# Patient Record
Sex: FEMALE | Race: WHITE | NOT HISPANIC OR LATINO | Employment: OTHER | URBAN - METROPOLITAN AREA
[De-identification: names, ages, dates, MRNs, and addresses within clinical notes are randomized per-mention and may not be internally consistent; named-entity substitution may affect disease eponyms.]

---

## 2017-11-10 ENCOUNTER — HOSPITAL ENCOUNTER (OUTPATIENT)
Dept: RADIOLOGY | Facility: OTHER | Age: 82
Discharge: HOME | End: 2017-11-10
Attending: UROLOGY
Payer: MEDICARE

## 2017-11-10 ENCOUNTER — HOSPITAL ENCOUNTER (OUTPATIENT)
Dept: PREADMISSION TESTING | Facility: OTHER | Age: 82
Discharge: HOME | End: 2017-11-10
Attending: UROLOGY
Payer: MEDICARE

## 2017-11-10 ENCOUNTER — OFFICE VISIT (OUTPATIENT)
Dept: UROLOGY | Facility: CLINIC | Age: 82
End: 2017-11-10
Attending: UROLOGY
Payer: MEDICARE

## 2017-11-10 ENCOUNTER — ANESTHESIA EVENT (OUTPATIENT)
Dept: SURGERY | Facility: OTHER | Age: 82
End: 2017-11-10
Payer: MEDICARE

## 2017-11-10 VITALS
WEIGHT: 150.81 LBS | BODY MASS INDEX: 26.72 KG/M2 | HEIGHT: 63 IN | DIASTOLIC BLOOD PRESSURE: 69 MMHG | HEART RATE: 81 BPM | SYSTOLIC BLOOD PRESSURE: 162 MMHG

## 2017-11-10 VITALS
TEMPERATURE: 98 F | HEART RATE: 77 BPM | WEIGHT: 150 LBS | HEIGHT: 63 IN | OXYGEN SATURATION: 99 % | BODY MASS INDEX: 26.58 KG/M2 | SYSTOLIC BLOOD PRESSURE: 151 MMHG | DIASTOLIC BLOOD PRESSURE: 67 MMHG

## 2017-11-10 DIAGNOSIS — N28.89 URETERAL MASS: ICD-10-CM

## 2017-11-10 DIAGNOSIS — I10 HTN (HYPERTENSION): ICD-10-CM

## 2017-11-10 DIAGNOSIS — N28.89 URETERAL MASS: Primary | ICD-10-CM

## 2017-11-10 DIAGNOSIS — I10 HYPERTENSION, UNSPECIFIED TYPE: ICD-10-CM

## 2017-11-10 LAB
ANION GAP SERPL CALC-SCNC: 9 MMOL/L
BASOPHILS # BLD AUTO: 0.01 K/UL
BASOPHILS NFR BLD: 0.1 %
BILIRUB SERPL-MCNC: ABNORMAL MG/DL
BLOOD URINE, POC: 250
BUN SERPL-MCNC: 22 MG/DL
CALCIUM SERPL-MCNC: 9.5 MG/DL
CHLORIDE SERPL-SCNC: 104 MMOL/L
CO2 SERPL-SCNC: 25 MMOL/L
COLOR, POC UA: ABNORMAL
CREAT SERPL-MCNC: 1.3 MG/DL
DIFFERENTIAL METHOD: ABNORMAL
EOSINOPHIL # BLD AUTO: 0.2 K/UL
EOSINOPHIL NFR BLD: 2.4 %
ERYTHROCYTE [DISTWIDTH] IN BLOOD BY AUTOMATED COUNT: 15.5 %
EST. GFR  (AFRICAN AMERICAN): 43 ML/MIN/1.73 M^2
EST. GFR  (NON AFRICAN AMERICAN): 37 ML/MIN/1.73 M^2
GLUCOSE SERPL-MCNC: 79 MG/DL
GLUCOSE UR QL STRIP: ABNORMAL
HCT VFR BLD AUTO: 35.7 %
HGB BLD-MCNC: 11.5 G/DL
KETONES UR QL STRIP: ABNORMAL
LEUKOCYTE ESTERASE URINE, POC: ABNORMAL
LYMPHOCYTES # BLD AUTO: 1.6 K/UL
LYMPHOCYTES NFR BLD: 20.7 %
MCH RBC QN AUTO: 27.3 PG
MCHC RBC AUTO-ENTMCNC: 32.2 G/DL
MCV RBC AUTO: 85 FL
MONOCYTES # BLD AUTO: 0.6 K/UL
MONOCYTES NFR BLD: 8.6 %
NEUTROPHILS # BLD AUTO: 5.1 K/UL
NEUTROPHILS NFR BLD: 67.9 %
NITRITE, POC UA: ABNORMAL
PH, POC UA: 6
PLATELET # BLD AUTO: 322 K/UL
PMV BLD AUTO: 9.6 FL
POTASSIUM SERPL-SCNC: 3.9 MMOL/L
PROTEIN, POC: ABNORMAL
RBC # BLD AUTO: 4.21 M/UL
SODIUM SERPL-SCNC: 138 MMOL/L
SPECIFIC GRAVITY, POC UA: 1.01
UROBILINOGEN, POC UA: ABNORMAL
WBC # BLD AUTO: 7.48 K/UL

## 2017-11-10 PROCEDURE — 71020 XR CHEST PA AND LATERAL PRE-OP: CPT | Mod: 26,,, | Performed by: RADIOLOGY

## 2017-11-10 PROCEDURE — 85025 COMPLETE CBC W/AUTO DIFF WBC: CPT

## 2017-11-10 PROCEDURE — 99205 OFFICE O/P NEW HI 60 MIN: CPT | Mod: 25,S$GLB,, | Performed by: UROLOGY

## 2017-11-10 PROCEDURE — 87086 URINE CULTURE/COLONY COUNT: CPT

## 2017-11-10 PROCEDURE — 93010 ELECTROCARDIOGRAM REPORT: CPT | Mod: ,,, | Performed by: INTERNAL MEDICINE

## 2017-11-10 PROCEDURE — 93005 ELECTROCARDIOGRAM TRACING: CPT

## 2017-11-10 PROCEDURE — 81002 URINALYSIS NONAUTO W/O SCOPE: CPT | Mod: S$GLB,,, | Performed by: UROLOGY

## 2017-11-10 PROCEDURE — 36415 COLL VENOUS BLD VENIPUNCTURE: CPT

## 2017-11-10 PROCEDURE — 80048 BASIC METABOLIC PNL TOTAL CA: CPT

## 2017-11-10 PROCEDURE — 88112 CYTOPATH CELL ENHANCE TECH: CPT | Performed by: PATHOLOGY

## 2017-11-10 PROCEDURE — 88112 CYTOPATH CELL ENHANCE TECH: CPT | Mod: 26,,, | Performed by: PATHOLOGY

## 2017-11-10 PROCEDURE — 71020 XR CHEST PA AND LATERAL PRE-OP: CPT | Mod: TC

## 2017-11-10 RX ORDER — CLOPIDOGREL BISULFATE 75 MG/1
75 TABLET ORAL DAILY
Status: ON HOLD | COMMUNITY
End: 2017-11-15 | Stop reason: HOSPADM

## 2017-11-10 RX ORDER — NAPROXEN SODIUM 220 MG/1
81 TABLET, FILM COATED ORAL DAILY
Status: ON HOLD | COMMUNITY
End: 2017-11-15 | Stop reason: HOSPADM

## 2017-11-10 RX ORDER — NITROFURANTOIN 25; 75 MG/1; MG/1
100 CAPSULE ORAL DAILY
Status: ON HOLD | COMMUNITY
End: 2017-11-15 | Stop reason: HOSPADM

## 2017-11-10 RX ORDER — LIDOCAINE HYDROCHLORIDE 20 MG/ML
JELLY TOPICAL ONCE
Status: CANCELLED | OUTPATIENT
Start: 2017-11-10 | End: 2017-11-10

## 2017-11-10 RX ORDER — FENOFIBRATE 145 MG/1
145 TABLET, FILM COATED ORAL DAILY
COMMUNITY
End: 2018-05-07 | Stop reason: SDUPTHER

## 2017-11-10 RX ORDER — SODIUM CHLORIDE, SODIUM LACTATE, POTASSIUM CHLORIDE, CALCIUM CHLORIDE 600; 310; 30; 20 MG/100ML; MG/100ML; MG/100ML; MG/100ML
INJECTION, SOLUTION INTRAVENOUS CONTINUOUS
Status: CANCELLED | OUTPATIENT
Start: 2017-11-10

## 2017-11-10 RX ORDER — AMLODIPINE BESYLATE 2.5 MG/1
2.5 TABLET ORAL DAILY
COMMUNITY
End: 2018-04-10 | Stop reason: SDUPTHER

## 2017-11-10 RX ORDER — FAMOTIDINE 20 MG/1
20 TABLET, FILM COATED ORAL
Status: CANCELLED | OUTPATIENT
Start: 2017-11-10 | End: 2017-11-10

## 2017-11-10 NOTE — LETTER
November 10, 2017      MARIA ELENA Larson III, MD  [address]  [address]  [facility] [address]           [facility] - Urology  [address] [place] [address], [address] [address]  Phone: [phone]          Patient: Destinee Luo   MR Number: [id number]   YOB: 1931   Date of Visit: 11/10/2017       Dear Dr. MARIA ELENA Larson III:    Thank you for referring Destinee Luo to me for evaluation. Attached you will find relevant portions of my assessment and plan of care.    If you have questions, please do not hesitate to call me. I look forward to following Destinee Luo along with you.    Sincerely,    Kiet Arellano MD    Enclosure  CC:  No Recipients    If you would like to receive this communication electronically, please contact [email or web address] or [phone] to request more information on [facility] access.    For providers and/or their staff who would like to refer a patient to [facility], please contact us through our one-stop-shop provider referral line, [facility], at [phone].    If you feel you have received this communication in error or would no longer like to receive these types of communications, please e-mail [email or web address]

## 2017-11-10 NOTE — DISCHARGE INSTRUCTIONS
PRE-ADMIT TESTING -  [phone]    [address]  [place] [facility]          Your surgery has been scheduled at [facility]. We are pleased to have the opportunity to serve you. For Further Information please call [phone].    On the day of surgery please report to the Information Desk on the 1st floor.    · CONTACT YOUR PHYSICIAN'S OFFICE THE DAY PRIOR TO YOUR SURGERY TO OBTAIN YOUR ARRIVAL TIME.     · The evening before surgery do not eat anything after 9 p.m. ( this includes hard candy, chewing gum and mints).  You may only have GATORADE, POWERADE AND WATER  from 9 p.m. until you leave your home.   DO NOT DRINK ANY LIQUIDS ON THE WAY TO THE HOSPITAL.      SPECIAL MEDICATION INSTRUCTIONS: TAKE medications checked off by the Anesthesiologist on your Medication List.    Angiogram Patients: Take medications as instructed by your physician, including aspirin.     Surgery Patients:    If you take ASPIRIN - Your PHYSICIAN/SURGEON will need to inform you IF/OR when you need to stop taking aspirin prior to your surgery.     Do Not take any medications containing IBUPROFEN.  Do Not Wear any make-up or dark nail polish   (especially eye make-up) to surgery. If you come to surgery with makeup on you will be required to remove the makeup or nail polish.    Do not shave your surgical area at least 5 days prior to your surgery. The surgical prep will be performed at the hospital according to Infection Control regulations.    Leave all valuables at home.   Do Not wear any jewelry or watches, including any metal in body piercings.  Contact Lens must be removed before surgery. Either do not wear the contact lens or bring a case and solution for storage.  Please bring a container for eyeglasses or dentures as required.  Bring any paperwork your physician has provided, such as consent forms,  history and physicals, doctor's orders, etc.   Bring comfortable clothes that are loose fitting to wear upon  discharge. Take into consideration the type of surgery being performed.  Maintain your diet as advised per your physician the day prior to surgery.      Adequate rest the night before surgery is advised.   Park in the Parking lot behind the hospital or in the [facility] across the street from the parking lot. Parking is complimentary.  If you will be discharged the same day as your procedure, please arrange for a responsible adult to drive you home or to accompany you if traveling by taxi.   YOU WILL NOT BE PERMITTED TO DRIVE OR TO LEAVE THE HOSPITAL ALONE AFTER SURGERY.   It is strongly recommended that you arrange for someone to remain with you for the first 24 hrs following your surgery.       Thank you for your cooperation.  The Staff of [facility].        Bathing Instructions                                                                 Please shower the evening before and morning of your procedure with    ANTIBACTERIAL SOAP. ( DIAL, etc )  Concentrate on the surgical area   for at least 3 minutes and rinse completely. Dry off as usual.   Do not use any deodorant, powder, body lotions, perfume, after shave or    cologne.

## 2017-11-10 NOTE — ANESTHESIA PREPROCEDURE EVALUATION
11/10/2017  Destinee Luo is a 85 y.o., female.    Anesthesia Evaluation    I have reviewed the Patient Summary Reports.    I have reviewed the Nursing Notes.   I have reviewed the Medications.     Review of Systems  Anesthesia Hx:  No problems with previous Anesthesia  Denies Family Hx of Anesthesia complications.    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    Endocrine:  Endocrine Normal        Physical Exam   Airway/Jaw/Neck:  Airway Findings: Mouth Opening: Normal Tongue: Normal  General Airway Assessment: Adult  Mallampati: I         Dental:  Dental Findings: In tact             Anesthesia Plan  Type of Anesthesia, risks & benefits discussed:  Anesthesia Type:  general  Patient's Preference:   Intra-op Monitoring Plan: standard ASA monitors  Intra-op Monitoring Plan Comments:   Post Op Pain Control Plan:   Post Op Pain Control Plan Comments:   Induction:   IV  Beta Blocker:         Informed Consent: Patient understands risks and agrees with Anesthesia plan.  Questions answered. Anesthesia consent signed with patient.  ASA Score: 2     Day of Surgery Review of History & Physical:    H&P update referred to the surgeon.         Ready For Surgery From Anesthesia Perspective.

## 2017-11-10 NOTE — PROGRESS NOTES
[facility] Urolog  Urology  History & Physical    Patient Name: Destinee Luo  MRN: [id number]  Admission Date: (Not on file)  Code Status: [unfilled]   Attending Provider: Kiet Arellano MD   Primary Care Physician: Primary Doctor No  Principal Problem:[unfilled]    Subjective:     HPI:   85-year-old female referred by Dr. Joseph Larson.  She has had bleeding into her underwear for quite a few months.  It is not clear if this is vaginal or in the urine.  She tells me she was evaluated by her gynecologist and he did not find a source.  She recently saw Dr. Dorian Larson, urology.  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  He performed a retrograde pyelogram and was not able to get a stent up the right ureter.  She is not symptomatic.  She presents for further evaluation.  No symptoms of metastatic disease.  Otherwise she feels well.    She stopped her Plavix about 2 days ago with the permission of her cardiologist.  She is on this for carotid stenosis.  She does not have any stents and is not having any chest pain or CVA type symptoms.  She had a TIA in the past    No past medical history on file.    No past surgical history on file.    Review of patient's allergies indicates:   Allergen Reactions    Codeine Anxiety       Family History     None          Social History Main Topics    Smoking status: Never Smoker    Smokeless tobacco: Never Used    Alcohol use Not on file    Drug use: Unknown    Sexual activity: Not on file       Review of Systems    Objective:     [unfilled]     Body mass index is 26.71 kg/m².    [unfilled]       Lines/Drains/Airways          No matching active lines, drains, or airways          Physical Exam    Ao*4  resp normal rate' breathing not labored; clear to auscultation bilateral  cv normal rate; regular rate and rhythm  Ab nondistended  Ext no edema  No inguinal adenopathy          Significant Imaging:    Ultrasound and  retrograde pyelogram show right hydronephrosis with right ureteral mass.  Reports reviewed images not available    Assessment and Plan:     There are no hospital problems to display for this patient.    Right ureteral mass    Chest x-ray, CBC, CMP, urine cytology, CT urogram  We will schedule right ureteroscopy and biopsy.  I explained that a nephrostomy tube may be necessary as a second procedure if we are unable to obtain access to the right kidney    We discussed all the risk and benefits and alternatives and I answered all of her questions and her family's questions.  They would like to proceed as above.  We will tentatively schedule ureteroscopy for next Wednesday    60 min face to face; more than 50% time spent counseling and coordinating care        VTE Risk Mitigation     None          Kiet Arellano MD  Urology  [facility]  Urology

## 2017-11-11 LAB — BACTERIA UR CULT: NO GROWTH

## 2017-11-13 ENCOUNTER — HOSPITAL ENCOUNTER (OUTPATIENT)
Dept: RADIOLOGY | Facility: HOSPITAL | Age: 82
Discharge: HOME | End: 2017-11-13
Attending: UROLOGY
Payer: MEDICARE

## 2017-11-13 DIAGNOSIS — N28.89 URETERAL MASS: ICD-10-CM

## 2017-11-13 PROCEDURE — 74178 CT ABD&PLV WO CNTR FLWD CNTR: CPT | Mod: 26,,, | Performed by: RADIOLOGY

## 2017-11-13 PROCEDURE — 25500020 PHARM REV CODE 255: Mod: PO | Performed by: UROLOGY

## 2017-11-13 PROCEDURE — 74178 CT ABD&PLV WO CNTR FLWD CNTR: CPT | Mod: TC,PO

## 2017-11-13 RX ADMIN — IOHEXOL 125 ML: 350 INJECTION, SOLUTION INTRAVENOUS at 10:11

## 2017-11-15 ENCOUNTER — ANESTHESIA (OUTPATIENT)
Dept: SURGERY | Facility: OTHER | Age: 82
End: 2017-11-15
Payer: MEDICARE

## 2017-11-15 ENCOUNTER — TELEPHONE (OUTPATIENT)
Dept: UROLOGY | Facility: CLINIC | Age: 82
End: 2017-11-15

## 2017-11-15 ENCOUNTER — HOSPITAL ENCOUNTER (OUTPATIENT)
Facility: OTHER | Age: 82
Discharge: HOME | End: 2017-11-15
Attending: UROLOGY | Admitting: UROLOGY
Payer: MEDICARE

## 2017-11-15 VITALS
SYSTOLIC BLOOD PRESSURE: 149 MMHG | WEIGHT: 150 LBS | HEIGHT: 63 IN | HEART RATE: 84 BPM | BODY MASS INDEX: 26.58 KG/M2 | RESPIRATION RATE: 16 BRPM | TEMPERATURE: 98 F | DIASTOLIC BLOOD PRESSURE: 69 MMHG | OXYGEN SATURATION: 93 %

## 2017-11-15 DIAGNOSIS — N28.89 URETERAL MASS: ICD-10-CM

## 2017-11-15 DIAGNOSIS — N13.6 PYONEPHROSIS: Primary | ICD-10-CM

## 2017-11-15 LAB
GRAM STN SPEC: NORMAL
GRAM STN SPEC: NORMAL

## 2017-11-15 PROCEDURE — 88112 CYTOPATH CELL ENHANCE TECH: CPT | Mod: 26,,, | Performed by: PATHOLOGY

## 2017-11-15 PROCEDURE — C2617 STENT, NON-COR, TEM W/O DEL: HCPCS | Performed by: UROLOGY

## 2017-11-15 PROCEDURE — 87116 MYCOBACTERIA CULTURE: CPT

## 2017-11-15 PROCEDURE — 88112 CYTOPATH CELL ENHANCE TECH: CPT | Performed by: PATHOLOGY

## 2017-11-15 PROCEDURE — 74420 UROGRAPHY RTRGR +-KUB: CPT | Mod: 26,,, | Performed by: UROLOGY

## 2017-11-15 PROCEDURE — 25000003 PHARM REV CODE 250: Performed by: ANESTHESIOLOGY

## 2017-11-15 PROCEDURE — 63600175 PHARM REV CODE 636 W HCPCS: Performed by: UROLOGY

## 2017-11-15 PROCEDURE — 36000707: Performed by: UROLOGY

## 2017-11-15 PROCEDURE — 25000003 PHARM REV CODE 250: Performed by: NURSE ANESTHETIST, CERTIFIED REGISTERED

## 2017-11-15 PROCEDURE — 63600175 PHARM REV CODE 636 W HCPCS: Performed by: NURSE ANESTHETIST, CERTIFIED REGISTERED

## 2017-11-15 PROCEDURE — 87205 SMEAR GRAM STAIN: CPT

## 2017-11-15 PROCEDURE — C1758 CATHETER, URETERAL: HCPCS | Performed by: UROLOGY

## 2017-11-15 PROCEDURE — 37000009 HC ANESTHESIA EA ADD 15 MINS: Performed by: UROLOGY

## 2017-11-15 PROCEDURE — 36000706: Performed by: UROLOGY

## 2017-11-15 PROCEDURE — 71000016 HC POSTOP RECOV ADDL HR: Performed by: UROLOGY

## 2017-11-15 PROCEDURE — 71000033 HC RECOVERY, INTIAL HOUR: Performed by: UROLOGY

## 2017-11-15 PROCEDURE — 87075 CULTR BACTERIA EXCEPT BLOOD: CPT

## 2017-11-15 PROCEDURE — 37000008 HC ANESTHESIA 1ST 15 MINUTES: Performed by: UROLOGY

## 2017-11-15 PROCEDURE — C1769 GUIDE WIRE: HCPCS | Performed by: UROLOGY

## 2017-11-15 PROCEDURE — 25500020 PHARM REV CODE 255: Performed by: UROLOGY

## 2017-11-15 PROCEDURE — 52332 CYSTOSCOPY AND TREATMENT: CPT | Mod: RT,,, | Performed by: UROLOGY

## 2017-11-15 PROCEDURE — 87070 CULTURE OTHR SPECIMN AEROBIC: CPT

## 2017-11-15 PROCEDURE — 71000015 HC POSTOP RECOV 1ST HR: Performed by: UROLOGY

## 2017-11-15 PROCEDURE — 87102 FUNGUS ISOLATION CULTURE: CPT

## 2017-11-15 PROCEDURE — 63600175 PHARM REV CODE 636 W HCPCS: Performed by: ANESTHESIOLOGY

## 2017-11-15 DEVICE — STENT URETERAL UNIV 6FR 24CM: Type: IMPLANTABLE DEVICE | Site: KIDNEY | Status: FUNCTIONAL

## 2017-11-15 RX ORDER — ACETAMINOPHEN 10 MG/ML
INJECTION, SOLUTION INTRAVENOUS
Status: DISCONTINUED | OUTPATIENT
Start: 2017-11-15 | End: 2017-11-15

## 2017-11-15 RX ORDER — SODIUM CHLORIDE 9 MG/ML
INJECTION, SOLUTION INTRAVENOUS CONTINUOUS
Status: DISCONTINUED | OUTPATIENT
Start: 2017-11-15 | End: 2017-11-15 | Stop reason: HOSPADM

## 2017-11-15 RX ORDER — FENTANYL CITRATE 50 UG/ML
25 INJECTION, SOLUTION INTRAMUSCULAR; INTRAVENOUS EVERY 5 MIN PRN
Status: DISCONTINUED | OUTPATIENT
Start: 2017-11-15 | End: 2017-11-15 | Stop reason: HOSPADM

## 2017-11-15 RX ORDER — DEXAMETHASONE SODIUM PHOSPHATE 4 MG/ML
INJECTION, SOLUTION INTRA-ARTICULAR; INTRALESIONAL; INTRAMUSCULAR; INTRAVENOUS; SOFT TISSUE
Status: DISCONTINUED | OUTPATIENT
Start: 2017-11-15 | End: 2017-11-15

## 2017-11-15 RX ORDER — SODIUM CHLORIDE, SODIUM LACTATE, POTASSIUM CHLORIDE, CALCIUM CHLORIDE 600; 310; 30; 20 MG/100ML; MG/100ML; MG/100ML; MG/100ML
INJECTION, SOLUTION INTRAVENOUS CONTINUOUS
Status: DISCONTINUED | OUTPATIENT
Start: 2017-11-15 | End: 2017-11-15 | Stop reason: HOSPADM

## 2017-11-15 RX ORDER — FAMOTIDINE 20 MG/1
20 TABLET, FILM COATED ORAL
Status: COMPLETED | OUTPATIENT
Start: 2017-11-15 | End: 2017-11-15

## 2017-11-15 RX ORDER — LIDOCAINE HCL/PF 100 MG/5ML
SYRINGE (ML) INTRAVENOUS
Status: DISCONTINUED | OUTPATIENT
Start: 2017-11-15 | End: 2017-11-15

## 2017-11-15 RX ORDER — SODIUM CHLORIDE 0.9 % (FLUSH) 0.9 %
3 SYRINGE (ML) INJECTION
Status: DISCONTINUED | OUTPATIENT
Start: 2017-11-15 | End: 2017-11-15 | Stop reason: HOSPADM

## 2017-11-15 RX ORDER — PROPOFOL 10 MG/ML
VIAL (ML) INTRAVENOUS
Status: DISCONTINUED | OUTPATIENT
Start: 2017-11-15 | End: 2017-11-15

## 2017-11-15 RX ORDER — HYDROMORPHONE HYDROCHLORIDE 2 MG/ML
0.4 INJECTION, SOLUTION INTRAMUSCULAR; INTRAVENOUS; SUBCUTANEOUS EVERY 5 MIN PRN
Status: DISCONTINUED | OUTPATIENT
Start: 2017-11-15 | End: 2017-11-15 | Stop reason: HOSPADM

## 2017-11-15 RX ORDER — OXYCODONE HYDROCHLORIDE 5 MG/1
5 TABLET ORAL
Status: DISCONTINUED | OUTPATIENT
Start: 2017-11-15 | End: 2017-11-15 | Stop reason: HOSPADM

## 2017-11-15 RX ORDER — MEPERIDINE HYDROCHLORIDE 50 MG/ML
12.5 INJECTION INTRAMUSCULAR; INTRAVENOUS; SUBCUTANEOUS ONCE AS NEEDED
Status: DISCONTINUED | OUTPATIENT
Start: 2017-11-15 | End: 2017-11-15 | Stop reason: HOSPADM

## 2017-11-15 RX ORDER — FLUCONAZOLE 2 MG/ML
200 INJECTION, SOLUTION INTRAVENOUS
Status: DISCONTINUED | OUTPATIENT
Start: 2017-11-15 | End: 2017-11-15 | Stop reason: HOSPADM

## 2017-11-15 RX ORDER — LIDOCAINE HYDROCHLORIDE 20 MG/ML
JELLY TOPICAL ONCE
Status: DISCONTINUED | OUTPATIENT
Start: 2017-11-15 | End: 2017-11-15 | Stop reason: HOSPADM

## 2017-11-15 RX ORDER — ONDANSETRON 2 MG/ML
4 INJECTION INTRAMUSCULAR; INTRAVENOUS DAILY PRN
Status: DISCONTINUED | OUTPATIENT
Start: 2017-11-15 | End: 2017-11-15 | Stop reason: HOSPADM

## 2017-11-15 RX ORDER — FLUCONAZOLE 100 MG/1
100 TABLET ORAL DAILY
Qty: 14 TABLET | Refills: 0 | Status: SHIPPED | OUTPATIENT
Start: 2017-11-15 | End: 2017-11-29

## 2017-11-15 RX ORDER — AMOXICILLIN AND CLAVULANATE POTASSIUM 875; 125 MG/1; MG/1
1 TABLET, FILM COATED ORAL 2 TIMES DAILY
Qty: 28 TABLET | Refills: 0 | Status: SHIPPED | OUTPATIENT
Start: 2017-11-15 | End: 2017-12-01

## 2017-11-15 RX ORDER — ONDANSETRON 2 MG/ML
INJECTION INTRAMUSCULAR; INTRAVENOUS
Status: DISCONTINUED | OUTPATIENT
Start: 2017-11-15 | End: 2017-11-15

## 2017-11-15 RX ORDER — CEFAZOLIN SODIUM 2 G/50ML
2 SOLUTION INTRAVENOUS
Status: COMPLETED | OUTPATIENT
Start: 2017-11-15 | End: 2017-11-15

## 2017-11-15 RX ORDER — FENTANYL CITRATE 50 UG/ML
INJECTION, SOLUTION INTRAMUSCULAR; INTRAVENOUS
Status: DISCONTINUED | OUTPATIENT
Start: 2017-11-15 | End: 2017-11-15

## 2017-11-15 RX ADMIN — PROPOFOL 140 MG: 10 INJECTION, EMULSION INTRAVENOUS at 10:11

## 2017-11-15 RX ADMIN — FENTANYL CITRATE 50 MCG: 50 INJECTION, SOLUTION INTRAMUSCULAR; INTRAVENOUS at 10:11

## 2017-11-15 RX ADMIN — OXYCODONE HYDROCHLORIDE 5 MG: 5 TABLET ORAL at 11:11

## 2017-11-15 RX ADMIN — ONDANSETRON 4 MG: 2 INJECTION INTRAMUSCULAR; INTRAVENOUS at 12:11

## 2017-11-15 RX ADMIN — PROMETHAZINE HYDROCHLORIDE 6.25 MG: 25 INJECTION INTRAMUSCULAR; INTRAVENOUS at 12:11

## 2017-11-15 RX ADMIN — FLUCONAZOLE 200 MG: 200 INJECTION, SOLUTION INTRAVENOUS at 10:11

## 2017-11-15 RX ADMIN — LIDOCAINE HYDROCHLORIDE 50 MG: 20 INJECTION, SOLUTION INTRAVENOUS at 10:11

## 2017-11-15 RX ADMIN — CARBOXYMETHYLCELLULOSE SODIUM 2 DROP: 2.5 SOLUTION/ DROPS OPHTHALMIC at 10:11

## 2017-11-15 RX ADMIN — CEFAZOLIN SODIUM 2 G: 2 SOLUTION INTRAVENOUS at 10:11

## 2017-11-15 RX ADMIN — FAMOTIDINE 20 MG: 20 TABLET, FILM COATED ORAL at 08:11

## 2017-11-15 RX ADMIN — DEXAMETHASONE SODIUM PHOSPHATE 8 MG: 4 INJECTION, SOLUTION INTRAMUSCULAR; INTRAVENOUS at 10:11

## 2017-11-15 RX ADMIN — ACETAMINOPHEN 1000 MG: 10 INJECTION, SOLUTION INTRAVENOUS at 10:11

## 2017-11-15 RX ADMIN — ONDANSETRON 4 MG: 2 INJECTION INTRAMUSCULAR; INTRAVENOUS at 10:11

## 2017-11-15 RX ADMIN — SODIUM CHLORIDE, SODIUM LACTATE, POTASSIUM CHLORIDE, AND CALCIUM CHLORIDE: 600; 310; 30; 20 INJECTION, SOLUTION INTRAVENOUS at 09:11

## 2017-11-15 NOTE — TELEPHONE ENCOUNTER
----- Message from Luz Plaza sent at 11/15/2017  4:09 PM CST -----  Contact: Dayana ayala 1st Request  _ 2nd Request  _ 3rd Request    Who:Ara pt granddaughter     Why: Ara is calling in regards to a ultra sound order for pt. Please call and advise.     What Number to Call Back: [phone]     When to Expect a call back: (Before the end of the day)  -- if call after 3:00 call back will be tomorrow.

## 2017-11-15 NOTE — H&P (VIEW-ONLY)
[facility] Urolog  Urology  History & Physical    Patient Name: Destinee Luo  MRN: [id number]  Admission Date: (Not on file)  Code Status: [unfilled]   Attending Provider: Kiet Arellano MD   Primary Care Physician: Primary Doctor No  Principal Problem:[unfilled]    Subjective:     HPI:   85-year-old female referred by Dr. Joseph Larson.  She has had bleeding into her underwear for quite a few months.  It is not clear if this is vaginal or in the urine.  She tells me she was evaluated by her gynecologist and he did not find a source.  She recently saw Dr. Dorian Larson, urology.  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  He performed a retrograde pyelogram and was not able to get a stent up the right ureter.  She is not symptomatic.  She presents for further evaluation.  No symptoms of metastatic disease.  Otherwise she feels well.    She stopped her Plavix about 2 days ago with the permission of her cardiologist.  She is on this for carotid stenosis.  She does not have any stents and is not having any chest pain or CVA type symptoms.  She had a TIA in the past    No past medical history on file.    No past surgical history on file.    Review of patient's allergies indicates:   Allergen Reactions    Codeine Anxiety       Family History     None          Social History Main Topics    Smoking status: Never Smoker    Smokeless tobacco: Never Used    Alcohol use Not on file    Drug use: Unknown    Sexual activity: Not on file       Review of Systems    Objective:     [unfilled]     Body mass index is 26.71 kg/m².    [unfilled]       Lines/Drains/Airways          No matching active lines, drains, or airways          Physical Exam    Ao*4  resp normal rate' breathing not labored; clear to auscultation bilateral  cv normal rate; regular rate and rhythm  Ab nondistended  Ext no edema  No inguinal adenopathy          Significant Imaging:    Ultrasound and  retrograde pyelogram show right hydronephrosis with right ureteral mass.  Reports reviewed images not available    Assessment and Plan:     There are no hospital problems to display for this patient.    Right ureteral mass    Chest x-ray, CBC, CMP, urine cytology, CT urogram  We will schedule right ureteroscopy and biopsy.  I explained that a nephrostomy tube may be necessary as a second procedure if we are unable to obtain access to the right kidney    We discussed all the risk and benefits and alternatives and I answered all of her questions and her family's questions.  They would like to proceed as above.  We will tentatively schedule ureteroscopy for next Wednesday    60 min face to face; more than 50% time spent counseling and coordinating care        VTE Risk Mitigation     None          Kiet Arellano MD  Urology  [facility]  Urology

## 2017-11-15 NOTE — PLAN OF CARE
Destinee Luo has met all discharge criteria from Phase II. Vital Signs are stable, ambulating  without difficulty. Discharge instructions given, patient verbalized understanding. Discharged from facility via wheelchair in stable condition.

## 2017-11-15 NOTE — INTERVAL H&P NOTE
The patient has been examined and the H&P has been reviewed:    cs neg; cytology shows yeast not no malignant cells; will give diflucan    Anesthesia/Surgery risks, benefits and alternative options discussed and understood by patient/family.          Active Hospital Problems    Diagnosis  POA    Ureteral mass [N28.9]  Yes      Resolved Hospital Problems    Diagnosis Date Resolved POA   No resolved problems to display.

## 2017-11-15 NOTE — TRANSFER OF CARE
"Anesthesia Transfer of Care Note    Patient: Destinee CARDONA Major    Procedure(s) Performed: Procedure(s) (LRB):  URETEROSCOPY; biopsy (Right)  CYSTOSCOPY/ RETROGRADE PYELOGRAM/ STENT PLACEMENT/STENT EXCHANGE (Right)    Patient location: PACU    Anesthesia Type: general    Transport from OR: Transported from OR on 2-3 L/min O2 by NC with adequate spontaneous ventilation    Post pain: adequate analgesia    Post assessment: no apparent anesthetic complications and tolerated procedure well    Post vital signs: stable    Level of consciousness: awake, alert and oriented    Nausea/Vomiting: no nausea/vomiting    Complications: none    Transfer of care protocol was followed      Last vitals:   Visit Vitals  BP (!) 170/77 (BP Location: Left arm, Patient Position: Sitting)   Pulse 80   Temp 36.6 °C (97.8 °F) (Oral)   Resp 18   Ht 5' 3" (1.6 m)   Wt 68 kg (150 lb)   SpO2 100%   BMI 26.57 kg/m²     "

## 2017-11-15 NOTE — OP NOTE
DATE OF PROCEDURE:  11/15/2017.    SURGEON:  Kiet Arellano M.D.    PROCEDURE PERFORMED:  Cystoscopy with right retrograde pyelogram, right ureteral   stent placement and cultures from right kidney.    PREOPERATIVE DIAGNOSES:  1.  Right ureteral mass.  2.  Ovarian lesions.    POSTOPERATIVE DIAGNOSES:  1.  Right ureteral mass.  2.  Ovarian lesions.  3.  Pyonephrosis present on admission.    ANESTHESIA:  MAC.    COMPLICATIONS:  None.    HISTORY:  Ms. Luo is an 85-year-old woman who has had some bleeding.  It is   not clear if the bleeding is urinary or vaginal.  She has seen her gynecologist   and he did not find a vaginal source for her bleeding.  Imaging reveals a mass   in the right distal ureter with hydronephrosis.  Stent placement was   unsuccessful and she was referred by Dr. Joseph Larson for further evaluation and   treatment.  Urine cytology showed yeast, but surprisingly no malignant cells.    The left upper tracts were unremarkable.  Preop urine culture is negative.  She   presents for ureteroscopy and biopsy of the right ureteral mass.  Informed   consent is obtained.    PROCEDURE IN DETAIL:  Preop Diflucan and Ancef were administered.  TEDs and SCDs   were applied.  The patient was brought to Cysto and undergoes LMA anesthesia.    Sterile prep and drape was performed.  She was placed in the dorsal lithotomy   position.  Cystoscopy was performed with 30 and 70 degree lenses.  The bladder   was unremarkable.  There were no tumors and no stones in the bladder.  Both   ureteral orifices are in the anticipated locations.  A right retrograde   pyelogram was performed with the cone tip catheter.  There was an obstructive lesion in the ureter aproximately 5 cm proximal to the ureteral orifice.  There   does appear to be intrinsic obstruction of the right ureter with a filling defect.  The proximal   ureter was severely dilated.  The open-ended catheter was advanced after   removing the cone tip catheter.  A  [employer] sensor wires advanced   beyond the obstruction.  After performing this, there was a large amount of   gross purulent material expressed.  The open-ended catheter was advanced into   the kidney.  The wire was removed and a large amount of purulent urine is   drained from the kidney.  This was sent for urine culture including AFB and   fungal cultures and cytology.  I did not think it was safe to proceed with the   ureteroscopy at this time.  Therefore, we placed the stent and will reschedule   ureteroscopy next week after she was treated with antibiotics and antifungals.    A 6-Serbian 24 cm double-J ureteral stent was placed over a wire.  The stent   coils well in the area of the kidney and in the bladder.  There is brisk efflux   of the side holes of the stent.  The bladder was drained.  The scope was   removed.  Bimanual examination was performed.  There was a pessary in place.  No   masses are palpable on examination.  The patient tolerated the procedure well.    I will order a pelvic ultrasound to follow up on the ovarian lesions that were   identified.  I did review this with one of our GYN and oncologist and they were   not particularly concerned about the ovarian lesions.  She will return next   Wednesday for ureteroscopy.  We will keep her on Augmentin and Diflucan until   that time.      CHARLES/BLAKE  dd: 11/15/2017 11:17:21 (CST)  td: 11/15/2017 11:59:35 (CST)  Doc ID   #[id number]  Job ID #[id number]    CC:

## 2017-11-15 NOTE — ANESTHESIA POSTPROCEDURE EVALUATION
"Anesthesia Post Evaluation    Patient: Destinee CARDONA Major    Procedure(s) Performed: Procedure(s) (LRB):  URETEROSCOPY; biopsy (Right)  CYSTOSCOPY/ RETROGRADE PYELOGRAM/ STENT PLACEMENT/STENT EXCHANGE (Right)    Final Anesthesia Type: general  Patient location during evaluation: PACU  Patient participation: Yes- Able to Participate  Level of consciousness: awake and alert  Post-procedure vital signs: reviewed and stable  Pain management: adequate  Airway patency: patent  PONV status at discharge: No PONV  Anesthetic complications: no      Cardiovascular status: blood pressure returned to baseline  Respiratory status: unassisted and spontaneous ventilation  Hydration status: euvolemic  Follow-up not needed.        Visit Vitals  BP (!) 150/76   Pulse 80   Temp 36.6 °C (97.9 °F) (Oral)   Resp 16   Ht 5' 3" (1.6 m)   Wt 68 kg (150 lb)   SpO2 95%   BMI 26.57 kg/m²       Pain/Anabella Score: Pain Assessment Performed: Yes (11/15/2017 12:05 PM)  Presence of Pain: denies (11/15/2017 12:05 PM)  Pain Rating Prior to Med Admin: 0 (11/15/2017 11:27 AM)  Pain Rating Post Med Admin: 0 (11/15/2017 11:27 AM)  Anabella Score: 10 (11/15/2017 11:35 AM)      "

## 2017-11-15 NOTE — DISCHARGE INSTRUCTIONS

## 2017-11-16 ENCOUNTER — TELEPHONE (OUTPATIENT)
Dept: UROLOGY | Facility: CLINIC | Age: 82
End: 2017-11-16

## 2017-11-16 DIAGNOSIS — N13.6 PYONEPHROSIS: Primary | ICD-10-CM

## 2017-11-16 NOTE — TELEPHONE ENCOUNTER
----- Message from Jv Sierra sent at 11/16/2017 10:14 AM CST -----  Contact: Pt Ara Ludwig   x_  1st Request  _  2nd Request  _  3rd Request        Who: Pt Ara Ludwig     Why: Requesting a call back in regards to speaking with gio about scheduling concerns.   Please return the call at earliest convenience to discuss further. Thanks!    What Number to Call Back: ara @ [phone]     When to Expect a call back: (Within 24 hours)

## 2017-11-17 ENCOUNTER — HOSPITAL ENCOUNTER (OUTPATIENT)
Dept: RADIOLOGY | Facility: HOSPITAL | Age: 82
Discharge: HOME | End: 2017-11-17
Attending: UROLOGY
Payer: MEDICARE

## 2017-11-17 DIAGNOSIS — N13.6 PYONEPHROSIS: ICD-10-CM

## 2017-11-17 PROCEDURE — 76856 US EXAM PELVIC COMPLETE: CPT | Mod: TC,PO

## 2017-11-17 PROCEDURE — 76830 TRANSVAGINAL US NON-OB: CPT | Mod: 26,,, | Performed by: RADIOLOGY

## 2017-11-17 PROCEDURE — 76856 US EXAM PELVIC COMPLETE: CPT | Mod: 26,,, | Performed by: RADIOLOGY

## 2017-11-18 LAB — BACTERIA SPEC AEROBE CULT: NO GROWTH

## 2017-11-22 ENCOUNTER — ANESTHESIA (OUTPATIENT)
Dept: SURGERY | Facility: OTHER | Age: 82
DRG: 744 | End: 2017-11-22
Payer: MEDICARE

## 2017-11-22 ENCOUNTER — ANESTHESIA EVENT (OUTPATIENT)
Dept: SURGERY | Facility: OTHER | Age: 82
DRG: 744 | End: 2017-11-22
Payer: MEDICARE

## 2017-11-22 ENCOUNTER — HOSPITAL ENCOUNTER (INPATIENT)
Facility: OTHER | Age: 82
LOS: 1 days | Discharge: HOME | DRG: 744 | End: 2017-11-22
Attending: UROLOGY | Admitting: UROLOGY
Payer: MEDICARE

## 2017-11-22 VITALS
TEMPERATURE: 98 F | BODY MASS INDEX: 26.58 KG/M2 | OXYGEN SATURATION: 100 % | HEART RATE: 79 BPM | RESPIRATION RATE: 16 BRPM | SYSTOLIC BLOOD PRESSURE: 182 MMHG | HEIGHT: 63 IN | DIASTOLIC BLOOD PRESSURE: 72 MMHG | WEIGHT: 150 LBS

## 2017-11-22 DIAGNOSIS — N28.89 URETERAL MASS: ICD-10-CM

## 2017-11-22 LAB — BACTERIA SPEC ANAEROBE CULT: NORMAL

## 2017-11-22 PROCEDURE — 25500020 PHARM REV CODE 255: Performed by: UROLOGY

## 2017-11-22 PROCEDURE — 37000008 HC ANESTHESIA 1ST 15 MINUTES: Performed by: UROLOGY

## 2017-11-22 PROCEDURE — 71000015 HC POSTOP RECOV 1ST HR: Performed by: UROLOGY

## 2017-11-22 PROCEDURE — 0T768DZ DILATION OF RIGHT URETER WITH INTRALUMINAL DEVICE, VIA NATURAL OR ARTIFICIAL OPENING ENDOSCOPIC: ICD-10-PCS | Performed by: UROLOGY

## 2017-11-22 PROCEDURE — 52332 CYSTOSCOPY AND TREATMENT: CPT | Mod: 51,RT,, | Performed by: UROLOGY

## 2017-11-22 PROCEDURE — BT1D1ZZ FLUOROSCOPY OF RIGHT KIDNEY, URETER AND BLADDER USING LOW OSMOLAR CONTRAST: ICD-10-PCS | Performed by: UROLOGY

## 2017-11-22 PROCEDURE — 71000016 HC POSTOP RECOV ADDL HR: Performed by: UROLOGY

## 2017-11-22 PROCEDURE — 88305 TISSUE EXAM BY PATHOLOGIST: CPT | Mod: 26,,, | Performed by: PATHOLOGY

## 2017-11-22 PROCEDURE — 0TB68ZX EXCISION OF RIGHT URETER, VIA NATURAL OR ARTIFICIAL OPENING ENDOSCOPIC, DIAGNOSTIC: ICD-10-PCS | Performed by: UROLOGY

## 2017-11-22 PROCEDURE — 36000706: Performed by: UROLOGY

## 2017-11-22 PROCEDURE — 74420 UROGRAPHY RTRGR +-KUB: CPT | Mod: 26,,, | Performed by: UROLOGY

## 2017-11-22 PROCEDURE — 63600175 PHARM REV CODE 636 W HCPCS: Performed by: NURSE ANESTHETIST, CERTIFIED REGISTERED

## 2017-11-22 PROCEDURE — 71000033 HC RECOVERY, INTIAL HOUR: Performed by: UROLOGY

## 2017-11-22 PROCEDURE — C2617 STENT, NON-COR, TEM W/O DEL: HCPCS | Performed by: UROLOGY

## 2017-11-22 PROCEDURE — 36000707: Performed by: UROLOGY

## 2017-11-22 PROCEDURE — C1769 GUIDE WIRE: HCPCS | Performed by: UROLOGY

## 2017-11-22 PROCEDURE — C1758 CATHETER, URETERAL: HCPCS | Performed by: UROLOGY

## 2017-11-22 PROCEDURE — 37000009 HC ANESTHESIA EA ADD 15 MINS: Performed by: UROLOGY

## 2017-11-22 PROCEDURE — C1773 RET DEV, INSERTABLE: HCPCS | Performed by: UROLOGY

## 2017-11-22 PROCEDURE — 0UDB7ZX EXTRACTION OF ENDOMETRIUM, VIA NATURAL OR ARTIFICIAL OPENING, DIAGNOSTIC: ICD-10-PCS | Performed by: OBSTETRICS & GYNECOLOGY

## 2017-11-22 PROCEDURE — 25000003 PHARM REV CODE 250: Performed by: NURSE ANESTHETIST, CERTIFIED REGISTERED

## 2017-11-22 PROCEDURE — 27201423 OPTIME MED/SURG SUP & DEVICES STERILE SUPPLY: Performed by: UROLOGY

## 2017-11-22 PROCEDURE — 12000002 HC ACUTE/MED SURGE SEMI-PRIVATE ROOM

## 2017-11-22 PROCEDURE — 88305 TISSUE EXAM BY PATHOLOGIST: CPT | Mod: 59 | Performed by: PATHOLOGY

## 2017-11-22 PROCEDURE — 52354 CYSTOURETERO W/BIOPSY: CPT | Mod: RT,,, | Performed by: UROLOGY

## 2017-11-22 PROCEDURE — 25000003 PHARM REV CODE 250: Performed by: ANESTHESIOLOGY

## 2017-11-22 PROCEDURE — 0TP98DZ REMOVAL OF INTRALUMINAL DEVICE FROM URETER, VIA NATURAL OR ARTIFICIAL OPENING ENDOSCOPIC: ICD-10-PCS | Performed by: UROLOGY

## 2017-11-22 DEVICE — STENT URETERAL UNIV 6FR 24CM: Type: IMPLANTABLE DEVICE | Site: URETER | Status: FUNCTIONAL

## 2017-11-22 RX ORDER — MEPERIDINE HYDROCHLORIDE 50 MG/ML
12.5 INJECTION INTRAMUSCULAR; INTRAVENOUS; SUBCUTANEOUS ONCE AS NEEDED
Status: ACTIVE | OUTPATIENT
Start: 2017-11-22 | End: 2017-11-22

## 2017-11-22 RX ORDER — SODIUM CHLORIDE 0.9 % (FLUSH) 0.9 %
3 SYRINGE (ML) INJECTION
Status: DISCONTINUED | OUTPATIENT
Start: 2017-11-22 | End: 2017-11-27 | Stop reason: HOSPADM

## 2017-11-22 RX ORDER — CIPROFLOXACIN 2 MG/ML
INJECTION, SOLUTION INTRAVENOUS
Status: DISCONTINUED | OUTPATIENT
Start: 2017-11-22 | End: 2017-11-22

## 2017-11-22 RX ORDER — DEXAMETHASONE SODIUM PHOSPHATE 4 MG/ML
INJECTION, SOLUTION INTRA-ARTICULAR; INTRALESIONAL; INTRAMUSCULAR; INTRAVENOUS; SOFT TISSUE
Status: DISCONTINUED | OUTPATIENT
Start: 2017-11-22 | End: 2017-11-22

## 2017-11-22 RX ORDER — SODIUM CHLORIDE, SODIUM LACTATE, POTASSIUM CHLORIDE, CALCIUM CHLORIDE 600; 310; 30; 20 MG/100ML; MG/100ML; MG/100ML; MG/100ML
INJECTION, SOLUTION INTRAVENOUS CONTINUOUS PRN
Status: DISCONTINUED | OUTPATIENT
Start: 2017-11-22 | End: 2017-11-22

## 2017-11-22 RX ORDER — ONDANSETRON HYDROCHLORIDE 2 MG/ML
INJECTION, SOLUTION INTRAMUSCULAR; INTRAVENOUS
Status: DISCONTINUED | OUTPATIENT
Start: 2017-11-22 | End: 2017-11-22

## 2017-11-22 RX ORDER — ACETAMINOPHEN 10 MG/ML
INJECTION, SOLUTION INTRAVENOUS
Status: DISCONTINUED | OUTPATIENT
Start: 2017-11-22 | End: 2017-11-22

## 2017-11-22 RX ORDER — ONDANSETRON 2 MG/ML
4 INJECTION INTRAMUSCULAR; INTRAVENOUS DAILY PRN
Status: DISCONTINUED | OUTPATIENT
Start: 2017-11-22 | End: 2017-11-24

## 2017-11-22 RX ORDER — FENTANYL CITRATE 50 UG/ML
25 INJECTION, SOLUTION INTRAMUSCULAR; INTRAVENOUS EVERY 5 MIN PRN
Status: DISCONTINUED | OUTPATIENT
Start: 2017-11-22 | End: 2017-11-24

## 2017-11-22 RX ORDER — GLYCOPYRROLATE 0.2 MG/ML
INJECTION INTRAMUSCULAR; INTRAVENOUS
Status: DISCONTINUED | OUTPATIENT
Start: 2017-11-22 | End: 2017-11-22

## 2017-11-22 RX ORDER — SODIUM CHLORIDE 9 MG/ML
INJECTION, SOLUTION INTRAVENOUS CONTINUOUS
Status: DISCONTINUED | OUTPATIENT
Start: 2017-11-22 | End: 2017-11-27 | Stop reason: HOSPADM

## 2017-11-22 RX ORDER — PROPOFOL 10 MG/ML
VIAL (ML) INTRAVENOUS
Status: DISCONTINUED | OUTPATIENT
Start: 2017-11-22 | End: 2017-11-22

## 2017-11-22 RX ORDER — FENTANYL CITRATE 50 UG/ML
INJECTION, SOLUTION INTRAMUSCULAR; INTRAVENOUS
Status: DISCONTINUED | OUTPATIENT
Start: 2017-11-22 | End: 2017-11-22

## 2017-11-22 RX ORDER — LIDOCAINE HCL/PF 100 MG/5ML
SYRINGE (ML) INTRAVENOUS
Status: DISCONTINUED | OUTPATIENT
Start: 2017-11-22 | End: 2017-11-22

## 2017-11-22 RX ORDER — HYDROMORPHONE HYDROCHLORIDE 2 MG/ML
0.4 INJECTION, SOLUTION INTRAMUSCULAR; INTRAVENOUS; SUBCUTANEOUS EVERY 5 MIN PRN
Status: DISCONTINUED | OUTPATIENT
Start: 2017-11-22 | End: 2017-11-24

## 2017-11-22 RX ORDER — ACETAMINOPHEN 325 MG/1
325 TABLET ORAL EVERY 6 HOURS PRN
Refills: 0 | Status: ON HOLD | COMMUNITY
Start: 2017-11-22 | End: 2021-01-01 | Stop reason: CLARIF

## 2017-11-22 RX ORDER — OXYCODONE HYDROCHLORIDE 5 MG/1
5 TABLET ORAL
Status: DISCONTINUED | OUTPATIENT
Start: 2017-11-22 | End: 2017-11-24

## 2017-11-22 RX ADMIN — FENTANYL CITRATE 50 MCG: 50 INJECTION, SOLUTION INTRAMUSCULAR; INTRAVENOUS at 11:11

## 2017-11-22 RX ADMIN — CIPROFLOXACIN 400 MG: 2 INJECTION, SOLUTION INTRAVENOUS at 11:11

## 2017-11-22 RX ADMIN — GLYCOPYRROLATE 0.2 MG: 0.2 INJECTION, SOLUTION INTRAMUSCULAR; INTRAVENOUS at 11:11

## 2017-11-22 RX ADMIN — PROPOFOL 100 MG: 10 INJECTION, EMULSION INTRAVENOUS at 11:11

## 2017-11-22 RX ADMIN — ACETAMINOPHEN 1000 MG: 10 INJECTION, SOLUTION INTRAVENOUS at 11:11

## 2017-11-22 RX ADMIN — FENTANYL CITRATE 25 MCG: 50 INJECTION, SOLUTION INTRAMUSCULAR; INTRAVENOUS at 11:11

## 2017-11-22 RX ADMIN — DEXAMETHASONE SODIUM PHOSPHATE 8 MG: 4 INJECTION, SOLUTION INTRAMUSCULAR; INTRAVENOUS at 11:11

## 2017-11-22 RX ADMIN — LIDOCAINE HYDROCHLORIDE 20 MG: 20 INJECTION, SOLUTION INTRAVENOUS at 11:11

## 2017-11-22 RX ADMIN — SODIUM CHLORIDE, SODIUM LACTATE, POTASSIUM CHLORIDE, AND CALCIUM CHLORIDE: 600; 310; 30; 20 INJECTION, SOLUTION INTRAVENOUS at 10:11

## 2017-11-22 RX ADMIN — CARBOXYMETHYLCELLULOSE SODIUM 2 DROP: 2.5 SOLUTION/ DROPS OPHTHALMIC at 11:11

## 2017-11-22 RX ADMIN — ONDANSETRON 4 MG: 2 INJECTION, SOLUTION INTRAMUSCULAR; INTRAVENOUS at 11:11

## 2017-11-22 NOTE — DISCHARGE INSTRUCTIONS
Anesthesia: After Your Surgery  Youve just had surgery. During surgery, you received medication called anesthesia to keep you comfortable and pain-free. After surgery, you may experience some pain or nausea. This is common. Here are some tips for feeling better and recovering after surgery.    Going home  Your doctor or nurse will show you how to take care of yourself when you go home. He or she will also answer your questions. Have an adult family member or friend drive you home. For the first 24 hours after your surgery:  · Do not drive or use heavy equipment.  · Do not make important decisions or sign legal documents.  · Avoid alcohol.  · Have someone stay with you, if needed. He or she can watch for problems and help keep you safe.  Be sure to keep all follow-up appointments with your doctor. And rest after your procedure for as long as your doctor tells you to.    Coping with pain  If you have pain after surgery, pain medication will help you feel better. Take it as directed, before pain becomes severe. Also, ask your doctor or pharmacist about other ways to control pain, such as with heat, ice, and relaxation. And follow any other instructions your surgeon or nurse gives you.    Tips for taking pain medication  To get the best relief possible, remember these points:  · Pain medications can upset your stomach. Taking them with a little food may help.  · Most pain relievers taken by mouth need at least 20 to 30 minutes to take effect.  · Taking medication on a schedule can help you remember to take it. Try to time your medication so that you can take it before beginning an activity, such as dressing, walking, or sitting down for dinner.  · Constipation is a common side effect of pain medications. Contact your doctor before taking any medications like laxatives or stool softeners to help relieve constipation. Also ask about any dietary restrictions, because drinking lots of fluids and eating foods like fruits  and vegetables that are high in fiber can also help. Remember, dont take laxatives unless your surgeon has prescribed them.  · Mixing alcohol and pain medication can cause dizziness and slow your breathing. It can even be fatal. Dont drink alcohol while taking pain medication.  · Pain medication can slow your reflexes. Dont drive or operate machinery while taking pain medication.  If your health care provider tells you to take acetaminophen to help relieve your pain, ask him or her how much you are supposed to take each day. (Acetaminophen is the generic name for Tylenol and other brand-name pain relievers.) Acetaminophen or other pain relievers may interact with your prescription medicines or other over-the-counter (OTC) drugs. Some prescription medications contain acetaminophen along with other active ingredients. Using both prescription and OTC acetaminophen for pain can cause you to overdose. The FDA recommends that you read the labels on your OTC medications carefully. This will help you to clearly understand the list of active ingredients, dosing instructions, and any warnings. It may also help you avoid taking too much acetaminophen. If you have questions or don't understand the information, ask your pharmacist or health care provider to explain it to you before you take the OTC medication.    Managing nausea  Some people have an upset stomach after surgery. This is often due to anesthesia, pain, pain medications, or the stress of surgery. The following tips will help you manage nausea and get good nutrition as you recover. If you were on a special diet before surgery, ask your doctor if you should follow it during recovery. These tips may help:  · Dont push yourself to eat. Your body will tell you when to eat and how much.  · Start off with clear liquids and soup. They are easier to digest.  · Progress to semi-solid foods (mashed potatoes, applesauce, and gelatin) as you feel ready.  · Slowly move to  solid foods. Dont eat fatty, rich, or spicy foods at first.  · Dont force yourself to have three large meals a day. Instead, eat smaller amounts more often.  · Take pain medications with a small amount of solid food, such as crackers or toast to avoid nausea.      Call your surgeon if  · You still have pain an hour after taking medication (it may not be strong enough).  · You feel too sleepy, dizzy, or groggy (medication may be too strong).  · You have side effects like nausea, vomiting, or skin changes (rash, itching, or hives).   © 8577-5500 [employer]. [address] [place] [address], [place] [address]. All rights reserved. This information is not intended as a substitute for professional medical care. Always follow your healthcare professional's instructions.

## 2017-11-22 NOTE — INTERVAL H&P NOTE
The patient has been examined and the H&P has been reviewed:    spoke with daughter and answered all questions    Anesthesia/Surgery risks, benefits and alternative options discussed and understood by patient/family.          There are no hospital problems to display for this patient.

## 2017-11-22 NOTE — ANESTHESIA POSTPROCEDURE EVALUATION
"Anesthesia Post Evaluation    Patient: Destinee CARDONA Major    Procedure(s) Performed: Procedure(s) (LRB):  URETEROSCOPY - WITH BX MASS (Right)  BIOPSY ENDOMETRIAL    Final Anesthesia Type: general  Patient location during evaluation: PACU  Patient participation: Yes- Able to Participate  Level of consciousness: awake and alert  Post-procedure vital signs: reviewed and stable  Pain management: adequate  Airway patency: patent  PONV status at discharge: No PONV  Anesthetic complications: no      Cardiovascular status: blood pressure returned to baseline and stable  Respiratory status: unassisted, spontaneous ventilation and room air  Hydration status: euvolemic  Follow-up not needed.        Visit Vitals  BP (!) 149/72   Pulse 85   Temp 36.3 °C (97.4 °F) (Oral)   Resp 16   Ht 5' 3" (1.6 m)   Wt 68 kg (150 lb)   SpO2 100%   BMI 26.57 kg/m²       Pain/Anabella Score: Pain Assessment Performed: Yes (11/22/2017 12:43 PM)  Presence of Pain: denies (11/22/2017 12:43 PM)  Anabella Score: 8 (11/22/2017 12:43 PM)  Modified Anabella Score: 18 (11/22/2017 12:43 PM)      "

## 2017-11-22 NOTE — DISCHARGE SUMMARY
[facility]  Discharge Note  Short Stay    Admit Date: 11/22/2017    Discharge Date and Time: No discharge date for patient encounter.     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       Discharged Condition: good    Hospital Course: Patient was admitted for an outpatient procedure and tolerated the procedure well with no complications.    Final Diagnoses: Same as principal problem.    Disposition: Home or Self Care    Follow up/Patient Instructions:    Medications:    Stop augmentin and diflucan in 2 days  Reconciled Home Medications:   Current Discharge Medication List      START taking these medications    Details   acetaminophen (TYLENOL) 325 MG tablet Take 1 tablet (325 mg total) by mouth every 6 (six) hours as needed for Pain.  Refills: 0         CONTINUE these medications which have NOT CHANGED    Details   amLODIPine (NORVASC) 2.5 MG tablet Take 2.5 mg by mouth once daily.      amoxicillin-clavulanate 875-125mg (AUGMENTIN) 875-125 mg per tablet Take 1 tablet by mouth 2 (two) times daily.  Qty: 28 tablet, Refills: 0      fenofibrate (TRICOR) 145 MG tablet Take 145 mg by mouth once daily.      fluconazole (DIFLUCAN) 100 MG tablet Take 1 tablet (100 mg total) by mouth once daily.  Qty: 14 tablet, Refills: 0             Discharge Procedure Orders  Diet general     Activity as tolerated     Call MD for:  temperature >100.4     Call MD for:  persistent nausea and vomiting     Call MD for:  severe uncontrolled pain     Call MD for:  difficulty breathing, headache or visual disturbances     Call MD for:  redness, tenderness, or signs of infection (pain, swelling, redness, odor or green/yellow discharge around incision site)     Call MD for:  hives     Call MD for:  persistent  dizziness or light-headedness     Call MD for:  extreme fatigue     Call MD for:   Order Comments: Call MD or go to the nearest ER if you are unable to urinate.       Follow-up Information     Kiet Arellano MD On 12/1/2017.    Specialty:  Urology  Contact information:  [address] [address]  [phone]                   Discharge Procedure Orders (must include Diet, Follow-up, Activity):    Discharge Procedure Orders (must include Diet, Follow-up, Activity)  Diet general     Activity as tolerated     Call MD for:  temperature >100.4     Call MD for:  persistent nausea and vomiting     Call MD for:  severe uncontrolled pain     Call MD for:  difficulty breathing, headache or visual disturbances     Call MD for:  redness, tenderness, or signs of infection (pain, swelling, redness, odor or green/yellow discharge around incision site)     Call MD for:  hives     Call MD for:  persistent dizziness or light-headedness     Call MD for:  extreme fatigue     Call MD for:   Order Comments: Call MD or go to the nearest ER if you are unable to urinate.

## 2017-11-22 NOTE — DISCHARGE SUMMARY
[facility]  Discharge Note  Short Stay    Admit Date: 11/15/2017    Discharge Date and Time: 11/15/2017  1:37 PM     Attending Physician: No att. 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       Discharged Condition: good    Hospital Course: Patient was admitted for an outpatient procedure and tolerated the procedure well with no complications.    Final Diagnoses: Same as principal problem.    Disposition: Home or Self Care    Follow up/Patient Instructions:    Medications:  Reconciled Home Medications:   Discharge Medication List as of 11/15/2017 11:28 AM      START taking these medications    Details   amoxicillin-clavulanate 875-125mg (AUGMENTIN) 875-125 mg per tablet Take 1 tablet by mouth 2 (two) times daily., Starting Wed 11/15/2017, Normal      fluconazole (DIFLUCAN) 100 MG tablet Take 1 tablet (100 mg total) by mouth once daily., Starting Wed 11/15/2017, Until Wed 11/29/2017, Normal         CONTINUE these medications which have NOT CHANGED    Details   amLODIPine (NORVASC) 2.5 MG tablet Take 2.5 mg by mouth once daily., Historical Med      fenofibrate (TRICOR) 145 MG tablet Take 145 mg by mouth once daily., Historical Med         STOP taking these medications       aspirin 81 MG Chew Comments:   Reason for Stopping:         clopidogrel (PLAVIX) 75 mg tablet Comments:   Reason for Stopping:         nitrofurantoin, macrocrystal-monohydrate, (MACROBID) 100 MG capsule Comments:   Reason for Stopping:               Discharge Procedure Orders  Diet general     Activity as tolerated     Call MD for:  temperature >100.4     Call MD for:  persistent nausea and vomiting     Call MD for:  severe uncontrolled pain     Call MD for:  difficulty breathing, headache or visual disturbances     Call MD for:  redness,  tenderness, or signs of infection (pain, swelling, redness, odor or green/yellow discharge around incision site)     Call MD for:  hives     Call MD for:  persistent dizziness or light-headedness     Call MD for:  extreme fatigue     Call MD for:   Order Comments: Call MD or go to the nearest ER if you are unable to urinate.       Follow-up Information     Follow up On 11/22/2017.    Why:  Return wednesday 11/22 at 8am for ureteroscopy                 Discharge Procedure Orders (must include Diet, Follow-up, Activity):    Discharge Procedure Orders (must include Diet, Follow-up, Activity)  Diet general     Activity as tolerated     Call MD for:  temperature >100.4     Call MD for:  persistent nausea and vomiting     Call MD for:  severe uncontrolled pain     Call MD for:  difficulty breathing, headache or visual disturbances     Call MD for:  redness, tenderness, or signs of infection (pain, swelling, redness, odor or green/yellow discharge around incision site)     Call MD for:  hives     Call MD for:  persistent dizziness or light-headedness     Call MD for:  extreme fatigue     Call MD for:   Order Comments: Call MD or go to the nearest ER if you are unable to urinate.

## 2017-11-22 NOTE — TRANSFER OF CARE
"Anesthesia Transfer of Care Note    Patient: Destinee CARDONA Major    Procedure(s) Performed: Procedure(s) (LRB):  URETEROSCOPY - WITH BX MASS (Right)  BIOPSY ENDOMETRIAL    Patient location: PACU    Anesthesia Type: general    Transport from OR: Transported from OR on 2-3 L/min O2 by NC with adequate spontaneous ventilation    Post pain: adequate analgesia    Post assessment: no apparent anesthetic complications and tolerated procedure well    Post vital signs: stable    Level of consciousness: awake, alert and oriented    Nausea/Vomiting: no nausea/vomiting    Complications: none    Transfer of care protocol was followed      Last vitals:   Visit Vitals  BP (!) 170/78   Pulse 90   Temp 36.4 °C (97.6 °F)   Resp 18   Ht 5' 3" (1.6 m)   Wt 68 kg (150 lb)   SpO2 (!) 94%   BMI 26.57 kg/m²     "

## 2017-11-22 NOTE — ANESTHESIA PREPROCEDURE EVALUATION
11/22/2017  Destniee Luo is a 85 y.o., female.    Pre-op Assessment    I have reviewed the Patient Summary Reports.     I have reviewed the Nursing Notes.   I have reviewed the Medications.     Review of Systems  Anesthesia Hx:  No problems with previous Anesthesia  Denies Family Hx of Anesthesia complications.    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    Endocrine:  Endocrine Normal        Physical Exam   Airway/Jaw/Neck:  Airway Findings: Mouth Opening: Normal Tongue: Normal  General Airway Assessment: Adult  Mallampati: I         Dental:  Dental Findings: In tact             Anesthesia Plan  Type of Anesthesia, risks & benefits discussed:  Anesthesia Type:  general  Patient's Preference:   Intra-op Monitoring Plan: standard ASA monitors  Intra-op Monitoring Plan Comments:   Post Op Pain Control Plan:   Post Op Pain Control Plan Comments:   Induction:   IV  Beta Blocker:         Informed Consent: Patient understands risks and agrees with Anesthesia plan.  Questions answered. Anesthesia consent signed with patient.  ASA Score: 2     Day of Surgery Review of History & Physical:    H&P update referred to the surgeon.     Anesthesia Plan Notes: No problems with prior procedure        Ready For Surgery From Anesthesia Perspective.

## 2017-11-22 NOTE — CONSULTS
INTRAOPERATIVE CONSULT    DATE: November 22nd, 2017   TIME: 1115 AM    PROCEDURE: Endometrial biopsy    INDICATION: pmb, thickend EMS (18mm)    IMAGING: reviewed as below   Pelvic U/S 11/17/2017  Narrative     Ultrasound transabdominal and transvaginal. The vaginal portion of this examination was reportedly limited secondary to patient comfort      The uterus appears to measure 7.3 x 3.3 x 4.2cm in size.  The endometrium appears to measure 18 mm, this has a thickened rounded appearance suggestive of a possible endometrial neoplasm. Clinical correlation further evaluation is necessary.      The right ovary and the left ovary were not able to be visualized making this a suboptimal evaluation.    A solid mass that is tubular is noted in the right hemipelvis consistent with the patient's distal mass filled ureter. This is worrisome for a ureteral neoplasm    A tubular cystic structure is noted in the right adnexal region suggestive of a hydrosalpinx. No obvious solid mass component is noted.   Impression         1.  Hyperechoic uterine masslike prominence to the endometrium worrisome for a possible endometrial neoplasm. Endometrial hyperplasia or polyps would be less likely. Further evaluation and/or clinical correlation is necessary    2. Tubular fluid signal intensity in the right hemipelvis most likely relating to a hydrosalpinx. This could be from multiple etiologies including inflammatory or post obstructive. Please clinical correlate for sources     3. Tubular masslike echogenicity suggestive of a ureteral mass/neoplasm    4. Neither ovary was able to be visualized making the examination suboptimal     PATIENT CONSENT:     PRE ENDOMETRIAL BIOPSY COUNSELING:   The patient was informed of the risk of bleeding, infection, uterine perforation and  pain and that the test will rule-out endometrial cancer with accuracy greater than 95%. She was counseled on the alternatives to endometrial biopsy.  All of her questions  were answered.  Patient gives written consent    ANESTHESIA: None    PROCEDURE NOTE:  The pessary was removed. The cervix was visualized with a speculum and swabbed with soap and with betadine.  The anterior lip of the cervix was grasped with a single toothed tenaculum, and a sterile endometrial pipelle was inserted into the uterus to a sound length of 9 cm. 3 passes were made with the pipelle and heavy amount of tissue was obtained. The specimen was placed in formalin and sent to Pathology for evaluation. The vagina was again swabbed with betadine. The pessary was rinsed with soap and water and replaced. At this point, the procedure was turned over to Urology for scheduled ureteroscopy.    COMPLICATIONS: None    DISPOSITION: The patient tolerated the above procedure well.    POST ENDOMETRIAL BIOPSY COUNSELING:  - Manage post biopsy cramping with NSAIDs or Tylenol.  - Expect spotting or light bleeding for a few days.  - Report bleeding heavier than a period, fever > 101.0 F, worsening pain or a foul smelling vaginal discharge.    We will follow up Pathology results.  Irma Gramajo MD 11/22/2017 10:04 AM

## 2017-11-22 NOTE — H&P
[facility]  Urology  History & Physical    Patient Name: Destinee Luo  MRN: [id number]  Admission Date: 11/22/2017  Code Status: No Order   Attending Provider: Kiet Arellano MD   Primary Care Physician: Edilberto Cassidy MD  Principal Problem:<principal problem not specified>    Subjective:     HPI:   85 WF  Right ureteral mass  Stent placed last week; purulent material drainged from right kidney  On abx and diflucan  All cs neg  Cytology neg    Pelvic US shows ?endometrial lesion  Gyn team plans endometrial biopsy at time of ureteroscopy and biopsy    Past Medical History:   Diagnosis Date    High cholesterol     Hypertension     Stroke 2012    TIA, no deficits       Past Surgical History:   Procedure Laterality Date    NO PAST SURGERIES         Review of patient's allergies indicates:   Allergen Reactions    Sulfa (sulfonamide antibiotics)     Codeine Anxiety       Family History     None          Social History Main Topics    Smoking status: Never Smoker    Smokeless tobacco: Never Used    Alcohol use No    Drug use: Unknown    Sexual activity: Not on file       Review of Systems    Objective:     Temp:  [97.6 °F (36.4 °C)] 97.6 °F (36.4 °C)  Pulse:  [90] 90  Resp:  [18] 18  SpO2:  [94 %] 94 %  BP: (170)/(78) 170/78     Body mass index is 26.57 kg/m².    No intake/output data recorded.       Lines/Drains/Airways     Peripheral Intravenous Line                 Peripheral IV - Single Lumen 11/22/17 1004 Left Hand less than 1 day                Physical Exam  Ao*4  resp normal rate' breathing not labored  cv normal rate  Ab nondistended  Ext no edema    Significant Labs:  BMP:  No results for input(s): NA, K, CL, CO2, BUN, CREATININE, LABGLOM, GLUCOSE, CALCIUM in the last 168 hours.      Significant Imaging:  CXR no mets; pelvic US ?endometrial mass    Assessment and Plan:     There are no hospital problems to display for this patient.  Right ureteral mass  Endometrial  lesion    Plan  Right ureteroscopy and biopsy  Endometrial biopsy by gyn team    VTE Risk Mitigation     None          Kiet Arellano MD  Urology  [facility]

## 2017-11-23 PROCEDURE — 12000002 HC ACUTE/MED SURGE SEMI-PRIVATE ROOM

## 2017-11-23 NOTE — OP NOTE
DATE OF PROCEDURE:  11/22/2017.    PROCEDURES:  1.  Cystoscopy with right retrograde pyelogram and right ureteral stent change.  2.  Right ureteroscopy with biopsy and fulguration.    PREOPERATIVE DIAGNOSIS:  Right ureteral tumor.    POSTOPERATIVE DIAGNOSIS:  Right ureteral tumor.    ANESTHESIA:  General.    COMPLICATIONS:  None.    BLOOD LOSS:  Minimal.    HISTORY:  Ms. Luo is an 85-year-old woman with hematuria and a right distal   ureteral tumor.  The left upper tract is unremarkable.  There is no evidence of   metastatic disease.  She does have a questionable lesion in her endometrium for   which she is also undergoing an endometrial biopsy.  Preop urine cytology is   negative and urine cultures from the right kidney were negative also for   bacteria and fungi.  Informed consent was obtained.  The patient was brought to   the operating room and undergoes general endotracheal anesthesia.  The   gynecology team performed the endometrial biopsy.  Please see their separate   dictation.  Sterile prep and drape were then performed.  The 22-Sinhala   cystoscope was advanced into the bladder.  The stent was seen emanating from the   right ureteral orifice.  There were no tumors noted in the bladder.  The stent   was removed and a wire was advanced through the stent.  An open ended catheter   was advanced over the wire and retrograde pyelogram was performed.  The tip of   the wire was in the proximal ureter.  The wire was navigated into the kidney,   which was hydronephrotic.  There was a strictured long mid ureter.  The   cystoscope was removed and the rigid ureteroscope was advanced alongside the   wire.  There is indeed a papillary tumor noted in the ureter.  Even after   significant manipulations, we are unable to bypass the entire tumor.  We   attempted to biopsy the tumor with a wire basket, initially this is   unsuccessful.  Following this, a biopsy was attempted with an ureteroscopic   grasper.  This was also  unsuccessful.  A second ureteroscopic grasper was tried   and this was unsuccessful.  We then backloaded the BIGopsy forceps and I was   unable to navigate the ureteroscope with the BIGopsy forceps beyond the ureteral   orifice.  Therefore, the ureteroscope was removed.  The bladder was   decompressed throughout the procedure with a red rubber catheter.  Another   attempt is made to biopsy the forceps with a wire basket.  We were able to get   two quite adequate specimens with the wire basket.  The largest specimen was   approximately 1 cm in diameter.  This appeared quite adequate for biopsy.  The   ureteroscope is again advanced into the ureter.  The tumor was bleeding after   the biopsy and this was fulgurated which provided good hemostasis.  There was no   evidence of perforation.  The ureteroscope was removed.  The wire was   back-loaded on the cystoscope.  A 6-Georgian 24 cm double-J ureteral stent was   placed.  It coils well in the renal pelvis and in the bladder.  There was good   hemostasis at the end of the case.  The bladder was drained and the scope was   removed.  The patient will be monitored and she will be discharged home today if   she continues to do well.  She was on antibiotics at home.  Her cultures again   have all been negative and therefore I have asked her family to stop her   antibiotics and stop her antifungals in two days.  She will follow up with me   next Friday to discuss the pathology results.      LIZ  dd: 11/22/2017 13:04:23 (CST)  td: 11/22/2017 18:38:58 (CST)  Doc ID   #[id number]  Job ID #[id number]    CC:

## 2017-11-24 PROCEDURE — 12000002 HC ACUTE/MED SURGE SEMI-PRIVATE ROOM

## 2017-11-25 PROCEDURE — 12000002 HC ACUTE/MED SURGE SEMI-PRIVATE ROOM

## 2017-11-26 PROCEDURE — 12000002 HC ACUTE/MED SURGE SEMI-PRIVATE ROOM

## 2017-11-27 ENCOUNTER — TELEPHONE (OUTPATIENT)
Dept: UROLOGY | Facility: CLINIC | Age: 82
End: 2017-11-27

## 2017-11-27 NOTE — TELEPHONE ENCOUNTER
----- Message from Jv Sierra sent at 11/27/2017  3:05 PM CST -----  Contact: Pt Ara Ludwig   x_  1st Request  _  2nd Request  _  3rd Request        Who: Pt Ara Ludwig     Why: Requesting a call back in regards to discussing appointmnet this week, which was advised by .     What Number to Call Back:[phone]    When to Expect a call back: (Within 24 hours)    Please return the call at earliest convenience. Thanks!

## 2017-12-01 ENCOUNTER — PATIENT MESSAGE (OUTPATIENT)
Dept: UROLOGY | Facility: CLINIC | Age: 82
End: 2017-12-01

## 2017-12-01 ENCOUNTER — OFFICE VISIT (OUTPATIENT)
Dept: UROLOGY | Facility: CLINIC | Age: 82
End: 2017-12-01
Attending: UROLOGY
Payer: MEDICARE

## 2017-12-01 VITALS
SYSTOLIC BLOOD PRESSURE: 176 MMHG | WEIGHT: 150 LBS | HEIGHT: 63 IN | BODY MASS INDEX: 26.58 KG/M2 | HEART RATE: 95 BPM | DIASTOLIC BLOOD PRESSURE: 77 MMHG

## 2017-12-01 DIAGNOSIS — C54.1 ENDOMETRIAL CANCER: ICD-10-CM

## 2017-12-01 DIAGNOSIS — C66.1 URETERAL CANCER, RIGHT: ICD-10-CM

## 2017-12-01 DIAGNOSIS — R39.15 URINARY URGENCY: Primary | ICD-10-CM

## 2017-12-01 LAB
BILIRUB SERPL-MCNC: ABNORMAL MG/DL
BLOOD URINE, POC: 250
COLOR, POC UA: ABNORMAL
GLUCOSE UR QL STRIP: NORMAL
KETONES UR QL STRIP: ABNORMAL
LEUKOCYTE ESTERASE URINE, POC: ABNORMAL
NITRITE, POC UA: ABNORMAL
PH, POC UA: 6
PROTEIN, POC: ABNORMAL
SPECIFIC GRAVITY, POC UA: 1
UROBILINOGEN, POC UA: NORMAL

## 2017-12-01 PROCEDURE — 81002 URINALYSIS NONAUTO W/O SCOPE: CPT | Mod: S$GLB,,, | Performed by: UROLOGY

## 2017-12-01 PROCEDURE — 99214 OFFICE O/P EST MOD 30 MIN: CPT | Mod: S$GLB,,, | Performed by: UROLOGY

## 2017-12-01 PROCEDURE — 87086 URINE CULTURE/COLONY COUNT: CPT

## 2017-12-01 RX ORDER — LORATADINE 10 MG/1
TABLET ORAL
COMMUNITY
End: 2018-03-21

## 2017-12-01 RX ORDER — CIPROFLOXACIN 250 MG/1
250 TABLET, FILM COATED ORAL EVERY 12 HOURS
Qty: 14 TABLET | Refills: 0 | Status: SHIPPED | OUTPATIENT
Start: 2017-12-01 | End: 2017-12-08

## 2017-12-01 NOTE — PROGRESS NOTES
"Subjective:      Destinee Luo is a 85 y.o. female who returns today regarding her ureteral cancer. She is an established patient of Dr. Arellano and is new to me today.     The patient is s/p ureteroscopy with right retrograde pyelogram, right ureteral stent change, and biopsy on 11/22. Dr. Arellano has discussed the pathology results with the patient and her family (pathology report below). They present today to discuss treatment options.    Additionally, the patient reports urinary urgency and frequency for the last 2 days. She denies fever, chills, flank pain, dysuria and hematuria. She completed Augmentin 1 week ago. Previously on macrodantin prophylaxis for recurrent UTIs.    The following portions of the patient's history were reviewed and updated as appropriate: allergies, current medications, past family history, past medical history, past social history, past surgical history and problem list.    Review of Systems  A comprehensive multipoint review of systems was negative except as otherwise stated in the HPI.     Objective:   Vitals: BP (!) 176/77 (BP Location: Right arm, Patient Position: Sitting, BP Method: Large (Automatic))   Pulse 95   Ht 5' 3" (1.6 m)   Wt 68 kg (150 lb)   BMI 26.57 kg/m²     Physical Exam   General: alert and oriented, no acute distress  Respiratory: Symmetric expansion, non-labored breathing  Cardiovascular: no peripheral edema  Abdomen: soft, non distended  Skin: normal coloration and turgor, no rashes, no suspicious skin lesions noted  Neuro: no gross deficits  Psych: normal judgment and insight, normal mood/affect and non-anxious  No CVA tenderness     Lab Review   Urinalysis demonstrates positive for leukocytes (++), red blood cells (about 250 malka/mL), protein (++)  Lab Results   Component Value Date    WBC 7.48 11/10/2017    HGB 11.5 (L) 11/10/2017    HCT 35.7 (L) 11/10/2017    MCV 85 11/10/2017     11/10/2017     Lab Results   Component Value Date    CREATININE 1.3 " "11/10/2017    BUN 22 11/10/2017       FINAL PATHOLOGIC DIAGNOSIS  1. 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  2. URETERAL BIOPSY: HIGH-GRADE UROTHELIAL CARCINOMA.  Note: The ureteral tumor appears mostly "in situ" within the small biopsy. There is a small area of at least  superficial stromal invasion. It appears histologically distinct from the endometrial tumor.    Diagnosed by: Desmond Hernandez M.D.    Imaging   (all images personally reviewed; agree with report below)    CT urogram (11/10/17)- "1. Moderate to severe right-sided hydronephrosis to the level of the distal right ureteral mass worrisome for an obstructive neoplasm in that is thought to be enhancing.2. Hypodensity off of the right ovary nonspecific on this examination possibly relating to a cyst or follicle. Female pelvis ultrasound would be suggested to exclude a cystic ovarian neoplastic mass3. Suggestion of fluid or abnormal density in the uterus centrally presumably involving the endometrium, female pelvis ultrasound evaluation would be suggested to exclude endometrial thickening as can be seen with a neoplastic process, hyperplasia, or polyps.4. Gas within the bladder, please correlate for recent instrumentation, fistula formation or air forming organism could appear similar.5. Adrenal nodules, statistically favored related to adenomas but not ideally evaluated secondary to size. Followup for size stability would be reasonable"    Assessment and Plan:   Destinee was seen today for follow-up.    Diagnoses and all orders for this visit:    Urinary urgency  -     POCT URINE DIPSTICK WITHOUT MICROSCOPE  -     Urine culture  -     ciprofloxacin HCl (CIPRO) 250 MG tablet; Take 1 tablet (250 mg total) by mouth every 12 (twelve) hours.    Ureteral cancer, right  -     Ambulatory consult to Gynecologic Oncology    Endometrial cancer  -     Ambulatory consult to Gynecologic Oncology    Plan:  --Urine culture today  --Start Cipro, may adjust " based on culture results  --Dr. Arellano discussed pathology results with the patient and her family. Due to her CKD, chemotherapy is not recommended. The patient and her family are in agreement with this.   --Referral to Dr. Mann, appointment to be scheduled for early next week  --Plan for robotic distal ureterectomy with reimplant and hysterectomy  --Follow up with Dr. Arellano next week for pre op appointment and repeat urine culture

## 2017-12-03 LAB
BACTERIA UR CULT: NORMAL
BACTERIA UR CULT: NORMAL

## 2017-12-06 ENCOUNTER — INITIAL CONSULT (OUTPATIENT)
Dept: GYNECOLOGIC ONCOLOGY | Facility: CLINIC | Age: 82
End: 2017-12-06
Payer: MEDICARE

## 2017-12-06 VITALS
HEIGHT: 63 IN | WEIGHT: 150.38 LBS | BODY MASS INDEX: 26.64 KG/M2 | HEART RATE: 87 BPM | DIASTOLIC BLOOD PRESSURE: 75 MMHG | SYSTOLIC BLOOD PRESSURE: 167 MMHG

## 2017-12-06 DIAGNOSIS — C54.1 ENDOMETRIAL CANCER: Primary | ICD-10-CM

## 2017-12-06 DIAGNOSIS — N93.9 VAGINAL BLEEDING: ICD-10-CM

## 2017-12-06 DIAGNOSIS — N28.89 URETERAL MASS: ICD-10-CM

## 2017-12-06 PROCEDURE — 99212 OFFICE O/P EST SF 10 MIN: CPT | Mod: PBBFAC | Performed by: OBSTETRICS & GYNECOLOGY

## 2017-12-06 PROCEDURE — 99999 PR PBB SHADOW E&M-EST. PATIENT-LVL II: CPT | Mod: PBBFAC,,, | Performed by: OBSTETRICS & GYNECOLOGY

## 2017-12-06 PROCEDURE — 99205 OFFICE O/P NEW HI 60 MIN: CPT | Mod: S$PBB,,, | Performed by: OBSTETRICS & GYNECOLOGY

## 2017-12-06 RX ORDER — TITANIUM DIOXIDE, OCTINOXATE, ZINC OXIDE 4.61; 1.6; .78 G/40ML; G/40ML; G/40ML
CREAM TOPICAL DAILY
Status: ON HOLD | COMMUNITY
End: 2021-01-01 | Stop reason: CLARIF

## 2017-12-06 NOTE — PROGRESS NOTES
Subjective:      Patient ID: Destinee Luo is a 85 y.o. female.    Chief Complaint: Endometrial Cancer (consult)      HPI  Here today at the request of Dr. Arellano due to known right urothelial cancer of the ureter and recent diagnosis of endometrial cancer.  During evaluation of her right ureteral mass, her uterus was noted to be heterogenous with a thickened EMS. During her cystoscopy, EMB was performed and final path was c/w grade 1-2 endometrial cancer.  He is planning segmental resection and reimplant and was wanting to perform her surgery concurrently.  She originally presented with bleeding and it was unclear whether the source was gyn or urologic in origin.  She states her symptoms have improved slightly.  Of note, she is on Plavix for carotid stenosis, but has stopped that and her ASA since her cysto.  No previous abdominal surgeries.  On CT she does have bilateral inguinal hernias.  Known prolapse for which she has an indwelling pessary  Review of Systems   Constitutional: Negative for activity change, appetite change, chills, fatigue and fever.   HENT: Negative for hearing loss, mouth sores, nosebleeds, sore throat and tinnitus.    Eyes: Negative for visual disturbance.   Respiratory: Negative for cough, chest tightness, shortness of breath and wheezing.    Cardiovascular: Negative for chest pain and leg swelling.   Gastrointestinal: Negative for abdominal distention, abdominal pain, blood in stool, constipation, diarrhea, nausea and vomiting.   Genitourinary: Negative for dysuria, flank pain, frequency, hematuria, pelvic pain, vaginal bleeding, vaginal discharge and vaginal pain.   Musculoskeletal: Negative for arthralgias and back pain.   Skin: Negative for rash.   Neurological: Negative for dizziness, seizures, syncope, weakness and numbness.   Hematological: Does not bruise/bleed easily.   Psychiatric/Behavioral: Negative for confusion and sleep disturbance. The patient is not nervous/anxious.         Past Medical History:   Diagnosis Date    High cholesterol     Hypertension     Stroke 2012    TIA, no deficits     Past Surgical History:   Procedure Laterality Date    NO PAST SURGERIES       History reviewed. No pertinent family history.  Social History     Social History    Marital status:      Spouse name: N/A    Number of children: N/A    Years of education: N/A     Occupational History    Not on file.     Social History Main Topics    Smoking status: Never Smoker    Smokeless tobacco: Never Used    Alcohol use No    Drug use: Unknown    Sexual activity: Not on file     Other Topics Concern    Not on file     Social History Narrative    No narrative on file     Current Outpatient Prescriptions   Medication Sig    acetaminophen (TYLENOL) 325 MG tablet Take 1 tablet (325 mg total) by mouth every 6 (six) hours as needed for Pain.    amLODIPine (NORVASC) 2.5 MG tablet Take 2.5 mg by mouth once daily.    ciprofloxacin HCl (CIPRO) 250 MG tablet Take 1 tablet (250 mg total) by mouth every 12 (twelve) hours.    cranberry 400 mg Cap Take by mouth.    fenofibrate (TRICOR) 145 MG tablet Take 145 mg by mouth once daily.    Lactobacillus acidophilus Cap Take by mouth.    loratadine (CLARITIN) 10 mg tablet Take by mouth.    multivitamin capsule Take 1 capsule by mouth.     No current facility-administered medications for this visit.      Review of patient's allergies indicates:   Allergen Reactions    Sulfa (sulfonamide antibiotics)     Codeine Anxiety       Objective:   Physical Exam:   Constitutional: She is oriented to person, place, and time. She appears well-developed and well-nourished. No distress.    HENT:   Head: Normocephalic and atraumatic.    Eyes: No scleral icterus.    Neck: Normal range of motion. Neck supple.    Cardiovascular: Normal rate and intact distal pulses.  Exam reveals no cyanosis and no edema.     Pulmonary/Chest: Effort normal. No respiratory distress. She  exhibits no tenderness.        Abdominal: Soft. Normal appearance. She exhibits no distension, no fluid wave, no ascites and no mass. There is no tenderness. There is no rigidity, no rebound and no guarding. No hernia.     Genitourinary: Rectum normal, vagina normal and uterus normal. Pelvic exam was performed with patient supine. There is no rash, tenderness or lesion on the right labia. There is no rash, tenderness or lesion on the left labia. Uterus is not deviated, not enlarged, not fixed, not tender, not hosting fibroids and not experiencing uterine prolapse. Cervix is normal. Right adnexum displays no mass, no tenderness and no fullness. Left adnexum displays no mass, no tenderness and no fullness. No bleeding in the vagina. No vaginal discharge found. Labial bartholins normal.Cervix exhibits no motion tenderness, no discharge and no friability.        Uterus Size: 7 cm   Musculoskeletal: Normal range of motion and moves all extremeties. She exhibits no edema.      Lymphadenopathy:     She has no cervical adenopathy.        Right: No inguinal adenopathy present.        Left: No inguinal adenopathy present.    Neurological: She is alert and oriented to person, place, and time.    Skin: Skin is warm. No rash noted. No cyanosis or erythema.    Psychiatric: She has a normal mood and affect. Thought content normal.       Assessment:     1. Endometrial cancer    2. Vaginal bleeding    3. Ureteral mass        Plan:       Spoke with the patient and her daughter.  My recommendation is simple hysterectomy based on her EMB histology, age and concurrent ureteral cancer.  We can perform this when Dr. Arellano does her ureteral resection.  I would not repair her inguinal hernia concurrently if it is not in the way of our surgery as she would likely require mesh and risk of infection is increased when performing colpotomy with hysterectomy.  I will schedule her on a day that is convenient for Dr. Arellano in the SI room.  The  risks, benefits, and indications of the procedure were discussed with the patient and her daughter.  These included bleeding, infection, damage to surrounding tissues, conversion to laparotomy, and the possibility of major complications including death.  She voiced understanding, all questions were answered and consents were signed.

## 2017-12-06 NOTE — Clinical Note
I will look at dates with my nurse when I get back to [facility].  I saw her in [place] today and did not have my [facility] surgery book.

## 2017-12-07 ENCOUNTER — TELEPHONE (OUTPATIENT)
Dept: GYNECOLOGIC ONCOLOGY | Facility: CLINIC | Age: 82
End: 2017-12-07

## 2017-12-07 DIAGNOSIS — C54.1 ENDOMETRIAL CANCER: Primary | ICD-10-CM

## 2017-12-08 ENCOUNTER — TELEPHONE (OUTPATIENT)
Dept: UROLOGY | Facility: CLINIC | Age: 82
End: 2017-12-08

## 2017-12-08 NOTE — TELEPHONE ENCOUNTER
----- Message from Wu Fine sent at 12/8/2017  9:23 AM CST -----  Contact: patient's maikel Ford  x_ 1st Request   _ 2nd Request   _ 3rd Request     Who: STEPHAN GREGG [[id number]]    Why: patient's granddaughter is requesting a call back in reference to questions she has about rescheduling the patients f/u appointment.  Patient cancelled her appointment today due to weather.    What Number to Call Back: [phone]    When to Expect a call back: (Before the end of the day)   -- if call after 3:00 call back will be tomorrow.

## 2017-12-08 NOTE — TELEPHONE ENCOUNTER
Spoke to patient grand daughter.  They are unable to come in today because of weather.  She is aware of procedure date and pre op appt set up by Dr Mann.  I rescheduled her appt with Dr Arellano on 12/22.  Per Dr Arellano patient is to finish current antibiotics.  If she does not have any UTI symptoms we do not need to repeat urine culture.  It will be done on 12/22 appt.  Granddaughter notified if patient develops UTI symptoms to call office and a urine culture can be order for home health nurse.

## 2017-12-18 LAB — FUNGUS SPEC CULT: NORMAL

## 2017-12-20 ENCOUNTER — TELEPHONE (OUTPATIENT)
Dept: UROLOGY | Facility: CLINIC | Age: 82
End: 2017-12-20

## 2017-12-20 NOTE — TELEPHONE ENCOUNTER
Pt and granddaughter think pt has UTI.  I called [facility] at [phone] and asked them to get a cath UA with C&S.  They will do this today, but results will not be back until at least Friday.  With the holiday week, I was not sure if you wanted to call in antibiotics for her.  Please advise.

## 2017-12-20 NOTE — TELEPHONE ENCOUNTER
----- Message from Allegra Adams sent at 12/20/2017  7:21 AM CST -----  _  1st Request  _  2nd Request  _  3rd Request        Who: Ara santos    Why: Requesting a call back in regards to pt may have uti      What Number to Call Back: [phone]    When to Expect a call back: (Within 24 hours)    Please return the call at earliest convenience. Thanks!

## 2017-12-28 ENCOUNTER — ANESTHESIA EVENT (OUTPATIENT)
Dept: SURGERY | Facility: OTHER | Age: 82
DRG: 654 | End: 2017-12-28
Payer: MEDICARE

## 2017-12-28 ENCOUNTER — OFFICE VISIT (OUTPATIENT)
Dept: UROLOGY | Facility: CLINIC | Age: 82
End: 2017-12-28
Attending: UROLOGY
Payer: MEDICARE

## 2017-12-28 ENCOUNTER — HOSPITAL ENCOUNTER (OUTPATIENT)
Dept: PREADMISSION TESTING | Facility: OTHER | Age: 82
Discharge: HOME | End: 2017-12-28
Attending: OBSTETRICS & GYNECOLOGY
Payer: MEDICARE

## 2017-12-28 VITALS
TEMPERATURE: 98 F | HEART RATE: 72 BPM | HEIGHT: 62 IN | SYSTOLIC BLOOD PRESSURE: 142 MMHG | OXYGEN SATURATION: 97 % | WEIGHT: 150 LBS | RESPIRATION RATE: 16 BRPM | BODY MASS INDEX: 27.6 KG/M2 | DIASTOLIC BLOOD PRESSURE: 66 MMHG

## 2017-12-28 VITALS
HEIGHT: 63 IN | WEIGHT: 150 LBS | DIASTOLIC BLOOD PRESSURE: 70 MMHG | SYSTOLIC BLOOD PRESSURE: 162 MMHG | HEART RATE: 82 BPM | BODY MASS INDEX: 26.58 KG/M2

## 2017-12-28 DIAGNOSIS — C66.1 URETERAL CANCER, RIGHT: Primary | ICD-10-CM

## 2017-12-28 DIAGNOSIS — N18.30 CKD (CHRONIC KIDNEY DISEASE) STAGE 3, GFR 30-59 ML/MIN: ICD-10-CM

## 2017-12-28 DIAGNOSIS — N39.0 URINARY TRACT INFECTION WITH HEMATURIA, SITE UNSPECIFIED: ICD-10-CM

## 2017-12-28 DIAGNOSIS — R31.9 URINARY TRACT INFECTION WITH HEMATURIA, SITE UNSPECIFIED: ICD-10-CM

## 2017-12-28 DIAGNOSIS — C54.1 ENDOMETRIAL CANCER: ICD-10-CM

## 2017-12-28 LAB
ABO + RH BLD: NORMAL
BLD GP AB SCN CELLS X3 SERPL QL: NORMAL
RH BLD: NORMAL

## 2017-12-28 PROCEDURE — 86901 BLOOD TYPING SEROLOGIC RH(D): CPT

## 2017-12-28 PROCEDURE — 36415 COLL VENOUS BLD VENIPUNCTURE: CPT

## 2017-12-28 PROCEDURE — 86900 BLOOD TYPING SEROLOGIC ABO: CPT

## 2017-12-28 PROCEDURE — 86901 BLOOD TYPING SEROLOGIC RH(D): CPT | Mod: 91

## 2017-12-28 PROCEDURE — 87086 URINE CULTURE/COLONY COUNT: CPT

## 2017-12-28 PROCEDURE — 99215 OFFICE O/P EST HI 40 MIN: CPT | Mod: S$GLB,,, | Performed by: UROLOGY

## 2017-12-28 RX ORDER — FAMOTIDINE 20 MG/1
20 TABLET, FILM COATED ORAL
Status: CANCELLED | OUTPATIENT
Start: 2017-12-28 | End: 2017-12-28

## 2017-12-28 RX ORDER — CHOLECALCIFEROL (VITAMIN D3) 25 MCG
1000 TABLET ORAL DAILY
Status: ON HOLD | COMMUNITY
End: 2021-01-01 | Stop reason: CLARIF

## 2017-12-28 RX ORDER — CIPROFLOXACIN 500 MG/1
500 TABLET ORAL 2 TIMES DAILY
Refills: 0 | Status: ON HOLD | COMMUNITY
Start: 2017-12-22 | End: 2018-01-08

## 2017-12-28 RX ORDER — SODIUM CHLORIDE, SODIUM LACTATE, POTASSIUM CHLORIDE, CALCIUM CHLORIDE 600; 310; 30; 20 MG/100ML; MG/100ML; MG/100ML; MG/100ML
INJECTION, SOLUTION INTRAVENOUS CONTINUOUS
Status: CANCELLED | OUTPATIENT
Start: 2017-12-28

## 2017-12-28 NOTE — DISCHARGE INSTRUCTIONS
PRE-ADMIT TESTING -  [phone]    [address]  [place] [facility]          Your surgery has been scheduled at [facility]. We are pleased to have the opportunity to serve you. For Further Information please call [phone].    On the day of surgery please report to the Information Desk on the 1st floor.    · CONTACT YOUR PHYSICIAN'S OFFICE THE DAY PRIOR TO YOUR SURGERY TO OBTAIN YOUR ARRIVAL TIME.     · The evening before surgery do not eat anything after 9 p.m. ( this includes hard candy, chewing gum and mints).  You may only have GATORADE, POWERADE AND WATER  from 9 p.m. until you leave your home.   DO NOT DRINK ANY LIQUIDS ON THE WAY TO THE HOSPITAL.      SPECIAL MEDICATION INSTRUCTIONS: TAKE medications checked off by the Anesthesiologist on your Medication List.    Angiogram Patients: Take medications as instructed by your physician, including aspirin.     Surgery Patients:    If you take ASPIRIN - Your PHYSICIAN/SURGEON will need to inform you IF/OR when you need to stop taking aspirin prior to your surgery.     Do Not take any medications containing IBUPROFEN.  Do Not Wear any make-up or dark nail polish   (especially eye make-up) to surgery. If you come to surgery with makeup on you will be required to remove the makeup or nail polish.    Do not shave your surgical area at least 5 days prior to your surgery. The surgical prep will be performed at the hospital according to Infection Control regulations.    Leave all valuables at home.   Do Not wear any jewelry or watches, including any metal in body piercings.  Contact Lens must be removed before surgery. Either do not wear the contact lens or bring a case and solution for storage.  Please bring a container for eyeglasses or dentures as required.  Bring any paperwork your physician has provided, such as consent forms,  history and physicals, doctor's orders, etc.   Bring comfortable clothes that are loose fitting to wear upon  discharge. Take into consideration the type of surgery being performed.  Maintain your diet as advised per your physician the day prior to surgery.      Adequate rest the night before surgery is advised.   Park in the Parking lot behind the hospital or in the [facility] across the street from the parking lot. Parking is complimentary.  If you will be discharged the same day as your procedure, please arrange for a responsible adult to drive you home or to accompany you if traveling by taxi.   YOU WILL NOT BE PERMITTED TO DRIVE OR TO LEAVE THE HOSPITAL ALONE AFTER SURGERY.   It is strongly recommended that you arrange for someone to remain with you for the first 24 hrs following your surgery.       Thank you for your cooperation.  The Staff of [facility].        Bathing Instructions                                                                 Please shower the evening before and morning of your procedure with    ANTIBACTERIAL SOAP. ( DIAL, etc )  Concentrate on the surgical area   for at least 3 minutes and rinse completely. Dry off as usual.   Do not use any deodorant, powder, body lotions, perfume, after shave or    cologne.

## 2017-12-28 NOTE — ANESTHESIA PREPROCEDURE EVALUATION
12/28/2017  Destinee Luo is a 86 y.o., female.    Anesthesia Evaluation    I have reviewed the Patient Summary Reports.    I have reviewed the Nursing Notes.   I have reviewed the Medications.     Review of Systems  Anesthesia Hx:  No problems with previous Anesthesia  Denies Family Hx of Anesthesia complications.    Social:  Non-Smoker    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    Endocrine:  Endocrine Normal        Physical Exam   Airway/Jaw/Neck:  Airway Findings: Mouth Opening: Normal Tongue: Normal  General Airway Assessment: Adult  Mallampati: I         Dental:  Dental Findings: In tact             Anesthesia Plan  Type of Anesthesia, risks & benefits discussed:  Anesthesia Type:  general  Patient's Preference:   Intra-op Monitoring Plan: standard ASA monitors  Intra-op Monitoring Plan Comments:   Post Op Pain Control Plan:   Post Op Pain Control Plan Comments:   Induction:   IV  Beta Blocker:         Informed Consent: Patient understands risks and agrees with Anesthesia plan.  Questions answered. Anesthesia consent signed with patient.  ASA Score: 2     Day of Surgery Review of History & Physical:    H&P update referred to the surgeon.     Anesthesia Plan Notes: No problems with prior procedures, some nausea with first but not with second        Ready For Surgery From Anesthesia Perspective.

## 2017-12-29 LAB — BACTERIA UR CULT: NO GROWTH

## 2018-01-03 ENCOUNTER — TELEPHONE (OUTPATIENT)
Dept: GYNECOLOGIC ONCOLOGY | Facility: CLINIC | Age: 83
End: 2018-01-03

## 2018-01-03 NOTE — TELEPHONE ENCOUNTER
01/03/18 5:30 am arrival time for surg on 1/04/18 at [facility] verified with ptRobert IRAHETA/MA

## 2018-01-04 ENCOUNTER — HOSPITAL ENCOUNTER (INPATIENT)
Facility: OTHER | Age: 83
LOS: 4 days | Discharge: HOME HEALTH | DRG: 654 | End: 2018-01-08
Attending: OBSTETRICS & GYNECOLOGY | Admitting: OBSTETRICS & GYNECOLOGY
Payer: MEDICARE

## 2018-01-04 ENCOUNTER — ANESTHESIA (OUTPATIENT)
Dept: SURGERY | Facility: OTHER | Age: 83
DRG: 654 | End: 2018-01-04
Payer: MEDICARE

## 2018-01-04 ENCOUNTER — SURGERY (OUTPATIENT)
Age: 83
End: 2018-01-04

## 2018-01-04 DIAGNOSIS — C66.1 URETERAL CANCER, RIGHT: Primary | ICD-10-CM

## 2018-01-04 DIAGNOSIS — C54.1 ENDOMETRIAL CANCER: ICD-10-CM

## 2018-01-04 PROBLEM — Z86.73 HISTORY OF TRANSIENT ISCHEMIC ATTACK (TIA): Status: ACTIVE | Noted: 2018-01-04

## 2018-01-04 PROBLEM — I10 ESSENTIAL HYPERTENSION: Status: ACTIVE | Noted: 2018-01-04

## 2018-01-04 PROBLEM — E78.5 DYSLIPIDEMIA: Status: ACTIVE | Noted: 2018-01-04

## 2018-01-04 LAB — POCT GLUCOSE: 86 MG/DL (ref 70–110)

## 2018-01-04 PROCEDURE — 25000003 PHARM REV CODE 250: Performed by: HOSPITALIST

## 2018-01-04 PROCEDURE — 71000039 HC RECOVERY, EACH ADD'L HOUR: Performed by: OBSTETRICS & GYNECOLOGY

## 2018-01-04 PROCEDURE — 58150 TOTAL HYSTERECTOMY: CPT | Mod: ,,, | Performed by: OBSTETRICS & GYNECOLOGY

## 2018-01-04 PROCEDURE — 58150 TOTAL HYSTERECTOMY: CPT | Mod: AS,,, | Performed by: NURSE PRACTITIONER

## 2018-01-04 PROCEDURE — 88342 IMHCHEM/IMCYTCHM 1ST ANTB: CPT | Performed by: PATHOLOGY

## 2018-01-04 PROCEDURE — 71000033 HC RECOVERY, INTIAL HOUR: Performed by: OBSTETRICS & GYNECOLOGY

## 2018-01-04 PROCEDURE — 88307 TISSUE EXAM BY PATHOLOGIST: CPT | Mod: 26,,, | Performed by: PATHOLOGY

## 2018-01-04 PROCEDURE — 50949 UNLISTED LAPS PX URETER: CPT | Mod: RT,,, | Performed by: UROLOGY

## 2018-01-04 PROCEDURE — 63600175 PHARM REV CODE 636 W HCPCS: Performed by: NURSE ANESTHETIST, CERTIFIED REGISTERED

## 2018-01-04 PROCEDURE — 88309 TISSUE EXAM BY PATHOLOGIST: CPT | Mod: 26,,, | Performed by: PATHOLOGY

## 2018-01-04 PROCEDURE — 25000003 PHARM REV CODE 250: Performed by: NURSE ANESTHETIST, CERTIFIED REGISTERED

## 2018-01-04 PROCEDURE — 63600175 PHARM REV CODE 636 W HCPCS: Performed by: UROLOGY

## 2018-01-04 PROCEDURE — 0TB64ZZ EXCISION OF RIGHT URETER, PERCUTANEOUS ENDOSCOPIC APPROACH: ICD-10-PCS | Performed by: UROLOGY

## 2018-01-04 PROCEDURE — 0UT24ZZ RESECTION OF BILATERAL OVARIES, PERCUTANEOUS ENDOSCOPIC APPROACH: ICD-10-PCS | Performed by: OBSTETRICS & GYNECOLOGY

## 2018-01-04 PROCEDURE — 99223 1ST HOSP IP/OBS HIGH 75: CPT | Mod: ,,, | Performed by: HOSPITALIST

## 2018-01-04 PROCEDURE — 36000712 HC OR TIME LEV V 1ST 15 MIN: Performed by: OBSTETRICS & GYNECOLOGY

## 2018-01-04 PROCEDURE — 36000713 HC OR TIME LEV V EA ADD 15 MIN: Performed by: OBSTETRICS & GYNECOLOGY

## 2018-01-04 PROCEDURE — 0TN64ZZ RELEASE RIGHT URETER, PERCUTANEOUS ENDOSCOPIC APPROACH: ICD-10-PCS | Performed by: UROLOGY

## 2018-01-04 PROCEDURE — 07BC4ZZ EXCISION OF PELVIS LYMPHATIC, PERCUTANEOUS ENDOSCOPIC APPROACH: ICD-10-PCS | Performed by: UROLOGY

## 2018-01-04 PROCEDURE — C1769 GUIDE WIRE: HCPCS | Performed by: OBSTETRICS & GYNECOLOGY

## 2018-01-04 PROCEDURE — 63600175 PHARM REV CODE 636 W HCPCS: Performed by: STUDENT IN AN ORGANIZED HEALTH CARE EDUCATION/TRAINING PROGRAM

## 2018-01-04 PROCEDURE — 86920 COMPATIBILITY TEST SPIN: CPT

## 2018-01-04 PROCEDURE — 0T164ZB BYPASS RIGHT URETER TO BLADDER, PERCUTANEOUS ENDOSCOPIC APPROACH: ICD-10-PCS | Performed by: UROLOGY

## 2018-01-04 PROCEDURE — C1729 CATH, DRAINAGE: HCPCS | Performed by: OBSTETRICS & GYNECOLOGY

## 2018-01-04 PROCEDURE — 38571 LAPAROSCOPY LYMPHADENECTOMY: CPT | Mod: 52,,, | Performed by: UROLOGY

## 2018-01-04 PROCEDURE — C2617 STENT, NON-COR, TEM W/O DEL: HCPCS | Performed by: OBSTETRICS & GYNECOLOGY

## 2018-01-04 PROCEDURE — 88307 TISSUE EXAM BY PATHOLOGIST: CPT | Performed by: PATHOLOGY

## 2018-01-04 PROCEDURE — 37000009 HC ANESTHESIA EA ADD 15 MINS: Performed by: OBSTETRICS & GYNECOLOGY

## 2018-01-04 PROCEDURE — 27201423 OPTIME MED/SURG SUP & DEVICES STERILE SUPPLY: Performed by: OBSTETRICS & GYNECOLOGY

## 2018-01-04 PROCEDURE — 25000003 PHARM REV CODE 250: Performed by: ANESTHESIOLOGY

## 2018-01-04 PROCEDURE — 0UT94ZZ RESECTION OF UTERUS, PERCUTANEOUS ENDOSCOPIC APPROACH: ICD-10-PCS | Performed by: OBSTETRICS & GYNECOLOGY

## 2018-01-04 PROCEDURE — C9290 INJ, BUPIVACAINE LIPOSOME: HCPCS | Performed by: UROLOGY

## 2018-01-04 PROCEDURE — 88342 IMHCHEM/IMCYTCHM 1ST ANTB: CPT | Mod: 26,,, | Performed by: PATHOLOGY

## 2018-01-04 PROCEDURE — 37000008 HC ANESTHESIA 1ST 15 MINUTES: Performed by: OBSTETRICS & GYNECOLOGY

## 2018-01-04 PROCEDURE — 8E0W4CZ ROBOTIC ASSISTED PROCEDURE OF TRUNK REGION, PERCUTANEOUS ENDOSCOPIC APPROACH: ICD-10-PCS | Performed by: UROLOGY

## 2018-01-04 PROCEDURE — 0T764DZ DILATION OF RIGHT URETER WITH INTRALUMINAL DEVICE, PERCUTANEOUS ENDOSCOPIC APPROACH: ICD-10-PCS | Performed by: UROLOGY

## 2018-01-04 PROCEDURE — 88341 IMHCHEM/IMCYTCHM EA ADD ANTB: CPT | Mod: 26,,, | Performed by: PATHOLOGY

## 2018-01-04 PROCEDURE — 11000001 HC ACUTE MED/SURG PRIVATE ROOM

## 2018-01-04 PROCEDURE — 25000003 PHARM REV CODE 250: Performed by: UROLOGY

## 2018-01-04 PROCEDURE — 0TBB4ZZ EXCISION OF BLADDER, PERCUTANEOUS ENDOSCOPIC APPROACH: ICD-10-PCS | Performed by: UROLOGY

## 2018-01-04 PROCEDURE — 0UT74ZZ RESECTION OF BILATERAL FALLOPIAN TUBES, PERCUTANEOUS ENDOSCOPIC APPROACH: ICD-10-PCS | Performed by: OBSTETRICS & GYNECOLOGY

## 2018-01-04 DEVICE — STENT URETERAL UNIV 8FR 22CM: Type: IMPLANTABLE DEVICE | Site: URETER | Status: FUNCTIONAL

## 2018-01-04 RX ORDER — CEFAZOLIN SODIUM 2 G/50ML
2 SOLUTION INTRAVENOUS
Status: COMPLETED | OUTPATIENT
Start: 2018-01-04 | End: 2018-01-05

## 2018-01-04 RX ORDER — SODIUM CHLORIDE 9 MG/ML
INJECTION, SOLUTION INTRAVENOUS CONTINUOUS
Status: DISCONTINUED | OUTPATIENT
Start: 2018-01-04 | End: 2018-01-05

## 2018-01-04 RX ORDER — CEFAZOLIN SODIUM 2 G/50ML
2 SOLUTION INTRAVENOUS
Status: COMPLETED | OUTPATIENT
Start: 2018-01-04 | End: 2018-01-04

## 2018-01-04 RX ORDER — LABETALOL HYDROCHLORIDE 5 MG/ML
INJECTION, SOLUTION INTRAVENOUS
Status: DISCONTINUED | OUTPATIENT
Start: 2018-01-04 | End: 2018-01-04

## 2018-01-04 RX ORDER — SODIUM CHLORIDE 0.9 % (FLUSH) 0.9 %
3 SYRINGE (ML) INJECTION
Status: DISCONTINUED | OUTPATIENT
Start: 2018-01-04 | End: 2018-01-04

## 2018-01-04 RX ORDER — FAMOTIDINE 20 MG/1
20 TABLET, FILM COATED ORAL
Status: COMPLETED | OUTPATIENT
Start: 2018-01-04 | End: 2018-01-04

## 2018-01-04 RX ORDER — DIPHENHYDRAMINE HYDROCHLORIDE 50 MG/ML
12.5 INJECTION INTRAMUSCULAR; INTRAVENOUS EVERY 4 HOURS PRN
Status: DISCONTINUED | OUTPATIENT
Start: 2018-01-04 | End: 2018-01-08 | Stop reason: HOSPADM

## 2018-01-04 RX ORDER — SODIUM CHLORIDE 450 MG/100ML
INJECTION, SOLUTION INTRAVENOUS CONTINUOUS
Status: DISCONTINUED | OUTPATIENT
Start: 2018-01-04 | End: 2018-01-04

## 2018-01-04 RX ORDER — ACETAMINOPHEN 10 MG/ML
INJECTION, SOLUTION INTRAVENOUS
Status: DISCONTINUED | OUTPATIENT
Start: 2018-01-04 | End: 2018-01-04

## 2018-01-04 RX ORDER — ACETAMINOPHEN 10 MG/ML
1000 INJECTION, SOLUTION INTRAVENOUS EVERY 8 HOURS
Status: COMPLETED | OUTPATIENT
Start: 2018-01-04 | End: 2018-01-05

## 2018-01-04 RX ORDER — OXYCODONE HYDROCHLORIDE 5 MG/1
5 TABLET ORAL
Status: DISCONTINUED | OUTPATIENT
Start: 2018-01-04 | End: 2018-01-04 | Stop reason: HOSPADM

## 2018-01-04 RX ORDER — NEOSTIGMINE METHYLSULFATE 1 MG/ML
INJECTION, SOLUTION INTRAVENOUS
Status: DISCONTINUED | OUTPATIENT
Start: 2018-01-04 | End: 2018-01-04

## 2018-01-04 RX ORDER — ONDANSETRON 2 MG/ML
INJECTION INTRAMUSCULAR; INTRAVENOUS
Status: DISCONTINUED | OUTPATIENT
Start: 2018-01-04 | End: 2018-01-04

## 2018-01-04 RX ORDER — HYDROMORPHONE HYDROCHLORIDE 2 MG/ML
0.4 INJECTION, SOLUTION INTRAMUSCULAR; INTRAVENOUS; SUBCUTANEOUS EVERY 5 MIN PRN
Status: DISCONTINUED | OUTPATIENT
Start: 2018-01-04 | End: 2018-01-04 | Stop reason: HOSPADM

## 2018-01-04 RX ORDER — LIDOCAINE HYDROCHLORIDE 10 MG/ML
1 INJECTION, SOLUTION EPIDURAL; INFILTRATION; INTRACAUDAL; PERINEURAL ONCE
Status: DISCONTINUED | OUTPATIENT
Start: 2018-01-04 | End: 2018-01-04 | Stop reason: HOSPADM

## 2018-01-04 RX ORDER — BISACODYL 10 MG
10 SUPPOSITORY, RECTAL RECTAL 2 TIMES DAILY
Status: COMPLETED | OUTPATIENT
Start: 2018-01-04 | End: 2018-01-05

## 2018-01-04 RX ORDER — PROPOFOL 10 MG/ML
VIAL (ML) INTRAVENOUS
Status: DISCONTINUED | OUTPATIENT
Start: 2018-01-04 | End: 2018-01-04

## 2018-01-04 RX ORDER — OXYCODONE HYDROCHLORIDE 5 MG/1
10 TABLET ORAL EVERY 4 HOURS PRN
Status: DISCONTINUED | OUTPATIENT
Start: 2018-01-04 | End: 2018-01-08 | Stop reason: HOSPADM

## 2018-01-04 RX ORDER — PHENYLEPHRINE HYDROCHLORIDE 10 MG/ML
INJECTION INTRAVENOUS
Status: DISCONTINUED | OUTPATIENT
Start: 2018-01-04 | End: 2018-01-04

## 2018-01-04 RX ORDER — FUROSEMIDE 10 MG/ML
INJECTION INTRAMUSCULAR; INTRAVENOUS
Status: DISCONTINUED | OUTPATIENT
Start: 2018-01-04 | End: 2018-01-04

## 2018-01-04 RX ORDER — DIPHENHYDRAMINE HYDROCHLORIDE 50 MG/ML
25 INJECTION INTRAMUSCULAR; INTRAVENOUS EVERY 6 HOURS PRN
Status: DISCONTINUED | OUTPATIENT
Start: 2018-01-04 | End: 2018-01-04 | Stop reason: HOSPADM

## 2018-01-04 RX ORDER — ONDANSETRON 2 MG/ML
4 INJECTION INTRAMUSCULAR; INTRAVENOUS EVERY 8 HOURS PRN
Status: DISCONTINUED | OUTPATIENT
Start: 2018-01-04 | End: 2018-01-08 | Stop reason: HOSPADM

## 2018-01-04 RX ORDER — ONDANSETRON 2 MG/ML
4 INJECTION INTRAMUSCULAR; INTRAVENOUS DAILY PRN
Status: DISCONTINUED | OUTPATIENT
Start: 2018-01-04 | End: 2018-01-04 | Stop reason: HOSPADM

## 2018-01-04 RX ORDER — AMLODIPINE BESYLATE 2.5 MG/1
2.5 TABLET ORAL DAILY
Status: DISCONTINUED | OUTPATIENT
Start: 2018-01-04 | End: 2018-01-05

## 2018-01-04 RX ORDER — ROCURONIUM BROMIDE 10 MG/ML
INJECTION, SOLUTION INTRAVENOUS
Status: DISCONTINUED | OUTPATIENT
Start: 2018-01-04 | End: 2018-01-04

## 2018-01-04 RX ORDER — LIDOCAINE HCL/PF 100 MG/5ML
SYRINGE (ML) INTRAVENOUS
Status: DISCONTINUED | OUTPATIENT
Start: 2018-01-04 | End: 2018-01-04

## 2018-01-04 RX ORDER — FENTANYL CITRATE 50 UG/ML
INJECTION, SOLUTION INTRAMUSCULAR; INTRAVENOUS
Status: DISCONTINUED | OUTPATIENT
Start: 2018-01-04 | End: 2018-01-04

## 2018-01-04 RX ORDER — FENTANYL CITRATE 50 UG/ML
25 INJECTION, SOLUTION INTRAMUSCULAR; INTRAVENOUS EVERY 5 MIN PRN
Status: DISCONTINUED | OUTPATIENT
Start: 2018-01-04 | End: 2018-01-04 | Stop reason: HOSPADM

## 2018-01-04 RX ORDER — MEPERIDINE HYDROCHLORIDE 50 MG/ML
12.5 INJECTION INTRAMUSCULAR; INTRAVENOUS; SUBCUTANEOUS ONCE AS NEEDED
Status: DISCONTINUED | OUTPATIENT
Start: 2018-01-04 | End: 2018-01-04 | Stop reason: HOSPADM

## 2018-01-04 RX ORDER — MUPIROCIN 20 MG/G
OINTMENT TOPICAL
Status: DISCONTINUED | OUTPATIENT
Start: 2018-01-04 | End: 2018-01-04 | Stop reason: HOSPADM

## 2018-01-04 RX ORDER — CIPROFLOXACIN 500 MG/1
500 TABLET ORAL 2 TIMES DAILY
Status: DISCONTINUED | OUTPATIENT
Start: 2018-01-04 | End: 2018-01-08 | Stop reason: HOSPADM

## 2018-01-04 RX ORDER — DOCUSATE SODIUM 100 MG/1
100 CAPSULE, LIQUID FILLED ORAL 2 TIMES DAILY
Status: DISCONTINUED | OUTPATIENT
Start: 2018-01-04 | End: 2018-01-08 | Stop reason: HOSPADM

## 2018-01-04 RX ORDER — FENOFIBRATE 145 MG/1
145 TABLET, FILM COATED ORAL DAILY
Status: DISCONTINUED | OUTPATIENT
Start: 2018-01-04 | End: 2018-01-08 | Stop reason: HOSPADM

## 2018-01-04 RX ORDER — GLYCOPYRROLATE 0.2 MG/ML
INJECTION INTRAMUSCULAR; INTRAVENOUS
Status: DISCONTINUED | OUTPATIENT
Start: 2018-01-04 | End: 2018-01-04

## 2018-01-04 RX ORDER — KETOROLAC TROMETHAMINE 30 MG/ML
15 INJECTION, SOLUTION INTRAMUSCULAR; INTRAVENOUS EVERY 6 HOURS PRN
Status: DISCONTINUED | OUTPATIENT
Start: 2018-01-04 | End: 2018-01-06

## 2018-01-04 RX ORDER — MIDAZOLAM HYDROCHLORIDE 1 MG/ML
INJECTION INTRAMUSCULAR; INTRAVENOUS
Status: DISCONTINUED | OUTPATIENT
Start: 2018-01-04 | End: 2018-01-04

## 2018-01-04 RX ORDER — CIPROFLOXACIN 2 MG/ML
400 INJECTION, SOLUTION INTRAVENOUS
Status: COMPLETED | OUTPATIENT
Start: 2018-01-04 | End: 2018-01-04

## 2018-01-04 RX ORDER — OXYCODONE HYDROCHLORIDE 5 MG/1
5 TABLET ORAL EVERY 4 HOURS PRN
Status: DISCONTINUED | OUTPATIENT
Start: 2018-01-04 | End: 2018-01-08 | Stop reason: HOSPADM

## 2018-01-04 RX ORDER — SODIUM CHLORIDE, SODIUM LACTATE, POTASSIUM CHLORIDE, CALCIUM CHLORIDE 600; 310; 30; 20 MG/100ML; MG/100ML; MG/100ML; MG/100ML
INJECTION, SOLUTION INTRAVENOUS CONTINUOUS
Status: DISCONTINUED | OUTPATIENT
Start: 2018-01-04 | End: 2018-01-04

## 2018-01-04 RX ORDER — DEXAMETHASONE SODIUM PHOSPHATE 4 MG/ML
INJECTION, SOLUTION INTRA-ARTICULAR; INTRALESIONAL; INTRAMUSCULAR; INTRAVENOUS; SOFT TISSUE
Status: DISCONTINUED | OUTPATIENT
Start: 2018-01-04 | End: 2018-01-04

## 2018-01-04 RX ADMIN — ACETAMINOPHEN 1000 MG: 10 INJECTION, SOLUTION INTRAVENOUS at 04:01

## 2018-01-04 RX ADMIN — ONDANSETRON 4 MG: 2 INJECTION INTRAMUSCULAR; INTRAVENOUS at 11:01

## 2018-01-04 RX ADMIN — PHENYLEPHRINE HYDROCHLORIDE 100 MCG: 10 INJECTION INTRAVENOUS at 08:01

## 2018-01-04 RX ADMIN — FENTANYL CITRATE 50 MCG: 50 INJECTION, SOLUTION INTRAMUSCULAR; INTRAVENOUS at 12:01

## 2018-01-04 RX ADMIN — GLYCOPYRROLATE 0.2 MG: 0.2 INJECTION, SOLUTION INTRAMUSCULAR; INTRAVENOUS at 11:01

## 2018-01-04 RX ADMIN — BISACODYL 10 MG: 10 SUPPOSITORY RECTAL at 10:01

## 2018-01-04 RX ADMIN — CEFAZOLIN SODIUM 2 G: 2 SOLUTION INTRAVENOUS at 03:01

## 2018-01-04 RX ADMIN — LABETALOL HYDROCHLORIDE 5 MG: 5 INJECTION, SOLUTION INTRAVENOUS at 07:01

## 2018-01-04 RX ADMIN — ROCURONIUM BROMIDE 5 MG: 10 INJECTION INTRAVENOUS at 07:01

## 2018-01-04 RX ADMIN — SODIUM CHLORIDE, SODIUM LACTATE, POTASSIUM CHLORIDE, AND CALCIUM CHLORIDE: 600; 310; 30; 20 INJECTION, SOLUTION INTRAVENOUS at 11:01

## 2018-01-04 RX ADMIN — ROCURONIUM BROMIDE 10 MG: 10 INJECTION INTRAVENOUS at 10:01

## 2018-01-04 RX ADMIN — ONDANSETRON 4 MG: 2 INJECTION INTRAMUSCULAR; INTRAVENOUS at 05:01

## 2018-01-04 RX ADMIN — ACETAMINOPHEN 1000 MG: 10 INJECTION, SOLUTION INTRAVENOUS at 07:01

## 2018-01-04 RX ADMIN — CARBOXYMETHYLCELLULOSE SODIUM 2 DROP: 2.5 SOLUTION/ DROPS OPHTHALMIC at 07:01

## 2018-01-04 RX ADMIN — DEXAMETHASONE SODIUM PHOSPHATE 8 MG: 4 INJECTION, SOLUTION INTRAMUSCULAR; INTRAVENOUS at 07:01

## 2018-01-04 RX ADMIN — FUROSEMIDE 5 MG: 10 INJECTION, SOLUTION INTRAMUSCULAR; INTRAVENOUS at 11:01

## 2018-01-04 RX ADMIN — SODIUM CHLORIDE: 0.9 INJECTION, SOLUTION INTRAVENOUS at 03:01

## 2018-01-04 RX ADMIN — CIPROFLOXACIN 400 MG: 2 INJECTION, SOLUTION INTRAVENOUS at 07:01

## 2018-01-04 RX ADMIN — PROPOFOL 100 MG: 10 INJECTION, EMULSION INTRAVENOUS at 07:01

## 2018-01-04 RX ADMIN — FENTANYL CITRATE 50 MCG: 50 INJECTION, SOLUTION INTRAMUSCULAR; INTRAVENOUS at 07:01

## 2018-01-04 RX ADMIN — BUPIVACAINE 20 ML: 13.3 INJECTION, SUSPENSION, LIPOSOMAL INFILTRATION at 12:01

## 2018-01-04 RX ADMIN — GLYCOPYRROLATE 0.6 MG: 0.2 INJECTION, SOLUTION INTRAMUSCULAR; INTRAVENOUS at 12:01

## 2018-01-04 RX ADMIN — PHENYLEPHRINE HYDROCHLORIDE 100 MCG: 10 INJECTION INTRAVENOUS at 10:01

## 2018-01-04 RX ADMIN — MIDAZOLAM HYDROCHLORIDE 1 MG: 1 INJECTION, SOLUTION INTRAMUSCULAR; INTRAVENOUS at 07:01

## 2018-01-04 RX ADMIN — CEFAZOLIN SODIUM 2 G: 2 SOLUTION INTRAVENOUS at 10:01

## 2018-01-04 RX ADMIN — ONDANSETRON 4 MG: 2 INJECTION INTRAMUSCULAR; INTRAVENOUS at 10:01

## 2018-01-04 RX ADMIN — SODIUM CHLORIDE, SODIUM LACTATE, POTASSIUM CHLORIDE, AND CALCIUM CHLORIDE: 600; 310; 30; 20 INJECTION, SOLUTION INTRAVENOUS at 06:01

## 2018-01-04 RX ADMIN — DOCUSATE SODIUM 100 MG: 100 CAPSULE, LIQUID FILLED ORAL at 10:01

## 2018-01-04 RX ADMIN — CIPROFLOXACIN HYDROCHLORIDE 500 MG: 500 TABLET, FILM COATED ORAL at 10:01

## 2018-01-04 RX ADMIN — FAMOTIDINE 20 MG: 20 TABLET, FILM COATED ORAL at 05:01

## 2018-01-04 RX ADMIN — SODIUM CHLORIDE, SODIUM LACTATE, POTASSIUM CHLORIDE, AND CALCIUM CHLORIDE: 600; 310; 30; 20 INJECTION, SOLUTION INTRAVENOUS at 09:01

## 2018-01-04 RX ADMIN — ROCURONIUM BROMIDE 10 MG: 10 INJECTION INTRAVENOUS at 09:01

## 2018-01-04 RX ADMIN — ACETAMINOPHEN 1000 MG: 10 INJECTION, SOLUTION INTRAVENOUS at 11:01

## 2018-01-04 RX ADMIN — LIDOCAINE HYDROCHLORIDE 75 MG: 20 INJECTION, SOLUTION INTRAVENOUS at 07:01

## 2018-01-04 RX ADMIN — ROCURONIUM BROMIDE 45 MG: 10 INJECTION INTRAVENOUS at 07:01

## 2018-01-04 RX ADMIN — PROPOFOL 20 MG: 10 INJECTION, EMULSION INTRAVENOUS at 07:01

## 2018-01-04 RX ADMIN — KETOROLAC TROMETHAMINE 15 MG: 30 INJECTION, SOLUTION INTRAMUSCULAR at 05:01

## 2018-01-04 RX ADMIN — NEOSTIGMINE METHYLSULFATE 4 MG: 1 INJECTION INTRAVENOUS at 12:01

## 2018-01-04 RX ADMIN — CEFAZOLIN SODIUM 2 G: 2 SOLUTION INTRAVENOUS at 07:01

## 2018-01-04 NOTE — ASSESSMENT & PLAN NOTE
Patient reports an event with numbness which took place about 5 years ago.  Denies any neurologic symptoms since this event.  She was on aspirin therapy for many years and then start on dual antiplatelet therapy as a preventive measure.  I would recommend holding aspirin and clopidogrel until post-operative bleeding risk low.  Unclear if she needs dual antiplatelet therapy if she never failed treatment with aspirin.  Recommend patient discuss switching back to monotherapy with Dr. Cassidy unless she has a stronger indication to also treat with clopidogrel.

## 2018-01-04 NOTE — NURSING
Pt arrived to floor via stretcher with CARINA Devi and transferred with assist to bed. VS taken and stable on 2 L NC and afebrile.  IVF started, SCDs applied, oriented to room, call light placed within reach, bed low and locked, and family at bedside. No complaints of pain. Dia noted draining clear pink urine to gravity. Incisions noted to abdomen; 5 laps, CDI. RAFFI drain to L abdomen, WDL. No acute distress noted at this time. Will continue to monitor.

## 2018-01-04 NOTE — HOSPITAL COURSE
Patient underwent a total hysterectomy, right distal ureterectomy with ureteric reimplantation performed by Dr. Kiet Arellano.  Patient tolerated surgery well.  Patient tolerating oral intake.  Pain well controlled.

## 2018-01-04 NOTE — INTERVAL H&P NOTE
The patient has been examined and the H&P has been reviewed:    I concur with the findings and no changes have occurred since H&P was written.    Surgery risks, benefits and alternative options discussed and understood by patient/family.          Active Hospital Problems    Diagnosis  POA    Endometrial cancer [C54.1]  Yes      Resolved Hospital Problems    Diagnosis Date Resolved POA   No resolved problems to display.

## 2018-01-04 NOTE — OPERATIVE NOTE ADDENDUM
Certification of Assistant at Surgery       Surgery Date: 1/4/2018     Participating Surgeons:  Surgeon(s) and Role:  Panel 1:     * Carlos Mann MD - Primary        Rosa Padgett NP-C, First Assist     * Viola Cordero MD - Resident - Assisting    Panel 2:     * Kiet Arellano MD - Primary    Procedures:  Procedure(s) (LRB):  XI ROBOTIC ASSISTED LAPAROSCOPIC HYSTERECTOMY (N/A)  XI ROBOT ASSISTED LAPAROSCOPIC SALPINGO-OOPHERECTOMY (Bilateral)  URETERECTOMY (Right)  REIMPLANTATION-URETER (Right)  NEPHRECTOMY- ROBOT -  POSS OPEN (Right)    Assistant Surgeon's Certification of Necessity:  I understand that section 1842 (b) (6) (d) of the Social Security Act generally prohibits Medicare Part B reasonable charge payment for the services of assistants at surgery in teaching hospitals when qualified residents are available to furnish such services. I certify that the services for which payment is claimed were medically necessary, and that no qualified resident was available to perform the services. I further understand that these services are subject to post-payment review by the Medicare carrier.      Rosa Padgett NP    01/04/2018  9:12 AM

## 2018-01-04 NOTE — OR NURSING
Dr Arellano at bedside per request regarding saturated abdominal dressing. No new orders rec'd. Does not want dressing reinforced at thsi time. Scapula noted taped to right ID band.

## 2018-01-04 NOTE — PROGRESS NOTES
Gu post op    Af vss  Ab bening  Good urine output  Clear pink  Increased RAFFI output is anticiated  Left 2 trocar sites with serosanguinous draingage; dressing changed and re-evaluated; no active bleeding    Sp right distal ureterectomy with lymphadenectomy and ureteral reimplant with boari flap  Sp hysterectomy    Keep mitchell and Raffi and stent in place  Cont antibiotics while mitchell is in place  Will ask hosp med to assist with medical management  Ambulate avoid narcotics  Exparel/ ofimev. Oxycodone prn  toradol prn

## 2018-01-04 NOTE — INTERVAL H&P NOTE
The patient has been examined and the H&P has been reviewed:    preop urine cs neg; pt has been on prophylactic abx due to recurrent UTIs; cipro preop; plan open nephrectomy if total nephroU is necessary    Anesthesia/Surgery risks, benefits and alternative options discussed and understood by patient/family.          Active Hospital Problems    Diagnosis  POA    Endometrial cancer [C54.1]  Yes      Resolved Hospital Problems    Diagnosis Date Resolved POA   No resolved problems to display.

## 2018-01-04 NOTE — ASSESSMENT & PLAN NOTE
Blood pressure has been well controlled with low dose of amlodipine.  Will continue for now and monitor blood pressure.

## 2018-01-04 NOTE — ANESTHESIA POSTPROCEDURE EVALUATION
"Anesthesia Post Evaluation    Patient: Destinee CARDONA Major    Procedure(s) Performed: Procedure(s) (LRB):  XI ROBOTIC ASSISTED LAPAROSCOPIC HYSTERECTOMY (N/A)  XI ROBOT ASSISTED LAPAROSCOPIC SALPINGO-OOPHERECTOMY (Bilateral)  URETERECTOMY (Right)  REIMPLANTATION-URETER (Right)  NEPHRECTOMY- ROBOT -  POSS OPEN (Right)    Final Anesthesia Type: general  Patient location during evaluation: PACU  Patient participation: Yes- Able to Participate  Level of consciousness: awake and alert  Post-procedure vital signs: reviewed and stable  Pain management: adequate  Airway patency: patent  PONV status at discharge: No PONV  Anesthetic complications: no      Cardiovascular status: blood pressure returned to baseline  Respiratory status: unassisted and spontaneous ventilation  Hydration status: euvolemic  Follow-up not needed.        Visit Vitals  BP (!) 154/70   Pulse 90   Temp 36.6 °C (97.8 °F) (Oral)   Resp 16   Ht 5' 2" (1.575 m)   Wt 68 kg (150 lb)   SpO2 95%   BMI 27.44 kg/m²       Pain/Anabella Score: Pain Assessment Performed: Yes (1/4/2018  5:40 AM)  Presence of Pain: denies (1/4/2018  2:19 PM)  Anabella Score: 8 (1/4/2018  2:19 PM)      "

## 2018-01-04 NOTE — SUBJECTIVE & OBJECTIVE
Past Medical History:   Diagnosis Date    Anticoagulant long-term use     stopped Plavix and ASA 11/2017    High cholesterol     Hypertension     Macular degeneration     Stroke 2012    TIA, no deficits       Past Surgical History:   Procedure Laterality Date    NO PAST SURGERIES      URETERAL STENT PLACEMENT      URETEROSCOPY         Review of patient's allergies indicates:   Allergen Reactions    Sulfa (sulfonamide antibiotics)     Codeine Anxiety       No current facility-administered medications on file prior to encounter.      Current Outpatient Prescriptions on File Prior to Encounter   Medication Sig    acetaminophen (TYLENOL) 325 MG tablet Take 1 tablet (325 mg total) by mouth every 6 (six) hours as needed for Pain.    amLODIPine (NORVASC) 2.5 MG tablet Take 2.5 mg by mouth once daily.    cranberry 400 mg Cap Take by mouth.    fenofibrate (TRICOR) 145 MG tablet Take 145 mg by mouth once daily.    Lactobacillus acidophilus Cap Take by mouth.    loratadine (CLARITIN) 10 mg tablet Take by mouth.    multivitamin capsule Take 1 capsule by mouth.     Family History     None        Social History Main Topics    Smoking status: Never Smoker    Smokeless tobacco: Never Used    Alcohol use No    Drug use: Unknown    Sexual activity: Not on file     Review of Systems   Constitutional: Negative for chills and fever.   HENT: Negative for congestion, hearing loss, sinus pressure and trouble swallowing.    Eyes: Negative for photophobia, pain, redness and visual disturbance.   Respiratory: Negative for cough, chest tightness, shortness of breath and wheezing.    Cardiovascular: Negative for chest pain and palpitations.   Gastrointestinal: Negative for abdominal distention, abdominal pain, blood in stool, constipation, diarrhea, nausea and vomiting.   Endocrine: Negative for cold intolerance, heat intolerance, polydipsia, polyphagia and polyuria.   Genitourinary: Negative for dysuria and frequency.    Musculoskeletal: Negative for arthralgias, back pain, gait problem, joint swelling, myalgias and neck pain.   Allergic/Immunologic: Negative for environmental allergies, food allergies and immunocompromised state.   Neurological: Negative for dizziness, seizures, syncope, weakness, light-headedness and headaches.   Hematological: Negative for adenopathy.   Psychiatric/Behavioral: Negative for agitation, behavioral problems and confusion.     Objective:     Vital Signs (Most Recent):  Temp: 97.9 °F (36.6 °C) (01/04/18 1459)  Pulse: 96 (01/04/18 1459)  Resp: 16 (01/04/18 1459)  BP: 136/65 (01/04/18 1459)  SpO2: (!) 93 % (01/04/18 1459) Vital Signs (24h Range):  Temp:  [97.7 °F (36.5 °C)-97.9 °F (36.6 °C)] 97.9 °F (36.6 °C)  Pulse:  [77-96] 96  Resp:  [16] 16  SpO2:  [91 %-97 %] 93 %  BP: (136-179)/(65-78) 136/65     Weight: 68 kg (150 lb)  Body mass index is 27.44 kg/m².    Physical Exam   Constitutional: She is oriented to person, place, and time. She appears well-developed and well-nourished. No distress.   HENT:   Head: Normocephalic and atraumatic.   Nose: Nose normal.   Mouth/Throat: Oropharynx is clear and moist. No oropharyngeal exudate.   Eyes: Conjunctivae are normal. Right eye exhibits no discharge. Left eye exhibits no discharge. No scleral icterus.   Neck: Normal range of motion. Neck supple. No JVD present. No tracheal deviation present. No thyromegaly present.   Cardiovascular: Normal rate, regular rhythm, normal heart sounds and intact distal pulses.  Exam reveals no gallop and no friction rub.    No murmur heard.  Pulmonary/Chest: Effort normal and breath sounds normal. No stridor. No respiratory distress. She has no wheezes. She has no rales. She exhibits no tenderness.   Abdominal: Soft. Bowel sounds are normal. She exhibits no distension and no mass. There is no tenderness. There is no rebound and no guarding.   Multiple laparoscopic surgical incisions with dressings intact.  RAFFI tube on the left  abdomen with serosanguinous fluids present.   Musculoskeletal: Normal range of motion. She exhibits no edema or tenderness.   Lymphadenopathy:     She has no cervical adenopathy.   Neurological: She is alert and oriented to person, place, and time. She has normal reflexes. She displays normal reflexes. No cranial nerve deficit. She exhibits normal muscle tone. Coordination normal.   Skin: Skin is warm and dry. No rash noted. She is not diaphoretic. No erythema. No pallor.   Psychiatric: She has a normal mood and affect. Her behavior is normal. Judgment and thought content normal.           Significant Labs: All pertinent labs within the past 24 hours have been reviewed.    Significant Imaging: I have reviewed all pertinent imaging results/findings within the past 24 hours.

## 2018-01-04 NOTE — OR NURSING
Dr Arellano at bedside to reassess dressing. States ok to reinforce. Dressing reinforced with telfa and tegaderm to left abdomen. RAFFI sutured in place and staple X3 in place. Granddaughter Ara updated by phone and sent to pt room. Awaiting retuyrn call from Darlene LIM to give phone report. Pt without change from previous assessment. Continues without c/o pain at this time. Prepared for transfer to inpt room 387. Consult called to Dr Hanna per MD order for hospitalist. Spoke with Dr Berman and consult entered into the computer.

## 2018-01-04 NOTE — ASSESSMENT & PLAN NOTE
Checking fasting lipids with tomorrow morning labs.  Likely would benefit from statin therapy over fenofibrate but I have asked her to discuss this with her primary care provider Dr. Edilberto Cassidy.

## 2018-01-04 NOTE — OR NURSING
Scds and Teds applied to patient's bilateral legs prior to going to holding per last minute order.

## 2018-01-04 NOTE — TRANSFER OF CARE
"Anesthesia Transfer of Care Note    Patient: Destinee CARDONA Major    Procedure(s) Performed: Procedure(s) (LRB):  XI ROBOTIC ASSISTED LAPAROSCOPIC HYSTERECTOMY (N/A)  XI ROBOT ASSISTED LAPAROSCOPIC SALPINGO-OOPHERECTOMY (Bilateral)  URETERECTOMY (Right)  REIMPLANTATION-URETER (Right)  NEPHRECTOMY- ROBOT -  POSS OPEN (Right)    Patient location: PACU    Anesthesia Type: general    Transport from OR: Transported from OR on 2-3 L/min O2 by NC with adequate spontaneous ventilation    Post pain: adequate analgesia    Post assessment: no apparent anesthetic complications    Post vital signs: stable    Level of consciousness: awake, alert and oriented    Nausea/Vomiting: no nausea/vomiting    Complications: none    Transfer of care protocol was followed      Last vitals:   Visit Vitals  BP (!) 147/65   Pulse 87   Temp 36.6 °C (97.9 °F) (Oral)   Resp 16   Ht 5' 2" (1.575 m)   Wt 68 kg (150 lb)   SpO2 96%   BMI 27.44 kg/m²     "

## 2018-01-04 NOTE — H&P (VIEW-ONLY)
"[facility]  Urology  History & Physical    Patient Name: Destinee Luo  MRN: [id number]  Admission Date: (Not on file)  Code Status: [unfilled]   Attending Provider: Kiet Arellano MD   Primary Care Physician: Edilberto Cassidy MD  Principal Problem:[unfilled]    Subjective:     HPI:   86 WF  preop for right distal ureterectomy and reimplant vs nephroureterectomy for right ureteral ca    Also for simply hyst for endometrial ca by Dr Carlos Mann    Pt has recurrent UTIs  Currently on cipro        Past Medical History:   Diagnosis Date    High cholesterol     Hypertension     Macular degeneration     Stroke 2012    TIA, no deficits       Past Surgical History:   Procedure Laterality Date    NO PAST SURGERIES         Review of patient's allergies indicates:   Allergen Reactions    Sulfa (sulfonamide antibiotics)     Codeine Anxiety       Family History     None          Social History Main Topics    Smoking status: Never Smoker    Smokeless tobacco: Never Used    Alcohol use No    Drug use: Unknown    Sexual activity: Not on file       Review of Systems    Objective:     [unfilled]     Body mass index is 26.57 kg/m².    [unfilled]       Lines/Drains/Airways          No matching active lines, drains, or airways          Physical Exam    Ao*4  resp normal rate' breathing not labored; cta b  cv normal rate; rrr no mur  Ab nondistended  Ext no edema      Significant Labs:    Urine cs multiple org  Repeat cs 12/28 pending    Cr 1.3  GFR 37  Hct 35  Platelets normal    FINAL PATHOLOGIC DIAGNOSIS  1. 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  2. URETERAL BIOPSY: HIGH-GRADE UROTHELIAL CARCINOMA.  Note: The ureteral tumor appears mostly "in situ" within the small biopsy. There is a small area of at least  superficial stromal invasion. It appears histologically distinct from the endometrial tumor.  [facility]  Diagnosed by: Desmond Hernandez M.D.  (Electronically Signed: 2017-11-28 " 09:17:46)    Significant Imaging:  -   Impression         1. Moderate to severe right-sided hydronephrosis to the level of the distal right ureteral mass worrisome for an obstructive neoplasm in that is thought to be enhancing.    2. Hypodensity off of the right ovary nonspecific on this examination possibly relating to a cyst or follicle. Female pelvis ultrasound would be suggested to exclude a cystic ovarian neoplastic mass    3. Suggestion of fluid or abnormal density in the uterus centrally presumably involving the endometrium, female pelvis ultrasound evaluation would be suggested to exclude endometrial thickening as can be seen with a neoplastic process, hyperplasia, or polyps.    4. Gas within the bladder, please correlate for recent instrumentation, fistula formation or air forming organism could appear similar.    5. Adrenal nodules, statistically favored related to adenomas but not ideally evaluated secondary to size. Followup for size stability would be reasonable         Assessment and Plan:     There are no hospital problems to display for this patient.    Right ureteral cancer high grade T1  CKD  Recurrent UTIs  Endometrial ca      Right robotic vs open distal ureteral ureterectomy and reimplant, possible lymphadenectomy vs nephroureterectomy  Cont cipro  Repeat urine cs  Hysterectomy per Dr RENEE Mann    We discussed the increased risk of complications given her age, CKD, inguinal hernias, recurrent UTIs and multiple malignancies she is at increased risk for complications  I agree that concurrent repair of the inguinal hernias is not the best course at this time    She understands that she may require complete removal of the kidney and ureter and that she may require open surgery.    Answered all of her questions and her family's questions.    They agree with the plan and wish to proceed    40 min face to face; more than 50% time spent counseling and coordinating care           Kiet Arellano,  MD  Urology  [facility]  Urology

## 2018-01-04 NOTE — CONSULTS
[facility]  Hospital Medicine  Consult Note    Patient Name: Destinee Luo  MRN: [id number]  Admission Date: 1/4/2018  Hospital Length of Stay: 0 days  Attending Physician: Kiet Allison MD   Primary Care Provider: Edilberto Cassidy MD           Patient information was obtained from patient, relative(s) and past medical records.     Inpatient consult to Hospitalist  Consult performed by: ARISTEO YOUNG  Consult ordered by: KIET ALLISON        Subjective:     Principal Problem: Ureteral cancer, right    Chief Complaint: Ureteral and endometrial cancer     HPI: Patient is a 86 year-old woman with a history of hypertension (on amlodipine 5 mg oral daily), dyslipidemia (on fenofibrate) and prior transient ischemic attack about 5 years ago who had previously been on aspirin and clopidogrel until she developed bleeding from vaginal and/or urine since 11/2017 who underwent a total hysterectomy performed by Dr. Carlos Mann and also a right distal ureterectomy with ureteric reimplantation performed by Dr. Kiet Allison today for endometrial cancer and right ureteral cancer respectively.  Patient tolerated surgery well.  Dr. Allison has consulted hospital medicine for management of patient's medical conditions.    Patient lives independently and currently walk with a cane and currently drives.  Patient denies smoked tobacco and mild alcohol consumption.  No family history of heart disease or diabetes.  Patient's mother had metastatic cancer (patient and family unsure of the pathology) when her mother was in her 50's.    Past Medical History:   Diagnosis Date    Anticoagulant long-term use     stopped Plavix and ASA 11/2017    High cholesterol     Hypertension     Macular degeneration     Stroke 2012    TIA, no deficits       Past Surgical History:   Procedure Laterality Date    NO PAST SURGERIES      URETERAL STENT PLACEMENT      URETEROSCOPY         Review of patient's allergies indicates:    Allergen Reactions    Sulfa (sulfonamide antibiotics)     Codeine Anxiety       No current facility-administered medications on file prior to encounter.      Current Outpatient Prescriptions on File Prior to Encounter   Medication Sig    acetaminophen (TYLENOL) 325 MG tablet Take 1 tablet (325 mg total) by mouth every 6 (six) hours as needed for Pain.    amLODIPine (NORVASC) 2.5 MG tablet Take 2.5 mg by mouth once daily.    cranberry 400 mg Cap Take by mouth.    fenofibrate (TRICOR) 145 MG tablet Take 145 mg by mouth once daily.    Lactobacillus acidophilus Cap Take by mouth.    loratadine (CLARITIN) 10 mg tablet Take by mouth.    multivitamin capsule Take 1 capsule by mouth.     Family History     None        Social History Main Topics    Smoking status: Never Smoker    Smokeless tobacco: Never Used    Alcohol use No    Drug use: Unknown    Sexual activity: Not on file     Review of Systems   Constitutional: Negative for chills and fever.   HENT: Negative for congestion, hearing loss, sinus pressure and trouble swallowing.    Eyes: Negative for photophobia, pain, redness and visual disturbance.   Respiratory: Negative for cough, chest tightness, shortness of breath and wheezing.    Cardiovascular: Negative for chest pain and palpitations.   Gastrointestinal: Negative for abdominal distention, abdominal pain, blood in stool, constipation, diarrhea, nausea and vomiting.   Endocrine: Negative for cold intolerance, heat intolerance, polydipsia, polyphagia and polyuria.   Genitourinary: Negative for dysuria and frequency.   Musculoskeletal: Negative for arthralgias, back pain, gait problem, joint swelling, myalgias and neck pain.   Allergic/Immunologic: Negative for environmental allergies, food allergies and immunocompromised state.   Neurological: Negative for dizziness, seizures, syncope, weakness, light-headedness and headaches.   Hematological: Negative for adenopathy.   Psychiatric/Behavioral:  Negative for agitation, behavioral problems and confusion.     Objective:     Vital Signs (Most Recent):  Temp: 97.9 °F (36.6 °C) (01/04/18 1459)  Pulse: 96 (01/04/18 1459)  Resp: 16 (01/04/18 1459)  BP: 136/65 (01/04/18 1459)  SpO2: (!) 93 % (01/04/18 1459) Vital Signs (24h Range):  Temp:  [97.7 °F (36.5 °C)-97.9 °F (36.6 °C)] 97.9 °F (36.6 °C)  Pulse:  [77-96] 96  Resp:  [16] 16  SpO2:  [91 %-97 %] 93 %  BP: (136-179)/(65-78) 136/65     Weight: 68 kg (150 lb)  Body mass index is 27.44 kg/m².    Physical Exam   Constitutional: She is oriented to person, place, and time. She appears well-developed and well-nourished. No distress.   HENT:   Head: Normocephalic and atraumatic.   Nose: Nose normal.   Mouth/Throat: Oropharynx is clear and moist. No oropharyngeal exudate.   Eyes: Conjunctivae are normal. Right eye exhibits no discharge. Left eye exhibits no discharge. No scleral icterus.   Neck: Normal range of motion. Neck supple. No JVD present. No tracheal deviation present. No thyromegaly present.   Cardiovascular: Normal rate, regular rhythm, normal heart sounds and intact distal pulses.  Exam reveals no gallop and no friction rub.    No murmur heard.  Pulmonary/Chest: Effort normal and breath sounds normal. No stridor. No respiratory distress. She has no wheezes. She has no rales. She exhibits no tenderness.   Abdominal: Soft. Bowel sounds are normal. She exhibits no distension and no mass. There is no tenderness. There is no rebound and no guarding.   Multiple laparoscopic surgical incisions with dressings intact.  RAFFI tube on the left abdomen with serosanguinous fluids present.   Musculoskeletal: Normal range of motion. She exhibits no edema or tenderness.   Lymphadenopathy:     She has no cervical adenopathy.   Neurological: She is alert and oriented to person, place, and time. She has normal reflexes. She displays normal reflexes. No cranial nerve deficit. She exhibits normal muscle tone. Coordination normal.    Skin: Skin is warm and dry. No rash noted. She is not diaphoretic. No erythema. No pallor.   Psychiatric: She has a normal mood and affect. Her behavior is normal. Judgment and thought content normal.           Significant Labs: All pertinent labs within the past 24 hours have been reviewed.    Significant Imaging: I have reviewed all pertinent imaging results/findings within the past 24 hours.    Assessment/Plan:     * Ureteral cancer, right    Patient and underwent a right distal ureterectomy with ureteric reimplantation performed by Dr. Kiet Arellano today for right ureteral cancer.  Patient tolerated surgery well.  Pain well controlled.  Post-operative care including VTE prophylaxis per Dr. Arellano.  Will check kidney function and complete blood cell count tomorrow.        Endometrial cancer    Patient underwent a total hysterectomy performed by Dr. Carlos Mann today.        Essential hypertension    Blood pressure has been well controlled with low dose of amlodipine.  Will continue for now and monitor blood pressure.        History of transient ischemic attack (TIA)    Patient reports an event with numbness which took place about 5 years ago.  Denies any neurologic symptoms since this event.  She was on aspirin therapy for many years and then start on dual antiplatelet therapy as a preventive measure.  I would recommend holding aspirin and clopidogrel until post-operative bleeding risk low.  Unclear if she needs dual antiplatelet therapy if she never failed treatment with aspirin.  Recommend patient discuss switching back to monotherapy with Dr. Cassidy unless she has a stronger indication to also treat with clopidogrel.            Dyslipidemia    Checking fasting lipids with tomorrow morning labs.  Likely would benefit from statin therapy over fenofibrate but I have asked her to discuss this with her primary care provider Dr. Edilberto Cassidy.            VTE Risk Mitigation         Ordered     High Risk of VTE   Once      01/04/18 1503     Place sequential compression device  Until discontinued      01/04/18 1503     Place KIANNA hose  Until discontinued      01/04/18 1503     Reason for No Pharmacological VTE Prophylaxis  Once      01/04/18 1503              Thank you for your consult. I will follow-up with patient. Please contact us if you have any additional questions.    Brad Valero MD  Department of Hospital Medicine   [facility]

## 2018-01-04 NOTE — OR NURSING
Pt arrives to PACU with blood saturated telfa to left abdomen. Dressing marked. Dr Arellano called to bedside. Area around dressing soft to palpate. Pt with ring taped to left ring finger with green coban.

## 2018-01-04 NOTE — HPI
Patient is a 86 year-old woman with a history of hypertension (on amlodipine 5 mg oral daily), dyslipidemia (on fenofibrate) and prior transient ischemic attack about 5 years ago who had previously been on aspirin and clopidogrel until she developed bleeding from vaginal and/or urine since 11/2017 who underwent a total hysterectomy performed by Dr. Carlos Mann and also a right distal ureterectomy with ureteric reimplantation performed by Dr. Kiet Arellano today for endometrial cancer and right ureteral cancer respectively.  Patient tolerated surgery well.  Dr. Arellano has consulted hospital medicine for management of patient's medical conditions.    Patient lives independently and currently walk with a cane and currently drives.  Patient denies smoked tobacco and mild alcohol consumption.  No family history of heart disease or diabetes.  Patient's mother had metastatic cancer (patient and family unsure of the pathology) when her mother was in her 50's.

## 2018-01-04 NOTE — ASSESSMENT & PLAN NOTE
Patient and underwent a right distal ureterectomy with ureteric reimplantation performed by Dr. Kiet Arellano today for right ureteral cancer.  Patient tolerated surgery well.  Pain well controlled.  Post-operative care including VTE prophylaxis per Dr. Arellano.  Will check kidney function and complete blood cell count tomorrow.

## 2018-01-05 LAB
ANION GAP SERPL CALC-SCNC: 9 MMOL/L
BASOPHILS # BLD AUTO: 0 K/UL
BASOPHILS NFR BLD: 0 %
BUN SERPL-MCNC: 16 MG/DL
CALCIUM SERPL-MCNC: 8.2 MG/DL
CHLORIDE SERPL-SCNC: 106 MMOL/L
CHOLEST SERPL-MCNC: 106 MG/DL
CHOLEST/HDLC SERPL: 2.4 {RATIO}
CO2 SERPL-SCNC: 22 MMOL/L
CREAT SERPL-MCNC: 1.3 MG/DL
DIFFERENTIAL METHOD: ABNORMAL
EOSINOPHIL # BLD AUTO: 0 K/UL
EOSINOPHIL NFR BLD: 0 %
ERYTHROCYTE [DISTWIDTH] IN BLOOD BY AUTOMATED COUNT: 15.2 %
EST. GFR  (AFRICAN AMERICAN): 43 ML/MIN/1.73 M^2
EST. GFR  (NON AFRICAN AMERICAN): 37 ML/MIN/1.73 M^2
GLUCOSE SERPL-MCNC: 135 MG/DL
HCT VFR BLD AUTO: 33.2 %
HDLC SERPL-MCNC: 45 MG/DL
HDLC SERPL: 42.5 %
HGB BLD-MCNC: 10.9 G/DL
LDLC SERPL CALC-MCNC: 49.6 MG/DL
LYMPHOCYTES # BLD AUTO: 0.9 K/UL
LYMPHOCYTES NFR BLD: 7.6 %
MAGNESIUM SERPL-MCNC: 1.9 MG/DL
MCH RBC QN AUTO: 27.6 PG
MCHC RBC AUTO-ENTMCNC: 32.8 G/DL
MCV RBC AUTO: 84 FL
MONOCYTES # BLD AUTO: 0.9 K/UL
MONOCYTES NFR BLD: 7.8 %
NEUTROPHILS # BLD AUTO: 10 K/UL
NEUTROPHILS NFR BLD: 84.4 %
NONHDLC SERPL-MCNC: 61 MG/DL
PHOSPHATE SERPL-MCNC: 3.1 MG/DL
PLATELET # BLD AUTO: 259 K/UL
PMV BLD AUTO: 10.1 FL
POTASSIUM SERPL-SCNC: 3.8 MMOL/L
RBC # BLD AUTO: 3.95 M/UL
SODIUM SERPL-SCNC: 137 MMOL/L
TRIGL SERPL-MCNC: 57 MG/DL
WBC # BLD AUTO: 11.83 K/UL

## 2018-01-05 PROCEDURE — 80048 BASIC METABOLIC PNL TOTAL CA: CPT

## 2018-01-05 PROCEDURE — 97161 PT EVAL LOW COMPLEX 20 MIN: CPT

## 2018-01-05 PROCEDURE — 83735 ASSAY OF MAGNESIUM: CPT

## 2018-01-05 PROCEDURE — 36415 COLL VENOUS BLD VENIPUNCTURE: CPT

## 2018-01-05 PROCEDURE — 84100 ASSAY OF PHOSPHORUS: CPT

## 2018-01-05 PROCEDURE — 25000003 PHARM REV CODE 250: Performed by: HOSPITALIST

## 2018-01-05 PROCEDURE — 97116 GAIT TRAINING THERAPY: CPT

## 2018-01-05 PROCEDURE — 99232 SBSQ HOSP IP/OBS MODERATE 35: CPT | Mod: ,,, | Performed by: HOSPITALIST

## 2018-01-05 PROCEDURE — 11000001 HC ACUTE MED/SURG PRIVATE ROOM

## 2018-01-05 PROCEDURE — 94761 N-INVAS EAR/PLS OXIMETRY MLT: CPT

## 2018-01-05 PROCEDURE — 94799 UNLISTED PULMONARY SVC/PX: CPT

## 2018-01-05 PROCEDURE — 25000003 PHARM REV CODE 250: Performed by: UROLOGY

## 2018-01-05 PROCEDURE — 85025 COMPLETE CBC W/AUTO DIFF WBC: CPT

## 2018-01-05 PROCEDURE — 63600175 PHARM REV CODE 636 W HCPCS: Performed by: STUDENT IN AN ORGANIZED HEALTH CARE EDUCATION/TRAINING PROGRAM

## 2018-01-05 PROCEDURE — 80061 LIPID PANEL: CPT

## 2018-01-05 PROCEDURE — 63600175 PHARM REV CODE 636 W HCPCS: Performed by: UROLOGY

## 2018-01-05 RX ORDER — ENOXAPARIN SODIUM 100 MG/ML
40 INJECTION SUBCUTANEOUS EVERY 24 HOURS
Status: DISCONTINUED | OUTPATIENT
Start: 2018-01-05 | End: 2018-01-08 | Stop reason: HOSPADM

## 2018-01-05 RX ORDER — AMLODIPINE BESYLATE 5 MG/1
5 TABLET ORAL DAILY
Status: DISCONTINUED | OUTPATIENT
Start: 2018-01-05 | End: 2018-01-06

## 2018-01-05 RX ADMIN — FENOFIBRATE 145 MG: 145 TABLET, FILM COATED ORAL at 09:01

## 2018-01-05 RX ADMIN — SODIUM CHLORIDE: 0.9 INJECTION, SOLUTION INTRAVENOUS at 07:01

## 2018-01-05 RX ADMIN — ENOXAPARIN SODIUM 40 MG: 100 INJECTION SUBCUTANEOUS at 05:01

## 2018-01-05 RX ADMIN — CIPROFLOXACIN HYDROCHLORIDE 500 MG: 500 TABLET, FILM COATED ORAL at 08:01

## 2018-01-05 RX ADMIN — ONDANSETRON 4 MG: 2 INJECTION INTRAMUSCULAR; INTRAVENOUS at 09:01

## 2018-01-05 RX ADMIN — BISACODYL 10 MG: 10 SUPPOSITORY RECTAL at 08:01

## 2018-01-05 RX ADMIN — DOCUSATE SODIUM 100 MG: 100 CAPSULE, LIQUID FILLED ORAL at 08:01

## 2018-01-05 RX ADMIN — ACETAMINOPHEN 1000 MG: 10 INJECTION, SOLUTION INTRAVENOUS at 08:01

## 2018-01-05 RX ADMIN — CEFAZOLIN SODIUM 2 G: 2 SOLUTION INTRAVENOUS at 03:01

## 2018-01-05 RX ADMIN — AMLODIPINE BESYLATE 5 MG: 5 TABLET ORAL at 08:01

## 2018-01-05 RX ADMIN — DOCUSATE SODIUM 100 MG: 100 CAPSULE, LIQUID FILLED ORAL at 09:01

## 2018-01-05 RX ADMIN — CIPROFLOXACIN HYDROCHLORIDE 500 MG: 500 TABLET, FILM COATED ORAL at 09:01

## 2018-01-05 NOTE — ASSESSMENT & PLAN NOTE
Patient and underwent a right distal ureterectomy with ureteric reimplantation performed by Dr. Kiet Arellano on 1/4/2017 for right ureteral cancer.  Patient tolerated surgery well.  Pain well controlled.  Post-operative care including VTE prophylaxis per Dr. Arellano.  Serum creatinine unchanged from prior result and hemoglobin reasonable this morning.

## 2018-01-05 NOTE — PLAN OF CARE
-------------------------  ATTN: TEAM   DC PLANNING     PT CURRENTLY WITH [facility]     NEEDS MD TEAM TO COMPLETE HHC ORDER - IF MD TEAM AGREES - IF NOT PT TO F/U WITH PCP FOR RESUME HHC NEEDS ..    Gladys Ceja RN  Case management 1/5/20183:28 PM  # [phone] (FAX) [phone]  --------------------------------------------------     01/05/18 1521   Discharge Assessment   Assessment Type Discharge Planning Assessment   Confirmed/corrected address and phone number on facesheet? Yes   Assessment information obtained from? Patient   Communicated expected length of stay with patient/caregiver yes   Prior to hospitilization cognitive status: Alert/Oriented   Prior to hospitalization functional status: Assistive Equipment   Current cognitive status: Alert/Oriented   Current Functional Status: Assistive Equipment   Lives With alone   Is patient able to care for self after discharge? Yes   Patient currently being followed by outpatient case management? Yes   Patient currently receives any other outside agency services? Yes   Is it the patient/care giver preference to resume care with the current outside agency? Yes   Do you have any problems affording any of your prescribed medications? TBD   Is the patient taking medications as prescribed? yes   Does the patient have transportation home? No   Does the patient receive services at the [facility]? No   Discharge Plan A Home Health   Discharge Plan B Home with family   Patient/Family In Agreement With Plan yes

## 2018-01-05 NOTE — PLAN OF CARE
Problem: Physical Therapy Goal  Goal: Physical Therapy Goal  Goals to be met by 1-12-18.  1. Sup<>sit mod I  2. Sit<>stand with RW mod I  3. amb 150' with RW mod I  4. Up/down 3 steps B rail mod I  -    Comments: Physical therapy eval completed.  Recommend d/c home.  No equipment needed.

## 2018-01-05 NOTE — PLAN OF CARE
Problem: Patient Care Overview  Goal: Plan of Care Review  Vs stable. Denied need for pain med although nurse asked during the shift. Had one bout of dry heaves and medicated for nausea with relief.Last PM ambulated taking very small steps around the room with assistance x 1. Slept well post nausea.Dia with pink tinged urine. RAFFI drain with 85 cc serosang. Drainage this shift.

## 2018-01-05 NOTE — SUBJECTIVE & OBJECTIVE
Interval History: Patient reports sleeping well overnight with pain completely controlled with IV Tylenol. States she had two episodes of mild nausea overnight, improved with zofran.  She tolerated chicken broth and applesauce last night.  States she has passed some minimal flatus. Has ambulated a short distance around the room with little difficulty.      Scheduled Meds:   acetaminophen  1,000 mg Intravenous Q8H    amLODIPine  5 mg Oral Daily    bisacodyl  10 mg Rectal BID    ciprofloxacin HCl  500 mg Oral BID    docusate sodium  100 mg Oral BID    fenofibrate  145 mg Oral Daily     Continuous Infusions:   sodium chloride 0.9% 75 mL/hr at 01/05/18 0712     PRN Meds:diphenhydrAMINE, ketorolac, ondansetron, oxyCODONE, oxyCODONE    Review of patient's allergies indicates:   Allergen Reactions    Sulfa (sulfonamide antibiotics)     Codeine Anxiety       Objective:     Vital Signs (Most Recent):  Temp: 97.8 °F (36.6 °C) (01/05/18 0738)  Pulse: 93 (01/05/18 0738)  Resp: 16 (01/05/18 0738)  BP: (!) 188/77 (01/05/18 0738)  SpO2: 97 % (01/05/18 0738) Vital Signs (24h Range):  Temp:  [97.7 °F (36.5 °C)-98.3 °F (36.8 °C)] 97.8 °F (36.6 °C)  Pulse:  [77-96] 93  Resp:  [16] 16  SpO2:  [91 %-97 %] 97 %  BP: (136-188)/(62-84) 188/77     Weight: 68 kg (150 lb)  Body mass index is 27.44 kg/m².  No LMP recorded. Patient is postmenopausal.    I&O (Last 24H):    Intake/Output Summary (Last 24 hours) at 01/05/18 0751  Last data filed at 01/05/18 0736   Gross per 24 hour   Intake             3518 ml   Output             3640 ml   Net             -122 ml       Physical Exam:   Constitutional: She is oriented to person, place, and time. She appears well-developed and well-nourished. No distress.    HENT:   Head: Normocephalic and atraumatic.    Eyes: EOM are normal.     Cardiovascular: Normal rate and regular rhythm.  Exam reveals no edema.     Pulmonary/Chest: Effort normal and breath sounds normal. No respiratory distress.         Abdominal: Soft. Bowel sounds are normal. She exhibits abdominal incision (port sites with tegaderm, clean/dry/intact. RAFFI drain at LUQ, with 85ml of serosanguinous drainage overnight. ). She exhibits no distension. There is no tenderness (minimal, appropriate post op ). There is no rebound and no guarding.     Genitourinary:   Genitourinary Comments: Dia in place, urine output 1435ml overnight               Neurological: She is alert and oriented to person, place, and time.    Skin: Skin is warm and dry.    Psychiatric: She has a normal mood and affect. Her behavior is normal.       Laboratory:  CBC:   Recent Labs  Lab 01/05/18  0445   WBC 11.83   RBC 3.95*   HGB 10.9*   HCT 33.2*      MCV 84   MCH 27.6   MCHC 32.8     CMP:   Recent Labs  Lab 01/05/18  0445   *   CALCIUM 8.2*      K 3.8   CO2 22*      BUN 16   CREATININE 1.3       Diagnostic Results:  Labs: Reviewed

## 2018-01-05 NOTE — ASSESSMENT & PLAN NOTE
Patient underwent a total hysterectomy performed by Dr. Carlos Mann on 1/4/2017 in the same setting as her urologic procedure.

## 2018-01-05 NOTE — ASSESSMENT & PLAN NOTE
Blood pressure consistently above goal here and at home as well per patient and family.  Will increase amlodipine 5 mg oral daily.

## 2018-01-05 NOTE — ASSESSMENT & PLAN NOTE
- s/p right distal ureterectomy with ureteric reimplantation performed by Dr. Kiet Arellano on 1/4/2017 for right ureteral cancer. Patient tolerated well.   - Urine output adequate overnight. Dia to remain in place for 4 weeks, per urology

## 2018-01-05 NOTE — ASSESSMENT & PLAN NOTE
- s/p ROSE/BSO with Dr. Mann, patient tolerated well, patient meeting post op milestones appropriately  - Plan to advance to regular diet this morning  - pain control with IV Tylenol, oxy IR prn  - Zofran prn  - Encourage ambulation/IS  - AM labs wnl  - TEDS/SCDs in place for VTE ppx

## 2018-01-05 NOTE — PROGRESS NOTES
[facility]  Obstetrics & Gynecology  Progress Note    Patient Name: Destinee Luo  MRN: [id number]  Admission Date: 1/4/2018  Primary Care Provider: Edilberto Cassidy MD  Principal Problem: Ureteral cancer, right    Subjective:     HPI:  Here at the request of Dr. Arellano due to known right urothelial cancer of the ureter and recent diagnosis of endometrial cancer.  During evaluation of her right ureteral mass, her uterus was noted to be heterogenous with a thickened EMS. During her cystoscopy, EMB was performed and final path was c/w grade 1-2 endometrial cancer.  He is planning segmental resection and reimplant and was wanting to perform her surgery concurrently.  She originally presented with bleeding and it was unclear whether the source was gyn or urologic in origin.  She states her symptoms have improved slightly.  Of note, she is on Plavix for carotid stenosis, but has stopped that and her ASA since her cysto.  No previous abdominal surgeries.  On CT she does have bilateral inguinal hernias.  Known prolapse for which she has an indwelling pessary      Interval History: Patient reports sleeping well overnight with pain completely controlled with IV Tylenol. States she had two episodes of mild nausea overnight, improved with zofran.  She tolerated chicken broth and applesauce last night.  States she has passed some minimal flatus. Has ambulated a short distance around the room with little difficulty.      Scheduled Meds:   acetaminophen  1,000 mg Intravenous Q8H    amLODIPine  5 mg Oral Daily    bisacodyl  10 mg Rectal BID    ciprofloxacin HCl  500 mg Oral BID    docusate sodium  100 mg Oral BID    fenofibrate  145 mg Oral Daily     Continuous Infusions:   sodium chloride 0.9% 75 mL/hr at 01/05/18 0712     PRN Meds:diphenhydrAMINE, ketorolac, ondansetron, oxyCODONE, oxyCODONE    Review of patient's allergies indicates:   Allergen Reactions    Sulfa (sulfonamide antibiotics)      Codeine Anxiety       Objective:     Vital Signs (Most Recent):  Temp: 97.8 °F (36.6 °C) (01/05/18 0738)  Pulse: 93 (01/05/18 0738)  Resp: 16 (01/05/18 0738)  BP: (!) 188/77 (01/05/18 0738)  SpO2: 97 % (01/05/18 0738) Vital Signs (24h Range):  Temp:  [97.7 °F (36.5 °C)-98.3 °F (36.8 °C)] 97.8 °F (36.6 °C)  Pulse:  [77-96] 93  Resp:  [16] 16  SpO2:  [91 %-97 %] 97 %  BP: (136-188)/(62-84) 188/77     Weight: 68 kg (150 lb)  Body mass index is 27.44 kg/m².  No LMP recorded. Patient is postmenopausal.    I&O (Last 24H):    Intake/Output Summary (Last 24 hours) at 01/05/18 0751  Last data filed at 01/05/18 0736   Gross per 24 hour   Intake             3518 ml   Output             3640 ml   Net             -122 ml       Physical Exam:   Constitutional: She is oriented to person, place, and time. She appears well-developed and well-nourished. No distress.    HENT:   Head: Normocephalic and atraumatic.    Eyes: EOM are normal.     Cardiovascular: Normal rate and regular rhythm.  Exam reveals no edema.     Pulmonary/Chest: Effort normal and breath sounds normal. No respiratory distress.        Abdominal: Soft. Bowel sounds are normal. She exhibits abdominal incision (port sites with tegaderm, clean/dry/intact. RAFFI drain at LUQ, with 85ml of serosanguinous drainage overnight. ). She exhibits no distension. There is no tenderness (minimal, appropriate post op ). There is no rebound and no guarding.     Genitourinary:   Genitourinary Comments: Dia in place, urine output 1435ml overnight               Neurological: She is alert and oriented to person, place, and time.    Skin: Skin is warm and dry.    Psychiatric: She has a normal mood and affect. Her behavior is normal.       Laboratory:  CBC:   Recent Labs  Lab 01/05/18  0445   WBC 11.83   RBC 3.95*   HGB 10.9*   HCT 33.2*      MCV 84   MCH 27.6   MCHC 32.8     CMP:   Recent Labs  Lab 01/05/18  0445   *   CALCIUM 8.2*      K 3.8   CO2 22*      BUN 16    CREATININE 1.3       Diagnostic Results:  Labs: Reviewed       Assessment/Plan:     * Ureteral cancer, right    - s/p right distal ureterectomy with ureteric reimplantation performed by Dr. Kiet Arellano on 1/4/2017 for right ureteral cancer. Patient tolerated well.   - Urine output adequate overnight. Dia to remain in place for 4 weeks, per urology        History of transient ischemic attack (TIA)    - Plavix and ASA on hold until bleeding risk low, per medicine hospitalist  - TEDS/SCDs in place        Essential hypertension    - Elevated Blood pressure overnight  - Amlodipine increased to 5mg daily, per Medicine hospitalist          Endometrial cancer    - s/p RALH/BSO with Dr. Mann, patient tolerated well, patient meeting post op milestones appropriately  - Plan to advance to regular diet this morning  - pain control with IV Tylenol, oxy IR prn  - Zofran prn  - Encourage ambulation/IS  - AM labs wnl  - TEDS/SCDs in place for VTE ppx            Viola Cordero MD  Obstetrics & Gynecology  [facility]

## 2018-01-05 NOTE — PROGRESS NOTES
[facility] Hospital Medicine  Progress Note    Patient Name: Destinee Luo  MRN: [id number]  Patient Class: IP- Inpatient   Admission Date: 1/4/2018  Length of Stay: 1 days  Attending Physician: Kiet Arellano MD  Primary Care Provider: Edilberto Cassidy MD        Subjective:     Principal Problem:Ureteral cancer, right    HPI:  Patient is a 86 year-old woman with a history of hypertension (on amlodipine 5 mg oral daily), dyslipidemia (on fenofibrate) and prior transient ischemic attack about 5 years ago who had previously been on aspirin and clopidogrel until she developed bleeding from vaginal and/or urine since 11/2017 who underwent a total hysterectomy performed by Dr. Carlos Mann and also a right distal ureterectomy with ureteric reimplantation performed by Dr. Kiet Arellano today for endometrial cancer and right ureteral cancer respectively.  Patient tolerated surgery well.  Dr. Arellano has consulted hospital medicine for management of patient's medical conditions.    Patient lives independently and currently walk with a cane and currently drives.  Patient denies smoked tobacco and mild alcohol consumption.  No family history of heart disease or diabetes.  Patient's mother had metastatic cancer (patient and family unsure of the pathology) when her mother was in her 50's.    Hospital Course:  Patient underwent a total hysterectomy, right distal ureterectomy with ureteric reimplantation performed by Dr. Kiet Arellano.  Patient tolerated surgery well.  Patient with two episodes of nausea overnight but resolved with ondansetron.  Patient tolerating some oral intake in the form of soup last night.  Pain well controlled.    Past Medical History:   Diagnosis Date    Anticoagulant long-term use     stopped Plavix and ASA 11/2017    High cholesterol     Hypertension     Macular degeneration     Stroke 2012    TIA, no deficits       Past Surgical History:   Procedure Laterality Date    NO PAST  SURGERIES      URETERAL STENT PLACEMENT      URETEROSCOPY         Review of patient's allergies indicates:   Allergen Reactions    Sulfa (sulfonamide antibiotics)     Codeine Anxiety       No current facility-administered medications on file prior to encounter.      Current Outpatient Prescriptions on File Prior to Encounter   Medication Sig    acetaminophen (TYLENOL) 325 MG tablet Take 1 tablet (325 mg total) by mouth every 6 (six) hours as needed for Pain.    amLODIPine (NORVASC) 2.5 MG tablet Take 2.5 mg by mouth once daily.    cranberry 400 mg Cap Take by mouth.    fenofibrate (TRICOR) 145 MG tablet Take 145 mg by mouth once daily.    Lactobacillus acidophilus Cap Take by mouth.    loratadine (CLARITIN) 10 mg tablet Take by mouth.    multivitamin capsule Take 1 capsule by mouth.     Family History     None        Social History Main Topics    Smoking status: Never Smoker    Smokeless tobacco: Never Used    Alcohol use No    Drug use: Unknown    Sexual activity: Not on file     Review of Systems   Constitutional: Negative for chills and fever.   HENT: Negative for congestion, hearing loss, sinus pressure and trouble swallowing.    Eyes: Negative for photophobia, pain, redness and visual disturbance.   Respiratory: Negative for cough, chest tightness, shortness of breath and wheezing.    Cardiovascular: Negative for chest pain and palpitations.   Gastrointestinal: Negative for abdominal distention, abdominal pain, blood in stool, constipation, diarrhea, nausea and vomiting.   Endocrine: Negative for cold intolerance, heat intolerance, polydipsia, polyphagia and polyuria.   Genitourinary: Negative for dysuria and frequency.   Musculoskeletal: Negative for arthralgias, back pain, gait problem, joint swelling, myalgias and neck pain.   Allergic/Immunologic: Negative for environmental allergies, food allergies and immunocompromised state.   Neurological: Negative for dizziness, seizures, syncope,  weakness, light-headedness and headaches.   Hematological: Negative for adenopathy.   Psychiatric/Behavioral: Negative for agitation, behavioral problems and confusion.     Objective:     Vital Signs (Most Recent):  Temp: 98.3 °F (36.8 °C) (01/05/18 0319)  Pulse: 93 (01/05/18 0319)  Resp: 16 (01/05/18 0319)  BP: (!) 166/77 (01/05/18 0319)  SpO2: 97 % (01/05/18 0319) Vital Signs (24h Range):  Temp:  [97.7 °F (36.5 °C)-98.3 °F (36.8 °C)] 98.3 °F (36.8 °C)  Pulse:  [77-96] 93  Resp:  [16] 16  SpO2:  [91 %-97 %] 97 %  BP: (136-184)/(62-84) 166/77     Weight: 68 kg (150 lb)  Body mass index is 27.44 kg/m².    Physical Exam   Constitutional: She is oriented to person, place, and time. She appears well-developed and well-nourished. No distress.   HENT:   Head: Normocephalic and atraumatic.   Nose: Nose normal.   Mouth/Throat: Oropharynx is clear and moist. No oropharyngeal exudate.   Eyes: Conjunctivae are normal. Right eye exhibits no discharge. Left eye exhibits no discharge. No scleral icterus.   Neck: Normal range of motion. Neck supple. No JVD present. No tracheal deviation present. No thyromegaly present.   Cardiovascular: Normal rate, regular rhythm, normal heart sounds and intact distal pulses.  Exam reveals no gallop and no friction rub.    No murmur heard.  Pulmonary/Chest: Effort normal and breath sounds normal. No stridor. No respiratory distress. She has no wheezes. She has no rales. She exhibits no tenderness.   Abdominal: Soft. Bowel sounds are normal. She exhibits no distension and no mass. There is no tenderness. There is no rebound and no guarding.   Multiple laparoscopic surgical incisions with dressings intact.  RAFFI tube on the left abdomen with serosanguinous fluids present.   Musculoskeletal: Normal range of motion. She exhibits no edema or tenderness.   Lymphadenopathy:     She has no cervical adenopathy.   Neurological: She is alert and oriented to person, place, and time. She has normal reflexes.  She displays normal reflexes. No cranial nerve deficit. She exhibits normal muscle tone. Coordination normal.   Skin: Skin is warm and dry. No rash noted. She is not diaphoretic. No erythema. No pallor.   Psychiatric: She has a normal mood and affect. Her behavior is normal. Judgment and thought content normal.           Significant Labs: All pertinent labs within the past 24 hours have been reviewed.    Significant Imaging: I have reviewed all pertinent imaging results/findings within the past 24 hours.    Assessment/Plan:      * Ureteral cancer, right    Patient and underwent a right distal ureterectomy with ureteric reimplantation performed by Dr. Kiet Arellano on 1/4/2017 for right ureteral cancer.  Patient tolerated surgery well.  Pain well controlled.  Post-operative care including VTE prophylaxis per Dr. Arellano.  Serum creatinine unchanged from prior result and hemoglobin reasonable this morning.        Endometrial cancer    Patient underwent a total hysterectomy performed by Dr. Carlos Mann on 1/4/2017 in the same setting as her urologic procedure.        Essential hypertension    Blood pressure consistently above goal here and at home as well per patient and family.  Will increase amlodipine 5 mg oral daily.        History of transient ischemic attack (TIA)    Patient reports an event with numbness which took place about 5 years ago.  Denies any neurologic symptoms since this event.  She was on aspirin therapy for many years and then start on dual antiplatelet therapy as a preventive measure.  I would recommend holding aspirin and clopidogrel until post-operative bleeding risk low.  Unclear if she needs dual antiplatelet therapy if she never failed treatment with aspirin.  Recommend patient discuss switching back to monotherapy with Dr. Cassidy unless she has a stronger indication to also treat with clopidogrel.            Dyslipidemia    Checking fasting lipids with tomorrow morning labs.  Likely would  benefit from statin therapy over fenofibrate but I have asked her to discuss this with her primary care provider Dr. Edilberto Cassidy.            VTE Risk Mitigation         Ordered     High Risk of VTE  Once      01/04/18 1503     Place sequential compression device  Until discontinued      01/04/18 1503     Place KIANNA hose  Until discontinued      01/04/18 1503     Reason for No Pharmacological VTE Prophylaxis  Once      01/04/18 1503              Brad Valero MD  Department of Hospital Medicine   [facility]

## 2018-01-05 NOTE — HOSPITAL COURSE
01/05/2018 s/p RALH/BSO with R distal ureterectomy with ureteric reimplantation, POD#1. Patient tolerated surgery well. VSS, afebrile. Pain controlled. Tolerating full liquid diet, ready to advance. Has ambulated, and passed flatus. AM labs, wnl.  Plan for discharge tomorrow.    01/06/2018  POD#2. Doing well. No acute events overnight. Meeting post-op milestones.   01/08/2018 - POD #3 s/p robot assisted laparoscopic hysterectomy and bilateral salpingo-oophorectomy, R ureterectomy and reimplant.

## 2018-01-05 NOTE — PT/OT/SLP EVAL
Physical Therapy Evaluation    Patient Name:  Destinee Luo   MRN:  [id number]    Recommendations:     Discharge Recommendations:  home   Discharge Equipment Recommendations: none   Barriers to discharge: None    Assessment:     Destinee Luo is a 86 y.o. female admitted with a medical diagnosis of Ureteral cancer, right.  She presents with the following impairments/functional limitations:  weakness, impaired endurance, impaired balance, impaired functional mobilty, gait instability. Pt will benefit from hospital PT to regain full independent functional mobility.    Rehab Prognosis:  excellent.      Recent Surgery: Procedure(s) (LRB):  XI ROBOTIC ASSISTED LAPAROSCOPIC HYSTERECTOMY (N/A)  XI ROBOT ASSISTED LAPAROSCOPIC SALPINGO-OOPHERECTOMY (Bilateral)  URETERECTOMY (Right)  REIMPLANTATION-URETER (Right)  NEPHRECTOMY- ROBOT -  POSS OPEN (Right) 1 Day Post-Op    Plan:     During this hospitalization, patient to be seen 6 x/week to address the above listed problems via gait training, therapeutic activities, therapeutic exercises  · Plan of Care Expires:   2-5-18   Plan of Care Reviewed with: patient, daughter    Subjective     Communicated with patient and daughters prior to session.  Patient found in BS chair upon PT entry to room, agreeable to evaluation.      Chief Complaint: difficult sx  Patient comments/goals: to go home  Pain/Comfort:  · Pain Rating 1: 3/10  · Location - Side 1: Bilateral  · Location - Orientation 1: medial  · Location 1: abdomen  · Pain Addressed 1: Pre-medicate for activity, Distraction  · Pain Rating Post-Intervention 1: 3/10    Patients cultural, spiritual, Yazdanism conflicts given the current situation: none    Living Environment:  Lives alone in  house with 3 [address] and ramp.    Prior to admission, patients level of function was amb I with and without AD.  Patient has the following equipment: rollator, walker, rolling, bedside commode.  DME owned (not currently used): none.  Upon  discharge, patient will have assistance from daughters.    Objective:     Patient found with: RAFFI drain, mitchell catheter, peripheral IV     General Precautions: Standard, fall   Orthopedic Precautions:N/A   Braces:       Exams:  · Cognitive Exam:  Patient is oriented to Person, Place, Time and Situation and follows 100% of verbal commands   · Fine Motor Coordination:    · -       Intact  · Gross Motor Coordination:  WFL  · Postural Exam:  Patient presented with the following abnormalities:    · -       Rounded shoulders  · -       Forward head  · Sensation:    · -       Intact  · Skin Integrity/Edema:      · -       Skin integrity: Visible skin intact  · -       Edema: None noted B LE  · RLE ROM: WFL  · RLE Strength: WFL  · LLE ROM: WFL  · LLE Strength: WFL    Functional Mobility:  · Bed Mobility:     · Sit to Supine: minimum assistance  · Transfers:     · Sit to Stand:  moderate assistance with rolling walker  · Gait: amb 20' with RW and min A  · Balance: fair standing dynamic balance    AM-PAC 6 CLICK MOBILITY  Total Score:18       Patient left supine with all lines intact, call button in reach, nurse notified and daughters present.    GOALS:    Physical Therapy Goals        Problem: Physical Therapy Goal    Goal Priority Disciplines Outcome Goal Variances Interventions   Physical Therapy Goal     PT/OT, PT      Description:  Goals to be met by 1-12-18.  1. Sup<>sit mod I  2. Sit<>stand with RW mod I  3. amb 150' with RW mod I  4. Up/down 3 steps B rail mod I                    History:     Past Medical History:   Diagnosis Date    Anticoagulant long-term use     stopped Plavix and ASA 11/2017    High cholesterol     Hypertension     Macular degeneration     Stroke 2012    TIA, no deficits       Past Surgical History:   Procedure Laterality Date    NO PAST SURGERIES      URETERAL STENT PLACEMENT      URETEROSCOPY         Clinical Decision Making:     History  Co-morbidities and personal factors that may  impact the plan of care Examination  Body Structures and Functions, activity limitations and participation restrictions that may impact the plan of care Clinical Presentation   Decision Making/ Complexity Score   Co-morbidities:   [] Time since onset of injury / illness / exacerbation  [] Status of current condition  []Patient's cognitive status and safety concerns    [] Multiple Medical Problems (see med hx)  Personal Factors:   [] Patient's age  [] Prior Level of function   [] Patient's home situation (environment and family support)  [] Patient's level of motivation  [] Expected progression of patient      HISTORY:(criteria)    [] 40277 - no personal factors/history    [] 62343 - has 1-2 personal factor/comorbidity     [] 50624 - has >3 personal factor/comorbidity     Body Regions:  [] Objective examination findings  [] Head     []  Neck  [] Trunk   [] Upper Extremity  [] Lower Extremity    Body Systems:  [] For communication ability, affect, cognition, language, and learning style: the assessment of the ability to make needs known, consciousness, orientation (person, place, and time), expected emotional /behavioral responses, and learning preferences (eg, learning barriers, education  needs)  [] For the neuromuscular system: a general assessment of gross coordinated movement (eg, balance, gait, locomotion, transfers, and transitions) and motor function  (motor control and motor learning)  [] For the musculoskeletal system: the assessment of gross symmetry, gross range of motion, gross strength, height, and weight  [] For the integumentary system: the assessment of pliability(texture), presence of scar formation, skin color, and skin integrity  [] For cardiovascular/pulmonary system: the assessment of heart rate, respiratory rate, blood pressure, and edema     Activity limitations:    [] Patient's cognitive status and saf ety concerns          [] Status of current condition      [] Weight bearing restriction  []  Cardiopulmunary Restriction    Participation Restrictions:   [] Goals and goal agreement with the patient     [] Rehab potential (prognosis) and probable outcome      Examination of Body System: (criteria)    [] 84314 - addressing 1-2 elements    [] 30140 - addressing a total of 3 or more elements     [] 29954 -  Addressing a total of 4 or more elements         Clinical Presentation: (criteria)  Choose one     On examination of body system using standardized tests and measures patient presents with (CHOOSE ONE) elements from any of the following: body structures and functions, activity limitations, and/or participation restrictions.  Leading to a clinical presentation that is considered (CHOOSE ONE)                              Clinical Decision Making  (Eval Complexity):  Choose One     Time Tracking:     PT Received On: 01/05/18  PT Start Time: 1545     PT Stop Time: 1630  PT Total Time (min): 45 min     Billable Minutes: Evaluation 15 and Gait Training 30      Daniele Chapman, PT  01/05/2018

## 2018-01-05 NOTE — PROGRESS NOTES
Gu pod1    flo reg diet  No flatus or Bm yet  Pain well controlled  Af vss  Ao*4  Pleasant and conversant  Looks well  cta b (some sub Q emphysema0  rrr ab soft benign  Urine clear yellow  RAFFI minimal    Sp right distal ureterectomy with boari flap and hystesterectomy    Will leave mitchell for 4 weeks  When pt is ready for discharge, keep RAFFI in place  Teach family to strip and record RAFFI output  Will remove RAFFI as outpatient when output is less than 100cc/day  Cipro until mitchell is removed  See Judy Burroughs NP in 2 weeks  Will plan cystogram in 4 weeks    Home health for mitchell care, wound check, drain care  Home PT eval and treat    Dr Grier will cover for Gu issues this weekend and see pt  Appreciate hosp med assistance

## 2018-01-05 NOTE — PLAN OF CARE
Problem: Patient Care Overview  Goal: Plan of Care Review  Outcome: Ongoing (interventions implemented as appropriate)  Pt on RA sat's 98% o2 not in use.

## 2018-01-05 NOTE — ASSESSMENT & PLAN NOTE
- Elevated Blood pressure overnight  - Amlodipine increased to 5mg daily, per Medicine hospitalist

## 2018-01-05 NOTE — SUBJECTIVE & OBJECTIVE
Past Medical History:   Diagnosis Date    Anticoagulant long-term use     stopped Plavix and ASA 11/2017    High cholesterol     Hypertension     Macular degeneration     Stroke 2012    TIA, no deficits       Past Surgical History:   Procedure Laterality Date    NO PAST SURGERIES      URETERAL STENT PLACEMENT      URETEROSCOPY         Review of patient's allergies indicates:   Allergen Reactions    Sulfa (sulfonamide antibiotics)     Codeine Anxiety       No current facility-administered medications on file prior to encounter.      Current Outpatient Prescriptions on File Prior to Encounter   Medication Sig    acetaminophen (TYLENOL) 325 MG tablet Take 1 tablet (325 mg total) by mouth every 6 (six) hours as needed for Pain.    amLODIPine (NORVASC) 2.5 MG tablet Take 2.5 mg by mouth once daily.    cranberry 400 mg Cap Take by mouth.    fenofibrate (TRICOR) 145 MG tablet Take 145 mg by mouth once daily.    Lactobacillus acidophilus Cap Take by mouth.    loratadine (CLARITIN) 10 mg tablet Take by mouth.    multivitamin capsule Take 1 capsule by mouth.     Family History     None        Social History Main Topics    Smoking status: Never Smoker    Smokeless tobacco: Never Used    Alcohol use No    Drug use: Unknown    Sexual activity: Not on file     Review of Systems   Constitutional: Negative for chills and fever.   HENT: Negative for congestion, hearing loss, sinus pressure and trouble swallowing.    Eyes: Negative for photophobia, pain, redness and visual disturbance.   Respiratory: Negative for cough, chest tightness, shortness of breath and wheezing.    Cardiovascular: Negative for chest pain and palpitations.   Gastrointestinal: Negative for abdominal distention, abdominal pain, blood in stool, constipation, diarrhea, nausea and vomiting.   Endocrine: Negative for cold intolerance, heat intolerance, polydipsia, polyphagia and polyuria.   Genitourinary: Negative for dysuria and frequency.    Musculoskeletal: Negative for arthralgias, back pain, gait problem, joint swelling, myalgias and neck pain.   Allergic/Immunologic: Negative for environmental allergies, food allergies and immunocompromised state.   Neurological: Negative for dizziness, seizures, syncope, weakness, light-headedness and headaches.   Hematological: Negative for adenopathy.   Psychiatric/Behavioral: Negative for agitation, behavioral problems and confusion.     Objective:     Vital Signs (Most Recent):  Temp: 98.3 °F (36.8 °C) (01/05/18 0319)  Pulse: 93 (01/05/18 0319)  Resp: 16 (01/05/18 0319)  BP: (!) 166/77 (01/05/18 0319)  SpO2: 97 % (01/05/18 0319) Vital Signs (24h Range):  Temp:  [97.7 °F (36.5 °C)-98.3 °F (36.8 °C)] 98.3 °F (36.8 °C)  Pulse:  [77-96] 93  Resp:  [16] 16  SpO2:  [91 %-97 %] 97 %  BP: (136-184)/(62-84) 166/77     Weight: 68 kg (150 lb)  Body mass index is 27.44 kg/m².    Physical Exam   Constitutional: She is oriented to person, place, and time. She appears well-developed and well-nourished. No distress.   HENT:   Head: Normocephalic and atraumatic.   Nose: Nose normal.   Mouth/Throat: Oropharynx is clear and moist. No oropharyngeal exudate.   Eyes: Conjunctivae are normal. Right eye exhibits no discharge. Left eye exhibits no discharge. No scleral icterus.   Neck: Normal range of motion. Neck supple. No JVD present. No tracheal deviation present. No thyromegaly present.   Cardiovascular: Normal rate, regular rhythm, normal heart sounds and intact distal pulses.  Exam reveals no gallop and no friction rub.    No murmur heard.  Pulmonary/Chest: Effort normal and breath sounds normal. No stridor. No respiratory distress. She has no wheezes. She has no rales. She exhibits no tenderness.   Abdominal: Soft. Bowel sounds are normal. She exhibits no distension and no mass. There is no tenderness. There is no rebound and no guarding.   Multiple laparoscopic surgical incisions with dressings intact.  RAFFI tube on the left  abdomen with serosanguinous fluids present.   Musculoskeletal: Normal range of motion. She exhibits no edema or tenderness.   Lymphadenopathy:     She has no cervical adenopathy.   Neurological: She is alert and oriented to person, place, and time. She has normal reflexes. She displays normal reflexes. No cranial nerve deficit. She exhibits normal muscle tone. Coordination normal.   Skin: Skin is warm and dry. No rash noted. She is not diaphoretic. No erythema. No pallor.   Psychiatric: She has a normal mood and affect. Her behavior is normal. Judgment and thought content normal.           Significant Labs: All pertinent labs within the past 24 hours have been reviewed.    Significant Imaging: I have reviewed all pertinent imaging results/findings within the past 24 hours.

## 2018-01-05 NOTE — OP NOTE
DATE OF PROCEDURE:  01/04/2018    SURGEON:  Kiet Arellano M.D.    ASSISTANT:  Harleen Sykes RN, first assistant.    OPERATIVE PROCEDURE:  1.  Right robotic distal ureterectomy.  2.  Right ureteral reimplantation with psoas hitch and Boari flap.  3.  Right pelvic lymphadenectomy.  4. Ureterolysis    PREOPERATIVE DIAGNOSES:  1.  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  2.  Endometrial cancer.    POSTOPERATIVE DIAGNOSIS:  Pathology pending.    HISTORY:  Ms. Luo is an 86-year-old woman with a large urothelial cancer of   the right ureter.  The tumor began about 5 cm above the right ureteral orifice   and extends above the iliac vessels.  Her creatinine clearance is 37, and she is   adamant that she does not wish to be on dialysis.  We discussed   nephroureterectomy and we have discussed preop chemotherapy; she declined both.  However, the   patient would like to proceed with a distal ureterectomy and ureteral   reimplantation if at all possible.  She also has a recent diagnosis of   endometrial cancer.  There is no evidence of metastatic disease.  She is   undergoing concomitant hysterectomy by Dr. Carlos Mann of [facility].  The   tumor in the ureter was too large for endoscopic management.  Histologically,   there is high-grade disease with evidence of invasion.  Radiographically, there   was no evidence of disease outside the ureter, no adenopathy and no metastatic disease.  Informed consent was obtained.    PROCEDURE IN DETAIL:  The patient was brought to the Operating Room and   undergoes general endotracheal anesthesia.  Preoperative urine culture is   negative.  Preop antibiotics were administered and TEDs and SCDs were applied.    Dr. Mann performed his part of the procedure first and obtained the access to   the abdomen.  Please see his dictation.  After Dr. Mann left the room, then I   performed my portion of the procedure.  We placed an additional 12-mm dilating   trocar  in the right lower quadrant under direct vision.  The uterus had been   removed and the vaginal cuff had been closed.  There was a mass in the right ureter   which was most prominent where the urethra was crossing the iliac vessels.  The   mass extended just above the iliacs and almost to the bladder.  The mass was   rather fixed to the iliac vessels.  This required approximately 2 hours of   additional ureterolysis to free the tumor and the ureter from the iliac vessels.    Dissection was carried down to the bladder.  A cuff of bladder was excised.    The bladder was closed in 2 layers with a running Vicryl suture utilizing   Lapra-Tys.  The bladder was distended and there was no leakage from the closure.    The ureter was further dissected.  We were able to dissect the ureter off the iliac vessels and we were able to dissect up to normal-appearing   proximal ureter.  The ureter was opened.  There was a tumor visible in the lumen   of the ureter; however, the wall of the ureter was grossly free of tumor at the   site where it was transected.  The distal margin was marked with a silk suture   to identify the bladder cuff.  The specimen was placed in an EndoCatch bag and   sent for pathologic analysis.  The right pelvic lymphadenectomy was performed.    Clips and Harmonic scalpel were utilized.  Care was taken to preserve the   obturator nerve.  The bladder was distended and mobilized.  In order to obtain   adequate length, the lateral attachments on the left side of the bladder and the   right side of the bladder were taken down.  I incised the endopelvic fascia on   the left side to assist with mobilization of the bladder.  Following this, an   inverted U-shaped incision in the dome of the bladder was made and the flap   thereby created was brought up to the proximal ureter.  The peritoneum at the   bladder was secured to the parietal peritoneum with interrupted Vicryl sutures.    Care was taken to avoid the  genitofemoral nerve which was preserved.    Multiple   Vicryl sutures were used to secure the peritoneum of the bladder to the parietal   peritoneum.  I chose not to secure the bladder wall to the psoas tendon because   this would have distorted the anastomosis.  In addition, there was no tension in the area of the anastomosis.      The anastomosis was performed with   interrupted 3-0 Vicryl sutures.  The proximal ureter was quite dilated.  The   left ureteral orifice was identified and there was clear efflux.  The bladder   was closed with a running 2-0 Stratafix suture.  An 8-Icelandic 22 cm double-J   ureteral stent was placed.  The stent easily passed into the kidney and the   distal coil was passed into the bladder.  There is efflux of urine from the   stent.  The remainder of the bladder was closed with Stratafix and the   anastomosis was completed with interrupted Vicryl sutures.  The bladder was   distended.  There is no gross leakage from the closure.  The bladder wall was   rather thin and there was not adequate muscle for a second layer.  We did   however secure the perivesical fat over the closure utilizing Weck clips.  A #19   Icelandic James-Drummond drain was placed.  Pressures lowered.  The abdomen was   irrigated.  There was good hemostasis.  There was a small amount of oozing near   the anastomosis, which was controlled with Surgiflo.  The 12 mm trocar was   removed and the fascia was closed at this location with the suture ease device   and a #1 Vicryl suture.  The pressures again lowered.  There was good   hemostasis.  The instruments were removed.  All counts were correct.  The   trocars were removed under direct vision.  The wounds were irrigated.  The   fascial incisions were all palpated.  None of the additional trocar sites   required closure.  The skin incisions were closed with staples after Exparel was   injected.  Sterile adhesive dressings were applied.  The patient tolerated the   procedure  well.      SMC/BLAKE  dd: 01/04/2018 13:30:23 (CST)  td: 01/04/2018 18:42:28 (CST)  Doc ID   #[id number]  Job ID #[id number]    CC:

## 2018-01-06 PROBLEM — N18.30 STAGE 3 CHRONIC KIDNEY DISEASE: Status: ACTIVE | Noted: 2018-01-06

## 2018-01-06 LAB
MAGNESIUM SERPL-MCNC: 2.1 MG/DL
PHOSPHATE SERPL-MCNC: 1.6 MG/DL

## 2018-01-06 PROCEDURE — 25000003 PHARM REV CODE 250: Performed by: HOSPITALIST

## 2018-01-06 PROCEDURE — 99024 POSTOP FOLLOW-UP VISIT: CPT | Mod: ,,, | Performed by: UROLOGY

## 2018-01-06 PROCEDURE — 11000001 HC ACUTE MED/SURG PRIVATE ROOM

## 2018-01-06 PROCEDURE — 63600175 PHARM REV CODE 636 W HCPCS: Performed by: UROLOGY

## 2018-01-06 PROCEDURE — 25000003 PHARM REV CODE 250: Performed by: UROLOGY

## 2018-01-06 PROCEDURE — 63600175 PHARM REV CODE 636 W HCPCS: Performed by: STUDENT IN AN ORGANIZED HEALTH CARE EDUCATION/TRAINING PROGRAM

## 2018-01-06 PROCEDURE — 99233 SBSQ HOSP IP/OBS HIGH 50: CPT | Mod: ,,, | Performed by: HOSPITALIST

## 2018-01-06 PROCEDURE — 36415 COLL VENOUS BLD VENIPUNCTURE: CPT

## 2018-01-06 PROCEDURE — 83735 ASSAY OF MAGNESIUM: CPT

## 2018-01-06 PROCEDURE — 84100 ASSAY OF PHOSPHORUS: CPT

## 2018-01-06 RX ORDER — ACETAMINOPHEN 325 MG/1
650 TABLET ORAL EVERY 6 HOURS PRN
Status: DISCONTINUED | OUTPATIENT
Start: 2018-01-06 | End: 2018-01-06

## 2018-01-06 RX ORDER — TRAMADOL HYDROCHLORIDE 50 MG/1
50 TABLET ORAL EVERY 8 HOURS PRN
Status: DISCONTINUED | OUTPATIENT
Start: 2018-01-06 | End: 2018-01-08 | Stop reason: HOSPADM

## 2018-01-06 RX ORDER — AMLODIPINE BESYLATE 5 MG/1
10 TABLET ORAL DAILY
Status: DISCONTINUED | OUTPATIENT
Start: 2018-01-07 | End: 2018-01-08 | Stop reason: HOSPADM

## 2018-01-06 RX ORDER — ACETAMINOPHEN 500 MG
1000 TABLET ORAL EVERY 6 HOURS PRN
Status: DISCONTINUED | OUTPATIENT
Start: 2018-01-06 | End: 2018-01-08 | Stop reason: HOSPADM

## 2018-01-06 RX ADMIN — CIPROFLOXACIN HYDROCHLORIDE 500 MG: 500 TABLET, FILM COATED ORAL at 08:01

## 2018-01-06 RX ADMIN — FENOFIBRATE 145 MG: 145 TABLET, FILM COATED ORAL at 08:01

## 2018-01-06 RX ADMIN — KETOROLAC TROMETHAMINE 15 MG: 30 INJECTION, SOLUTION INTRAMUSCULAR at 04:01

## 2018-01-06 RX ADMIN — AMLODIPINE BESYLATE 5 MG: 5 TABLET ORAL at 09:01

## 2018-01-06 RX ADMIN — ACETAMINOPHEN 1000 MG: 500 TABLET ORAL at 10:01

## 2018-01-06 RX ADMIN — DOCUSATE SODIUM 100 MG: 100 CAPSULE, LIQUID FILLED ORAL at 08:01

## 2018-01-06 RX ADMIN — ENOXAPARIN SODIUM 40 MG: 100 INJECTION SUBCUTANEOUS at 05:01

## 2018-01-06 RX ADMIN — CIPROFLOXACIN HYDROCHLORIDE 500 MG: 500 TABLET, FILM COATED ORAL at 09:01

## 2018-01-06 RX ADMIN — DOCUSATE SODIUM 100 MG: 100 CAPSULE, LIQUID FILLED ORAL at 09:01

## 2018-01-06 NOTE — PROGRESS NOTES
[facility]  Obstetrics & Gynecology  Progress Note    Patient Name: Destinee Luo  MRN: [id number]  Admission Date: 1/4/2018  Primary Care Provider: Edilberto Cassidy MD  Principal Problem: Ureteral cancer, right    Subjective:     HPI:  No notes on file    Interval History: Patient doing well. Reports pain well controlled overnight with IV tylenol. She reports tolerating a regular diet yesterday without nausea/vomiting. States she had some cramping this morning that resolved with a bowel movement. She has been ambulating around the room with Phys therapy.     Scheduled Meds:   amLODIPine  5 mg Oral Daily    ciprofloxacin HCl  500 mg Oral BID    docusate sodium  100 mg Oral BID    enoxaparin  40 mg Subcutaneous Daily    fenofibrate  145 mg Oral Daily     Continuous Infusions:  PRN Meds:diphenhydrAMINE, ketorolac, ondansetron, oxyCODONE, oxyCODONE    Review of patient's allergies indicates:   Allergen Reactions    Sulfa (sulfonamide antibiotics)     Codeine Anxiety       Objective:     Vital Signs (Most Recent):  Temp: 98.2 °F (36.8 °C) (01/06/18 0022)  Pulse: 97 (01/06/18 0022)  Resp: 18 (01/06/18 0022)  BP: (!) 143/71 (01/06/18 0022)  SpO2: 99 % (01/06/18 0022) Vital Signs (24h Range):  Temp:  [97.7 °F (36.5 °C)-98.6 °F (37 °C)] 98.2 °F (36.8 °C)  Pulse:  [] 97  Resp:  [16-20] 18  SpO2:  [96 %-99 %] 99 %  BP: (143-178)/(71-74) 143/71     Weight: 68 kg (150 lb)  Body mass index is 27.44 kg/m².  No LMP recorded. Patient is postmenopausal.    I&O (Last 24H):    Intake/Output Summary (Last 24 hours) at 01/06/18 0751  Last data filed at 01/06/18 0500   Gross per 24 hour   Intake             1699 ml   Output             2395 ml   Net             -696 ml       Physical Exam:   Constitutional: She is oriented to person, place, and time. She appears well-developed and well-nourished. No distress.    HENT:   Head: Normocephalic and atraumatic.    Eyes: EOM are normal.     Cardiovascular: Normal  rate and regular rhythm.  Exam reveals no edema.     Pulmonary/Chest: Effort normal and breath sounds normal.        Abdominal: Soft. Bowel sounds are normal. She exhibits abdominal incision (port sites with tegaderm. Mild erththema/ecchymoses surrounding assistant port. RAFFI drain at LUQ with serosanguinous drainage). She exhibits no distension. There is no tenderness. There is no rebound and no guarding.                 Neurological: She is alert and oriented to person, place, and time.    Skin: Skin is warm and dry.    Psychiatric: She has a normal mood and affect.       Laboratory:  BMP:   Recent Labs  Lab 01/05/18  0445 01/06/18  0425   *  --      --    K 3.8  --      --    CO2 22*  --    BUN 16  --    CREATININE 1.3  --    CALCIUM 8.2*  --    MG 1.9 2.1     CBC:   Recent Labs  Lab 01/05/18  0445   WBC 11.83   RBC 3.95*   HGB 10.9*   HCT 33.2*      MCV 84   MCH 27.6   MCHC 32.8       Diagnostic Results:  Labs: Reviewed       Assessment/Plan:     * Ureteral cancer, right    - s/p right distal ureterectomy with ureteric reimplantation performed by Dr. Kiet Arellano on 1/4/2017 for right ureteral cancer. Patient tolerated well.   - Urine output adequate overnight. Dia to remain in place with rx for cipro for 4 weeks, per urology         History of transient ischemic attack (TIA)    - Plavix and ASA on hold until bleeding risk low, per medicine hospitalist  - TEDS/SCDs in place        Essential hypertension    - Elevated Blood pressure overnight  - Amlodipine increased to 5mg daily, per Medicine hospitalist          Endometrial cancer    - s/p RALH/BSO with Dr. Mann, patient tolerated well, patient meeting post op milestones appropriately  - tolerating regular diet  - pain controlled, tylenol/oxy IR prn  - Zofran prn  - Encourage ambulation/IS  - AM CBC wnl  - TEDS/SCDs in place for VTE ppx              Viola Cordero MD  Obstetrics & Gynecology  [facility]

## 2018-01-06 NOTE — ASSESSMENT & PLAN NOTE
- s/p right distal ureterectomy with ureteric reimplantation performed by Dr. Kiet Arellano on 1/4/2017 for right ureteral cancer. Patient tolerated well.   - Urine output adequate overnight. Dia to remain in place with rx for cipro for 4 weeks, per urology

## 2018-01-06 NOTE — ASSESSMENT & PLAN NOTE
- s/p ROSE/BSO with Dr. Mann, patient tolerated well, patient meeting post op milestones appropriately  - tolerating regular diet  - pain controlled, tylenol/oxy IR prn  - Zofran prn  - Encourage ambulation/IS  - AM CBC wnl  - TEDS/SCDs in place for VTE ppx

## 2018-01-06 NOTE — PLAN OF CARE
Problem: Patient Care Overview  Goal: Plan of Care Review  Outcome: Ongoing (interventions implemented as appropriate)  Patient on RA. Sats 96%. IS done. Pt. in no distress, will continue to monitor.

## 2018-01-06 NOTE — ASSESSMENT & PLAN NOTE
S/p distal ureterectomy with Boari Flap  Keep RAFFI until output <100 mL day  Keep Dia, home with Dia    Continue to ambulate  Continue regular diet  Minimize narcotics    Appreciate hospital medicine

## 2018-01-06 NOTE — PROGRESS NOTES
[facility]  Urology  Progress Note    Patient Name: Destinee Luo  MRN: [id number]  Admission Date: 1/4/2018  Hospital Length of Stay: 2 days  Code Status: No Order   Attending Provider: Kiet Arellano MD   Primary Care Physician: Edilberto Cassidy MD    Subjective:     HPI:  No notes on file    Interval History: She has been ambulating.  Tolerating a regular diet.  Pain is well controlled.    Review of Systems   Constitutional: Negative.  Negative for fever.   HENT: Negative.    Eyes: Negative.    Respiratory: Negative for cough, chest tightness and shortness of breath.    Cardiovascular: Negative for chest pain.   Gastrointestinal: Negative.  Negative for constipation, diarrhea and nausea.   Genitourinary: Negative for flank pain and hematuria.   Musculoskeletal: Negative.    Neurological: Negative.    Psychiatric/Behavioral: Negative.      Objective:     Temp:  [97.7 °F (36.5 °C)-98.8 °F (37.1 °C)] 98.8 °F (37.1 °C)  Pulse:  [] 92  Resp:  [18-20] 18  SpO2:  [96 %-99 %] 97 %  BP: (142-178)/(63-75) 142/63     Body mass index is 27.44 kg/m².        RAFFI 175 ml in last 24 hours    Drains     Drain                 Closed/Suction Drain 01/04/18 1210 Left Abdomen Bulb 19 Fr. 2 days         Urethral Catheter 01/04/18 1208 Straight-tip;Non-latex 24 Fr. 2 days                Physical Exam   Nursing note and vitals reviewed.  Constitutional: She is oriented to person, place, and time. She appears well-developed.   HENT:   Head: Normocephalic.   Eyes: Conjunctivae are normal.   Neck: Normal range of motion. No tracheal deviation present. No thyromegaly present.   Cardiovascular: Normal rate, normal heart sounds and normal pulses.    Pulmonary/Chest: Effort normal and breath sounds normal. No respiratory distress. She has no wheezes.   Abdominal: Soft. She exhibits no distension and no mass. There is no hepatosplenomegaly. There is no tenderness. There is no rebound, no guarding and no CVA tenderness.  No hernia.   Incisions dry, dressed.   RAFFI with serosanguinous fluid  Dia with yellow urine   Musculoskeletal: Normal range of motion. She exhibits no edema or tenderness.   Lymphadenopathy:     She has no cervical adenopathy.   Neurological: She is alert and oriented to person, place, and time.   Skin: Skin is warm and dry. No rash noted. No erythema.     Psychiatric: She has a normal mood and affect. Her behavior is normal. Judgment and thought content normal.       Significant Labs:    BMP:    Recent Labs  Lab 01/05/18  0445      K 3.8      CO2 22*   BUN 16   CREATININE 1.3   CALCIUM 8.2*       CBC:     Recent Labs  Lab 01/05/18  0445   WBC 11.83   HGB 10.9*   HCT 33.2*          Blood Culture: No results for input(s): LABBLOO in the last 168 hours.  Urine Culture: No results for input(s): LABURIN in the last 168 hours.    Significant Imaging:                    Assessment/Plan:     * Ureteral cancer, right    S/p distal ureterectomy with Boari Flap  Keep RAFFI until output <100 mL day  Keep Dia, home with Dia    Continue to ambulate  Continue regular diet  Minimize narcotics    Appreciate hospital medicine        Essential hypertension    Per hospital medicine            VTE Risk Mitigation         Ordered     enoxaparin injection 40 mg  Daily     Route:  Subcutaneous        01/05/18 1439     High Risk of VTE  Once      01/04/18 1503     Place sequential compression device  Until discontinued      01/04/18 1503     Place KIANNA hose  Until discontinued      01/04/18 1503     Reason for No Pharmacological VTE Prophylaxis  Once      01/04/18 1503          W Rudy Grier MD  Urology  [facility]

## 2018-01-06 NOTE — SUBJECTIVE & OBJECTIVE
Interval History: Patient doing well. Reports pain well controlled overnight with IV tylenol. She reports tolerating a regular diet yesterday without nausea/vomiting. States she had some cramping this morning that resolved with a bowel movement. She has been ambulating around the room with Phys therapy.     Scheduled Meds:   amLODIPine  5 mg Oral Daily    ciprofloxacin HCl  500 mg Oral BID    docusate sodium  100 mg Oral BID    enoxaparin  40 mg Subcutaneous Daily    fenofibrate  145 mg Oral Daily     Continuous Infusions:  PRN Meds:diphenhydrAMINE, ketorolac, ondansetron, oxyCODONE, oxyCODONE    Review of patient's allergies indicates:   Allergen Reactions    Sulfa (sulfonamide antibiotics)     Codeine Anxiety       Objective:     Vital Signs (Most Recent):  Temp: 98.2 °F (36.8 °C) (01/06/18 0022)  Pulse: 97 (01/06/18 0022)  Resp: 18 (01/06/18 0022)  BP: (!) 143/71 (01/06/18 0022)  SpO2: 99 % (01/06/18 0022) Vital Signs (24h Range):  Temp:  [97.7 °F (36.5 °C)-98.6 °F (37 °C)] 98.2 °F (36.8 °C)  Pulse:  [] 97  Resp:  [16-20] 18  SpO2:  [96 %-99 %] 99 %  BP: (143-178)/(71-74) 143/71     Weight: 68 kg (150 lb)  Body mass index is 27.44 kg/m².  No LMP recorded. Patient is postmenopausal.    I&O (Last 24H):    Intake/Output Summary (Last 24 hours) at 01/06/18 0751  Last data filed at 01/06/18 0500   Gross per 24 hour   Intake             1699 ml   Output             2395 ml   Net             -696 ml       Physical Exam:   Constitutional: She is oriented to person, place, and time. She appears well-developed and well-nourished. No distress.    HENT:   Head: Normocephalic and atraumatic.    Eyes: EOM are normal.     Cardiovascular: Normal rate and regular rhythm.  Exam reveals no edema.     Pulmonary/Chest: Effort normal and breath sounds normal.        Abdominal: Soft. Bowel sounds are normal. She exhibits abdominal incision (port sites with tegaderm. Mild erththema/ecchymoses surrounding assistant port. RAFFI  drain at LUQ with serosanguinous drainage). She exhibits no distension. There is no tenderness. There is no rebound and no guarding.                 Neurological: She is alert and oriented to person, place, and time.    Skin: Skin is warm and dry.    Psychiatric: She has a normal mood and affect.       Laboratory:  BMP:   Recent Labs  Lab 01/05/18  0445 01/06/18  0425   *  --      --    K 3.8  --      --    CO2 22*  --    BUN 16  --    CREATININE 1.3  --    CALCIUM 8.2*  --    MG 1.9 2.1     CBC:   Recent Labs  Lab 01/05/18  0445   WBC 11.83   RBC 3.95*   HGB 10.9*   HCT 33.2*      MCV 84   MCH 27.6   MCHC 32.8       Diagnostic Results:  Labs: Reviewed

## 2018-01-06 NOTE — PLAN OF CARE
Remains free from fall, injury, and skin breakdown. Up in chair at this shift. Ambulated room with PT. VS stable on RA and afebrile. RAFFI drain performed. Lap sitesx5, CDI.  TEDs/SCDs maintained.Tolerating ordered diet. IV site WNL. Plan of care reviewed with patient and family. All questions answered. Bed low, locked w/ bed alarm on. Call light within reach. Purposeful rounding performed. No other complaints at this time.

## 2018-01-06 NOTE — SUBJECTIVE & OBJECTIVE
Past Medical History:   Diagnosis Date    Anticoagulant long-term use     stopped Plavix and ASA 11/2017    High cholesterol     Hypertension     Macular degeneration     Stroke 2012    TIA, no deficits       Past Surgical History:   Procedure Laterality Date    NO PAST SURGERIES      URETERAL STENT PLACEMENT      URETEROSCOPY         Review of patient's allergies indicates:   Allergen Reactions    Sulfa (sulfonamide antibiotics)     Codeine Anxiety       No current facility-administered medications on file prior to encounter.      Current Outpatient Prescriptions on File Prior to Encounter   Medication Sig    acetaminophen (TYLENOL) 325 MG tablet Take 1 tablet (325 mg total) by mouth every 6 (six) hours as needed for Pain.    amLODIPine (NORVASC) 2.5 MG tablet Take 2.5 mg by mouth once daily.    cranberry 400 mg Cap Take by mouth.    fenofibrate (TRICOR) 145 MG tablet Take 145 mg by mouth once daily.    Lactobacillus acidophilus Cap Take by mouth.    loratadine (CLARITIN) 10 mg tablet Take by mouth.    multivitamin capsule Take 1 capsule by mouth.     Family History     None        Social History Main Topics    Smoking status: Never Smoker    Smokeless tobacco: Never Used    Alcohol use No    Drug use: Unknown    Sexual activity: Not on file     Review of Systems   Constitutional: Negative for chills and fever.   HENT: Negative for congestion, hearing loss, sinus pressure and trouble swallowing.    Eyes: Negative for photophobia, pain, redness and visual disturbance.   Respiratory: Negative for cough, chest tightness, shortness of breath and wheezing.    Cardiovascular: Negative for chest pain and palpitations.   Gastrointestinal: Negative for abdominal distention, abdominal pain, blood in stool, constipation, diarrhea, nausea and vomiting.   Endocrine: Negative for cold intolerance, heat intolerance, polydipsia, polyphagia and polyuria.   Genitourinary: Negative for dysuria and frequency.    Musculoskeletal: Negative for arthralgias, back pain, gait problem, joint swelling, myalgias and neck pain.   Allergic/Immunologic: Negative for environmental allergies, food allergies and immunocompromised state.   Neurological: Negative for dizziness, seizures, syncope, weakness, light-headedness and headaches.   Hematological: Negative for adenopathy.   Psychiatric/Behavioral: Negative for agitation, behavioral problems and confusion.     Objective:     Vital Signs (Most Recent):  Temp: 97.7 °F (36.5 °C) (01/06/18 1648)  Pulse: 95 (01/06/18 1648)  Resp: 18 (01/06/18 1648)  BP: (!) 171/72 (01/06/18 1648)  SpO2: 97 % (01/06/18 1648) Vital Signs (24h Range):  Temp:  [97.7 °F (36.5 °C)-98.8 °F (37.1 °C)] 97.7 °F (36.5 °C)  Pulse:  [] 95  Resp:  [18-20] 18  SpO2:  [96 %-99 %] 97 %  BP: (142-178)/(63-75) 171/72     Weight: 68 kg (150 lb)  Body mass index is 27.44 kg/m².    Physical Exam   Constitutional: She is oriented to person, place, and time. She appears well-developed and well-nourished. No distress.   HENT:   Head: Normocephalic and atraumatic.   Nose: Nose normal.   Mouth/Throat: Oropharynx is clear and moist. No oropharyngeal exudate.   Eyes: Conjunctivae are normal. Right eye exhibits no discharge. Left eye exhibits no discharge. No scleral icterus.   Neck: Normal range of motion. Neck supple. No JVD present. No tracheal deviation present. No thyromegaly present.   Cardiovascular: Normal rate, regular rhythm, normal heart sounds and intact distal pulses.  Exam reveals no gallop and no friction rub.    No murmur heard.  Pulmonary/Chest: Effort normal and breath sounds normal. No stridor. No respiratory distress. She has no wheezes. She has no rales. She exhibits no tenderness.   Abdominal: Soft. Bowel sounds are normal. She exhibits no distension and no mass. There is no tenderness. There is no rebound and no guarding.   Multiple laparoscopic surgical incisions with dressings intact.  RAFFI tube on the  left abdomen with serosanguinous fluids present.   Musculoskeletal: Normal range of motion. She exhibits no edema or tenderness.   Lymphadenopathy:     She has no cervical adenopathy.   Neurological: She is alert and oriented to person, place, and time. She has normal reflexes. She displays normal reflexes. No cranial nerve deficit. She exhibits normal muscle tone. Coordination normal.   Skin: Skin is warm and dry. No rash noted. She is not diaphoretic. No erythema. No pallor.   Psychiatric: She has a normal mood and affect. Her behavior is normal. Judgment and thought content normal.           Significant Labs: All pertinent labs within the past 24 hours have been reviewed.    Significant Imaging: I have reviewed all pertinent imaging results/findings within the past 24 hours.

## 2018-01-06 NOTE — PLAN OF CARE
Problem: Patient Care Overview  Goal: Plan of Care Review  Outcome: Ongoing (interventions implemented as appropriate)  Plan of care reviewed with pt. NAD noted at this time. Denies any cp or sob. Resp even and unlabored. Incentive spirometer encouraged while awake. VSS, afebrile. Denies N/V. AAO x 4. PPP miguelina. Up with assist x 1 to bedside commode. SCD's remain in place. C/o abdominal discomfort and occasional gas pains but refuses to take any kind of pain medicine other than tylenol. RAFFI drain is intact and draining serosanguinous fluid. Lap sites are dry. 2 lap sites have significant bruising around the insertion sites. No hematomas noted. Remains free from injury. Safety precautions remain in place. Daughter at bedside. Call light in reach. Will continue to monitor.

## 2018-01-06 NOTE — PT/OT/SLP PROGRESS
Physical Therapy Treatment    Patient Name:  Destinee Luo   MRN:  [id number]   [id number]/[id number] A      Recommendations:     Discharge Recommendations:  home, home health PT   Discharge Equipment Recommendations: none   Barriers to discharge: None    Assessment:     Destinee Luo is a 86 y.o. female admitted with a medical diagnosis of Ureteral cancer, right.  She presents with the following impairments/functional limitations:  weakness, impaired endurance, gait instability, impaired functional mobilty, impaired balance, pain. Good progress, pt amb 150' x 2 in hallway.  Practiced sit<>stand from low surface (couch) which grand daughter said was the height of her bed.     Rehab Prognosis:  excellent; patient would benefit from acute skilled PT services to address these deficits and reach maximum level of function.      Recent Surgery: Procedure(s) (LRB):  XI ROBOTIC ASSISTED LAPAROSCOPIC HYSTERECTOMY (N/A)  XI ROBOT ASSISTED LAPAROSCOPIC SALPINGO-OOPHERECTOMY (Bilateral)  URETERECTOMY (Right)  REIMPLANTATION-URETER (Right)  NEPHRECTOMY- ROBOT -  POSS OPEN (Right) 2 Days Post-Op    Plan:     During this hospitalization, patient to be seen 6 x/week to address the above listed problems via gait training, therapeutic activities, therapeutic exercises  · Plan of Care Expires:  02/05/18   Plan of Care Reviewed with: patient, grandchild(aliyah)    Subjective     Communicated with patient and grand daughter prior to session.  Patient found supine in bed upon PT entry to room, agreeable to treatment.      Chief Complaint: abd pain  Patient comments/goals: to go home  Pain/Comfort:  · Pain Rating 1: 3/10  · Location - Side 1: Bilateral  · Location - Orientation 1: medial  · Location 1: abdomen  · Pain Addressed 1: Pre-medicate for activity, Distraction  · Pain Rating Post-Intervention 1: 5/10    Patients cultural, spiritual, Episcopal conflicts given the current situation: none    Objective:     Patient found with: RAFFI drain, mitchell  catheter, peripheral IV     General Precautions: Standard, fall   Orthopedic Precautions:N/A   Braces:       Functional Mobility:  · Bed Mobility:     · Supine to Sit: contact guard assistance and minimum assistance  · Sit to Supine: minimum assistance  · Transfers:     · Sit to Stand:  minimum assistance with rolling walker  · Gait: amb 150' x 2 with RW and CGA  · Balance: good standing dynamic balance      AM-PAC 6 CLICK MOBILITY  Turning over in bed (including adjusting bedclothes, sheets and blankets)?: 3  Sitting down on and standing up from a chair with arms (e.g., wheelchair, bedside commode, etc.): 3  Moving from lying on back to sitting on the side of the bed?: 3  Moving to and from a bed to a chair (including a wheelchair)?: 3  Need to walk in hospital room?: 3  Climbing 3-5 steps with a railing?: 3  Total Score: 18       Patient left with bed in chair position with all lines intact, call button in reach, bed alarm on, nurse notified and grand daughter present..    GOALS:    Physical Therapy Goals        Problem: Physical Therapy Goal    Goal Priority Disciplines Outcome Goal Variances Interventions   Physical Therapy Goal     PT/OT, PT      Description:  Goals to be met by 1-12-18.  1. Sup<>sit mod I  2. Sit<>stand with RW mod I  3. amb 150' with RW mod I  4. Up/down 3 steps B rail mod I                    Time Tracking:     PT Received On: 01/06/18  PT Start Time: 1455     PT Stop Time: 1530  PT Total Time (min): 35 min     Billable Minutes: Gait Training 35    Treatment Type: Treatment  PT/PTA: PT           Dnaiele Chapman, PT  01/06/2018

## 2018-01-06 NOTE — SUBJECTIVE & OBJECTIVE
Interval History: She has been ambulating.  Tolerating a regular diet.  Pain is well controlled.    Review of Systems   Constitutional: Negative.  Negative for fever.   HENT: Negative.    Eyes: Negative.    Respiratory: Negative for cough, chest tightness and shortness of breath.    Cardiovascular: Negative for chest pain.   Gastrointestinal: Negative.  Negative for constipation, diarrhea and nausea.   Genitourinary: Negative for flank pain and hematuria.   Musculoskeletal: Negative.    Neurological: Negative.    Psychiatric/Behavioral: Negative.      Objective:     Temp:  [97.7 °F (36.5 °C)-98.8 °F (37.1 °C)] 98.8 °F (37.1 °C)  Pulse:  [] 92  Resp:  [18-20] 18  SpO2:  [96 %-99 %] 97 %  BP: (142-178)/(63-75) 142/63     Body mass index is 27.44 kg/m².        RAFFI 175 ml in last 24 hours    Drains     Drain                 Closed/Suction Drain 01/04/18 1210 Left Abdomen Bulb 19 Fr. 2 days         Urethral Catheter 01/04/18 1208 Straight-tip;Non-latex 24 Fr. 2 days                Physical Exam   Nursing note and vitals reviewed.  Constitutional: She is oriented to person, place, and time. She appears well-developed.   HENT:   Head: Normocephalic.   Eyes: Conjunctivae are normal.   Neck: Normal range of motion. No tracheal deviation present. No thyromegaly present.   Cardiovascular: Normal rate, normal heart sounds and normal pulses.    Pulmonary/Chest: Effort normal and breath sounds normal. No respiratory distress. She has no wheezes.   Abdominal: Soft. She exhibits no distension and no mass. There is no hepatosplenomegaly. There is no tenderness. There is no rebound, no guarding and no CVA tenderness. No hernia.   Incisions dry, dressed.   RAFFI with serosanguinous fluid  Dia with yellow urine   Musculoskeletal: Normal range of motion. She exhibits no edema or tenderness.   Lymphadenopathy:     She has no cervical adenopathy.   Neurological: She is alert and oriented to person, place, and time.   Skin: Skin is  warm and dry. No rash noted. No erythema.     Psychiatric: She has a normal mood and affect. Her behavior is normal. Judgment and thought content normal.       Significant Labs:    BMP:    Recent Labs  Lab 01/05/18  0445      K 3.8      CO2 22*   BUN 16   CREATININE 1.3   CALCIUM 8.2*       CBC:     Recent Labs  Lab 01/05/18  0445   WBC 11.83   HGB 10.9*   HCT 33.2*          Blood Culture: No results for input(s): LABBLOO in the last 168 hours.  Urine Culture: No results for input(s): LABURIN in the last 168 hours.    Significant Imaging:

## 2018-01-06 NOTE — PLAN OF CARE
Problem: Physical Therapy Goal  Goal: Physical Therapy Goal  Goals to be met by 1-12-18.  1. Sup<>sit mod I  2. Sit<>stand with RW mod I  3. amb 150' with RW mod I  4. Up/down 3 steps B rail mod I   -    Comments: Good progress, pt amb 150' x 2 in hallway.

## 2018-01-07 LAB
ANION GAP SERPL CALC-SCNC: 7 MMOL/L
BUN SERPL-MCNC: 18 MG/DL
CALCIUM SERPL-MCNC: 8.7 MG/DL
CHLORIDE SERPL-SCNC: 108 MMOL/L
CO2 SERPL-SCNC: 25 MMOL/L
CREAT SERPL-MCNC: 1.2 MG/DL
EST. GFR  (AFRICAN AMERICAN): 47 ML/MIN/1.73 M^2
EST. GFR  (NON AFRICAN AMERICAN): 41 ML/MIN/1.73 M^2
GLUCOSE SERPL-MCNC: 96 MG/DL
MAGNESIUM SERPL-MCNC: 2 MG/DL
PHOSPHATE SERPL-MCNC: 2.2 MG/DL
POTASSIUM SERPL-SCNC: 3.9 MMOL/L
SODIUM SERPL-SCNC: 140 MMOL/L

## 2018-01-07 PROCEDURE — 80048 BASIC METABOLIC PNL TOTAL CA: CPT

## 2018-01-07 PROCEDURE — 25000003 PHARM REV CODE 250: Performed by: HOSPITALIST

## 2018-01-07 PROCEDURE — 94761 N-INVAS EAR/PLS OXIMETRY MLT: CPT

## 2018-01-07 PROCEDURE — 11000001 HC ACUTE MED/SURG PRIVATE ROOM

## 2018-01-07 PROCEDURE — 84100 ASSAY OF PHOSPHORUS: CPT

## 2018-01-07 PROCEDURE — 99024 POSTOP FOLLOW-UP VISIT: CPT | Mod: ,,, | Performed by: UROLOGY

## 2018-01-07 PROCEDURE — 25000003 PHARM REV CODE 250: Performed by: UROLOGY

## 2018-01-07 PROCEDURE — 83735 ASSAY OF MAGNESIUM: CPT

## 2018-01-07 PROCEDURE — 63600175 PHARM REV CODE 636 W HCPCS: Performed by: STUDENT IN AN ORGANIZED HEALTH CARE EDUCATION/TRAINING PROGRAM

## 2018-01-07 PROCEDURE — 36415 COLL VENOUS BLD VENIPUNCTURE: CPT

## 2018-01-07 RX ADMIN — FENOFIBRATE 145 MG: 145 TABLET, FILM COATED ORAL at 09:01

## 2018-01-07 RX ADMIN — AMLODIPINE BESYLATE 10 MG: 5 TABLET ORAL at 08:01

## 2018-01-07 RX ADMIN — DOCUSATE SODIUM 100 MG: 100 CAPSULE, LIQUID FILLED ORAL at 08:01

## 2018-01-07 RX ADMIN — CIPROFLOXACIN HYDROCHLORIDE 500 MG: 500 TABLET, FILM COATED ORAL at 08:01

## 2018-01-07 RX ADMIN — CIPROFLOXACIN HYDROCHLORIDE 500 MG: 500 TABLET, FILM COATED ORAL at 09:01

## 2018-01-07 RX ADMIN — ACETAMINOPHEN 1000 MG: 500 TABLET ORAL at 10:01

## 2018-01-07 RX ADMIN — DOCUSATE SODIUM 100 MG: 100 CAPSULE, LIQUID FILLED ORAL at 09:01

## 2018-01-07 RX ADMIN — ENOXAPARIN SODIUM 40 MG: 100 INJECTION SUBCUTANEOUS at 05:01

## 2018-01-07 NOTE — ASSESSMENT & PLAN NOTE
Patient and underwent a right distal ureterectomy with ureteric reimplantation performed by Dr. Kiet Arellano on 1/4/2017 for right ureteral cancer.  Patient tolerated surgery well.  Pain well controlled.  Post-operative care including VTE prophylaxis per Dr. Arellano.

## 2018-01-07 NOTE — SUBJECTIVE & OBJECTIVE
Interval History: She has been ambulating.  Pain is controlled with mostly Tylenol.  She felt warm this morning but no fever.      Review of Systems   Constitutional: Negative.  Negative for fever.   HENT: Negative.    Eyes: Negative.    Respiratory: Negative for cough, chest tightness and shortness of breath.    Cardiovascular: Negative for chest pain.   Gastrointestinal: Negative.  Negative for constipation, diarrhea and nausea.   Genitourinary: Negative for flank pain and hematuria.   Musculoskeletal: Negative.    Neurological: Negative.    Psychiatric/Behavioral: Negative.      Objective:     Temp:  [97.1 °F (36.2 °C)-98.8 °F (37.1 °C)] 97.1 °F (36.2 °C)  Pulse:  [85-99] 85  Resp:  [18] 18  SpO2:  [95 %-98 %] 97 %  BP: (133-183)/(63-79) 150/71     Body mass index is 27.44 kg/m².            Drains     Drain                 Closed/Suction Drain 01/04/18 1210 Left Abdomen Bulb 19 Fr. 2 days         Urethral Catheter 01/04/18 1208 Straight-tip;Non-latex 24 Fr. 2 days                Physical Exam   Nursing note and vitals reviewed.  Constitutional: She is oriented to person, place, and time. She appears well-developed.   HENT:   Head: Normocephalic.   Eyes: Conjunctivae are normal.   Neck: Normal range of motion. No tracheal deviation present. No thyromegaly present.   Cardiovascular: Normal rate, normal heart sounds and normal pulses.    Pulmonary/Chest: Effort normal and breath sounds normal. No respiratory distress. She has no wheezes.   Abdominal: Soft. She exhibits no distension and no mass. There is no hepatosplenomegaly. There is no tenderness. There is no rebound, no guarding and no CVA tenderness. No hernia.   Dressing removed  Incision C/D/I staples in place  RAFFI serous 130 ml in past 24 hours  Dia mostly yellow with a small amount of sediment   Musculoskeletal: Normal range of motion. She exhibits no edema or tenderness.   Lymphadenopathy:     She has no cervical adenopathy.   Neurological: She is alert  and oriented to person, place, and time.   Skin: Skin is warm and dry. No rash noted. No erythema.     Psychiatric: She has a normal mood and affect. Her behavior is normal. Judgment and thought content normal.       Significant Labs:    BMP:    Recent Labs  Lab 01/05/18  0445 01/07/18  0415    140   K 3.8 3.9    108   CO2 22* 25   BUN 16 18   CREATININE 1.3 1.2   CALCIUM 8.2* 8.7       CBC:     Recent Labs  Lab 01/05/18  0445   WBC 11.83   HGB 10.9*   HCT 33.2*          Blood Culture: No results for input(s): LABBLOO in the last 168 hours.  Urine Culture: No results for input(s): LABURIN in the last 168 hours.    Significant Imaging:

## 2018-01-07 NOTE — PLAN OF CARE
Problem: Patient Care Overview  Goal: Plan of Care Review  Outcome: Ongoing (interventions implemented as appropriate)  Patient remains free of falls or trauma at this time. AVSS, no s/s of infection noted. Tolerating diet. Voiding adequate amounts of blood tinged urine. Pain moderately controlled with current PRN regime. Patient/caregiver without complaints or concerns. Purposeful hourly rounding done. Safety measures maintained. Plan of care reviewed. Will monitor.

## 2018-01-07 NOTE — PROGRESS NOTES
[facility]  Urology  Progress Note    Patient Name: Destinee Luo  MRN: [id number]  Admission Date: 1/4/2018  Hospital Length of Stay: 3 days  Code Status: No Order   Attending Provider: Kiet Arellano MD   Primary Care Physician: Edilberto Cassidy MD    Subjective:     HPI:  No notes on file    Interval History: She has been ambulating.  Pain is controlled with mostly Tylenol.  She felt warm this morning but no fever.      Review of Systems   Constitutional: Negative.  Negative for fever.   HENT: Negative.    Eyes: Negative.    Respiratory: Negative for cough, chest tightness and shortness of breath.    Cardiovascular: Negative for chest pain.   Gastrointestinal: Negative.  Negative for constipation, diarrhea and nausea.   Genitourinary: Negative for flank pain and hematuria.   Musculoskeletal: Negative.    Neurological: Negative.    Psychiatric/Behavioral: Negative.      Objective:     Temp:  [97.1 °F (36.2 °C)-98.8 °F (37.1 °C)] 97.1 °F (36.2 °C)  Pulse:  [85-99] 85  Resp:  [18] 18  SpO2:  [95 %-98 %] 97 %  BP: (133-183)/(63-79) 150/71     Body mass index is 27.44 kg/m².            Drains     Drain                 Closed/Suction Drain 01/04/18 1210 Left Abdomen Bulb 19 Fr. 2 days         Urethral Catheter 01/04/18 1208 Straight-tip;Non-latex 24 Fr. 2 days                Physical Exam   Nursing note and vitals reviewed.  Constitutional: She is oriented to person, place, and time. She appears well-developed.   HENT:   Head: Normocephalic.   Eyes: Conjunctivae are normal.   Neck: Normal range of motion. No tracheal deviation present. No thyromegaly present.   Cardiovascular: Normal rate, normal heart sounds and normal pulses.    Pulmonary/Chest: Effort normal and breath sounds normal. No respiratory distress. She has no wheezes.   Abdominal: Soft. She exhibits no distension and no mass. There is no hepatosplenomegaly. There is no tenderness. There is no rebound, no guarding and no CVA tenderness.  No hernia.   Dressing removed  Incision C/D/I staples in place  RAFFI serous 130 ml in past 24 hours  Dia mostly yellow with a small amount of sediment   Musculoskeletal: Normal range of motion. She exhibits no edema or tenderness.   Lymphadenopathy:     She has no cervical adenopathy.   Neurological: She is alert and oriented to person, place, and time.   Skin: Skin is warm and dry. No rash noted. No erythema.     Psychiatric: She has a normal mood and affect. Her behavior is normal. Judgment and thought content normal.       Significant Labs:    BMP:    Recent Labs  Lab 01/05/18  0445 01/07/18  0415    140   K 3.8 3.9    108   CO2 22* 25   BUN 16 18   CREATININE 1.3 1.2   CALCIUM 8.2* 8.7       CBC:     Recent Labs  Lab 01/05/18  0445   WBC 11.83   HGB 10.9*   HCT 33.2*          Blood Culture: No results for input(s): LABBLOO in the last 168 hours.  Urine Culture: No results for input(s): LABURIN in the last 168 hours.    Significant Imaging:                    Assessment/Plan:     * Ureteral cancer, right    S/p distal ureterectomy with Boari Flap  Keep RAFFI until output <100 mL day  Keep Dia, home with Dia    Continue to ambulate  Continue regular diet  Minimize narcotics    Appreciate hospital medicine        Essential hypertension    Per hospital medicine            VTE Risk Mitigation         Ordered     enoxaparin injection 40 mg  Daily     Route:  Subcutaneous        01/05/18 1439     High Risk of VTE  Once      01/04/18 1503     Place sequential compression device  Until discontinued      01/04/18 1503     Place KIANNA hose  Until discontinued      01/04/18 1503     Reason for No Pharmacological VTE Prophylaxis  Once      01/04/18 1503          CECI Grier MD  Urology  [facility]

## 2018-01-07 NOTE — ASSESSMENT & PLAN NOTE
Lipids okay.  Likely would benefit from statin therapy over fenofibrate but I have asked her to discuss this with her primary care provider Dr. Edilberto Cassidy.

## 2018-01-08 VITALS
TEMPERATURE: 98 F | HEART RATE: 93 BPM | DIASTOLIC BLOOD PRESSURE: 60 MMHG | OXYGEN SATURATION: 98 % | SYSTOLIC BLOOD PRESSURE: 141 MMHG | BODY MASS INDEX: 27.6 KG/M2 | RESPIRATION RATE: 18 BRPM | HEIGHT: 62 IN | WEIGHT: 150 LBS

## 2018-01-08 PROBLEM — C66.1 URETERAL CANCER, RIGHT: Status: RESOLVED | Noted: 2018-01-04 | Resolved: 2018-01-08

## 2018-01-08 LAB
BLD PROD TYP BPU: NORMAL
BLD PROD TYP BPU: NORMAL
BLOOD UNIT EXPIRATION DATE: NORMAL
BLOOD UNIT EXPIRATION DATE: NORMAL
BLOOD UNIT TYPE CODE: 9500
BLOOD UNIT TYPE CODE: 9500
BLOOD UNIT TYPE: NORMAL
BLOOD UNIT TYPE: NORMAL
CODING SYSTEM: NORMAL
CODING SYSTEM: NORMAL
DISPENSE STATUS: NORMAL
DISPENSE STATUS: NORMAL
NUM UNITS TRANS PACKED RBC: NORMAL
NUM UNITS TRANS PACKED RBC: NORMAL

## 2018-01-08 PROCEDURE — 25000003 PHARM REV CODE 250: Performed by: UROLOGY

## 2018-01-08 PROCEDURE — 25000003 PHARM REV CODE 250: Performed by: HOSPITALIST

## 2018-01-08 PROCEDURE — 63600175 PHARM REV CODE 636 W HCPCS: Performed by: UROLOGY

## 2018-01-08 RX ORDER — ONDANSETRON 4 MG/1
4 TABLET, FILM COATED ORAL EVERY 8 HOURS PRN
Qty: 30 TABLET | Refills: 0 | Status: SHIPPED | OUTPATIENT
Start: 2018-01-08 | End: 2018-03-21

## 2018-01-08 RX ORDER — CIPROFLOXACIN 500 MG/1
500 TABLET ORAL 2 TIMES DAILY
Qty: 60 TABLET | Refills: 0 | Status: SHIPPED | OUTPATIENT
Start: 2018-01-08 | End: 2018-02-16

## 2018-01-08 RX ORDER — OXYCODONE AND ACETAMINOPHEN 5; 325 MG/1; MG/1
1 TABLET ORAL EVERY 4 HOURS PRN
Qty: 30 TABLET | Refills: 0 | Status: SHIPPED | OUTPATIENT
Start: 2018-01-08 | End: 2018-01-22

## 2018-01-08 RX ORDER — DOCUSATE SODIUM 100 MG/1
100 CAPSULE, LIQUID FILLED ORAL 2 TIMES DAILY
Refills: 0 | COMMUNITY
Start: 2018-01-08

## 2018-01-08 RX ADMIN — DOCUSATE SODIUM 100 MG: 100 CAPSULE, LIQUID FILLED ORAL at 09:01

## 2018-01-08 RX ADMIN — ACETAMINOPHEN 1000 MG: 500 TABLET ORAL at 09:01

## 2018-01-08 RX ADMIN — CIPROFLOXACIN HYDROCHLORIDE 500 MG: 500 TABLET, FILM COATED ORAL at 09:01

## 2018-01-08 RX ADMIN — ONDANSETRON 4 MG: 2 INJECTION INTRAMUSCULAR; INTRAVENOUS at 12:01

## 2018-01-08 RX ADMIN — AMLODIPINE BESYLATE 10 MG: 5 TABLET ORAL at 09:01

## 2018-01-08 NOTE — DISCHARGE SUMMARY
[facility]  Urology  Discharge Summary      Patient Name: Destinee Luo  MRN: [id number]  Admission Date: 1/4/2018  Hospital Length of Stay: 4 days  Discharge Date and Time:  01/08/2018 10:59 AM  Attending Physician: Kiet Allison MD   Discharging Provider: Kiet Allison MD  Primary Care Physician: Edilberto Cassidy MD    HPI:   No notes on file    Procedure(s) (LRB):  XI ROBOTIC ASSISTED LAPAROSCOPIC HYSTERECTOMY (N/A)  XI ROBOT ASSISTED LAPAROSCOPIC SALPINGO-OOPHERECTOMY (Bilateral)  URETERECTOMY (Right)  REIMPLANTATION-URETER (Right)  NEPHRECTOMY- ROBOT -  POSS OPEN (Right)     Indwelling Lines/Drains at time of discharge:   Lines/Drains/Airways     Drain                 Closed/Suction Drain 01/04/18 1210 Left Abdomen Bulb 19 Fr. 3 days         Urethral Catheter 01/04/18 1208 Straight-tip;Non-latex 24 Fr. 3 days                Hospital Course (synopsis of major diagnoses, care, treatment, and services provided during the course of the hospital stay):     pod4  Nina reg diet  +BM  Minimal discomfort  Eager to go home    Af vss  Ab benign  RAFFI removed (60cc yesterday)  Wounds clean, dry intact  Urine clear yellow        Consults:   Consults         Status Ordering Provider     Inpatient consult to Hospitalist  Once     Provider:  Brad Valero MD    Completed KIET ALLISON          Significant Diagnostic Studies: -    Pending Diagnostic Studies:     None          Final Active Diagnoses:    Diagnosis Date Noted POA    Stage 3 chronic kidney disease [N18.3] 01/06/2018 Yes    Essential hypertension [I10] 01/04/2018 Yes    Dyslipidemia [E78.5] 01/04/2018 Yes    History of transient ischemic attack (TIA) [Z86.73] 01/04/2018 Not Applicable    Endometrial cancer [C54.1] 12/06/2017 Yes      Problems Resolved During this Admission:    Diagnosis Date Noted Date Resolved POA    PRINCIPAL PROBLEM:  Ureteral cancer, right [C66.1] 01/04/2018 01/08/2018 Yes         Discharged Condition:  good    Disposition:     Follow Up:  Follow-up Information     Judy Burroughs NP In 2 weeks.    Specialty:  Urology  Contact information:  [address] [facility] [id number] [phone]             Edilberto Cassidy MD.    Specialty:  Internal Medicine  Contact information:  [address] [place] [address] [phone]             R Joseph Larson Iii, MD In 2 months.    Specialty:  Urology  Contact information:  [address]  [address]  [place] [id number] [phone]                 Patient Instructions:     Nursing communication   Order Comments: Pt will go home with mitchell  Please give patient a normal Gu bag NOT a urimeter  Do NOT give patient a leg bag       Medications:  Reconciled Home Medications:   Current Discharge Medication List      START taking these medications    Details   docusate sodium (COLACE) 100 MG capsule Take 1 capsule (100 mg total) by mouth 2 (two) times daily.  Refills: 0      oxyCODONE-acetaminophen (PERCOCET) 5-325 mg per tablet Take 1 tablet by mouth every 4 (four) hours as needed.  Qty: 30 tablet, Refills: 0         CONTINUE these medications which have NOT CHANGED    Details   acetaminophen (TYLENOL) 325 MG tablet Take 1 tablet (325 mg total) by mouth every 6 (six) hours as needed for Pain.  Refills: 0      aflibercept (EYLEA) 2 mg/0.05 mL Soln 2 mg.      amLODIPine (NORVASC) 2.5 MG tablet Take 2.5 mg by mouth once daily.      ciprofloxacin HCl (CIPRO) 500 MG tablet Take 500 mg by mouth 2 (two) times daily.  Refills: 0      cranberry 400 mg Cap Take by mouth.      fenofibrate (TRICOR) 145 MG tablet Take 145 mg by mouth once daily.      Lactobacillus acidophilus Cap Take by mouth.      loratadine (CLARITIN) 10 mg tablet Take by mouth.      multivitamin capsule Take 1 capsule by mouth.      vitamin D 1000 units Tab Take 1,000 Units by mouth once daily.             PT WILL SEE NP IN 2 WEEKS TO CHECK URINE CS AND ORDER CYSTOGRAM THEN SEE ME 4 WEEKS POST OP WITH  CYSTOGRAM    WILL FOLLOW UP WITH PCP ASAP  WILL FOLLOW UP WITH DR OTOOLE IN 2 MONTHS     Time spent on the discharge of patient: 45 minutes    Kite Arellano MD  Urology  [facility]

## 2018-01-08 NOTE — PT/OT/SLP DISCHARGE
Physical Therapy Discharge Summary    Name: Destinee Luo  MRN: [id number]   Principal Problem: Ureteral cancer, right     Patient Discharged from acute Physical Therapy on 1/8/18.  Please refer to prior PT noted date on 1/6/18 for functional status.     Assessment:     Goals partially met.    Objective:     GOALS:    Physical Therapy Goals     Not on file          Multidisciplinary Problems (Resolved)        Problem: Physical Therapy Goal    Goal Priority Disciplines Outcome Goal Variances Interventions   Physical Therapy Goal   (Resolved)     PT/OT, PT Outcome(s) achieved     Description:  Goals to be met by 1-12-18.  1. Sup<>sit mod I  2. Sit<>stand with RW mod I  3. amb 150' with RW mod I  4. Up/down 3 steps B rail mod I                    Reasons for Discontinuation of Therapy Services  Transfer to alternate level of care.      Plan:     Patient Discharged to: Home with Home Health Service. PT of record not available for documentation.      Nathalie Main, PT  1/8/2018

## 2018-01-08 NOTE — SUBJECTIVE & OBJECTIVE
Interval History:   01/08/2018 - POD #3 s/p robot assisted laparoscopic hysterectomy and bilateral salpingo-oophorectomy, R ureterectomy and reimplant.   She and her grand daughter report that she is feeling well this morning. She is tolerating full diet without nausea. Her pain is controlled with PO Tylenol. She ambulated up and down the halls yesterday.          Scheduled Meds:   amLODIPine  10 mg Oral Daily    ciprofloxacin HCl  500 mg Oral BID    docusate sodium  100 mg Oral BID    enoxaparin  40 mg Subcutaneous Daily    fenofibrate  145 mg Oral Daily     Continuous Infusions:  PRN Meds:acetaminophen, diphenhydrAMINE, ondansetron, oxyCODONE, oxyCODONE, traMADol    Review of patient's allergies indicates:   Allergen Reactions    Sulfa (sulfonamide antibiotics)     Codeine Anxiety       Objective:     Vital Signs (Most Recent):  Temp: 97.1 °F (36.2 °C) (01/08/18 0400)  Pulse: 83 (01/08/18 0400)  Resp: 18 (01/08/18 0400)  BP: 138/61 (01/08/18 0400)  SpO2: 98 % (01/08/18 0400) Vital Signs (24h Range):  Temp:  [97.1 °F (36.2 °C)-98 °F (36.7 °C)] 97.1 °F (36.2 °C)  Pulse:  [83-90] 83  Resp:  [18] 18  SpO2:  [97 %-98 %] 98 %  BP: (133-166)/(61-71) 138/61     Weight: 68 kg (150 lb)  Body mass index is 27.44 kg/m².  No LMP recorded. Patient is postmenopausal.    Laboratory:  I have personallly reviewed all pertinent lab results from the last 24 hours.    Diagnostic Results:  I have personally reviewed all diagnostic results within the last 24 hours.    Physical Exam:   Constitutional: She appears well-developed and well-nourished. No distress.    HENT:   Head: Normocephalic and atraumatic.   Mouth/Throat: Oropharynx is clear and moist.    Eyes: Conjunctivae and EOM are normal.    Neck: Normal range of motion.    Cardiovascular: Normal rate and regular rhythm.     Pulmonary/Chest: Effort normal and breath sounds normal.        Abdominal: Soft. Bowel sounds are normal. She exhibits abdominal incision (Steri strips  have been removed. Incision sites are clean and dry, no surrounding erythema or underlying fluctuance. There are areas of ecchymosis in RUQ nad LLQ. Mild tenderness to palpation, appropriate for POD #3.). She exhibits no distension.                  Skin: She is not diaphoretic.

## 2018-01-08 NOTE — PLAN OF CARE
01/08/18 1043   Discharge Reassessment   Assessment Type Discharge Planning Reassessment   Provided patient/caregiver education on the expected discharge date and the discharge plan Yes   Do you have any problems affording any of your prescribed medications? No   Discharge Plan A Home Health   Patient choice form signed by patient/caregiver Yes   Can the patient answer the patient profile reliably? Yes, cognitively intact   How does the patient rate their overall health at the present time? Good   Describe the patient's ability to walk at the present time. Minor restrictions or changes   Number of comorbid conditions (as recorded on the chart) Two

## 2018-01-08 NOTE — PLAN OF CARE
-------------------------  ATTN: TEAM   DC PLANNING - DISCHARGED     RESUME [facility]  ON AVS     Gladys Ceja RN  Case management# [phone] (FAX) [phone]  --------------------------------------------------     01/08/18 1220   Final Note   Assessment Type Final Discharge Note   Discharge Disposition [facility] Follow Up  Appt(s) scheduled? Yes   Discharge plans and expectations educations in teach back method with documentation complete? Yes   Right Care Referral Info   Post Acute Recommendation Home-care

## 2018-01-08 NOTE — SUBJECTIVE & OBJECTIVE
Interval History:   01/05/2018 s/p RALH/BSO with R distal ureterectomy with ureteric reimplantation, POD#1. Patient tolerated surgery well. VSS, afebrile. Pain controlled. Tolerating full liquid diet, ready to advance. Has ambulated, and passed flatus. AM labs, wnl.  Plan for discharge tomorrow.    01/06/2018  POD#2. Doing well. No acute events overnight. Meeting post-op milestones.   01/08/2018 - POD #3 s/p robot assisted laparoscopic hysterectomy and bilateral salpingo-oophorectomy, R ureterectomy and reimplant.    Scheduled Meds:   amLODIPine  10 mg Oral Daily    ciprofloxacin HCl  500 mg Oral BID    docusate sodium  100 mg Oral BID    enoxaparin  40 mg Subcutaneous Daily    fenofibrate  145 mg Oral Daily     Continuous Infusions:  PRN Meds:acetaminophen, diphenhydrAMINE, ondansetron, oxyCODONE, oxyCODONE, traMADol    Review of patient's allergies indicates:   Allergen Reactions    Sulfa (sulfonamide antibiotics)     Codeine Anxiety       Objective:     Vital Signs (Most Recent):  Temp: 97.1 °F (36.2 °C) (01/08/18 0400)  Pulse: 83 (01/08/18 0400)  Resp: 18 (01/08/18 0400)  BP: 138/61 (01/08/18 0400)  SpO2: 98 % (01/08/18 0400) Vital Signs (24h Range):  Temp:  [97.1 °F (36.2 °C)-98 °F (36.7 °C)] 97.1 °F (36.2 °C)  Pulse:  [83-90] 83  Resp:  [18] 18  SpO2:  [97 %-98 %] 98 %  BP: (133-166)/(61-71) 138/61     Weight: 68 kg (150 lb)  Body mass index is 27.44 kg/m².  No LMP recorded. Patient is postmenopausal.    I&O (Last 24H):    Intake/Output Summary (Last 24 hours) at 01/08/18 0648  Last data filed at 01/08/18 0600   Gross per 24 hour   Intake                0 ml   Output             1060 ml   Net            -1060 ml       Physical Exam:   Constitutional: She appears well-developed and well-nourished. No distress.    HENT:   Head: Normocephalic and atraumatic.    Eyes: Conjunctivae and EOM are normal.    Neck: Normal range of motion.    Cardiovascular: Normal rate and regular rhythm.     Pulmonary/Chest:  Effort normal and breath sounds normal.        Abdominal: Soft. Bowel sounds are normal. She exhibits abdominal incision (Steri strips have been removed. All incisions are clean and dry. No surrounding erythema or underlying fluctuance. No drainage expressed from incisions. Mild tenderness, appropriate for POD #3. ). She exhibits no distension. There is tenderness (mild). There is no rebound and no guarding.             Musculoskeletal: Normal range of motion and moves all extremeties.       Neurological: She is alert.    Skin: Skin is warm and dry. She is not diaphoretic.    Psychiatric: She has a normal mood and affect. Her behavior is normal. Judgment normal.       Laboratory:  I have personallly reviewed all pertinent lab results from the last 24 hours.

## 2018-01-08 NOTE — PROGRESS NOTES
[facility]  Obstetrics & Gynecology  Progress Note    Patient Name: Destinee Luo  MRN: [id number]  Admission Date: 1/4/2018  Primary Care Provider: Edilberto Cassidy MD  Principal Problem: Ureteral cancer, right    Subjective:     HPI:  No notes on file    Interval History:   01/05/2018 s/p RALH/BSO with R distal ureterectomy with ureteric reimplantation, POD#1. Patient tolerated surgery well. VSS, afebrile. Pain controlled. Tolerating full liquid diet, ready to advance. Has ambulated, and passed flatus. AM labs, wnl.  Plan for discharge tomorrow.    01/06/2018  POD#2. Doing well. No acute events overnight. Meeting post-op milestones.   01/08/2018 - POD #3 s/p robot assisted laparoscopic hysterectomy and bilateral salpingo-oophorectomy, R ureterectomy and reimplant.    Scheduled Meds:   amLODIPine  10 mg Oral Daily    ciprofloxacin HCl  500 mg Oral BID    docusate sodium  100 mg Oral BID    enoxaparin  40 mg Subcutaneous Daily    fenofibrate  145 mg Oral Daily     Continuous Infusions:  PRN Meds:acetaminophen, diphenhydrAMINE, ondansetron, oxyCODONE, oxyCODONE, traMADol    Review of patient's allergies indicates:   Allergen Reactions    Sulfa (sulfonamide antibiotics)     Codeine Anxiety       Objective:     Vital Signs (Most Recent):  Temp: 97.1 °F (36.2 °C) (01/08/18 0400)  Pulse: 83 (01/08/18 0400)  Resp: 18 (01/08/18 0400)  BP: 138/61 (01/08/18 0400)  SpO2: 98 % (01/08/18 0400) Vital Signs (24h Range):  Temp:  [97.1 °F (36.2 °C)-98 °F (36.7 °C)] 97.1 °F (36.2 °C)  Pulse:  [83-90] 83  Resp:  [18] 18  SpO2:  [97 %-98 %] 98 %  BP: (133-166)/(61-71) 138/61     Weight: 68 kg (150 lb)  Body mass index is 27.44 kg/m².  No LMP recorded. Patient is postmenopausal.    I&O (Last 24H):    Intake/Output Summary (Last 24 hours) at 01/08/18 0648  Last data filed at 01/08/18 0600   Gross per 24 hour   Intake                0 ml   Output             1060 ml   Net            -1060 ml       Physical Exam:    Constitutional: She appears well-developed and well-nourished. No distress.    HENT:   Head: Normocephalic and atraumatic.    Eyes: Conjunctivae and EOM are normal.    Neck: Normal range of motion.    Cardiovascular: Normal rate and regular rhythm.     Pulmonary/Chest: Effort normal and breath sounds normal.        Abdominal: Soft. Bowel sounds are normal. She exhibits abdominal incision (Steri strips have been removed. All incisions are clean and dry. No surrounding erythema or underlying fluctuance. No drainage expressed from incisions. Mild tenderness, appropriate for POD #3. ). She exhibits no distension. There is tenderness (mild). There is no rebound and no guarding.             Musculoskeletal: Normal range of motion and moves all extremeties.       Neurological: She is alert.    Skin: Skin is warm and dry. She is not diaphoretic.    Psychiatric: She has a normal mood and affect. Her behavior is normal. Judgment normal.       Laboratory:  I have personallly reviewed all pertinent lab results from the last 24 hours.      Assessment/Plan:     * Ureteral cancer, right    - s/p right distal ureterectomy with ureteric reimplantation performed by Dr. Kiet Arellano on 1/4/2017 for right ureteral cancer. Patient tolerated well.   - Urine output adequate overnight. Dia to remain in place with rx for cipro for 4 weeks, per urology         History of transient ischemic attack (TIA)    - Plavix and ASA on hold until bleeding risk low, per medicine hospitalist  - TEDS/SCDs in place        Essential hypertension    - Elevated Blood pressure overnight  - Amlodipine increased to 5mg daily, per Medicine hospitalist          Endometrial cancer    - POD #3 s/p RALH/BSO with Dr. Mann, patient tolerated well, patient has met post op milestones appropriately  - tolerating regular diet, zofran prn  - pain controlled, tylenol/oxy IR prn  - Encourage ambulation - ambulating with walker now  - AM CBC yesterday WNL  - TEDS/SCDs  removed at patient's request, as her legs were getting sore. Patient on enoxaparin.             Disposition per Urology.     Chuy Bear MD  Obstetrics & Gynecology  [facility]

## 2018-01-08 NOTE — PLAN OF CARE
01/08/18 1043   Medicare Message   Important Message from Medicare regarding Discharge Appeal Rights Given to patient/caregiver;Explained to patient/caregiver;Signed/date by patient/caregiver   Date IMM was signed 01/08/18   Time IMM was signed 0901

## 2018-01-08 NOTE — ASSESSMENT & PLAN NOTE
- POD #3 s/p ROSE/BSO with Dr. Mann, patient tolerated well, patient has met post op milestones appropriately  - tolerating regular diet, zofran prn  - pain controlled, tylenol/oxy IR prn  - Encourage ambulation - ambulating with walker now  - AM CBC yesterday WNL  - TEDS/SCDs removed at patient's request, as her legs were getting sore. Patient on enoxaparin.

## 2018-01-08 NOTE — PLAN OF CARE
[facility]  [address]  [place] [address]  [phone] after hours  [phone]  Fax         HOME  HEALTH ORDERS    01/08/2018    Admit to Home Health    Diagnoses:  Active Hospital Problems    Diagnosis  POA    Stage 3 chronic kidney disease [N18.3]  Yes    Essential hypertension [I10]  Yes    Dyslipidemia [E78.5]  Yes    History of transient ischemic attack (TIA) [Z86.73]  Not Applicable    Endometrial cancer [C54.1]  Yes      Resolved Hospital Problems    Diagnosis Date Resolved POA    *Ureteral cancer, right [C66.1] 01/08/2018 Yes       Patient is homebound due to:  Ureteral cancer, right    Allergies:  Review of patient's allergies indicates:   Allergen Reactions    Sulfa (sulfonamide antibiotics)     Codeine Anxiety       Diet: regular    Acitivities: as tolerated    Nursing:   SN to complete comprehensive assessment including routine vital signs. Instruct on disease process and s/s of complications to report to MD. Review/verify medication list sent home with the patient at time of discharge  and instruct patient/caregiver as needed. Frequency may be adjusted depending on start of care date.    Notify MD if SBP > 160 or < 90; DBP > 90 or < 50; HR > 120 or < 50; Temp > 101; Other:     CONSULTS:    PT to evaluate and treat. Evaluate for home safety and equipment needs; Establish/upgrade home exercise program. Perform / instruct on therapeutic exercises, gait training, transfer training, and Range of Motion.    OT to evaluate and treat. Evaluate home environment for safety and equipment needs. Perform/Instruct on transfers, ADL training, ROM, and therapeutic exercises.      Aide to provide assistance with personal care, ADLs, and vital signs      Other Wounds:   Surgical    Wound care nurse can evaluate and treat       S/p distal ureterectomy with Boari Flap  Keep RAFFI until output <100 mL day  Keep Dia, home with Dia             Medications: Review  discharge medications with patient and family and provide education.       Destinee Luo   Home Medication Instructions COLTON:[id number]    Printed on:01/08/18 1050   Medication Information                      acetaminophen (TYLENOL) 325 MG tablet  Take 1 tablet (325 mg total) by mouth every 6 (six) hours as needed for Pain.             aflibercept (EYLEA) 2 mg/0.05 mL Soln  2 mg.             amLODIPine (NORVASC) 2.5 MG tablet  Take 2.5 mg by mouth once daily.             ciprofloxacin HCl (CIPRO) 500 MG tablet  Take 500 mg by mouth 2 (two) times daily.             cranberry 400 mg Cap  Take by mouth.             fenofibrate (TRICOR) 145 MG tablet  Take 145 mg by mouth once daily.             Lactobacillus acidophilus Cap  Take by mouth.             loratadine (CLARITIN) 10 mg tablet  Take by mouth.             multivitamin capsule  Take 1 capsule by mouth.             vitamin D 1000 units Tab  Take 1,000 Units by mouth once daily.                       _________________________________  Dr Arellano  01/08/2018

## 2018-01-08 NOTE — PLAN OF CARE
Problem: Patient Care Overview  Goal: Plan of Care Review  Patient remains free of falls or trauma at this time. AVSS, no s/s of infection noted. Tolerating diet. Voiding adequate amounts of urine. Pain moderately controlled with current PRN regime.Sat up in chair for meals and ambulated in halls today. Patient/caregiver without complaints or concerns. Purposeful hourly rounding done. Safety measures maintained. Plan of care reviewed. Will monitor.

## 2018-01-08 NOTE — NURSING
Dr Arellano making rounds, removed Abdirizak drain using aseptic technique. New orders for discharge noted. Spoke with Miki in [place], will set up.

## 2018-01-08 NOTE — PLAN OF CARE
-----------------------  ATTN: TEAM   DC PLANNING - DISCHARGED     [facility]     NEEDS  TO F/U WITH PCP FOR RESUME HHC NEEDS .. NO ORDERS IN EPIC PER KEEGAN GATES - MSG LEFT AT MD OFFICE - PENDING CALL BACK     Gladys Ceja RN  Case management# [phone] (FAX) [phone]  --------------------------------------------------     01/08/18 1220   Final Note   Assessment Type Final Discharge Note   Discharge Disposition Home   Hospital Follow Up  Appt(s) scheduled? Yes   Discharge plans and expectations educations in teach back method with documentation complete? Yes   Right Care Referral Info   Post Acute Recommendation No Care

## 2018-01-09 NOTE — OP NOTE
DATE OF PROCEDURE:  01/04/2018.    SURGEON:  Carlos Mann M.D.    ASSISTANT:  Rosa Padgett, nurse practitioner served as first assistant and   Viola Roque, resident.  This was a combined case with Dr. Kiet Arellano who   performed ureteral resection and reimplantation of the right ureter.    PREOPERATIVE DIAGNOSIS:  Endometrial carcinoma.    POSTOPERATIVE DIAGNOSIS:  Endometrial carcinoma.    PROCEDURES PERFORMED:  Robotic-assisted laparoscopic hysterectomy, bilateral   salpingo-oophorectomy.    COMPLICATIONS:  None.    ESTIMATED BLOOD LOSS:  Less than 30 mL    ANESTHESIA:  General.    INTRAOPERATIVE FINDINGS:  Included 8 cm uterus with normal appearing bilateral   tubes and ovaries.  The patient had no apparent spread of disease.  The patient   did have an enlarged right ureter consistent with her primary right ureteral   cancer.    PROCEDURE IN DETAIL:  After informed consent was obtained, the patient was taken   to the Operating Suite.  General anesthesia was administered.  Once this was   felt to be adequate, she was placed in dorsal lithotomy position with her arms   tucked.  The abdomen and pelvis were prepped and draped in the usual sterile   fashion and a speculum was placed in the vagina.  The cervix was visualized,   grasped with single-tooth tenaculum and sounded to 8 cm.  Serial dilation of the   cervix was performed and a medium VCare manipulator was placed without   difficulty.  Dia catheter was placed to gravity drainage and attention was   turned to the abdominal portion of the procedure.  Veress needle was placed   through the umbilicus.  Opening pressure was noted to be 0.  Pneumoperitoneum   was obtained with carbon dioxide and once this was felt to be adequate, an 8 mm   incision was made above the umbilicus and a robotic trocar was passed through   this.  Intraperitoneal placement was confirmed with the camera and lateral   robotic trocars x3 were placed through 8 mm incisions.  A  right upper quadrant 8   mm accessory AirSeal port was placed and the patient was placed in steep   Trendelenburg.  The robot was docked and the instruments were passed in the   operative field.  The above stated findings were noted.  Attention was first   turned to the right side where the right round ligament was transected after   sacrificing with bipolar cautery.  The anterior and posterior leaflets of the   broad ligament were opened and the retroperitoneum was explored.  The ureter was   identified and a window was made beneath the infundibulopelvic ligament and was   sacrificed with bipolar cautery and transected.  The medial fold of the   peritoneum was taken to the level of uterosacral on this side.  An identical   dissection was performed on the left.  Anteriorly, the vesicouterine peritoneum   was incised and the bladder was mobilized inferiorly over the ring.  A 10   o'clock to 2 o'clock colpotomy was performed.  Posteriorly, a 4 o'clock to 8   o'clock colpotomy was performed and bilateral cardinal ligaments and uterine   vessels skeletonized their peritoneal attachments sacrificed with bipolar   cautery and transected.  Circumferential colpotomy was completed and ureter,   cervix, bilateral tubes and ovaries were removed.  At this point in time, the   vaginal cuff was made hemostatic with electrocautery and then the cuff was   closed in running nonlocking 0 Vicryl suture tied in the midline.  The case was   then handed over to Dr. Arellano who will dictate the remainder of the surgery as   well as closure of the abdomen.  Of note, I was present for and performed all   key aspects of procedure.  Rosa Padgett's expertise was needed as there was   no qualified resident available.      LISETH  dd: 01/09/2018 08:28:23 (CST)  td: 01/09/2018 09:35:50 (CST)  Doc ID   #[id number]  Job ID #[id number]    CC:

## 2018-01-15 ENCOUNTER — TELEPHONE (OUTPATIENT)
Dept: UROLOGY | Facility: CLINIC | Age: 83
End: 2018-01-15

## 2018-01-15 NOTE — TELEPHONE ENCOUNTER
----- Message from Wu Fine sent at 1/15/2018 10:45 AM CST -----  Contact: [facility]  x_ 1st Request   _ 2nd Request   _ 3rd Request     Who: STEPHAN GREGG [[id number]]    Why: Giuliana is requesting a call back in reference to new orders to adjust the patients catheter.  She faxed orders to office on 01/09 and is requesting that they be signed and faxed back to [phone]    What Number to Call Back: [phone]    When to Expect a call back: (Before the end of the day)   -- if call after 3:00 call back will be tomorrow.

## 2018-01-16 NOTE — PHYSICIAN QUERY
"PT Name: Destinee Luo  MR #: [id number]     Physician Query Form - Documentation Clarification      CDS/: LI Baumann, RN, CCDS               Contact information: [email or web address]    This form is a permanent document in the medical record.     Query Date: January 16, 2018    By submitting this query, we are merely seeking further clarification of documentation. Please utilize your independent clinical judgment when addressing the question(s) below.    The Medical record reflects the following:    Supporting Clinical Findings Location in Medical Record   preop urine cs neg; pt has been on prophylactic abx due to recurrent UTIs; cipro preop; plan open nephrectomy if total nephroU is necessary     Pt has recurrent UTIs   Currently on cipro  1/4 Interval H&P by Dr. Arellano          12/28 H&P View Only by Dr. Arellano     Ciprofloxacin 500 mg 2 times daily Oral  Indications of Use: Urinary Tract Infection     1/4-1/8 MAR                                                                            Doctor, Please specify diagnosis or diagnoses associated with above clinical findings.    Please clarify "recurrent UTIs" for 1/4-1/8 Admission.     Provider Use Only      (   ) UTI is active diagnosis for this admission    (   ) No UTI at this time, prophylactic treatment only    (  x ) Other (specify):___UTI was present on admission and was treat prior to and during the admission______________________________________________                                                                                                                 [  ] Clinically undetermined            "

## 2018-01-17 ENCOUNTER — TELEPHONE (OUTPATIENT)
Dept: UROLOGY | Facility: CLINIC | Age: 83
End: 2018-01-17

## 2018-01-17 LAB
ACID FAST MOD KINY STN SPEC: NORMAL
MYCOBACTERIUM SPEC QL CULT: NORMAL

## 2018-01-17 NOTE — TELEPHONE ENCOUNTER
----- Message from Kiet Arellano MD sent at 1/17/2018  1:58 PM CST -----  I called pt and her daughter with her path results    Please fax to dr ml bach

## 2018-01-18 ENCOUNTER — TELEPHONE (OUTPATIENT)
Dept: UROLOGY | Facility: CLINIC | Age: 83
End: 2018-01-18

## 2018-01-18 NOTE — TELEPHONE ENCOUNTER
----- Message from Flavia Chavez sent at 1/18/2018 12:29 PM CST -----  Contact: Patient's Home Health Nurse / Patel Monson / # [phone]  X  1st Request  _  2nd Request  _  3rd Request    Who: Destinee Luo (mrn# [id number])    Why: Patient's home health nurse called requesting orders for UA because patient's urine appears to be cloudy and patient last night experienced some confusion.  Please give a call back at your earliest convenience.       THANKS!    What Number to Call Back: Patel Monson / Home Health Nurse / Ph#  [phone]    When to Expect a call back: (Before the end of the day)   -- if the call is after 12:00, the call back will be tomorrow.

## 2018-01-22 ENCOUNTER — OFFICE VISIT (OUTPATIENT)
Dept: UROLOGY | Facility: CLINIC | Age: 83
End: 2018-01-22
Payer: MEDICARE

## 2018-01-22 VITALS
WEIGHT: 152.13 LBS | DIASTOLIC BLOOD PRESSURE: 71 MMHG | HEIGHT: 62 IN | BODY MASS INDEX: 27.99 KG/M2 | HEART RATE: 78 BPM | SYSTOLIC BLOOD PRESSURE: 160 MMHG

## 2018-01-22 DIAGNOSIS — N28.89 URETERAL MASS: Primary | ICD-10-CM

## 2018-01-22 PROCEDURE — 99024 POSTOP FOLLOW-UP VISIT: CPT | Mod: S$GLB,POP,, | Performed by: NURSE PRACTITIONER

## 2018-01-22 PROCEDURE — 87086 URINE CULTURE/COLONY COUNT: CPT

## 2018-01-22 PROCEDURE — 87106 FUNGI IDENTIFICATION YEAST: CPT

## 2018-01-22 PROCEDURE — 87088 URINE BACTERIA CULTURE: CPT

## 2018-01-22 NOTE — PROGRESS NOTES
"Subjective:      Destinee Luo is a 86 y.o. female who returns today regarding her post op.     The patient is 2 weeks s/p robotic hysterectomy and distal ureterectomy with reimplantation with Dr. Arellano and Dr. Mann. Doing well post operatively. Denies pain, only taking tylenol PRN. Dia draining well, with sediment. Denies fever, chills, flank pain and hematuria.     The following portions of the patient's history were reviewed and updated as appropriate: allergies, current medications, past family history, past medical history, past social history, past surgical history and problem list.    Review of Systems  A comprehensive multipoint review of systems was negative except as otherwise stated in the HPI.     Objective:   Vitals: BP (!) 160/71 (BP Location: Right arm, Patient Position: Sitting, BP Method: Medium (Automatic))   Pulse 78   Ht 5' 2" (1.575 m)   Wt 69 kg (152 lb 1.9 oz)   BMI 27.82 kg/m²     Physical Exam   General: alert and oriented, no acute distress  Respiratory: Symmetric expansion, non-labored breathing  Cardiovascular: no peripheral edema  Abdomen: soft, non distended; incisions CDI, staples removed and steri strips applied   Skin: normal coloration and turgor, no rashes, no suspicious skin lesions noted  Neuro: no gross deficits  Psych: normal judgment and insight, normal mood/affect and non-anxious  Dia to gravity with yellow urine, some sediment     Lab Review   Urinalysis demonstrates- sent for culture   Lab Results   Component Value Date    WBC 11.83 01/05/2018    HGB 10.9 (L) 01/05/2018    HCT 33.2 (L) 01/05/2018    MCV 84 01/05/2018     01/05/2018     Lab Results   Component Value Date    CREATININE 1.2 01/07/2018    BUN 18 01/07/2018       Imaging   None    Assessment and Plan:   Destinee was seen today for follow-up.    Diagnoses and all orders for this visit:    Ureteral mass  -     Urine culture  -     FL Cystogram Minimum 3 Views; Future    Plan:  --Urine culture today, " will notify if antibiotics need to be adjusted  --Continue cipro for now  --Follow up with Dr. Arellano in 2 weeks with cystogram prior to appointment

## 2018-01-25 ENCOUNTER — TELEPHONE (OUTPATIENT)
Dept: UROLOGY | Facility: CLINIC | Age: 83
End: 2018-01-25

## 2018-01-25 DIAGNOSIS — N30.00 ACUTE CYSTITIS WITHOUT HEMATURIA: Primary | ICD-10-CM

## 2018-01-25 LAB — BACTERIA UR CULT: NORMAL

## 2018-01-25 RX ORDER — FLUCONAZOLE 150 MG/1
150 TABLET ORAL DAILY
Qty: 16 TABLET | Refills: 0 | Status: SHIPPED | OUTPATIENT
Start: 2018-01-25 | End: 2018-02-10

## 2018-01-25 NOTE — TELEPHONE ENCOUNTER
"----- Message from Flavia Chavez sent at 1/25/2018  8:56 AM CST -----  Contact: Patient's daughter / Tawana / erwin# [phone]  X  1st Request  _  2nd Request  _  3rd Request    Who: Destinee Luo (mrn# [id number])    Why:  Patient's daughter called requesting a call.  Says, "she would like to have her mother's test results."  Please give a call back at your earliest convenience.       THANKS!    What Number to Call Back: [phone]    When to Expect a call back: (Before the end of the day)   -- if the call is after 12:00, the call back will be tomorrow.                          "

## 2018-02-02 ENCOUNTER — OFFICE VISIT (OUTPATIENT)
Dept: GYNECOLOGIC ONCOLOGY | Facility: CLINIC | Age: 83
End: 2018-02-02
Payer: MEDICARE

## 2018-02-02 ENCOUNTER — HOSPITAL ENCOUNTER (OUTPATIENT)
Dept: RADIOLOGY | Facility: OTHER | Age: 83
Discharge: HOME | End: 2018-02-02
Attending: NURSE PRACTITIONER
Payer: MEDICARE

## 2018-02-02 ENCOUNTER — OFFICE VISIT (OUTPATIENT)
Dept: UROLOGY | Facility: CLINIC | Age: 83
End: 2018-02-02
Attending: UROLOGY
Payer: MEDICARE

## 2018-02-02 VITALS
HEIGHT: 62 IN | WEIGHT: 152 LBS | BODY MASS INDEX: 27.97 KG/M2 | DIASTOLIC BLOOD PRESSURE: 69 MMHG | SYSTOLIC BLOOD PRESSURE: 146 MMHG | HEART RATE: 94 BPM

## 2018-02-02 VITALS
HEART RATE: 81 BPM | BODY MASS INDEX: 27.62 KG/M2 | DIASTOLIC BLOOD PRESSURE: 72 MMHG | WEIGHT: 151 LBS | SYSTOLIC BLOOD PRESSURE: 168 MMHG

## 2018-02-02 DIAGNOSIS — C54.1 ENDOMETRIAL CANCER: Primary | ICD-10-CM

## 2018-02-02 DIAGNOSIS — Z86.79 HISTORY OF AORTIC VALVE INSUFFICIENCY: ICD-10-CM

## 2018-02-02 DIAGNOSIS — Z86.73 HISTORY OF TRANSIENT ISCHEMIC ATTACK (TIA): ICD-10-CM

## 2018-02-02 DIAGNOSIS — N30.00 ACUTE CYSTITIS WITHOUT HEMATURIA: Primary | ICD-10-CM

## 2018-02-02 DIAGNOSIS — N18.30 STAGE 3 CHRONIC KIDNEY DISEASE: ICD-10-CM

## 2018-02-02 DIAGNOSIS — N28.89 URETERAL MASS: ICD-10-CM

## 2018-02-02 LAB — POC RESIDUAL URINE VOLUME: 23 ML (ref 0–100)

## 2018-02-02 PROCEDURE — 99214 OFFICE O/P EST MOD 30 MIN: CPT | Mod: 24,S$PBB,, | Performed by: OBSTETRICS & GYNECOLOGY

## 2018-02-02 PROCEDURE — 74430 CONTRAST X-RAY BLADDER: CPT | Mod: TC

## 2018-02-02 PROCEDURE — 25500020 PHARM REV CODE 255: Performed by: NURSE PRACTITIONER

## 2018-02-02 PROCEDURE — 1126F AMNT PAIN NOTED NONE PRSNT: CPT | Mod: ,,, | Performed by: OBSTETRICS & GYNECOLOGY

## 2018-02-02 PROCEDURE — 1159F MED LIST DOCD IN RCRD: CPT | Mod: ,,, | Performed by: OBSTETRICS & GYNECOLOGY

## 2018-02-02 PROCEDURE — 99213 OFFICE O/P EST LOW 20 MIN: CPT | Mod: PBBFAC,25 | Performed by: OBSTETRICS & GYNECOLOGY

## 2018-02-02 PROCEDURE — 99999 PR PBB SHADOW E&M-EST. PATIENT-LVL III: CPT | Mod: PBBFAC,,, | Performed by: OBSTETRICS & GYNECOLOGY

## 2018-02-02 PROCEDURE — 87086 URINE CULTURE/COLONY COUNT: CPT

## 2018-02-02 PROCEDURE — 51798 US URINE CAPACITY MEASURE: CPT | Mod: S$GLB,,, | Performed by: UROLOGY

## 2018-02-02 PROCEDURE — 99024 POSTOP FOLLOW-UP VISIT: CPT | Mod: S$GLB,POP,, | Performed by: UROLOGY

## 2018-02-02 PROCEDURE — 74430 CONTRAST X-RAY BLADDER: CPT | Mod: 26,,, | Performed by: RADIOLOGY

## 2018-02-02 RX ADMIN — IOTHALAMATE MEGLUMINE 175 ML: 172 INJECTION URETERAL at 09:02

## 2018-02-02 NOTE — PROGRESS NOTES
Subjective:      Patient ID: Destinee Luo is a 86 y.o. female.    Chief Complaint: Endometrial Cancer (F/u)    Treatment History  Stage IBG3 (clinical) endometrial cancer  RALH/BSO in combo with a right ureteral resection and ureteroneocystostomy by Dr. Arellano    HPI  Here today for post-op check.  Also seeing Dr. Arellano today.  She will need only surveillance for her  cancer.  Denies VB.  Reports normal return of bladder and bowel function.  Review of Systems   Constitutional: Negative for activity change, appetite change, chills, fatigue and fever.   HENT: Negative for hearing loss, mouth sores, nosebleeds, sore throat and tinnitus.    Eyes: Negative for visual disturbance.   Respiratory: Negative for cough, chest tightness, shortness of breath and wheezing.    Cardiovascular: Negative for chest pain and leg swelling.   Gastrointestinal: Negative for abdominal distention, abdominal pain, blood in stool, constipation, diarrhea, nausea and vomiting.   Genitourinary: Negative for dysuria, flank pain, frequency, hematuria, pelvic pain, vaginal bleeding, vaginal discharge and vaginal pain.   Musculoskeletal: Negative for arthralgias and back pain.   Skin: Negative for rash.   Neurological: Negative for dizziness, seizures, syncope, weakness and numbness.   Hematological: Does not bruise/bleed easily.   Psychiatric/Behavioral: Negative for confusion and sleep disturbance. The patient is not nervous/anxious.        Objective:   Physical Exam:   Constitutional: She appears well-developed and well-nourished. No distress.    HENT:   Head: Normocephalic and atraumatic.    Eyes: No scleral icterus.    Neck: Normal range of motion. Neck supple.    Cardiovascular: Normal rate and intact distal pulses.  Exam reveals no cyanosis and no edema.     Pulmonary/Chest: Effort normal. No respiratory distress. She exhibits no tenderness.        Abdominal: Soft. She exhibits no distension (healed incisions), no fluid wave, no ascites  and no mass. There is no tenderness. There is no rebound and no guarding. No hernia.     Genitourinary: Rectum normal and vagina normal. Pelvic exam was performed with patient supine. There is no rash, tenderness or lesion on the right labia. There is no rash, tenderness or lesion on the left labia. Uterus is absent. There is an absent adnexa. Right adnexum displays no mass, no tenderness and no fullness. Left adnexum displays no mass, no tenderness and no fullness. No bleeding (cuff healing well) or unspecified prolapse of vaginal walls in the vagina. No vaginal discharge found. Vaginal cuff normal.Labial bartholins normal.Cervix exhibits absence.              Lymphadenopathy:     She has no cervical adenopathy.        Right: No inguinal adenopathy present.        Left: No inguinal adenopathy present.     Skin: No cyanosis.        Assessment:     1. Endometrial cancer    2. Stage 3 chronic kidney disease    3. History of aortic valve insufficiency    4. History of transient ischemic attack (TIA)        Plan:       Discussed her uterine path with her and her family.  In order to decrease her local recurrence risk, I recommend vaginal brachytherapy to be done in the next 2-3 weeks so as to allow further time for her cuff to heal.  After this, will perform surveillance exams at 3 month intervals. They voiced understanding and all questions were answered.  RTC 3 months.  Referal placed.

## 2018-02-02 NOTE — PROGRESS NOTES
Post op    Cystogram   No leak  Stent is coiled in prox ureter    Dia removed  Voided well  PVR minimal  Ab benign  Wounds CDI  No cva tenderness; no signs of obstruction    On cipro and diflucan  Repeat urine cs sent    Path high grade Ta  Tumor at bladder end of resection (bladder wall at cuff was grossly uninvolved)     Sp distal ureterectomy and boari flap/reimplant\  Ureteral ca high grade Ta    Cysto and stent removal in 2 weeks  Discussed surveillance with cysto Q 3 months +/-BCG and upper tract surveillance

## 2018-02-04 LAB — BACTERIA UR CULT: NORMAL

## 2018-02-06 ENCOUNTER — PATIENT MESSAGE (OUTPATIENT)
Dept: UROLOGY | Facility: CLINIC | Age: 83
End: 2018-02-06

## 2018-02-12 DIAGNOSIS — C66.9 MALIGNANT NEOPLASM OF URETER, UNSPECIFIED LATERALITY: Primary | ICD-10-CM

## 2018-02-14 ENCOUNTER — TELEPHONE (OUTPATIENT)
Dept: RADIATION ONCOLOGY | Facility: CLINIC | Age: 83
End: 2018-02-14

## 2018-02-15 ENCOUNTER — TELEPHONE (OUTPATIENT)
Dept: UROLOGY | Facility: CLINIC | Age: 83
End: 2018-02-15

## 2018-02-15 DIAGNOSIS — R35.0 URINARY FREQUENCY: Primary | ICD-10-CM

## 2018-02-15 NOTE — TELEPHONE ENCOUNTER
----- Message from Jv Sierra sent at 2/15/2018  4:39 PM CST -----  Contact: Pt granddaughter  x_  1st Request  _  2nd Request  _  3rd Request        Who: Ara     Why: Requesting a call back in regards to discussing orders for urinalysis. Please return the call at earliest convenience. Thanks!    What Number to Call Back:[phone]    When to Expect a call back: (Within 24 hours)

## 2018-02-15 NOTE — TELEPHONE ENCOUNTER
Spoke with Ara. She stated that the pt is running a low grade fever and is having some urinary hesitancy. Ara is requesting an order for a urinary cultre to be placed so pts home health agency can collect. I did offer pt an appointment to see Judy TORRES but per Ara they live an hour away and prefer urinary testing. Please advise.( PER ARA SHE DOESN'T REALLY KNOW WHEN HOME HEALTH WILL BE BACK OUT)

## 2018-02-16 RX ORDER — CIPROFLOXACIN 250 MG/1
250 TABLET, FILM COATED ORAL 2 TIMES DAILY
Qty: 14 TABLET | Refills: 0 | Status: SHIPPED | OUTPATIENT
Start: 2018-02-16 | End: 2018-02-23

## 2018-02-16 NOTE — TELEPHONE ENCOUNTER
Spoke with catrachita. Rx for cipro sent to their preferred pharmacy. Verbal order for UA and culture given to [facility] in [place]. Instructed pt to start antibiotics only after providing the urine sample.

## 2018-02-20 ENCOUNTER — PATIENT MESSAGE (OUTPATIENT)
Dept: UROLOGY | Facility: CLINIC | Age: 83
End: 2018-02-20

## 2018-02-21 ENCOUNTER — TELEPHONE (OUTPATIENT)
Dept: UROLOGY | Facility: CLINIC | Age: 83
End: 2018-02-21

## 2018-02-21 DIAGNOSIS — N30.00 ACUTE CYSTITIS WITHOUT HEMATURIA: Primary | ICD-10-CM

## 2018-02-21 RX ORDER — DOXYCYCLINE 100 MG/1
100 CAPSULE ORAL EVERY 12 HOURS
Qty: 14 CAPSULE | Refills: 0 | Status: SHIPPED | OUTPATIENT
Start: 2018-02-21 | End: 2018-02-28

## 2018-02-21 NOTE — TELEPHONE ENCOUNTER
----- Message from Wu Fine sent at 2/21/2018  9:57 AM CST -----  Contact: April with [facility]  x_ 1st Request   _ 2nd Request   _ 3rd Request     Who: STEPHAN GREGG [[id number]]    Why: April is requesting a call back in reference to the lab results she faxed to the office.    What Number to Call Back: [phone]    When to Expect a call back: (Before the end of the day)   -- if call after 3:00 call back will be tomorrow.

## 2018-02-22 ENCOUNTER — TELEPHONE (OUTPATIENT)
Dept: RADIATION ONCOLOGY | Facility: CLINIC | Age: 83
End: 2018-02-22

## 2018-02-23 ENCOUNTER — HOSPITAL ENCOUNTER (OUTPATIENT)
Dept: RADIOLOGY | Facility: OTHER | Age: 83
Discharge: HOME | End: 2018-02-23
Attending: UROLOGY
Payer: MEDICARE

## 2018-02-23 ENCOUNTER — HOSPITAL ENCOUNTER (EMERGENCY)
Facility: OTHER | Age: 83
Discharge: HOME | End: 2018-02-23
Attending: EMERGENCY MEDICINE
Payer: MEDICARE

## 2018-02-23 ENCOUNTER — PROCEDURE VISIT (OUTPATIENT)
Dept: UROLOGY | Facility: CLINIC | Age: 83
End: 2018-02-23
Attending: UROLOGY
Payer: MEDICARE

## 2018-02-23 ENCOUNTER — TELEPHONE (OUTPATIENT)
Dept: UROLOGY | Facility: CLINIC | Age: 83
End: 2018-02-23

## 2018-02-23 VITALS
DIASTOLIC BLOOD PRESSURE: 81 MMHG | HEIGHT: 63 IN | SYSTOLIC BLOOD PRESSURE: 187 MMHG | WEIGHT: 155.63 LBS | HEART RATE: 98 BPM | BODY MASS INDEX: 27.57 KG/M2

## 2018-02-23 VITALS
WEIGHT: 154.75 LBS | HEIGHT: 63 IN | SYSTOLIC BLOOD PRESSURE: 141 MMHG | BODY MASS INDEX: 27.42 KG/M2 | OXYGEN SATURATION: 97 % | RESPIRATION RATE: 16 BRPM | HEART RATE: 78 BPM | DIASTOLIC BLOOD PRESSURE: 64 MMHG | TEMPERATURE: 98 F

## 2018-02-23 DIAGNOSIS — M79.89 RIGHT LEG SWELLING: Primary | ICD-10-CM

## 2018-02-23 DIAGNOSIS — I82.411 ACUTE DEEP VEIN THROMBOSIS (DVT) OF FEMORAL VEIN OF RIGHT LOWER EXTREMITY: Primary | ICD-10-CM

## 2018-02-23 DIAGNOSIS — C66.9 MALIGNANT NEOPLASM OF URETER, UNSPECIFIED LATERALITY: ICD-10-CM

## 2018-02-23 DIAGNOSIS — R82.90 ABNORMAL FINDING IN URINE: ICD-10-CM

## 2018-02-23 DIAGNOSIS — R60.0 LOCALIZED EDEMA: ICD-10-CM

## 2018-02-23 PROCEDURE — 99283 EMERGENCY DEPT VISIT LOW MDM: CPT

## 2018-02-23 PROCEDURE — 96372 THER/PROPH/DIAG INJ SC/IM: CPT | Mod: S$GLB,,, | Performed by: UROLOGY

## 2018-02-23 PROCEDURE — 87086 URINE CULTURE/COLONY COUNT: CPT

## 2018-02-23 PROCEDURE — 93970 EXTREMITY STUDY: CPT | Mod: 26,,, | Performed by: RADIOLOGY

## 2018-02-23 PROCEDURE — 25000003 PHARM REV CODE 250: Performed by: EMERGENCY MEDICINE

## 2018-02-23 PROCEDURE — 93970 EXTREMITY STUDY: CPT | Mod: TC

## 2018-02-23 RX ORDER — LIDOCAINE HYDROCHLORIDE 20 MG/ML
JELLY TOPICAL
Status: COMPLETED | OUTPATIENT
Start: 2018-02-23 | End: 2018-02-23

## 2018-02-23 RX ORDER — GENTAMICIN SULFATE 40 MG/ML
80 INJECTION, SOLUTION INTRAMUSCULAR; INTRAVENOUS
Status: COMPLETED | OUTPATIENT
Start: 2018-02-23 | End: 2018-02-23

## 2018-02-23 RX ADMIN — RIVAROXABAN 15 MG: 15 TABLET, FILM COATED ORAL at 02:02

## 2018-02-23 RX ADMIN — LIDOCAINE HYDROCHLORIDE 5 ML: 20 JELLY TOPICAL at 10:02

## 2018-02-23 RX ADMIN — GENTAMICIN SULFATE 80 MG: 40 INJECTION, SOLUTION INTRAMUSCULAR; INTRAVENOUS at 11:02

## 2018-02-23 NOTE — PROCEDURES
"Destinee Luo is a 86 y.o. female patient.    Pulse: 98 (02/23/18 0942)  BP: (!) 187/81 (02/23/18 0942)  Weight: 70.6 kg (155 lb 10.3 oz) (02/23/18 0942)  Height: 5' 3" (160 cm) (02/23/18 0942)       Cystoscopy  Date/Time: 2/23/2018 1:13 PM  Performed by: ALEJANDRA ALLISON.  Authorized by: ALEJANDRA ALLISON     Consent Done?:  Yes (Written)  Time out: Immediately prior to procedure a "time out" was called to verify the correct patient, procedure, equipment, support staff and site/side marked as required.    Position:  Dorsal lithotomy  Anesthesia:  Lidocaine jelly  Patient sedated?: No    Preparation: Patient was prepped and draped in usual sterile fashion      Scope type:  Flexible cystoscope  Stent removed: Yes    Stent encrusted: No      Patient tolerance:  Patient tolerated the procedure well with no immediate complications     Urine cs noted  Pt on doxy  Gent 80mg given  Stent sent for cs    Pt complains of right foot swelling; no CP; no SOB  Stat us shows DVT; pt sent to ED    Ureteral ca sp distal ureterectomy and boari flap  Uterine ca sp hysterectomy  UTI  DVT    Cont doxy  Gent 80mg IM given  Stent sent for cs  Pt sent to ED for DVT; if admission is required, we will ask hosp med to admit pt  OK for anticoagulation from  standpoint          Alejandra Allison  2/23/2018  "

## 2018-02-23 NOTE — ED PROVIDER NOTES
Encounter Date: 2/23/2018    SCRIBE #1 NOTE: I, Clemencia Matson, am scribing for, and in the presence of, Dr. Cornejo.       History     Chief Complaint   Patient presents with    Deep Vein Thrombosis     right leg found at clinic     Time seen by provider: 1:32 PM    This is a 86 y.o. female, with history of HTN and stroke, who presents with complaint of right leg swelling for one week. She has no other complaints, and denies fever, chills, SOB, chest pain, palpitations, nausea, vomiting, light headedness, dizziness, headache, numbness, or extremity weakness. She was taken off Plavix four months ago and did not start on it again after surgery last month.      The history is provided by the patient and a relative.     Review of patient's allergies indicates:   Allergen Reactions    Sulfa (sulfonamide antibiotics)     Codeine Anxiety     Past Medical History:   Diagnosis Date    Anticoagulant long-term use     stopped Plavix and ASA 11/2017    High cholesterol     Hypertension     Macular degeneration     S/P complete hysterectomy 01/04/2018    Stroke 2012    TIA, no deficits     Past Surgical History:   Procedure Laterality Date    HYSTERECTOMY      NO PAST SURGERIES      URETERAL REIMPLANTION      URETERAL STENT PLACEMENT      URETEROSCOPY       No family history on file.  Social History   Substance Use Topics    Smoking status: Never Smoker    Smokeless tobacco: Never Used    Alcohol use No     Review of Systems   Constitutional: Negative for chills, diaphoresis and fever.   HENT: Negative for congestion and sore throat.    Respiratory: Negative for cough and shortness of breath.    Cardiovascular: Positive for leg swelling. Negative for chest pain and palpitations.   Gastrointestinal: Negative for abdominal pain, nausea and vomiting.   Genitourinary: Negative for dysuria.   Musculoskeletal: Negative for back pain and myalgias.   Skin: Negative for rash.   Neurological: Negative for dizziness,  weakness, light-headedness, numbness and headaches.   Hematological: Does not bruise/bleed easily.       Physical Exam     Initial Vitals [02/23/18 1310]   BP Pulse Resp Temp SpO2   (!) 183/77 93 16 97.6 °F (36.4 °C) 99 %      MAP       112.33         Physical Exam    Nursing note and vitals reviewed.  Constitutional: She appears well-developed and well-nourished. She is not diaphoretic. No distress.   HENT:   Head: Normocephalic and atraumatic.   Right Ear: External ear normal.   Left Ear: External ear normal.   Eyes: Conjunctivae and EOM are normal. Pupils are equal, round, and reactive to light. No scleral icterus.   Neck: Normal range of motion. Neck supple.   Cardiovascular: Normal rate, regular rhythm and normal heart sounds. Exam reveals no gallop and no friction rub.    No murmur heard.  Pulmonary/Chest: Breath sounds normal. No respiratory distress. She has no wheezes. She has no rhonchi. She has no rales.   Musculoskeletal: Normal range of motion. She exhibits edema (right leg). She exhibits no tenderness.   Neurological: She is alert and oriented to person, place, and time.   Skin: Skin is warm and dry. No rash noted. No pallor.   Psychiatric: She has a normal mood and affect. Her behavior is normal. Judgment and thought content normal.         ED Course   Procedures  Labs Reviewed - No data to display          Medical Decision Making:   ED Management:  86-year-old female presents with a DVT from outpatient ultrasound.  Had robotic abdominal surgery approximately 8 weeks prior to.  Patient's vital signs otherwise normal.  She has no signs or symptoms to suggest pulmonary embolus.  I feel she is stable for discharge the patient as well as her granddaughter would prefer this.  We'll give her first dose of Xarelto here and discharge her with a prescription.  She has a follow-up appointment with her primary care physician who can further manage the treatment.  I did give her return precautions and signs and  symptoms of possible pulmonary embolus.            Scribe Attestation:   Scribe #1: I performed the above scribed service and the documentation accurately describes the services I performed. I attest to the accuracy of the note.    Attending Attestation:           Physician Attestation for Scribe:  Physician Attestation Statement for Scribe #1: I, Dr. Cornejo, reviewed documentation, as scribed by Clemencia Matson in my presence, and it is both accurate and complete.                    Clinical Impression:     1. Acute deep vein thrombosis (DVT) of femoral vein of right lower extremity                               Vinny Cornejo, DO  02/23/18 1426

## 2018-02-23 NOTE — TELEPHONE ENCOUNTER
----- Message from Brittani Andre sent at 2/23/2018 12:36 PM CST -----  Contact: Dr Young   x_  1st Request  _  2nd Request  _  3rd Request      Who:Dr Young     Why: would like to speak to Dr mccabe in regards to pt please advise requesting call back today    What Number to Call Back: [phone]    When to Expect a call back: (Before the end of the day)   -- if call after 3:00 call back will be tomorrow.

## 2018-02-24 LAB — BACTERIA UR CULT: NO GROWTH

## 2018-02-26 ENCOUNTER — PATIENT MESSAGE (OUTPATIENT)
Dept: UROLOGY | Facility: CLINIC | Age: 83
End: 2018-02-26

## 2018-02-26 RX ORDER — NITROFURANTOIN MACROCRYSTALS 50 MG/1
50 CAPSULE ORAL NIGHTLY
Qty: 30 CAPSULE | Refills: 3 | Status: SHIPPED | OUTPATIENT
Start: 2018-02-26 | End: 2018-03-28

## 2018-02-26 RX ORDER — LIDOCAINE HYDROCHLORIDE 20 MG/ML
JELLY TOPICAL
Status: COMPLETED | OUTPATIENT
Start: 2018-02-26 | End: 2018-02-26

## 2018-02-26 RX ADMIN — LIDOCAINE HYDROCHLORIDE 5 ML: 20 JELLY TOPICAL at 12:02

## 2018-02-28 ENCOUNTER — LAB VISIT (OUTPATIENT)
Dept: LAB | Facility: HOSPITAL | Age: 83
End: 2018-02-28
Attending: ORTHOPAEDIC SURGERY
Payer: MEDICARE

## 2018-02-28 ENCOUNTER — OFFICE VISIT (OUTPATIENT)
Dept: FAMILY MEDICINE | Facility: CLINIC | Age: 83
End: 2018-02-28
Payer: MEDICARE

## 2018-02-28 ENCOUNTER — PATIENT MESSAGE (OUTPATIENT)
Dept: UROLOGY | Facility: CLINIC | Age: 83
End: 2018-02-28

## 2018-02-28 ENCOUNTER — TELEPHONE (OUTPATIENT)
Dept: FAMILY MEDICINE | Facility: CLINIC | Age: 83
End: 2018-02-28

## 2018-02-28 ENCOUNTER — TELEPHONE (OUTPATIENT)
Dept: UROLOGY | Facility: CLINIC | Age: 83
End: 2018-02-28

## 2018-02-28 VITALS
HEART RATE: 88 BPM | HEIGHT: 63 IN | SYSTOLIC BLOOD PRESSURE: 168 MMHG | WEIGHT: 155.19 LBS | BODY MASS INDEX: 27.5 KG/M2 | DIASTOLIC BLOOD PRESSURE: 70 MMHG

## 2018-02-28 DIAGNOSIS — I82.411 DEEP VEIN THROMBOSIS (DVT) OF FEMORAL VEIN OF RIGHT LOWER EXTREMITY, UNSPECIFIED CHRONICITY: ICD-10-CM

## 2018-02-28 DIAGNOSIS — R31.9 HEMATURIA, UNSPECIFIED TYPE: ICD-10-CM

## 2018-02-28 DIAGNOSIS — R31.9 HEMATURIA, UNSPECIFIED TYPE: Primary | ICD-10-CM

## 2018-02-28 LAB
BASOPHILS # BLD AUTO: 0.03 K/UL
BASOPHILS NFR BLD: 0.3 %
DIFFERENTIAL METHOD: ABNORMAL
EOSINOPHIL # BLD AUTO: 0.2 K/UL
EOSINOPHIL NFR BLD: 2.2 %
ERYTHROCYTE [DISTWIDTH] IN BLOOD BY AUTOMATED COUNT: 14.7 %
HCT VFR BLD AUTO: 30.3 %
HGB BLD-MCNC: 9.7 G/DL
IMM GRANULOCYTES # BLD AUTO: 0.03 K/UL
IMM GRANULOCYTES NFR BLD AUTO: 0.3 %
LYMPHOCYTES # BLD AUTO: 1.6 K/UL
LYMPHOCYTES NFR BLD: 17.1 %
MCH RBC QN AUTO: 26.8 PG
MCHC RBC AUTO-ENTMCNC: 32 G/DL
MCV RBC AUTO: 84 FL
MONOCYTES # BLD AUTO: 1.3 K/UL
MONOCYTES NFR BLD: 13.9 %
NEUTROPHILS # BLD AUTO: 6.3 K/UL
NEUTROPHILS NFR BLD: 66.2 %
NRBC BLD-RTO: 0 /100 WBC
PLATELET # BLD AUTO: 404 K/UL
PMV BLD AUTO: 10.5 FL
RBC # BLD AUTO: 3.62 M/UL
WBC # BLD AUTO: 9.55 K/UL

## 2018-02-28 PROCEDURE — 99214 OFFICE O/P EST MOD 30 MIN: CPT | Mod: S$PBB,,, | Performed by: PHYSICIAN ASSISTANT

## 2018-02-28 PROCEDURE — 99999 PR PBB SHADOW E&M-EST. PATIENT-LVL III: CPT | Mod: PBBFAC,,, | Performed by: PHYSICIAN ASSISTANT

## 2018-02-28 PROCEDURE — 85025 COMPLETE CBC W/AUTO DIFF WBC: CPT

## 2018-02-28 PROCEDURE — 99213 OFFICE O/P EST LOW 20 MIN: CPT | Mod: PBBFAC,PO | Performed by: PHYSICIAN ASSISTANT

## 2018-02-28 PROCEDURE — 36415 COLL VENOUS BLD VENIPUNCTURE: CPT | Mod: PO

## 2018-02-28 RX ORDER — PHENAZOPYRIDINE HYDROCHLORIDE 100 MG/1
200 TABLET, FILM COATED ORAL 3 TIMES DAILY PRN
Refills: 0 | COMMUNITY
Start: 2018-02-22 | End: 2018-03-21

## 2018-02-28 NOTE — TELEPHONE ENCOUNTER
----- Message from Allegra Adams sent at 2/28/2018  8:24 AM CST -----  _  1st Request  _  2nd Request  _  3rd Request        Who: pt granddaughter catrachita fuentes    Why: Requesting a call back in regards to her grandmother, she is urinating a lot of blood this morning, they are not sure if the new medication is causing this or should they bring her to the ED   Please return the call at earliest convenience. Thanks!    What Number to Call Back:[phone]      When to Expect a call back: (Within 24 hours)

## 2018-02-28 NOTE — PROGRESS NOTES
Subjective:       Patient ID: Destinee Luo is a 86 y.o. female    Chief Complaint: ER follow up (here for ER follow up from Friday for fluid on legs. She has a blood clot. Taking Xarelto.Blood in urine last night at 8:30PM.Not too much this AM.)    HPI  Mrs Luo presents today CO persistent hematuria and for follow up after ER visit at [facility] on 2/23/2018 for a DVT in her right leg.   She states that today her urine is pink but there were thick blood clots and red urine yesterday.  She is under the care of Dr Mann and Dr Arellano for her endometrial and urethral cancer.  On 11/15/17 she had a robotic hysterectomy and she recently had a urethral stent removed 6 days ago with Dr Arellano.  She feels that her right leg has only improved slightly since beginning Zaralto on 2/23.  She is set up for follow up with vascular surgery Dr Flores on 3/8    Review of Systems   Constitutional: Positive for activity change and unexpected weight change.   HENT: Negative for hearing loss, rhinorrhea and trouble swallowing.    Eyes: Negative for discharge and visual disturbance.   Respiratory: Negative for chest tightness and wheezing.    Cardiovascular: Negative for chest pain and palpitations.   Gastrointestinal: Positive for constipation. Negative for blood in stool, diarrhea and vomiting.   Endocrine: Positive for polyuria. Negative for polydipsia.   Genitourinary: Positive for hematuria. Negative for difficulty urinating, dysuria and menstrual problem.   Musculoskeletal: Negative for arthralgias, joint swelling and neck pain.   Neurological: Positive for weakness. Negative for headaches.   Psychiatric/Behavioral: Positive for confusion and dysphoric mood.        Objective:   Physical Exam   Constitutional: She is oriented to person, place, and time. She appears well-developed and well-nourished. No distress.   HENT:   Head: Normocephalic and atraumatic.   Eyes: EOM are normal. Pupils are equal, round, and reactive to  light.   Neck: Normal range of motion. Neck supple.   Cardiovascular: Normal rate, regular rhythm, normal heart sounds and intact distal pulses.  Exam reveals no gallop and no friction rub.    No murmur heard.  Pulmonary/Chest: Effort normal and breath sounds normal. No respiratory distress. She has no wheezes. She has no rales. She exhibits no tenderness.   Abdominal: Soft. She exhibits no distension.   Musculoskeletal: Normal range of motion. She exhibits edema (right lower leg shows pitting edema, erythema and ttp).   Neurological: She is alert and oriented to person, place, and time.   Skin: Skin is warm and dry. No rash noted. She is not diaphoretic.   Psychiatric: She has a normal mood and affect. Her behavior is normal. Judgment and thought content normal.        Assessment:       1. Hematuria, unspecified type  CBC auto differential    SUBSEQUENT HOME HEALTH ORDERS   2. Deep vein thrombosis (DVT) of femoral vein of right lower extremity, unspecified chronicity          Plan:       Hematuria, unspecified type  -     CBC auto differential; Future  -     SUBSEQUENT HOME HEALTH ORDERS  -  Message sent to Dr Arellano for follow up for hematuria    Deep vein thrombosis (DVT) of femoral vein of right lower extremity, unspecified chronicity  - continue with current treatment and follow up with vascular surgery

## 2018-02-28 NOTE — TELEPHONE ENCOUNTER
Dr Arellano,     I saw your patient, Mrs Angelica Luo, today for her hematuria.  She had a urethral stent removed 6 days ago and she was treated 5 days ago at [facility] for a DVT of her right leg with Xaralto.  Her right leg remains swollen without much improvement and she needs to stay on the Xaralto.  I will check a CBC and a urinalysis today but she will need follow up with you to stop her bleeding if it persists.      Susi GOYAL  Former[facility]

## 2018-03-01 ENCOUNTER — TELEPHONE (OUTPATIENT)
Dept: UROLOGY | Facility: CLINIC | Age: 83
End: 2018-03-01

## 2018-03-01 DIAGNOSIS — R31.0 GROSS HEMATURIA: Primary | ICD-10-CM

## 2018-03-01 NOTE — TELEPHONE ENCOUNTER
----- Message from Judy Burroughs NP sent at 3/1/2018 12:51 PM CST -----  Please schedule Ms. Luo for an appt with Dr. Arellano on Tuesday. Orders placed. Thanks!    ----- Message -----  From: Kiet Arellano MD  Sent: 3/1/2018  12:35 PM  To: Judy Burroughs NP    No need to come in now  Lets get another CBC on Monday morning and see me Tuesday  Thanks  Sc    ----- Message -----  From: Judy Burroughs NP  Sent: 3/1/2018  11:29 AM  To: Kiet Arellano MD    Spoke with the pt's daughter. Urine is becoming less bloody. H&H appears pretty stable. She is not symptomatic. Would you still like for her to come in?    ----- Message -----  From: Kiet Arellano MD  Sent: 3/1/2018  10:42 AM  To: Judy Burroughs NP, [facility] Santa Staff    Please have pt come see us today or tomorrow in the office  She can see me or Judy    I want to evaluate her hematuria    Please let Susi Jolley NP, know that we are seeing her      Thanks  sc

## 2018-03-01 NOTE — TELEPHONE ENCOUNTER
I spoke to granddaughter and pt will have CBC on Monday 3/5/18.  I double-booked her into a slot to see you on Tuesday 3/6/18.  She is see a cardiovascular surgeon on 3/8/18 also.

## 2018-03-01 NOTE — TELEPHONE ENCOUNTER
----- Message from Kiet Arellano MD sent at 3/1/2018 10:42 AM CST -----  Please have pt come see us today or tomorrow in the office  She can see me or Judy    I want to evaluate her hematuria    Please let Susi Jolley NP, know that we are seeing her      Thanks  sc

## 2018-03-05 ENCOUNTER — LAB VISIT (OUTPATIENT)
Dept: LAB | Facility: HOSPITAL | Age: 83
End: 2018-03-05
Attending: UROLOGY
Payer: MEDICARE

## 2018-03-05 DIAGNOSIS — N28.89 URETERAL MASS: ICD-10-CM

## 2018-03-05 LAB
ALBUMIN SERPL BCP-MCNC: 3.2 G/DL
ALP SERPL-CCNC: 65 U/L
ALT SERPL W/O P-5'-P-CCNC: 13 U/L
ANION GAP SERPL CALC-SCNC: 10 MMOL/L
AST SERPL-CCNC: 18 U/L
BASOPHILS # BLD AUTO: 0.04 K/UL
BASOPHILS NFR BLD: 0.6 %
BILIRUB SERPL-MCNC: 0.2 MG/DL
BUN SERPL-MCNC: 22 MG/DL
CALCIUM SERPL-MCNC: 9.8 MG/DL
CHLORIDE SERPL-SCNC: 103 MMOL/L
CO2 SERPL-SCNC: 26 MMOL/L
CREAT SERPL-MCNC: 1.4 MG/DL
DIFFERENTIAL METHOD: ABNORMAL
EOSINOPHIL # BLD AUTO: 0.1 K/UL
EOSINOPHIL NFR BLD: 2 %
ERYTHROCYTE [DISTWIDTH] IN BLOOD BY AUTOMATED COUNT: 14.6 %
EST. GFR  (AFRICAN AMERICAN): 39.2 ML/MIN/1.73 M^2
EST. GFR  (NON AFRICAN AMERICAN): 34 ML/MIN/1.73 M^2
GLUCOSE SERPL-MCNC: 87 MG/DL
HCT VFR BLD AUTO: 30.6 %
HGB BLD-MCNC: 9.5 G/DL
IMM GRANULOCYTES # BLD AUTO: 0.03 K/UL
IMM GRANULOCYTES NFR BLD AUTO: 0.4 %
LYMPHOCYTES # BLD AUTO: 1.4 K/UL
LYMPHOCYTES NFR BLD: 19.2 %
MCH RBC QN AUTO: 26 PG
MCHC RBC AUTO-ENTMCNC: 31 G/DL
MCV RBC AUTO: 84 FL
MONOCYTES # BLD AUTO: 0.6 K/UL
MONOCYTES NFR BLD: 8.6 %
NEUTROPHILS # BLD AUTO: 4.9 K/UL
NEUTROPHILS NFR BLD: 69.2 %
NRBC BLD-RTO: 0 /100 WBC
PLATELET # BLD AUTO: 527 K/UL
PMV BLD AUTO: 10.6 FL
POTASSIUM SERPL-SCNC: 4.3 MMOL/L
PROT SERPL-MCNC: 7.4 G/DL
RBC # BLD AUTO: 3.65 M/UL
SODIUM SERPL-SCNC: 139 MMOL/L
WBC # BLD AUTO: 7.1 K/UL

## 2018-03-05 PROCEDURE — 85025 COMPLETE CBC W/AUTO DIFF WBC: CPT

## 2018-03-05 PROCEDURE — 80053 COMPREHEN METABOLIC PANEL: CPT

## 2018-03-05 PROCEDURE — 36415 COLL VENOUS BLD VENIPUNCTURE: CPT | Mod: PO

## 2018-03-06 ENCOUNTER — TELEPHONE (OUTPATIENT)
Dept: UROLOGY | Facility: CLINIC | Age: 83
End: 2018-03-06

## 2018-03-06 ENCOUNTER — OFFICE VISIT (OUTPATIENT)
Dept: UROLOGY | Facility: CLINIC | Age: 83
End: 2018-03-06
Attending: UROLOGY
Payer: MEDICARE

## 2018-03-06 VITALS
WEIGHT: 155.19 LBS | BODY MASS INDEX: 27.5 KG/M2 | SYSTOLIC BLOOD PRESSURE: 154 MMHG | DIASTOLIC BLOOD PRESSURE: 76 MMHG | HEIGHT: 63 IN | HEART RATE: 91 BPM

## 2018-03-06 DIAGNOSIS — C66.9 MALIGNANT NEOPLASM OF URETER, UNSPECIFIED LATERALITY: Primary | ICD-10-CM

## 2018-03-06 PROCEDURE — 99024 POSTOP FOLLOW-UP VISIT: CPT | Mod: S$GLB,POP,, | Performed by: UROLOGY

## 2018-03-06 NOTE — PROGRESS NOTES
"Subjective:      Destinee Luo is a 86 y.o. female who returns today regarding her     Ultrasound performed 2 weeks ago showed a right lower extremity DVT.  She started on xeralto.  He had some hematuria which is now cleared.  She does have some urinary incontinence.  She is not having flank pain.  Her stent has been removed.  She has no symptoms of urinary tract obstruction in her creatinine is stable    She has seen her primary physician to manage the DVT and she is scheduled to see a vascular surgeon, Dr. Van, also to manage this  (It appears that this may have been present before surgery; pt and family recently told me that the right leg swelling was present preop)    She is scheduled to have brachii therapy of her vaginal cuff with Dr. Carlos Mann    The following portions of the patient's history were reviewed and updated as appropriate: allergies, current medications, past family history, past medical history, past social history, past surgical history and problem list.    Review of Systems  Pertinent items are noted in HPI.  A comprehensive multipoint review of systems was negative except as otherwise stated in the HPI.     Objective:   Vitals: BP (!) 154/76 (BP Location: Left arm, Patient Position: Sitting, BP Method: Large (Automatic))   Pulse 91   Ht 5' 3" (1.6 m)   Wt 70.4 kg (155 lb 3.3 oz)   BMI 27.49 kg/m²     Physical Exam   General: alert and oriented, no acute distress  Respiratory: Symmetric expansion, non-labored breathing  Cardiovascular: no peripheral edema  Abdomen: soft, non distended  Skin: normal coloration and turgor, no rashes, no suspicious skin lesions noted  Neuro: no gross deficits  Psych: normal judgment and insight, normal mood/affect and non-anxious    Physical Exam    Lab Review   Urinalysis demonstrates   Urine culture sent  Lab Results   Component Value Date    WBC 7.10 03/05/2018    HGB 9.5 (L) 03/05/2018    HCT 30.6 (L) 03/05/2018    MCV 84 03/05/2018     (H) " 03/05/2018     Lab Results   Component Value Date    CREATININE 1.4 03/05/2018    BUN 22 03/05/2018     SPECIMEN  1) Uterus, cervix, bilateral tubes and ovaries  2) Right pelvic lymph nodes and stent  3) Right ureter  FINAL PATHOLOGIC DIAGNOSIS  1. UTERUS: HIGH-GRADE ENDOMETRIOID CARCINOMA OF ENDOMETRIUM WITH >50% INVASIVON O F  MYOMETRIUM; SECTIONS OF CERVIX, SEROSA, AND RESECTION MARGINS FREE OF MALIGNANCY;  RIGHT AND LEFT FALLOPIAN TUBES AND OVARIES: NO EVIDENCE OF MALIGNANCY.  Note: Neuroendocrine markers (NSE, chromogranin, synaptophysin) and p53 are not immunologically reactive.  Positive and negative controls reacted appropriately. Most of the tumor is solid and undifferentiated; however, there  is a small component of low-grade tumor.  2. LYMPH NODES (3): NO EVIDENCE OF MALIGNANCY; URETERAL STENT (GROSS DIAGNOSIS).  3. URETER: HIGH-GRADE NONINVASIVE PAPILLARY UROTHELIAL CARCINOMA.  Note: A small nest of tumor is present at the shaved distal end.  Representative sections from each tumor were reviewed by a second pathologist who concurs with the diagnoses.      There was tumor extending to the UO at the time of surgery but the margin of the bladder cuff was grossly neg    Imaging  -  Assessment and Plan:   Malignant neoplasm of ureter, unspecified laterality  High grade [id number] sp right robotic distal ureterectomy and boari flap and lymphadenectomy    We discussed the risks and benefits of BCG  Pt and family prefer observation rather than BCG    Will transfer urologic care back to Dr Doiran Larson  I recommend cysto and cystology every 3 months for 2 years and serial upper tract imaging    Follow up with PCP and vascular surgery to manage DVT  I told pt and family to anticipate some hematuria while on xeralto    Follow up with Dr Carlos Mann for endometrial ca

## 2018-03-07 ENCOUNTER — TELEPHONE (OUTPATIENT)
Dept: VASCULAR SURGERY | Facility: CLINIC | Age: 83
End: 2018-03-07

## 2018-03-07 NOTE — TELEPHONE ENCOUNTER
Appt rescheduled to 3/14 in [place]. Refill of Xarelto 15 mg x 1 called to [facility] so as not to run out before evaulated by Dr. Flores.

## 2018-03-07 NOTE — TELEPHONE ENCOUNTER
----- Message from Prema Fine sent at 3/7/2018  9:03 AM CST -----  Please call granddaughter Ara Leos at [phone] / asking for Vale

## 2018-03-14 ENCOUNTER — OFFICE VISIT (OUTPATIENT)
Dept: VASCULAR SURGERY | Facility: CLINIC | Age: 83
End: 2018-03-14
Payer: MEDICARE

## 2018-03-14 VITALS
WEIGHT: 150 LBS | HEIGHT: 63 IN | SYSTOLIC BLOOD PRESSURE: 167 MMHG | DIASTOLIC BLOOD PRESSURE: 71 MMHG | HEART RATE: 81 BPM | BODY MASS INDEX: 26.58 KG/M2

## 2018-03-14 DIAGNOSIS — I82.412 ACUTE DEEP VEIN THROMBOSIS (DVT) OF FEMORAL VEIN OF LEFT LOWER EXTREMITY: Primary | ICD-10-CM

## 2018-03-14 PROCEDURE — 99213 OFFICE O/P EST LOW 20 MIN: CPT | Mod: PBBFAC,PO | Performed by: THORACIC SURGERY (CARDIOTHORACIC VASCULAR SURGERY)

## 2018-03-14 PROCEDURE — 99999 PR PBB SHADOW E&M-EST. PATIENT-LVL III: CPT | Mod: PBBFAC,,, | Performed by: THORACIC SURGERY (CARDIOTHORACIC VASCULAR SURGERY)

## 2018-03-14 PROCEDURE — 99203 OFFICE O/P NEW LOW 30 MIN: CPT | Mod: S$PBB,,, | Performed by: THORACIC SURGERY (CARDIOTHORACIC VASCULAR SURGERY)

## 2018-03-14 NOTE — PROGRESS NOTES
OFFICE NOTE    This is an 86-year-old lady who developed deep venous thrombosis in the right   lower extremity.  She is being treated with Xarelto.  She is status post robotic   laparoscopic hysterectomy and partial nephrectomy for cancer.  Apparently, this   has gone quite well.  She is recovering satisfactorily from this, but developed   swelling in the leg and was found to have deep venous thrombosis.  She was   placed on Xarelto.  She came today for a second opinion regarding treatment for   the deep venous thrombosis.  Medicines are noted.  They are part of the recent   EPIC record.  Her problem list is reviewed.    PHYSICAL EXAMINATION:  GENERAL:  She is quite healthy, elderly lady in no distress.  HEENT:  Pupils equal, round and reactive to light.  Nose and throat are clear.  NECK:  Supple.  CHEST:  Clear to auscultation.  HEART:  Regular rate and rhythm.  ABDOMEN:  Benign.  EXTREMITIES:  Femoral and pedal pulses are equal and palpable.  She has edema of   the right lower extremity, which is moderate.  She has no visible varicosities   and no signs of significant phlegmasia or any other progressive venous disease.    PLAN:  I had a discussion with the patient about treatment at this point.  I   agree with the present management to conservative treatment for deep venous   thrombosis.  I recommended that she wear daily stocking to reduce the swelling   in the extremity.  She should continue her physical therapy as ordered for   postoperative treatment after hysterectomy.  She can see us on an as needed   basis.  She probably ought to get a venous duplex of the right lower extremity   in two to three months.      JO-ANN  dd: 03/14/2018 15:50:24 (CDT)  td: 03/15/2018 09:03:01 (CDT)  Doc ID   #[id number]  Job ID #[id number]    CC:

## 2018-03-21 ENCOUNTER — OFFICE VISIT (OUTPATIENT)
Dept: FAMILY MEDICINE | Facility: CLINIC | Age: 83
End: 2018-03-21
Payer: MEDICARE

## 2018-03-21 VITALS
WEIGHT: 148.81 LBS | HEIGHT: 63 IN | SYSTOLIC BLOOD PRESSURE: 130 MMHG | RESPIRATION RATE: 17 BRPM | HEART RATE: 82 BPM | BODY MASS INDEX: 26.37 KG/M2 | OXYGEN SATURATION: 98 % | DIASTOLIC BLOOD PRESSURE: 72 MMHG

## 2018-03-21 DIAGNOSIS — I82.491 ACUTE DEEP VEIN THROMBOSIS (DVT) OF OTHER SPECIFIED VEIN OF RIGHT LOWER EXTREMITY: ICD-10-CM

## 2018-03-21 DIAGNOSIS — I10 ESSENTIAL HYPERTENSION: Primary | ICD-10-CM

## 2018-03-21 DIAGNOSIS — C54.1 ENDOMETRIAL CANCER: ICD-10-CM

## 2018-03-21 DIAGNOSIS — E78.5 DYSLIPIDEMIA: ICD-10-CM

## 2018-03-21 PROBLEM — I82.401 ACUTE DEEP VEIN THROMBOSIS (DVT) OF RIGHT LOWER EXTREMITY: Status: ACTIVE | Noted: 2018-03-21

## 2018-03-21 PROCEDURE — 99999 PR PBB SHADOW E&M-EST. PATIENT-LVL III: CPT | Mod: PBBFAC,,, | Performed by: FAMILY MEDICINE

## 2018-03-21 PROCEDURE — 99213 OFFICE O/P EST LOW 20 MIN: CPT | Mod: PBBFAC,PO | Performed by: FAMILY MEDICINE

## 2018-03-21 PROCEDURE — 99214 OFFICE O/P EST MOD 30 MIN: CPT | Mod: S$PBB,,, | Performed by: FAMILY MEDICINE

## 2018-03-21 NOTE — PROGRESS NOTES
"Subjective:       Patient ID: Destinee Luo is a 86 y.o. female.    Chief Complaint: Establish Care    This pt is new to me.  Pt is here for followup of chronic medical issues.  Is s/p recent surgery for a tumor of her ureter--she also had hysterectomy at same surgery for endometrial cancer. Her GYN has recommended radiation.  She has upcoming appt.  She had catheter and stents removed in the last few weeks.  She was then found to have a DVT--is on Xarelto.  She says she is doing well now.  She is treated for HTN, hyperlipidemia.  She has had a TIA.      Review of Systems   Constitutional: Negative for activity change and unexpected weight change.   HENT: Negative for hearing loss, rhinorrhea and trouble swallowing.    Eyes: Negative for discharge and visual disturbance.   Respiratory: Negative for chest tightness and wheezing.    Cardiovascular: Negative for chest pain and palpitations.   Gastrointestinal: Negative for blood in stool, constipation, diarrhea and vomiting.   Endocrine: Positive for polyuria (since surgery--urologist is aware). Negative for polydipsia.   Genitourinary: Negative for difficulty urinating, dysuria, hematuria and menstrual problem.   Musculoskeletal: Negative for arthralgias, joint swelling and neck pain.   Neurological: Negative for weakness and headaches.   Psychiatric/Behavioral: Negative for confusion and dysphoric mood.       Objective:       Vitals:    03/21/18 1111   BP: 130/72   BP Location: Left arm   Patient Position: Sitting   BP Method: Medium (Manual)   Pulse: 82   Resp: 17   SpO2: 98%   Weight: 67.5 kg (148 lb 13 oz)   Height: 5' 3" (1.6 m)     Physical Exam    Assessment:       1. Essential hypertension    2. Dyslipidemia    3. Endometrial cancer    4. Acute deep vein thrombosis (DVT) of other specified vein of right lower extremity        Plan:       Destinee was seen today for establish care.    Diagnoses and all orders for this visit:    Essential " hypertension    Dyslipidemia    Endometrial cancer    Acute deep vein thrombosis (DVT) of other specified vein of right lower extremity    - Continue current therapy  - Serial blood pressure monitoring  - Diet and exercise education.  Continue f/u with specialists as scheduled    During this visit, I reviewed the pt's history, medications, allergies, and problem list.  Will continue Xarelto 3-6 months

## 2018-03-25 ENCOUNTER — PATIENT MESSAGE (OUTPATIENT)
Dept: FAMILY MEDICINE | Facility: CLINIC | Age: 83
End: 2018-03-25

## 2018-03-25 DIAGNOSIS — I82.409 ACUTE DEEP VEIN THROMBOSIS (DVT) OF OTHER VEIN OF LOWER EXTREMITY, UNSPECIFIED LATERALITY: Primary | ICD-10-CM

## 2018-03-28 ENCOUNTER — PATIENT MESSAGE (OUTPATIENT)
Dept: FAMILY MEDICINE | Facility: CLINIC | Age: 83
End: 2018-03-28

## 2018-03-29 DIAGNOSIS — I82.409 ACUTE DEEP VEIN THROMBOSIS (DVT) OF OTHER VEIN OF LOWER EXTREMITY, UNSPECIFIED LATERALITY: ICD-10-CM

## 2018-03-29 NOTE — TELEPHONE ENCOUNTER
Pt should take 20 mg once daily after the first 21 days--the script was sent as 2 pills once a day--Use up the 10 mg pills--take 2 once a day.  I sent in new script for 20 mg pills 1 a day.

## 2018-04-10 ENCOUNTER — PATIENT MESSAGE (OUTPATIENT)
Dept: FAMILY MEDICINE | Facility: CLINIC | Age: 83
End: 2018-04-10

## 2018-04-10 DIAGNOSIS — I10 ESSENTIAL HYPERTENSION: Primary | ICD-10-CM

## 2018-04-10 RX ORDER — AMLODIPINE BESYLATE 2.5 MG/1
2.5 TABLET ORAL DAILY
Qty: 90 TABLET | Refills: 1 | Status: SHIPPED | OUTPATIENT
Start: 2018-04-10 | End: 2018-10-18 | Stop reason: SDUPTHER

## 2018-04-10 NOTE — TELEPHONE ENCOUNTER
Patient called requesting refill on medication to preferred pharmacy. Last Office Visit 3/21/18  Next Office Visit 6/21/18    . Please advise.

## 2018-04-13 ENCOUNTER — INITIAL CONSULT (OUTPATIENT)
Dept: RADIATION ONCOLOGY | Facility: CLINIC | Age: 83
End: 2018-04-13
Payer: MEDICARE

## 2018-04-13 VITALS
TEMPERATURE: 98 F | DIASTOLIC BLOOD PRESSURE: 80 MMHG | HEIGHT: 63 IN | HEART RATE: 79 BPM | SYSTOLIC BLOOD PRESSURE: 189 MMHG | WEIGHT: 148.63 LBS | BODY MASS INDEX: 26.34 KG/M2

## 2018-04-13 DIAGNOSIS — N28.89 URETERAL MASS: ICD-10-CM

## 2018-04-13 DIAGNOSIS — I82.491 ACUTE DEEP VEIN THROMBOSIS (DVT) OF OTHER SPECIFIED VEIN OF RIGHT LOWER EXTREMITY: ICD-10-CM

## 2018-04-13 DIAGNOSIS — C54.1 ENDOMETRIAL CANCER: Primary | ICD-10-CM

## 2018-04-13 PROCEDURE — 99213 OFFICE O/P EST LOW 20 MIN: CPT | Mod: PBBFAC | Performed by: RADIOLOGY

## 2018-04-13 PROCEDURE — 99204 OFFICE O/P NEW MOD 45 MIN: CPT | Mod: S$PBB,,, | Performed by: RADIOLOGY

## 2018-04-13 PROCEDURE — 99999 PR PBB SHADOW E&M-EST. PATIENT-LVL III: CPT | Mod: PBBFAC,,, | Performed by: RADIOLOGY

## 2018-04-13 RX ORDER — NITROFURANTOIN 25; 75 MG/1; MG/1
100 CAPSULE ORAL NIGHTLY
Refills: 6 | COMMUNITY
Start: 2018-03-22 | End: 2020-03-19 | Stop reason: SDUPTHER

## 2018-04-13 NOTE — PROGRESS NOTES
REFERRING PHYSICIAN:   Carlos Mann M.D., Kiet Arellano M.D.    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 Luo is an 86-year-old female who was recently diagnosed with synchronous endometrial carcinoma and right ureter carcinoma after evaluation for hematuria.  She wasn't sure of the source of the blood and was evaluated by GYN which did not reveal any source of bleeding.  A CT urogram on November 13, 2017 revealed right moderate hydronephrosis and ureteral dilation suspicious for neoplastic mass in the right ureter.  There was also some abnormality noted in the uterus as well.  An endovaginal ultrasound revealed prominent endometrium worrisome for neoplastic process.  On November 22, 2017, she underwent endometrial and ureter biopsy.  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  The urethral biopsy revealed high-grade urothelial carcinoma.      On January 4, 2018, she underwent AMOS/BSO and pelvic sampling as well as right ureteral resection and reimplantation of the right ureter.  The pathology from the uterus revealed FIGO grade 3, endometrioid carcinoma, with invasion into greater than 50% of the myometrium (invasion of 9 mm out of 10 mm of myometrium).  The tumor measured 1.5 x 1.2 x 1 cm.  There is no lymphovascular invasion noted.  3 out of 3 lymph nodes were negative for involvement.  The pathology from the right ureter revealed high-grade noninvasive papillary urothelial carcinoma. After evaluation by Dr. Arellano, patient has decided to undergo close monitoring for the ureteric cancer.  She is here today for recommendation regarding further treatment for the endometrial carcinoma.    At present, Ms. major reports urinary incontinence requiring pads since her surgery.  She denies vaginal bleeding or vaginal discharge.  She also denies dysuria,  hematuria, nausea, vomiting, diarrhea, fever, night sweats, or weight loss.    REVIEW OF SYSTEMS:  As above.  In addition, patient denies headaches, visual problems, dizziness, chest pain, shortness of breath, cough, nausea, vomiting, diarrhea, or any new bony pains. Patient also denies easy bruising, skin rashes, or numbness or tingling.    ECO    PAST MEDICAL HISTORY:  Past Medical History:   Diagnosis Date    Anticoagulant long-term use     stopped Plavix and ASA 2017    High cholesterol     Hypertension     Macular degeneration     S/P complete hysterectomy 2018    Stroke 2012    TIA, no deficits       PAST SURGICAL HISTORY:  Past Surgical History:   Procedure Laterality Date    HYSTERECTOMY      NO PAST SURGERIES      URETERAL REIMPLANTION      URETERAL STENT PLACEMENT      URETEROSCOPY         ALLERGIES:   Review of patient's allergies indicates:   Allergen Reactions    Sulfa (sulfonamide antibiotics)     Codeine Anxiety       MEDICATIONS:  Current Outpatient Prescriptions   Medication Sig    acetaminophen (TYLENOL) 325 MG tablet Take 1 tablet (325 mg total) by mouth every 6 (six) hours as needed for Pain.    amLODIPine (NORVASC) 2.5 MG tablet Take 1 tablet (2.5 mg total) by mouth once daily.    cranberry 400 mg Cap Take by mouth.    docusate sodium (COLACE) 100 MG capsule Take 1 capsule (100 mg total) by mouth 2 (two) times daily.    fenofibrate (TRICOR) 145 MG tablet Take 145 mg by mouth once daily.    Lactobacillus acidophilus Cap Take by mouth.    multivitamin capsule Take 1 capsule by mouth.    rivaroxaban (XARELTO) 20 mg Tab Take 1 tablet (20 mg total) by mouth daily with dinner or evening meal.    vitamin D 1000 units Tab Take 1,000 Units by mouth once daily.     No current facility-administered medications for this visit.        SOCIAL HISTORY:  Social History     Social History    Marital status:      Spouse name: N/A    Number of children: N/A    Years of  education: N/A     Occupational History    Not on file.     Social History Main Topics    Smoking status: Never Smoker    Smokeless tobacco: Never Used    Alcohol use No    Drug use: No    Sexual activity: No     Other Topics Concern    Not on file     Social History Narrative    No narrative on file       FAMILY HISTORY:  No family history on file.      PHYSICAL EXAMINATION:  There were no vitals filed for this visit.  GENERAL: Patient is alert and oriented, in no acute distress.  HEENT:Extraocular muscles are intact.  Oropharynx is clear without lesions.  There is no cervical or supraclavicular lymphadenopathy palpated.  No thyromegaly noted.  HEART: Regular rate and rhythm.  LUNGS: Clear to auscultation bilaterally.  ABDOMEN:Soft, nontender, nondistended, without hepatosplenomegaly.  Normoactive bowel sounds.  PELVIC EXAM: A speculum examination reveals the vaginal cuff with a subcentimeter erythematous papular lesion with induration in the anterior aspect without bleeding.  No other lesions noted in the vagina.  EXTREMITIES: No clubbing, cyanosis, or edema.  NEUROLOGICAL: Cranial nerve II through XII grossly intact.  Sensation is intact.  Strength is 5 out of 5 in the upper and lower extremities bilaterally.       ASSESSMENT:   This is an 86-year-old female with FIGO grade 3, stage IB, endometrial cancer who underwent AMOS/BSO on January 4, 2018 with invasion of greater than one half of the myometrium and high-grade urothelial cancer of the right ureter.    PLAN:   After review of the pathology and discussion in the  tumor board, Ms. Luo is noted to have high risk endometrial cancer as well as high-grade urothelial cancer. Based findings on physical examination today, I am concerned about vaginal cuff recurrence.  She is recommended to see Dr. Mann to further evaluation to rule out recurrence.  I had a long discussion with the patient and her granddaughter regarding the treatment for endometrial  cancer.  Based on invasion of 9 out of 10 mm of myometrium, she is at high risk for vaginal cuff and pelvic recurrence.  The standard of care would be pelvic radiation with or without vaginal cuff brachytherapy.  However, based on her age and her performance status, she does not want to undergo pelvic radiation due to risk of side effects.  This is reasonable given her age and history of urinary incontinence.  Therefore, I recommend vaginal cuff irradiation if she does not have evidence of recurrence after evaluation by Dr. Mann.  I plan to deliver approximately 2100 cGy in 3 fractions.  I also plan to discuss the case with Dr. Mann.    The risks, benefits, and side effects of radiation were explained in detail to the patient and her granddaughter who is here today.   All questions were answered and informed consent was signed.  I plan to see the patient back for radiation planning CT after evaluation by Dr. Mann.     Psychosocial Distress screening score of Distress Score: 5 noted and reviewed. No intervention indicated.    I spent approximately 60 minutes reviewing the available records and evaluating the patient, out of which over 50% of the time was spent face to face with the patient in counseling and coordinating this patient's care.

## 2018-04-17 ENCOUNTER — PATIENT MESSAGE (OUTPATIENT)
Dept: GYNECOLOGIC ONCOLOGY | Facility: CLINIC | Age: 83
End: 2018-04-17

## 2018-04-17 ENCOUNTER — PATIENT MESSAGE (OUTPATIENT)
Dept: RADIATION ONCOLOGY | Facility: CLINIC | Age: 83
End: 2018-04-17

## 2018-04-25 ENCOUNTER — OFFICE VISIT (OUTPATIENT)
Dept: GYNECOLOGIC ONCOLOGY | Facility: CLINIC | Age: 83
End: 2018-04-25
Payer: MEDICARE

## 2018-04-25 ENCOUNTER — HOSPITAL ENCOUNTER (OUTPATIENT)
Dept: RADIATION THERAPY | Facility: HOSPITAL | Age: 83
Discharge: HOME | End: 2018-04-25
Attending: RADIOLOGY
Payer: MEDICARE

## 2018-04-25 ENCOUNTER — OFFICE VISIT (OUTPATIENT)
Dept: RADIATION ONCOLOGY | Facility: CLINIC | Age: 83
End: 2018-04-25
Payer: MEDICARE

## 2018-04-25 ENCOUNTER — TELEPHONE (OUTPATIENT)
Dept: GYNECOLOGIC ONCOLOGY | Facility: CLINIC | Age: 83
End: 2018-04-25

## 2018-04-25 VITALS
WEIGHT: 148.81 LBS | HEART RATE: 80 BPM | BODY MASS INDEX: 26.37 KG/M2 | TEMPERATURE: 98 F | SYSTOLIC BLOOD PRESSURE: 160 MMHG | DIASTOLIC BLOOD PRESSURE: 72 MMHG | RESPIRATION RATE: 16 BRPM | HEIGHT: 63 IN

## 2018-04-25 VITALS
WEIGHT: 148.81 LBS | HEIGHT: 63 IN | BODY MASS INDEX: 26.37 KG/M2 | SYSTOLIC BLOOD PRESSURE: 160 MMHG | HEART RATE: 80 BPM | DIASTOLIC BLOOD PRESSURE: 72 MMHG

## 2018-04-25 DIAGNOSIS — I82.411 ACUTE DEEP VEIN THROMBOSIS (DVT) OF FEMORAL VEIN OF RIGHT LOWER EXTREMITY: ICD-10-CM

## 2018-04-25 DIAGNOSIS — C54.1 ENDOMETRIAL CANCER: Primary | ICD-10-CM

## 2018-04-25 PROCEDURE — 99213 OFFICE O/P EST LOW 20 MIN: CPT | Mod: S$PBB,25,, | Performed by: RADIOLOGY

## 2018-04-25 PROCEDURE — 57100 BIOPSY VAGINAL MUCOSA SIMPLE: CPT | Mod: PBBFAC | Performed by: OBSTETRICS & GYNECOLOGY

## 2018-04-25 PROCEDURE — 99213 OFFICE O/P EST LOW 20 MIN: CPT | Mod: PBBFAC,25 | Performed by: OBSTETRICS & GYNECOLOGY

## 2018-04-25 PROCEDURE — 77263 THER RADIOLOGY TX PLNG CPLX: CPT | Mod: ,,, | Performed by: RADIOLOGY

## 2018-04-25 PROCEDURE — 77334 RADIATION TREATMENT AID(S): CPT | Mod: TC | Performed by: RADIOLOGY

## 2018-04-25 PROCEDURE — 88305 TISSUE EXAM BY PATHOLOGIST: CPT | Mod: 26,,, | Performed by: PATHOLOGY

## 2018-04-25 PROCEDURE — 99214 OFFICE O/P EST MOD 30 MIN: CPT | Mod: 25,S$PBB,, | Performed by: OBSTETRICS & GYNECOLOGY

## 2018-04-25 PROCEDURE — 99999 PR PBB SHADOW E&M-EST. PATIENT-LVL III: CPT | Mod: PBBFAC,,, | Performed by: RADIOLOGY

## 2018-04-25 PROCEDURE — 99999 PR PBB SHADOW E&M-EST. PATIENT-LVL III: CPT | Mod: PBBFAC,,, | Performed by: OBSTETRICS & GYNECOLOGY

## 2018-04-25 PROCEDURE — 88305 TISSUE EXAM BY PATHOLOGIST: CPT | Performed by: PATHOLOGY

## 2018-04-25 PROCEDURE — 77014 HC CT GUIDANCE RADIATION THERAPY FLDS PLACEMENT: CPT | Mod: TC | Performed by: RADIOLOGY

## 2018-04-25 PROCEDURE — 99213 OFFICE O/P EST LOW 20 MIN: CPT | Mod: PBBFAC,27,25 | Performed by: RADIOLOGY

## 2018-04-25 PROCEDURE — 77334 RADIATION TREATMENT AID(S): CPT | Mod: 26,,, | Performed by: RADIOLOGY

## 2018-04-25 PROCEDURE — 77290 THER RAD SIMULAJ FIELD CPLX: CPT | Mod: TC | Performed by: RADIOLOGY

## 2018-04-25 PROCEDURE — 77290 THER RAD SIMULAJ FIELD CPLX: CPT | Mod: 26,,, | Performed by: RADIOLOGY

## 2018-04-25 RX ORDER — LORATADINE 10 MG/1
10 TABLET ORAL DAILY PRN
COMMUNITY

## 2018-04-25 NOTE — PROGRESS NOTES
"REFERRING PHYSICIAN: Carlos Mann M.D., Kiet Arellano M.D.    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 Luo is an 86-year-old female who is well-known to me after consultation on April 13, 2018 regarding treatment of high-grade endometrial cancer.  Please see full consultation note on that day for details.  At that time, she was noted to have an erythematous lesion at the vaginal cuff on exam.  Earlier today, she underwent biopsy of this region to rule out recurrent disease.  The pathology is currently pending.  She returns today for treatment planning CT prior to start of high-dose-rate brachytherapy.    She continues to have bladder incontinence/urgency requiring pads.  She denies vaginal bleeding or discharge other than with biopsy earlier today.    PHYSICAL EXAMINATION:  VITAL SINGS: BP (!) 160/72   Pulse 80   Temp 97.8 °F (36.6 °C) (Oral)   Resp 16   Ht 5' 3" (1.6 m)   Wt 67.5 kg (148 lb 13 oz)   BMI 26.36 kg/m²   GENERAL: Patient is alert and oriented, in no acute distress.  HEENT: Extraocular muscles are intact.  Oropharynx is clear without lesions.  There is no cervical or supraclavicular adenopathy palpated.    CHEST: Breath sounds clear bilaterally.  No rales.  No rhonchi.  Unlabored respirations.  CARDIOVASCULAR: Normal S1, S2.  Normal rate.  Regular rhythm.  ABDOMEN: Bowel sounds normal.  No tenderness.  No abdominal distention.  No hepatomegaly.  No splenomegaly.  PELVIC EXAM: Deferred. (Previous exam on April 13, 2018: A speculum examination reveals the vaginal cuff with a subcentimeter erythematous papular lesion with induration in the anterior aspect without bleeding. No other lesions noted in the vagina.)  EXTREMITIES: No clubbing, cyanosis, edema  NEUROLOGIC: Cranial nerves II through XII are grossly intact.  Sensation is intact.  Strength is 5 out of 5 in the upper and lower " extremities bilaterally.    ASSESSMENT:   This is an 86-year-old female with FIGO grade 3, stage IB, endometrial cancer who underwent AMOS/BSO on January 4, 2018 with invasion of greater than one half of the myometrium and high-grade urothelial cancer of the right ureter.    PLAN:   She is scheduled to undergo treatment planning CT today for vaginal cuff HDR.  We will go ahead and proceed with that.  I plan to follow-up with the pathology.  If the pathology is negative for recurrence, I plan to deliver 2100 cGy in 3 fractions to the vaginal cuff and upper two thirds of the vagina.  This was again discussed in detail with the patient and her daughter.  I also reviewed the risks, benefits, and side effects.  All of their questions were answered.

## 2018-04-25 NOTE — PROCEDURES
Biopsy (Gynecological)  Date/Time: 4/25/2018 9:13 AM  Performed by: KASIA HOLDEN  Authorized by: KASIA HOLDEN     Consent Done?:  Yes (Written)  Local anesthesia used?: No      Biopsy Location:  Vagina  Vagina:     Complexity:  Simple    Site:  Anterior  Estimated blood loss (cc):  0   Patient tolerated the procedure well with no immediate complications.

## 2018-04-25 NOTE — PROGRESS NOTES
Subjective:      Patient ID: Destinee Luo is a 86 y.o. female.    Chief Complaint: Endometrial Cancer (3 mth )    Treatment History  Stage IBG3 (clinical) endometrial cancer  RALH/BSO in combo with a right ureteral resection and ureteroneocystostomy by Dr. Arellano on 1/4/18    HPI  Here today for continued surveillance and f/u.  She was evaluated by Dr. Bhagat who has concerns for recurrence at her vaginal cuff. Treatment has been delayed due to needing cysto for stent removal, and being diagnosed with a DVT.  Overall without complaints.  Denies VB.  Agree with rec for brachy only given age and her concerns related to WPRT.  Review of Systems   Constitutional: Negative for activity change, appetite change, chills, fatigue and fever.   HENT: Negative for hearing loss, mouth sores, nosebleeds, sore throat and tinnitus.    Eyes: Negative for visual disturbance.   Respiratory: Negative for cough, chest tightness, shortness of breath and wheezing.    Cardiovascular: Negative for chest pain and leg swelling.   Gastrointestinal: Negative for abdominal distention, abdominal pain, blood in stool, constipation, diarrhea, nausea and vomiting.   Genitourinary: Negative for dysuria, flank pain, frequency, hematuria, pelvic pain, vaginal bleeding, vaginal discharge and vaginal pain.   Musculoskeletal: Negative for arthralgias and back pain.   Skin: Negative for rash.   Neurological: Negative for dizziness, seizures, syncope, weakness and numbness.   Hematological: Does not bruise/bleed easily.   Psychiatric/Behavioral: Negative for confusion and sleep disturbance. The patient is not nervous/anxious.        Objective:   Physical Exam:   Constitutional: She appears well-developed and well-nourished. No distress.    HENT:   Head: Normocephalic and atraumatic.    Eyes: No scleral icterus.    Neck: Normal range of motion. Neck supple.    Cardiovascular: Normal rate and intact distal pulses.  Exam reveals no cyanosis and no edema.      Pulmonary/Chest: Effort normal. No respiratory distress. She exhibits no tenderness.        Abdominal: Soft. She exhibits no distension (healed incisions), no fluid wave, no ascites and no mass. There is no tenderness. There is no rebound and no guarding. No hernia.     Genitourinary: Rectum normal and vagina normal. Pelvic exam was performed with patient supine. There is no rash, tenderness or lesion on the right labia. There is no rash, tenderness or lesion on the left labia. Uterus is absent. There is an absent adnexa. Right adnexum displays no mass, no tenderness and no fullness. Left adnexum displays no mass, no tenderness and no fullness. No bleeding (no savita evidence of recurrence.  5 mm area of probable granulation tissue biopsied) or unspecified prolapse of vaginal walls in the vagina. No vaginal discharge found. Vaginal cuff normal.Labial bartholins normal.Cervix exhibits absence.              Lymphadenopathy:     She has no cervical adenopathy.        Right: No inguinal adenopathy present.        Left: No inguinal adenopathy present.     Skin: No cyanosis.        Assessment:     1. Endometrial cancer    2. Acute deep vein thrombosis (DVT) of femoral vein of right lower extremity        Plan:       Overall continues to do well.  Will f/u biopsy but likely granulation tissue.  If no recurrence, encouraged her to pursue brachytherapy.  Will see her after tx complete.

## 2018-04-26 ENCOUNTER — PATIENT MESSAGE (OUTPATIENT)
Dept: FAMILY MEDICINE | Facility: CLINIC | Age: 83
End: 2018-04-26

## 2018-05-01 ENCOUNTER — HOSPITAL ENCOUNTER (OUTPATIENT)
Dept: RADIATION THERAPY | Facility: HOSPITAL | Age: 83
Discharge: HOME | End: 2018-05-01
Attending: RADIOLOGY
Payer: MEDICARE

## 2018-05-02 ENCOUNTER — PATIENT MESSAGE (OUTPATIENT)
Dept: GYNECOLOGIC ONCOLOGY | Facility: CLINIC | Age: 83
End: 2018-05-02

## 2018-05-02 ENCOUNTER — TELEPHONE (OUTPATIENT)
Dept: GYNECOLOGIC ONCOLOGY | Facility: CLINIC | Age: 83
End: 2018-05-02

## 2018-05-03 ENCOUNTER — TELEPHONE (OUTPATIENT)
Dept: RADIATION ONCOLOGY | Facility: CLINIC | Age: 83
End: 2018-05-03

## 2018-05-05 ENCOUNTER — PATIENT MESSAGE (OUTPATIENT)
Dept: FAMILY MEDICINE | Facility: CLINIC | Age: 83
End: 2018-05-05

## 2018-05-05 DIAGNOSIS — E78.5 DYSLIPIDEMIA: Primary | ICD-10-CM

## 2018-05-07 RX ORDER — FENOFIBRATE 145 MG/1
145 TABLET, FILM COATED ORAL DAILY
Qty: 90 TABLET | Refills: 1 | Status: SHIPPED | OUTPATIENT
Start: 2018-05-07 | End: 2018-11-28 | Stop reason: SDUPTHER

## 2018-05-17 PROCEDURE — 77295 3-D RADIOTHERAPY PLAN: CPT | Mod: 26,,, | Performed by: RADIOLOGY

## 2018-05-17 PROCEDURE — 77300 RADIATION THERAPY DOSE PLAN: CPT | Mod: 26,,, | Performed by: RADIOLOGY

## 2018-05-17 PROCEDURE — 77470 SPECIAL RADIATION TREATMENT: CPT | Mod: 59,TC | Performed by: RADIOLOGY

## 2018-05-17 PROCEDURE — 77295 3-D RADIOTHERAPY PLAN: CPT | Mod: TC | Performed by: RADIOLOGY

## 2018-05-17 PROCEDURE — 77370 RADIATION PHYSICS CONSULT: CPT | Performed by: RADIOLOGY

## 2018-05-17 PROCEDURE — 77470 SPECIAL RADIATION TREATMENT: CPT | Mod: 26,59,, | Performed by: RADIOLOGY

## 2018-05-17 PROCEDURE — 77300 RADIATION THERAPY DOSE PLAN: CPT | Mod: TC | Performed by: RADIOLOGY

## 2018-05-24 ENCOUNTER — PROCEDURE VISIT (OUTPATIENT)
Dept: RADIATION ONCOLOGY | Facility: CLINIC | Age: 83
End: 2018-05-24
Payer: MEDICARE

## 2018-05-24 DIAGNOSIS — C54.1 ENDOMETRIAL CANCER: Primary | ICD-10-CM

## 2018-05-24 PROCEDURE — 77770 HDR RDNCL NTRSTL/ICAV BRCHTX: CPT | Mod: TC | Performed by: RADIOLOGY

## 2018-05-24 PROCEDURE — 57156 INS VAG BRACHYTX DEVICE: CPT | Mod: TC | Performed by: RADIOLOGY

## 2018-05-24 PROCEDURE — 57156 INS VAG BRACHYTX DEVICE: CPT | Mod: ,,, | Performed by: RADIOLOGY

## 2018-05-24 PROCEDURE — 77770 HDR RDNCL NTRSTL/ICAV BRCHTX: CPT | Mod: 26,,, | Performed by: RADIOLOGY

## 2018-05-24 PROCEDURE — C1717 BRACHYTX, NON-STR,HDR IR-192: HCPCS | Performed by: RADIOLOGY

## 2018-05-24 NOTE — PROCEDURES
PROCEDURE NOTE:     REFERRING PHYSICIAN: Carlos Mann M.D., Kiet Arellano M.D.    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 Luo is an 86-year-old female who was recently diagnosed with synchronous endometrial carcinoma and right ureter carcinoma after evaluation for hematuria.  An endovaginal ultrasound revealed prominent endometrium worrisome for neoplastic process. On November 22, 2017, she underwent endometrial and ureter biopsy. 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 The urethral biopsy revealed high-grade urothelial carcinoma.     On January 4, 2018, she underwent AMOS/BSO and pelvic sampling as well as right ureteral resection and reimplantation of the right ureter. The pathology from the uterus revealed FIGO grade 3, endometrioid carcinoma, with invasion into greater than 50% of the myometrium (invasion of 9 mm out of 10 mm of myometrium). The tumor measured 1.5 x 1.2 x 1 cm. There is no lymphovascular invasion noted. 3 out of 3 lymph nodes were negative for involvement. The pathology from the right ureter revealed high-grade noninvasive papillary urothelial carcinoma. After evaluation by Dr. Arellano, patient has decided to undergo close monitoring for the ureteric cancer.  She is recommended to undergo vaginal cuff brachytherapy to reduce her chance of recurrence. She is here today for her first treatment.      DATE OF PROCEDURE: 05/24/2018      PROCEDURE: Intracavitary vaginal HDR #1      AREA TREATED: Vaginal cuff and upper two thirds of the vagina      TREATMENT METHOD: Insertion of 3.5 cm vaginal cylinder into vagina      RADIATION: Iridium 192, high-dose-rate      DEPTH OF CALCULATION:   vaginal (cylinder) surface      DOSE: 7.0 gray      Time Out:   Performed by Diane Miramontes RN  Identifiers used: Name and date of birth      She was brought to the HDR delivery  room and a 3.5 cm vaginal cylinder was placed into the vagina. The brachytherapy catheter was connected to the cylinder and the treatment was delivered. She received the first out of 3 fractions of HDR vaginal brachytherapy as above. She tolerated the treatment without any unexpected side effects. She will return in 1 week to continue her treatment. I was present during the entire procedure

## 2018-05-31 ENCOUNTER — TREATMENT PLANNING (OUTPATIENT)
Dept: RADIATION ONCOLOGY | Facility: CLINIC | Age: 83
End: 2018-05-31

## 2018-05-31 PROCEDURE — 57156 INS VAG BRACHYTX DEVICE: CPT | Mod: TC | Performed by: RADIOLOGY

## 2018-05-31 PROCEDURE — 77770 HDR RDNCL NTRSTL/ICAV BRCHTX: CPT | Mod: TC | Performed by: RADIOLOGY

## 2018-05-31 PROCEDURE — 77770 HDR RDNCL NTRSTL/ICAV BRCHTX: CPT | Mod: 26,,, | Performed by: RADIOLOGY

## 2018-05-31 PROCEDURE — 57156 INS VAG BRACHYTX DEVICE: CPT | Mod: ,,, | Performed by: RADIOLOGY

## 2018-05-31 PROCEDURE — C1717 BRACHYTX, NON-STR,HDR IR-192: HCPCS | Performed by: RADIOLOGY

## 2018-06-01 ENCOUNTER — HOSPITAL ENCOUNTER (OUTPATIENT)
Dept: RADIATION THERAPY | Facility: HOSPITAL | Age: 83
Discharge: HOME | End: 2018-06-01
Attending: RADIOLOGY
Payer: MEDICARE

## 2018-06-07 ENCOUNTER — PROCEDURE VISIT (OUTPATIENT)
Dept: RADIATION ONCOLOGY | Facility: CLINIC | Age: 83
End: 2018-06-07
Payer: MEDICARE

## 2018-06-07 DIAGNOSIS — C54.1 ENDOMETRIAL CANCER: Primary | ICD-10-CM

## 2018-06-07 PROCEDURE — 77770 HDR RDNCL NTRSTL/ICAV BRCHTX: CPT | Mod: 26,,, | Performed by: RADIOLOGY

## 2018-06-07 PROCEDURE — C1717 BRACHYTX, NON-STR,HDR IR-192: HCPCS | Performed by: RADIOLOGY

## 2018-06-07 PROCEDURE — 77770 HDR RDNCL NTRSTL/ICAV BRCHTX: CPT | Mod: TC | Performed by: RADIOLOGY

## 2018-06-07 PROCEDURE — 57156 INS VAG BRACHYTX DEVICE: CPT | Mod: TC | Performed by: RADIOLOGY

## 2018-06-07 PROCEDURE — 57156 INS VAG BRACHYTX DEVICE: CPT | Mod: ,,, | Performed by: RADIOLOGY

## 2018-06-07 NOTE — PROCEDURES
PROCEDURE NOTE / COMPLETION NOTE:      REFERRING PHYSICIAN: Carlos Mann M.D., Kiet Arellano M.D.     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 Luo is an 86-year-old female who was recently diagnosed with synchronous endometrial carcinoma and right ureter carcinoma after evaluation for hematuria.  An endovaginal ultrasound revealed prominent endometrium worrisome for neoplastic process. On November 22, 2017, she underwent endometrial and ureter biopsy. 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 The urethral biopsy revealed high-grade urothelial carcinoma.      On January 4, 2018, she underwent AMOS/BSO and pelvic sampling as well as right ureteral resection and reimplantation of the right ureter. The pathology from the uterus revealed FIGO grade 3, endometrioid carcinoma, with invasion into greater than 50% of the myometrium (invasion of 9 mm out of 10 mm of myometrium). The tumor measured 1.5 x 1.2 x 1 cm. There is no lymphovascular invasion noted. 3 out of 3 lymph nodes were negative for involvement. The pathology from the right ureter revealed high-grade noninvasive papillary urothelial carcinoma. After evaluation by Dr. Arellano, patient has decided to undergo close monitoring for the ureteric cancer.  She is recommended to undergo vaginal cuff brachytherapy to reduce her chance of recurrence. She is here today for her third and final treatment.      DATE OF PROCEDURE: 6/7/2018      PROCEDURE: Intracavitary vaginal HDR #3      AREA TREATED: Vaginal cuff and upper two thirds of the vagina      TREATMENT METHOD: Insertion of 3.5 cm vaginal cylinder into vagina      RADIATION: Iridium 192, high-dose-rate      DEPTH OF CALCULATION:   vaginal (cylinder) surface      DOSE: 7.0 gray      Time Out:   Performed by Diane Miramontes RN  Identifiers used: Name and date of birth      She was  brought to the HDR delivery room and a 3.5 cm vaginal cylinder was placed into the vagina. The brachytherapy catheter was connected to the cylinder and the treatment was delivered. She received the third and final fraction of HDR vaginal brachytherapy as above. She tolerated the treatment without any unexpected side effects. I was present during the entire procedure.    COMPLETION NOTE:  DOSE: 2100 cGy in 3 fractions  AREA TREATED: Vaginal cuff and upper two thirds of the vagina  DURATION: April 24, 2018 through June 7, 2018    Ms. nixon will return to see me in 6 weeks, unless symptoms warrant otherwise.  She will also follow-up with Dr. Mann as planned.

## 2018-06-09 NOTE — PROGRESS NOTES
PROCEDURE NOTE:      REFERRING PHYSICIAN: Carlos Mann M.D., Kiet Arellano M.D.     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 Luo is an 86-year-old female who was recently diagnosed with synchronous endometrial carcinoma and right ureter carcinoma after evaluation for hematuria.  An endovaginal ultrasound revealed prominent endometrium worrisome for neoplastic process. On November 22, 2017, she underwent endometrial and ureter biopsy. 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 The urethral biopsy revealed high-grade urothelial carcinoma.      On January 4, 2018, she underwent AMOS/BSO and pelvic sampling as well as right ureteral resection and reimplantation of the right ureter. The pathology from the uterus revealed FIGO grade 3, endometrioid carcinoma, with invasion into greater than 50% of the myometrium (invasion of 9 mm out of 10 mm of myometrium). The tumor measured 1.5 x 1.2 x 1 cm. There is no lymphovascular invasion noted. 3 out of 3 lymph nodes were negative for involvement. The pathology from the right ureter revealed high-grade noninvasive papillary urothelial carcinoma. After evaluation by Dr. Arellano, patient has decided to undergo close monitoring for the ureteric cancer.  She is recommended to undergo vaginal cuff brachytherapy to reduce her chance of recurrence. She is here today for her second vaginal brachytherapy treatment.       DATE OF PROCEDURE: 05/31/2018      PROCEDURE: Intracavitary vaginal HDR #2      AREA TREATED: Vaginal cuff and upper two thirds of the vagina      TREATMENT METHOD: Insertion of 3.5 cm vaginal cylinder into vagina      RADIATION: Iridium 192, high-dose-rate      DEPTH OF CALCULATION:   vaginal (cylinder) surface      DOSE: 7.0 gray      Time Out:   Performed by Diane Miramontes RN  Identifiers used: Name and date of birth      She was  brought to the HDR delivery room and a 3.5 cm vaginal cylinder was placed into the vagina. The brachytherapy catheter was connected to the cylinder and the treatment was delivered. She received the second out of 3 fractions of HDR vaginal brachytherapy as above. She tolerated the treatment without any unexpected side effects. She will return in 1 week to continue her treatment. I was present during the entire procedure.

## 2018-06-13 DIAGNOSIS — I82.411 DVT OF DEEP FEMORAL VEIN, RIGHT: Primary | ICD-10-CM

## 2018-06-21 ENCOUNTER — OFFICE VISIT (OUTPATIENT)
Dept: FAMILY MEDICINE | Facility: CLINIC | Age: 83
End: 2018-06-21
Payer: MEDICARE

## 2018-06-21 ENCOUNTER — HOSPITAL ENCOUNTER (OUTPATIENT)
Dept: RADIOLOGY | Facility: HOSPITAL | Age: 83
Discharge: HOME | End: 2018-06-21
Attending: THORACIC SURGERY (CARDIOTHORACIC VASCULAR SURGERY)
Payer: MEDICARE

## 2018-06-21 VITALS
WEIGHT: 150.81 LBS | RESPIRATION RATE: 19 BRPM | OXYGEN SATURATION: 95 % | HEART RATE: 74 BPM | SYSTOLIC BLOOD PRESSURE: 134 MMHG | HEIGHT: 63 IN | DIASTOLIC BLOOD PRESSURE: 66 MMHG | BODY MASS INDEX: 26.72 KG/M2 | TEMPERATURE: 98 F

## 2018-06-21 DIAGNOSIS — I82.411 DVT OF DEEP FEMORAL VEIN, RIGHT: ICD-10-CM

## 2018-06-21 DIAGNOSIS — R26.89 POOR BALANCE: Primary | ICD-10-CM

## 2018-06-21 DIAGNOSIS — R13.10 DYSPHAGIA, UNSPECIFIED TYPE: ICD-10-CM

## 2018-06-21 DIAGNOSIS — K21.9 GASTROESOPHAGEAL REFLUX DISEASE WITHOUT ESOPHAGITIS: ICD-10-CM

## 2018-06-21 PROCEDURE — 93971 EXTREMITY STUDY: CPT | Mod: TC,PO

## 2018-06-21 PROCEDURE — 99214 OFFICE O/P EST MOD 30 MIN: CPT | Mod: S$PBB,,, | Performed by: FAMILY MEDICINE

## 2018-06-21 PROCEDURE — 93971 EXTREMITY STUDY: CPT | Mod: 26,,, | Performed by: RADIOLOGY

## 2018-06-21 PROCEDURE — 99999 PR PBB SHADOW E&M-EST. PATIENT-LVL IV: CPT | Mod: PBBFAC,,, | Performed by: FAMILY MEDICINE

## 2018-06-21 PROCEDURE — 99214 OFFICE O/P EST MOD 30 MIN: CPT | Mod: PBBFAC,25,PO | Performed by: FAMILY MEDICINE

## 2018-06-21 RX ORDER — PANTOPRAZOLE SODIUM 40 MG/1
40 TABLET, DELAYED RELEASE ORAL DAILY
Qty: 30 TABLET | Refills: 11 | Status: SHIPPED | OUTPATIENT
Start: 2018-06-21 | End: 2019-07-29 | Stop reason: SDUPTHER

## 2018-06-21 NOTE — PROGRESS NOTES
"Subjective:       Patient ID: Destinee Luo is a 86 y.o. female.    Chief Complaint: Hypertension    Pt is known to me.  p is having trouble with balance (long standing)--wants to restart  PT ([facility]).  Today pt thinks her ears are "blocked"--grand daughter says pt's hearing is not as good as it has been.  Pt also reports that she has trouble with minor choking with liquids and some foods. She denies frequent heartburn or indigestion--only refluxes if she bends over after eating.      Hypertension   Pertinent negatives include no chest pain, headaches, palpitations or shortness of breath.     Review of Systems   Constitutional: Negative for activity change, appetite change, fatigue and unexpected weight change.   HENT: Positive for trouble swallowing.    Eyes: Negative for visual disturbance.   Respiratory: Negative for cough, chest tightness and shortness of breath.    Cardiovascular: Negative for chest pain, palpitations and leg swelling.   Gastrointestinal: Negative for abdominal pain, constipation, diarrhea, nausea and vomiting.   Endocrine: Negative for cold intolerance, heat intolerance and polyuria.   Genitourinary: Negative for decreased urine volume and dysuria.   Musculoskeletal: Negative for arthralgias and back pain.   Skin: Negative for rash.   Neurological: Negative for numbness and headaches.       Objective:       Vitals:    06/21/18 0951   BP: 134/66   BP Location: Right arm   Patient Position: Sitting   BP Method: Large (Manual)   Pulse: 74   Resp: 19   Temp: 97.9 °F (36.6 °C)   TempSrc: Oral   SpO2: 95%   Weight: 68.4 kg (150 lb 12.7 oz)   Height: 5' 3" (1.6 m)     Physical Exam    Assessment:       1. Poor balance    2. Dysphagia, unspecified type    3. Gastroesophageal reflux disease without esophagitis        Plan:       Destinee was seen today for hypertension.    Diagnoses and all orders for this visit:    Poor balance    Dysphagia, unspecified type  -     Ambulatory consult to " Gastroenterology    Gastroesophageal reflux disease without esophagitis  -     pantoprazole (PROTONIX) 40 MG tablet; Take 1 tablet (40 mg total) by mouth once daily.  -     Ambulatory consult to Gastroenterology      During this visit, I reviewed the pt's history, medications, allergies, and problem list.    Will get PT restarted with HH

## 2018-07-11 ENCOUNTER — INITIAL CONSULT (OUTPATIENT)
Dept: GASTROENTEROLOGY | Facility: CLINIC | Age: 83
End: 2018-07-11
Payer: MEDICARE

## 2018-07-11 VITALS
BODY MASS INDEX: 26.72 KG/M2 | WEIGHT: 150.81 LBS | SYSTOLIC BLOOD PRESSURE: 142 MMHG | HEIGHT: 63 IN | DIASTOLIC BLOOD PRESSURE: 76 MMHG | HEART RATE: 85 BPM

## 2018-07-11 DIAGNOSIS — R07.9 NONSPECIFIC CHEST PAIN: ICD-10-CM

## 2018-07-11 DIAGNOSIS — R05.9 COUGH: ICD-10-CM

## 2018-07-11 DIAGNOSIS — Z87.898 HISTORY OF SHORTNESS OF BREATH: ICD-10-CM

## 2018-07-11 DIAGNOSIS — J35.8 TONSIL STONE: ICD-10-CM

## 2018-07-11 DIAGNOSIS — R12 HEARTBURN: ICD-10-CM

## 2018-07-11 DIAGNOSIS — R13.14 PHARYNGOESOPHAGEAL DYSPHAGIA: Primary | ICD-10-CM

## 2018-07-11 PROCEDURE — 99999 PR PBB SHADOW E&M-EST. PATIENT-LVL IV: CPT | Mod: PBBFAC,,, | Performed by: NURSE PRACTITIONER

## 2018-07-11 PROCEDURE — 99214 OFFICE O/P EST MOD 30 MIN: CPT | Mod: PBBFAC,PO | Performed by: NURSE PRACTITIONER

## 2018-07-11 PROCEDURE — 99204 OFFICE O/P NEW MOD 45 MIN: CPT | Mod: S$PBB,,, | Performed by: NURSE PRACTITIONER

## 2018-07-11 NOTE — PATIENT INSTRUCTIONS
Discharge Instructions: Eating a Soft Diet  You have been prescribed a soft diet (also called gastrointestinal soft diet or bland diet). This reduces the amount of work your digestive tract has to do. It also reduces the chance that your digestive tract will be irritated by the food you eat. A soft diet is prescribed for people with digestive problems. The diet consists of foods that are tender, mildly seasoned, and easy to digest. While on this diet, you should not eat fried or spicy foods, or raw fruits and vegetables. Also avoid alcoholic beverages.  General guidelines  · Eat in a calm, relaxed atmosphere. How you eat may be as important as what you eat. Dont rush while eating. Chew your food slowly and thoroughly, and swallow slowly.  · Eat small frequent meals throughout the day, but dont eat within 2 hours of bedtime.  · Avoid any foods that cause discomfort.  · Dont use NSAIDs (nonsteroidal anti-inflammatory drugs), such as aspirin, and ibuprofen. Also avoid medicine that contain aspirin. NSAIDs can cause ulcers and delay or prevent ulcer healing.  · Use antacids as needed, but keep in mind that magnesium-containing antacids may cause diarrhea.  Foods to eat  · Cream of wheat and cream of rice  · Cooked white rice  · Mashed potatoes, and boiled potatoes without skin  · Plain pasta and noodles  · Plain white crackers (such as no-salt soda crackers)  · White bread  · Applesauce  · Cooked fruits without skins or seeds  · Mild juices, such as apple and grape  · Bananas  · Cooked or mashed vegetables without stems and seeds  ¨ Carrots  ¨ Summer squash (zucchini, yellow squash)  ¨ Winter squash (acorn, butternut, spaghetti squash)  · Cottage cheese  · Mild hard or soft cheeses  · Custard  · Yogurt without seeds or nuts  · Milk (you may need lactose-free milk)  · Ice cream without seeds or nuts  · Smooth peanut butter  · Eggs  · Fish, turkey, chicken, or other meat that is not tough or stringy  · Tofu  Foods to  avoid  · Nuts and seeds  · Snack foods, such as the following:  ¨ Chocolate-containing snacks, candy, pastries, or cakes.  ¨ Potato chips (plain, barbecued, or other flavors)  ¨ Taco chips or nachos  ¨ Corn chips  ¨ Popcorn, popcorn cakes, or rice cakes  ¨ Crackers with nuts, seeds, or spicy seasonings  ¨ French fries  · Fried or greasy foods  · Whole-grain breads, rolls, and crackers  · Breads and rolls with nuts, seeds, or bran  · Bran and granola cereals  · Berries with seeds, such as strawberries, raspberries, and blackberries  · Acidic fruits, such as oranges, grapefruits, chaparrita, limes, and pineapples  · Raw vegetables  · Mild or hot peppers  · Sauerkraut and pickled vegetables  · Tomatoes or tomato products, such as tomato paste, tomato sauce, and tomato juice  · Barbecue sauce  · Spicy or flavored cheeses, such as jalapeño and black pepper cheese  · Crunchy peanut butter  · Dried cooked beans, such as kidd, kidney, or navy beans  · The following meats:  ¨ Fried or greasy meats  ¨ Processed, spicy meats, such as sausage, kumar, ham, and lunch meats  ¨ Ribs and other meats with barbecue sauce  ¨ Tough or stringy meats, such as corned beef or beef jerky  Fluids to avoid  · Alcoholic beverages  · Coffee and regular teas  · Angel and other drinks with caffeine  · Cranberry, orange, pineapple, and grapefruit juice  · Lemonade  · Vegetable juice  · Whole milk, if you are lactose intolerant  Follow-up  Make a follow-up appointment with a dietitian as directed by our staff.  Date Last Reviewed: 6/21/2015  © 8832-5103 [employer]. [address] [place] [address], [place] [address]. All rights reserved. This information is not intended as a substitute for professional medical care. Always follow your healthcare professional's instructions.        GERD (Adult)    The esophagus is a tube that carries food from the mouth to the stomach. A valve at the lower end of the esophagus prevents stomach acid from flowing  "upward. When this valve doesn't work properly, stomach contents may repeatedly flow back up (reflux) into the esophagus. This is called gastroesophageal reflux disease (GERD). GERD can irritate the esophagus. It can cause problems with swallowing or breathing. In severe cases, GERD can cause recurrent pneumonia or other serious problems.  Symptoms of reflux include burning, pressure or sharp pain in the upper abdomen or mid to lower chest. The pain can spread to the neck, back, or shoulder. There may be belching, an acid taste in the back of the throat, chronic cough, or sore throat or hoarseness. GERD symptoms often occur during the day after a big meal. They can also occur at night when lying down.   Home care  Lifestyle changes can help reduce symptoms. If needed, medicines may be prescribed. Symptoms often improve with treatment, but if treatment is stopped, the symptoms often return after a few months. So most persons with GERD will need to continue treatment.  Lifestyle changes  · Limit or avoid fatty, fried, and spicy foods, as well as coffee, chocolate, mint, and foods with high acid content such as tomatoes and citrus fruit and juices (orange, grapefruit, lemon).  · Dont eat large meals, especially at night. Frequent, smaller meals are best. Do not lie down right after eating. And dont eat anything 3 hours before going to bed.  · Avoid drinking alcohol and smoking. As much as possible, stay away from second hand smoke.  · If you are overweight, losing weight will reduce symptoms.   · Avoid wearing tight clothing around your stomach area.  · If your symptoms occur during sleep, use a foam wedge to elevate your upper body (not just your head.) Or, place 4" blocks under the head of your bed.  Medicines  If needed, medicines can help relieve the symptoms of GERD and prevent damage to the esophagus. Discuss a medicine plan with your healthcare provider. This may include one or more of the following " medicines:  · Antacids to help neutralize the normal acids in your stomach.  · Acid blockers (H2 blockers) to decrease acid production.  · Acid inhibitors (PPIs) to decrease acid production in a different way than the blockers. They may work better, but can take a little longer to take effect.  Take an antacid 30-60 minutes after eating and at bedtime, but not at the same time as an acid blocker.  Try not to take medicines such as ibuprofen and aspirin. If you are taking aspirin for your heart or other medical reasons, talk to your healthcare provider about stopping it.  Follow-up care  Follow up with your healthcare provider or as advised by our staff.  When to seek medical advice  Call your healthcare provider if any of the following occur:  · Stomach pain gets worse or moves to the lower right abdomen (appendix area)  · Chest pain appears or gets worse, or spreads to the back, neck, shoulder, or arm  · Frequent vomiting (cant keep down liquids)  · Blood in the stool or vomit (red or black in color)  · Feeling weak or dizzy  · Fever of 100.4ºF (38ºC) or higher, or as directed by your healthcare provider  Date Last Reviewed: 6/23/2015  © 8459-5375 [employer]. [address] [place] [address], [place] [address]. All rights reserved. This information is not intended as a substitute for professional medical care. Always follow your healthcare professional's instructions.

## 2018-07-11 NOTE — PROGRESS NOTES
Subjective:       Patient ID: Destinee Luo is a 86 y.o. female Body mass index is 26.71 kg/m².    Chief Complaint: Dysphagia (cough, choking sensation)    This patient is new to me.  Referring Provider:  Dr. Hinojosa for dysphagia and GERD    Patient is here with daughter, whom assisted with history.      Gastroesophageal Reflux   She complains of chest pain (occasional discomfort, denies currently), coughing (after drinking liquids and cold food such as ice cream; nonproductive), dysphagia (with liquids and food occasional; denies problems with pills), heartburn (rarely), a hoarse voice (occasional) and water brash. She reports no abdominal pain, no belching, no choking, no globus sensation, no nausea or no sore throat. This is a recurrent problem. The problem has been rapidly improving (since on PPI). The heartburn changes with position. The symptoms are aggravated by bending. Pertinent negatives include no fatigue, melena or weight loss. She has tried a PPI (protonix 40 mg once daily- started 6/21/18; PAST: prilosec) for the symptoms. The treatment provided significant relief.     Review of Systems   Constitutional: Negative for appetite change, chills, fatigue, fever, unexpected weight change and weight loss.   HENT: Positive for hoarse voice (occasional), postnasal drip, sneezing and trouble swallowing. Negative for congestion and sore throat.    Respiratory: Positive for cough (after drinking liquids and cold food such as ice cream; nonproductive) and shortness of breath (occasional; denies currently). Negative for choking.    Cardiovascular: Positive for chest pain (occasional discomfort, denies currently).   Gastrointestinal: Positive for dysphagia (with liquids and food occasional; denies problems with pills) and heartburn (rarely). Negative for abdominal pain, anal bleeding, blood in stool, constipation, diarrhea, melena, nausea, rectal pain and vomiting.   Genitourinary: Negative for difficulty urinating,  dysuria and flank pain.   Neurological: Negative for weakness.   Psychiatric/Behavioral: The patient is nervous/anxious.        Past Medical History:   Diagnosis Date    Anticoagulant long-term use     stopped Plavix and ASA 11/2017    High cholesterol     Hypertension     Macular degeneration     S/P complete hysterectomy 01/04/2018    Stroke 2012    TIA, no deficits     Past Surgical History:   Procedure Laterality Date    COLONOSCOPY  prior to 2008    HYSTERECTOMY  01/04/2018    NO PAST SURGERIES      URETERAL REIMPLANTION      URETERAL STENT PLACEMENT      URETEROSCOPY       Family History   Problem Relation Age of Onset    Cancer Mother 60        melanoma    Colon cancer Neg Hx     Colon polyps Neg Hx     Crohn's disease Neg Hx     Ulcerative colitis Neg Hx     Stomach cancer Neg Hx     Esophageal cancer Neg Hx      Wt Readings from Last 10 Encounters:   07/11/18 68.4 kg (150 lb 12.7 oz)   06/21/18 68.4 kg (150 lb 12.7 oz)   04/25/18 67.5 kg (148 lb 13 oz)   04/25/18 67.5 kg (148 lb 13 oz)   04/13/18 67.4 kg (148 lb 9.6 oz)   03/21/18 67.5 kg (148 lb 13 oz)   03/14/18 68 kg (150 lb)   03/06/18 70.4 kg (155 lb 3.3 oz)   02/28/18 70.4 kg (155 lb 3.3 oz)   02/23/18 70.2 kg (154 lb 12.2 oz)     Lab Results   Component Value Date    WBC 7.10 03/05/2018    HGB 9.5 (L) 03/05/2018    HCT 30.6 (L) 03/05/2018    MCV 84 03/05/2018     (H) 03/05/2018     CMP  Sodium   Date Value Ref Range Status   03/05/2018 139 136 - 145 mmol/L Final     Potassium   Date Value Ref Range Status   03/05/2018 4.3 3.5 - 5.1 mmol/L Final     Chloride   Date Value Ref Range Status   03/05/2018 103 95 - 110 mmol/L Final     CO2   Date Value Ref Range Status   03/05/2018 26 23 - 29 mmol/L Final     Glucose   Date Value Ref Range Status   03/05/2018 87 70 - 110 mg/dL Final     BUN, Bld   Date Value Ref Range Status   03/05/2018 22 8 - 23 mg/dL Final     Creatinine   Date Value Ref Range Status   03/05/2018 1.4 0.5 - 1.4  mg/dL Final     Calcium   Date Value Ref Range Status   03/05/2018 9.8 8.7 - 10.5 mg/dL Final     Total Protein   Date Value Ref Range Status   03/05/2018 7.4 6.0 - 8.4 g/dL Final     Albumin   Date Value Ref Range Status   03/05/2018 3.2 (L) 3.5 - 5.2 g/dL Final     Total Bilirubin   Date Value Ref Range Status   03/05/2018 0.2 0.1 - 1.0 mg/dL Final     Comment:     For infants and newborns, interpretation of results should be based  on gestational age, weight and in agreement with clinical  observations.  Premature Infant recommended reference ranges:  Up to 24 hours.............<8.0 mg/dL  Up to 48 hours............<12.0 mg/dL  3-5 days..................<15.0 mg/dL  6-29 days.................<15.0 mg/dL       Alkaline Phosphatase   Date Value Ref Range Status   03/05/2018 65 55 - 135 U/L Final     AST   Date Value Ref Range Status   03/05/2018 18 10 - 40 U/L Final     ALT   Date Value Ref Range Status   03/05/2018 13 10 - 44 U/L Final     Anion Gap   Date Value Ref Range Status   03/05/2018 10 8 - 16 mmol/L Final     eGFR if    Date Value Ref Range Status   03/05/2018 39.2 (A) >60 mL/min/1.73 m^2 Final     eGFR if non    Date Value Ref Range Status   03/05/2018 34.0 (A) >60 mL/min/1.73 m^2 Final     Comment:     Calculation used to obtain the estimated glomerular filtration  rate (eGFR) is the CKD-EPI equation.        Reviewed prior medical records including radiology report of 11/13/17 ct urogram abdomen pelvis & 11/17/17 pelvic ultrasound.    Objective:      Physical Exam   Constitutional: She is oriented to person, place, and time. She appears well-developed and well-nourished. No distress.   Patient uses walker for assistance with ambulation.   HENT:   Mouth/Throat: Oropharynx is clear and moist and mucous membranes are normal. No oral lesions. No oropharyngeal exudate (left tonsil noted with tonsil stones), posterior oropharyngeal edema or posterior oropharyngeal erythema.    Eyes: Conjunctivae are normal. Pupils are equal, round, and reactive to light. No scleral icterus.   Cardiovascular: Normal rate.    Pulmonary/Chest: Effort normal and breath sounds normal. No respiratory distress. She has no wheezes.   Abdominal: Soft. Normal appearance and bowel sounds are normal. She exhibits no distension, no abdominal bruit and no mass. There is no hepatosplenomegaly. There is no tenderness. There is no rigidity, no rebound, no guarding, no tenderness at McBurney's point and negative Shepard's sign. No hernia.   Neurological: She is alert and oriented to person, place, and time.   Skin: Skin is warm and dry. No rash noted. She is not diaphoretic. No erythema. No pallor.   Non-jaundiced   Psychiatric: She has a normal mood and affect. Her behavior is normal. Judgment and thought content normal.   Nursing note and vitals reviewed.      Assessment:       1. Pharyngoesophageal dysphagia    2. Heartburn    3. Cough    4. Nonspecific chest pain    5. History of shortness of breath    6. Tonsil stone        Plan:       Pharyngoesophageal dysphagia  - schedule EGD, discussed procedure with patient and possible esophageal dilation may be performed during procedure if indicated, patient verbalized understanding  - educated patient to eat smaller more frequent meals and to eat slowly and advised to eat a soft diet.  - possible UGI/esophagram/esophageal manometry if symptoms persist    Heartburn  - CONTINUE PROTONIX 40 MG ONCE DAILY AS DIRECTED, take in the morning 30-60 minutes before breakfast, discussed about possible long term use of medication (prefer to use lowest effective dose or discontinuing if possible) and discussed the risks & benefits with taking a reflux medication long term, and to take OTC calcium and vitamin d supplements as directed (such as Citracal +D), pt verbalized understanding  -discussed about the different types of medications used to treat reflux and how to use them, antacids  can be used PRN for breakthrough heartburn symptoms by reducing stomach acid that is already produced, H2 blockers (zantac) work by limiting the amount acid production, & PPI's work to block acid production and are taken daily, patient verbalized understanding.  -Educated patient on lifestyle modifications to help control/reduce reflux/abdominal pain including: avoid large meals, avoid eating within 2-3 hours of bedtime (avoid late night eating & lying down soon after eating), elevate head of bed if nocturnal symptoms are present, smoking cessation (if current smoker), & weight loss (if overweight).   -Educated to avoid known foods which trigger reflux symptoms & to minimize/avoid high-fat foods, chocolate, caffeine, citrus, alcohol, & tomato products.  -Advised to avoid/limit use of NSAID's, since they can cause GI upset, bleeding, and/or ulcers. If needed, take with food.   - schedule EGD, discussed procedure with patient, patient verbalized understanding    Cough, Nonspecific chest pain, & History of shortness of breath  - follow-up with PCP &/or cardiologist for continued evaluation and management    Tonsil stone  Recommend follow-up with Primary Care Provider/ENT for continued evaluation and management.    Follow-up in about 1 month (around 8/11/2018), or if symptoms worsen or fail to improve.      If no improvement in symptoms or symptoms worsen, call/follow-up at clinic or go to ER.

## 2018-07-11 NOTE — LETTER
July 12, 2018      NATIVIDAD Hinojosa MD  [address] [facility] [address] [place] [address]           [place] [facility]  [address] [facility] [address] [place] [address]  Phone: [phone]          Patient: Destinee Luo   MR Number: [id number]   YOB: 1931   Date of Visit: 7/11/2018       Dear Dr. NATIVIDAD Hinojosa:    Thank you for referring Destinee Luo to me for evaluation. Attached you will find relevant portions of my assessment and plan of care.    If you have questions, please do not hesitate to call me. I look forward to following Destinee Luo along with you.    Sincerely,    Coral Ramirez, [facility]    Enclosure  CC:  No Recipients    If you would like to receive this communication electronically, please contact [email or web address] or [phone] to request more information on [facility] access.    For providers and/or their staff who would like to refer a patient to [facility], please contact us through our one-stop-shop provider referral line, [facility], at [phone].    If you feel you have received this communication in error or would no longer like to receive these types of communications, please e-mail [email or web address]

## 2018-07-27 ENCOUNTER — TELEPHONE (OUTPATIENT)
Dept: GASTROENTEROLOGY | Facility: CLINIC | Age: 83
End: 2018-07-27

## 2018-07-27 NOTE — TELEPHONE ENCOUNTER
----- Message from Ivet Matta sent at 7/27/2018  9:11 AM CDT -----  Type: Needs Medical Advice    Who Called: Ara ko  Best Call Back Number: [phone]  Additional Information: Cotnact to advise if patient procedure has been canceled    Thank you

## 2018-07-31 ENCOUNTER — TELEPHONE (OUTPATIENT)
Dept: ADMINISTRATIVE | Facility: HOSPITAL | Age: 83
End: 2018-07-31

## 2018-07-31 NOTE — LETTER
July 31, 2018    Dr Cassidy             [facility]  [address]  [facility] [address]  Phone: [phone] July 31, 2018     Patient: Destinee Luo    YOB: 1931   Date of Visit: 7/31/2018       To Whom It May Concern:                                                                                        [facility]    We are seeing Destinee Luo in the clinic today at [facility] [place] [facility].  ZEKE Hinojosa MD is their PCP.  She/He has an outstanding lab/procedure at this time when reviewing their chart.  To help with our Health Maintenance records will you please supply the following:      [x]  Outside Immunizations   (Prevnar and Pneumovax)                                            Please Fax to [facility] [place] [facility] at [phone]    Thank you for your help, Gilles Kumar LPN-[facility].  If I can be of any assistance you can call at [phone]        [facility]         If you have any questions or concerns, please don't hesitate to call.    Sincerely,        ZEKE Hinojosa MD

## 2018-07-31 NOTE — LETTER
September 6, 2018    Dr Cassidy             [facility]  [address]  [facility] [address]  Phone: [phone] September 6, 2018     Patient: Destinee Luo    YOB: 1931   Date of Visit: 7/31/2018       To Whom It May Concern:                                                                                    [facility]    We are seeing Destinee Luo in the clinic today at [facility].  ZEKE Hinojosa MD is their PCP.  She/He has an outstanding lab/procedure at this time when reviewing their chart.  To help with our Health Maintenance records will you please supply the following:      [x]  Outside Immunizations     (3rd request)                                       Please Fax to [facility] [place] [facility] at [phone]    Thank you for your help, Gilles Kumar LPN-[facility].  If I can be of any assistance you can call at [phone]        [facility]         If you have any questions or concerns, please don't hesitate to call.    Sincerely,        ZEKE Hinojosa MD

## 2018-07-31 NOTE — LETTER
August 14, 2018    Dr Edilberto Cassidy             [facility]  [address]  [facility] [address]  Phone: [phone] August 14, 2018     Patient: Destinee Luo    YOB: 1931   Date of Visit: 7/31/2018       To Whom It May Concern:                                                                                    [facility]    We are seeing Destinee Luo in the clinic today at [facility] [place] [facility].  ZEKE Hinojosa MD is their PCP.  She/He has an outstanding lab/procedure at this time when reviewing their chart.  To help with our Health Maintenance records will you please supply the following:      [x]  Outside Immunizations   (Prevnar and Pneumovax)                                            Please Fax to [facility] [place] [facility] at [phone]    Thank you for your help, Gilles Kumar LPN-[facility].  If I can be of any assistance you can call at [phone]        [facility]         If you have any questions or concerns, please don't hesitate to call.    Sincerely,        ZEKE Hinojosa MD

## 2018-08-16 ENCOUNTER — TELEPHONE (OUTPATIENT)
Dept: GYNECOLOGIC ONCOLOGY | Facility: CLINIC | Age: 83
End: 2018-08-16

## 2018-08-17 ENCOUNTER — OFFICE VISIT (OUTPATIENT)
Dept: RADIATION ONCOLOGY | Facility: CLINIC | Age: 83
End: 2018-08-17
Payer: MEDICARE

## 2018-08-17 ENCOUNTER — OFFICE VISIT (OUTPATIENT)
Dept: GYNECOLOGIC ONCOLOGY | Facility: CLINIC | Age: 83
End: 2018-08-17
Payer: MEDICARE

## 2018-08-17 VITALS
HEART RATE: 77 BPM | BODY MASS INDEX: 26.81 KG/M2 | DIASTOLIC BLOOD PRESSURE: 69 MMHG | WEIGHT: 151.31 LBS | DIASTOLIC BLOOD PRESSURE: 68 MMHG | SYSTOLIC BLOOD PRESSURE: 156 MMHG | WEIGHT: 147 LBS | TEMPERATURE: 98 F | SYSTOLIC BLOOD PRESSURE: 158 MMHG | BODY MASS INDEX: 26.04 KG/M2 | HEART RATE: 82 BPM | RESPIRATION RATE: 16 BRPM | HEIGHT: 63 IN

## 2018-08-17 DIAGNOSIS — C54.1 ENDOMETRIAL CANCER: Primary | ICD-10-CM

## 2018-08-17 PROCEDURE — 99214 OFFICE O/P EST MOD 30 MIN: CPT | Mod: S$PBB,,, | Performed by: OBSTETRICS & GYNECOLOGY

## 2018-08-17 PROCEDURE — 99213 OFFICE O/P EST LOW 20 MIN: CPT | Mod: PBBFAC | Performed by: OBSTETRICS & GYNECOLOGY

## 2018-08-17 PROCEDURE — 99213 OFFICE O/P EST LOW 20 MIN: CPT | Mod: S$PBB,,, | Performed by: RADIOLOGY

## 2018-08-17 PROCEDURE — 99999 PR PBB SHADOW E&M-EST. PATIENT-LVL III: CPT | Mod: PBBFAC,,, | Performed by: RADIOLOGY

## 2018-08-17 PROCEDURE — 99999 PR PBB SHADOW E&M-EST. PATIENT-LVL III: CPT | Mod: PBBFAC,,, | Performed by: OBSTETRICS & GYNECOLOGY

## 2018-08-17 PROCEDURE — 99213 OFFICE O/P EST LOW 20 MIN: CPT | Mod: PBBFAC,27 | Performed by: RADIOLOGY

## 2018-08-17 NOTE — PROGRESS NOTES
REFERRING PHYSICIAN: Carlos Mann M.D., Kiet Arellano M.D.     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 Luo is an 86-year-old female who was recently diagnosed with synchronous endometrial carcinoma and right ureter carcinoma after evaluation for hematuria.  An endovaginal ultrasound revealed prominent endometrium worrisome for neoplastic process. On November 22, 2017, she underwent endometrial and ureter biopsy. 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 The urethral biopsy revealed high-grade urothelial carcinoma.      On January 4, 2018, she underwent AMOS/BSO and pelvic sampling as well as right ureteral resection and reimplantation of the right ureter. The pathology from the uterus revealed FIGO grade 3, endometrioid carcinoma, with invasion into greater than 50% of the myometrium (invasion of 9 mm out of 10 mm of myometrium). The tumor measured 1.5 x 1.2 x 1 cm. There is no lymphovascular invasion noted. 3 out of 3 lymph nodes were negative for involvement. The pathology from the right ureter revealed high-grade noninvasive papillary urothelial carcinoma. After evaluation by Dr. Arellano, patient has decided to undergo close monitoring for the ureteric cancer.  To reduce her chance of vaginal cuff recurrence, she received high-dose rate brachytherapy to the vaginal cuff and upper vagina as shown above.  She returns for a routine follow-up.    She reports that recently she underwent resection of another lesion from the bladder at an outside facility which was found upon surveillance.  The pathology is still pending.  At present, she denies vaginal bleeding, vaginal discharge, dysuria, hematuria,  "rectal bleeding, or rectal pain.  She has planned to see Dr. Mann later today.    PHYSICAL EXAMINATION:  VITAL SINGS: BP (!) 156/68   Pulse 77   Temp 97.9 °F (36.6 °C) (Oral)   Resp 16   Ht 5' 3" (1.6 m)   Wt 68.6 kg (151 lb 4.8 oz)   BMI 26.80 kg/m²   GENERAL: Patient is alert and oriented, in no acute distress.  HEENT: Extraocular muscles are intact.  Oropharynx is clear without lesions.  There is no cervical or supraclavicular adenopathy palpated.    CHEST: Breath sounds clear bilaterally.  No rales.  No rhonchi.  Unlabored respirations.  CARDIOVASCULAR: Normal S1, S2.  Normal rate.  Regular rhythm.  ABDOMEN: Bowel sounds normal.  No tenderness.  No abdominal distention.  No hepatomegaly.  No splenomegaly.  PELVIC EXAM:  A speculum examination reveals intact vaginal cuff with no suspicious lesions noted.  EXTREMITIES: No clubbing, cyanosis, edema  NEUROLOGIC: Cranial nerves II through XII are grossly intact.  Sensation is intact.  Strength is 5 out of 5 in the upper and lower extremities bilaterally.    ASSESSMENT:   This is an 86-year-old female with FIGO grade 3, stage IB, endometrial cancer who underwent AMOS/BSO on January 4, 2018 with invasion of greater than one half of the myometrium and high-grade urothelial cancer of the right ureter, who completed vaginal cuff brachytherapy.      PLAN:   Ms. Luo is doing well from the standpoint of endometrial cancer.  She will continue follow-up with [facility] and continue surveillance for that.  I plan to see her back as needed, unless symptoms warrant otherwise.      "

## 2018-08-17 NOTE — PROGRESS NOTES
Subjective:      Patient ID: Destinee Luo is a 86 y.o. female.    Chief Complaint: Endometrial Cancer (f/u)    Treatment History  Stage IBG3 (clinical) endometrial cancer  RALH/BSO in combo with a right ureteral resection and ureteroneocystostomy by Dr. Arellano on 1/4/18  Completed VB to 21 Gy on 6/7/18    HPI  Here today for continued surveillance.  Completed brachytherapy as above.  Also recently had cysto and bladder biopsy.  [facility] VB, F/C, N/V.  Review of Systems   Constitutional: Negative for activity change, appetite change, chills, fatigue and fever.   HENT: Negative for hearing loss, mouth sores, nosebleeds, sore throat and tinnitus.    Eyes: Negative for visual disturbance.   Respiratory: Negative for cough, chest tightness, shortness of breath and wheezing.    Cardiovascular: Negative for chest pain and leg swelling.   Gastrointestinal: Negative for abdominal distention, abdominal pain, blood in stool, constipation, diarrhea, nausea and vomiting.   Genitourinary: Negative for dysuria, flank pain, frequency, hematuria, pelvic pain, vaginal bleeding, vaginal discharge and vaginal pain.   Musculoskeletal: Negative for arthralgias and back pain.   Skin: Negative for rash.   Neurological: Negative for dizziness, seizures, syncope, weakness and numbness.   Hematological: Does not bruise/bleed easily.   Psychiatric/Behavioral: Negative for confusion and sleep disturbance. The patient is not nervous/anxious.        Objective:   Physical Exam:   Constitutional: She appears well-developed and well-nourished. No distress.    HENT:   Head: Normocephalic and atraumatic.    Eyes: No scleral icterus.    Neck: Normal range of motion. Neck supple.    Cardiovascular: Normal rate and intact distal pulses.  Exam reveals no cyanosis and no edema.     Pulmonary/Chest: Effort normal. No respiratory distress. She exhibits no tenderness.        Abdominal: Soft. She exhibits no distension (healed incisions), no fluid wave, no  ascites and no mass. There is no tenderness. There is no rebound and no guarding. No hernia.     Genitourinary: Rectum normal and vagina normal. Pelvic exam was performed with patient supine. There is no rash, tenderness or lesion on the right labia. There is no rash, tenderness or lesion on the left labia. Uterus is absent. There is an absent adnexa. Right adnexum displays no mass, no tenderness and no fullness. Left adnexum displays no mass, no tenderness and no fullness. No bleeding (cuff without defect or lesion) or unspecified prolapse of vaginal walls in the vagina. No vaginal discharge found. Vaginal cuff normal.Labial bartholins normal.Cervix exhibits absence.              Lymphadenopathy:     She has no cervical adenopathy.        Right: No inguinal adenopathy present.        Left: No inguinal adenopathy present.     Skin: No cyanosis.        Assessment:     1. Endometrial cancer        Plan:       Overall continues to do well.  IRMA on exam today.  Will have her f/u at 3 month intervals given r/o recurrence.

## 2018-09-05 ENCOUNTER — PATIENT MESSAGE (OUTPATIENT)
Dept: FAMILY MEDICINE | Facility: CLINIC | Age: 83
End: 2018-09-05

## 2018-09-05 ENCOUNTER — PATIENT MESSAGE (OUTPATIENT)
Dept: GYNECOLOGIC ONCOLOGY | Facility: CLINIC | Age: 83
End: 2018-09-05

## 2018-09-12 ENCOUNTER — OFFICE VISIT (OUTPATIENT)
Dept: FAMILY MEDICINE | Facility: CLINIC | Age: 83
End: 2018-09-12
Payer: MEDICARE

## 2018-09-12 VITALS
TEMPERATURE: 98 F | SYSTOLIC BLOOD PRESSURE: 130 MMHG | OXYGEN SATURATION: 98 % | HEART RATE: 82 BPM | DIASTOLIC BLOOD PRESSURE: 82 MMHG | BODY MASS INDEX: 27.7 KG/M2 | HEIGHT: 63 IN | WEIGHT: 156.31 LBS

## 2018-09-12 DIAGNOSIS — Z87.440 HISTORY OF RECURRENT UTI (URINARY TRACT INFECTION): ICD-10-CM

## 2018-09-12 DIAGNOSIS — N18.30 STAGE 3 CHRONIC KIDNEY DISEASE: ICD-10-CM

## 2018-09-12 DIAGNOSIS — K43.9 HERNIA OF ABDOMINAL WALL: Primary | ICD-10-CM

## 2018-09-12 DIAGNOSIS — R10.30 LOWER ABDOMINAL PAIN: ICD-10-CM

## 2018-09-12 DIAGNOSIS — N28.89 URETERAL MASS: ICD-10-CM

## 2018-09-12 PROCEDURE — 99213 OFFICE O/P EST LOW 20 MIN: CPT | Mod: S$PBB,,, | Performed by: NURSE PRACTITIONER

## 2018-09-12 PROCEDURE — 99999 PR PBB SHADOW E&M-EST. PATIENT-LVL IV: CPT | Mod: PBBFAC,,, | Performed by: NURSE PRACTITIONER

## 2018-09-12 PROCEDURE — 99214 OFFICE O/P EST MOD 30 MIN: CPT | Mod: PBBFAC,PO | Performed by: NURSE PRACTITIONER

## 2018-09-12 NOTE — PROGRESS NOTES
Subjective:       Patient ID: Destinee Luo is a 86 y.o. female.    Chief Complaint: Abdominal Pain (left lower area- hernia concerns )    Patient who is new to me presents with concern about her hernia. She has abdominal pain last week that she rated a 2/10. She states it felt like it was located to her hernia. She has been told she has 2 hernia's in the past. She does not want surgery. She use tylenol and a heating pad. This helped improve symptoms. She has chronic UTIs. Patient has her urine sent for culture and urinalysis through her home health to her urologist. She will get her results tomorrow. She is on macrobid daily for chronic utis. Patient states she has no problem with her bowels. She denies any changes in bowel habits. She denies any N/V/D/C. She denies any fever.       Review of Systems   Constitutional: Negative for chills, fatigue and fever.   HENT: Negative for congestion, sinus pressure, sinus pain, sneezing and sore throat.    Respiratory: Negative for cough, chest tightness, shortness of breath and wheezing.    Cardiovascular: Negative for chest pain, palpitations and leg swelling.   Gastrointestinal: Positive for abdominal distention and abdominal pain. Negative for anal bleeding, blood in stool, constipation, diarrhea, nausea and vomiting.   Genitourinary: Negative for decreased urine volume, difficulty urinating, dysuria, frequency and urgency.   Musculoskeletal: Negative for arthralgias, gait problem, joint swelling and myalgias.   Skin: Negative for rash and wound.   Neurological: Negative for dizziness, light-headedness, numbness and headaches.       Objective:      Physical Exam   Constitutional: She is oriented to person, place, and time. She appears well-developed and well-nourished.   HENT:   Head: Normocephalic and atraumatic.   Right Ear: External ear normal.   Left Ear: External ear normal.   Nose: Nose normal.   Mouth/Throat: Oropharynx is clear and moist.   Eyes: Pupils are equal,  round, and reactive to light.   Neck: Normal range of motion.   Cardiovascular: Normal rate, regular rhythm, normal heart sounds and intact distal pulses.   Pulmonary/Chest: Effort normal and breath sounds normal.   Abdominal: Soft. Bowel sounds are normal. There is no tenderness. There is no rigidity, no rebound, no guarding, no CVA tenderness, no tenderness at McBurney's point and negative Shepard's sign. A hernia is present.       Musculoskeletal: Normal range of motion.   Neurological: She is alert and oriented to person, place, and time.   Skin: Skin is warm and dry.   Nursing note and vitals reviewed.      Assessment:       1. Hernia of abdominal wall    2. Lower abdominal pain    3. History of recurrent UTI (urinary tract infection)    4. Stage 3 chronic kidney disease    5. Ureteral mass        Plan:       Hernia of abdominal wall  -     US Abdomen Limited; Future; Expected date: 09/12/2018    Lower abdominal pain  -     US Abdomen Limited; Future; Expected date: 09/12/2018    History of recurrent UTI (urinary tract infection)  Stable continue on current macrobid. Patient sent urine to urologist.  Stage 3 chronic kidney disease  Stable. Bp controlled on current medications.   Ureteral mass  Stable and followed by urologist.     Discussed with patient that hernia is reducible and discussed s/s of an incarcerated hernia. Discussed high fiber and adequate hydration to reduce constipation. Will follow up on hernia US. Patient to follow up with any new or concerning symptoms.

## 2018-09-17 ENCOUNTER — HOSPITAL ENCOUNTER (OUTPATIENT)
Dept: RADIOLOGY | Facility: HOSPITAL | Age: 83
Discharge: HOME | End: 2018-09-17
Attending: NURSE PRACTITIONER
Payer: MEDICARE

## 2018-09-17 DIAGNOSIS — R10.30 LOWER ABDOMINAL PAIN: ICD-10-CM

## 2018-09-17 DIAGNOSIS — K43.9 HERNIA OF ABDOMINAL WALL: ICD-10-CM

## 2018-09-17 PROCEDURE — 76705 ECHO EXAM OF ABDOMEN: CPT | Mod: TC,PO

## 2018-09-17 PROCEDURE — 76705 ECHO EXAM OF ABDOMEN: CPT | Mod: 26,,, | Performed by: RADIOLOGY

## 2018-09-17 NOTE — PROGRESS NOTES
Please call the patient and let her know that the US shows she does have inguinal hernia. They do not appear to be obstructed or blocked. If they start causing pain we can refer her to general surgery. Thanks.

## 2018-10-15 ENCOUNTER — TELEPHONE (OUTPATIENT)
Dept: ADMINISTRATIVE | Facility: CLINIC | Age: 83
End: 2018-10-15

## 2018-10-15 NOTE — TELEPHONE ENCOUNTER
Home Health Recert with [facility]-Dr. Viki Hinojosa. Pt received  services.

## 2018-10-18 DIAGNOSIS — I10 ESSENTIAL HYPERTENSION: ICD-10-CM

## 2018-10-19 RX ORDER — AMLODIPINE BESYLATE 2.5 MG/1
2.5 TABLET ORAL DAILY
Qty: 90 TABLET | Refills: 1 | Status: SHIPPED | OUTPATIENT
Start: 2018-10-19 | End: 2019-04-09 | Stop reason: SDUPTHER

## 2018-10-22 DIAGNOSIS — I10 ESSENTIAL HYPERTENSION: ICD-10-CM

## 2018-10-22 RX ORDER — AMLODIPINE BESYLATE 2.5 MG/1
2.5 TABLET ORAL DAILY
Qty: 90 TABLET | Refills: 1 | Status: CANCELLED | OUTPATIENT
Start: 2018-10-22

## 2018-10-31 ENCOUNTER — TELEPHONE (OUTPATIENT)
Dept: ADMINISTRATIVE | Facility: CLINIC | Age: 83
End: 2018-10-31

## 2018-11-05 ENCOUNTER — PATIENT MESSAGE (OUTPATIENT)
Dept: FAMILY MEDICINE | Facility: CLINIC | Age: 83
End: 2018-11-05

## 2018-11-05 DIAGNOSIS — I10 ESSENTIAL HYPERTENSION: ICD-10-CM

## 2018-11-05 DIAGNOSIS — E78.5 DYSLIPIDEMIA: Primary | ICD-10-CM

## 2018-11-05 DIAGNOSIS — I77.9 BILATERAL CAROTID ARTERY DISEASE, UNSPECIFIED TYPE: ICD-10-CM

## 2018-11-16 ENCOUNTER — TELEPHONE (OUTPATIENT)
Dept: GYNECOLOGIC ONCOLOGY | Facility: CLINIC | Age: 83
End: 2018-11-16

## 2018-11-19 ENCOUNTER — OFFICE VISIT (OUTPATIENT)
Dept: GYNECOLOGIC ONCOLOGY | Facility: CLINIC | Age: 83
End: 2018-11-19
Payer: MEDICARE

## 2018-11-19 VITALS
HEART RATE: 80 BPM | WEIGHT: 152 LBS | SYSTOLIC BLOOD PRESSURE: 180 MMHG | DIASTOLIC BLOOD PRESSURE: 84 MMHG | BODY MASS INDEX: 26.93 KG/M2

## 2018-11-19 DIAGNOSIS — C54.1 ENDOMETRIAL CANCER: Primary | ICD-10-CM

## 2018-11-19 PROCEDURE — 99999 PR PBB SHADOW E&M-EST. PATIENT-LVL III: CPT | Mod: PBBFAC,,, | Performed by: OBSTETRICS & GYNECOLOGY

## 2018-11-19 PROCEDURE — 99214 OFFICE O/P EST MOD 30 MIN: CPT | Mod: S$PBB,,, | Performed by: OBSTETRICS & GYNECOLOGY

## 2018-11-19 PROCEDURE — 99213 OFFICE O/P EST LOW 20 MIN: CPT | Mod: PBBFAC | Performed by: OBSTETRICS & GYNECOLOGY

## 2018-11-19 NOTE — PROGRESS NOTES
Subjective:      Patient ID: Destinee Luo is a 86 y.o. female.    Chief Complaint: Endometrial Cancer    Treatment History  Stage IBG3 (clinical) endometrial cancer  RALH/BSO in combo with a right ureteral resection and ureteroneocystostomy by Dr. Arellano on 1/4/18  Completed VB to 21 Gy on 6/7/18    HPI  Here today for continued surveillance.  Also recently had cysto and bladder biopsy that was positive for cancer but resected and repeat was negative.  Denies VB, F/C, N/V.  Review of Systems   Constitutional: Negative for activity change, appetite change, chills, fatigue and fever.   HENT: Negative for hearing loss, mouth sores, nosebleeds, sore throat and tinnitus.    Eyes: Negative for visual disturbance.   Respiratory: Negative for cough, chest tightness, shortness of breath and wheezing.    Cardiovascular: Negative for chest pain and leg swelling.   Gastrointestinal: Negative for abdominal distention, abdominal pain, blood in stool, constipation, diarrhea, nausea and vomiting.   Genitourinary: Negative for dysuria, flank pain, frequency, hematuria, pelvic pain, vaginal bleeding, vaginal discharge and vaginal pain.   Musculoskeletal: Negative for arthralgias and back pain.   Skin: Negative for rash.   Neurological: Negative for dizziness, seizures, syncope, weakness and numbness.   Hematological: Does not bruise/bleed easily.   Psychiatric/Behavioral: Negative for confusion and sleep disturbance. The patient is not nervous/anxious.        Objective:   Physical Exam:   Constitutional: She appears well-developed and well-nourished. No distress.    HENT:   Head: Normocephalic and atraumatic.    Eyes: No scleral icterus.    Neck: Normal range of motion. Neck supple.    Cardiovascular: Normal rate and intact distal pulses.  Exam reveals no cyanosis and no edema.     Pulmonary/Chest: Effort normal. No respiratory distress. She exhibits no tenderness.        Abdominal: Soft. She exhibits no distension (healed  incisions), no fluid wave, no ascites and no mass. There is no tenderness. There is no rebound and no guarding. No hernia.     Genitourinary: Rectum normal and vagina normal. Pelvic exam was performed with patient supine. There is no rash, tenderness or lesion on the right labia. There is no rash, tenderness or lesion on the left labia. Uterus is absent. There is an absent adnexa. Right adnexum displays no mass, no tenderness and no fullness. Left adnexum displays no mass, no tenderness and no fullness. No bleeding (cuff without defect or lesion) or unspecified prolapse of vaginal walls in the vagina. No vaginal discharge found. Vaginal cuff normal.Labial bartholins normal.Cervix exhibits absence.              Lymphadenopathy:     She has no cervical adenopathy.        Right: No inguinal adenopathy present.        Left: No inguinal adenopathy present.     Skin: No cyanosis.        Assessment:     1. Endometrial cancer        Plan:       Overall continues to do well.  IMRA on exam today.  Will have her f/u at 3 month intervals given r/o recurrence.

## 2018-11-28 RX ORDER — FENOFIBRATE 145 MG/1
TABLET, FILM COATED ORAL
Qty: 90 TABLET | Refills: 1 | Status: SHIPPED | OUTPATIENT
Start: 2018-11-28 | End: 2019-05-20 | Stop reason: SDUPTHER

## 2018-12-17 ENCOUNTER — TELEPHONE (OUTPATIENT)
Dept: ADMINISTRATIVE | Facility: CLINIC | Age: 83
End: 2018-12-17

## 2018-12-17 ENCOUNTER — OFFICE VISIT (OUTPATIENT)
Dept: FAMILY MEDICINE | Facility: CLINIC | Age: 83
End: 2018-12-17
Payer: MEDICARE

## 2018-12-17 ENCOUNTER — LAB VISIT (OUTPATIENT)
Dept: LAB | Facility: HOSPITAL | Age: 83
End: 2018-12-17
Attending: FAMILY MEDICINE
Payer: MEDICARE

## 2018-12-17 VITALS
HEIGHT: 63 IN | TEMPERATURE: 98 F | BODY MASS INDEX: 26.87 KG/M2 | HEART RATE: 97 BPM | OXYGEN SATURATION: 99 % | DIASTOLIC BLOOD PRESSURE: 94 MMHG | SYSTOLIC BLOOD PRESSURE: 132 MMHG | WEIGHT: 151.63 LBS

## 2018-12-17 DIAGNOSIS — I77.9 BILATERAL CAROTID ARTERY DISEASE, UNSPECIFIED TYPE: ICD-10-CM

## 2018-12-17 DIAGNOSIS — E78.5 DYSLIPIDEMIA: ICD-10-CM

## 2018-12-17 DIAGNOSIS — C54.1 ENDOMETRIAL CANCER: ICD-10-CM

## 2018-12-17 DIAGNOSIS — F41.9 ANXIETY: ICD-10-CM

## 2018-12-17 DIAGNOSIS — I10 ESSENTIAL HYPERTENSION: Primary | ICD-10-CM

## 2018-12-17 DIAGNOSIS — I10 ESSENTIAL HYPERTENSION: ICD-10-CM

## 2018-12-17 DIAGNOSIS — R42 VERTIGO: ICD-10-CM

## 2018-12-17 LAB
ALBUMIN SERPL BCP-MCNC: 4 G/DL
ALP SERPL-CCNC: 57 U/L
ALT SERPL W/O P-5'-P-CCNC: 22 U/L
ANION GAP SERPL CALC-SCNC: 11 MMOL/L
AST SERPL-CCNC: 24 U/L
BILIRUB SERPL-MCNC: 0.4 MG/DL
BUN SERPL-MCNC: 24 MG/DL
CALCIUM SERPL-MCNC: 9.8 MG/DL
CHLORIDE SERPL-SCNC: 105 MMOL/L
CHOLEST SERPL-MCNC: 178 MG/DL
CHOLEST/HDLC SERPL: 3 {RATIO}
CO2 SERPL-SCNC: 24 MMOL/L
CREAT SERPL-MCNC: 1.4 MG/DL
EST. GFR  (AFRICAN AMERICAN): 39 ML/MIN/1.73 M^2
EST. GFR  (NON AFRICAN AMERICAN): 33.8 ML/MIN/1.73 M^2
GLUCOSE SERPL-MCNC: 98 MG/DL
HDLC SERPL-MCNC: 60 MG/DL
HDLC SERPL: 33.7 %
LDLC SERPL CALC-MCNC: 96.8 MG/DL
NONHDLC SERPL-MCNC: 118 MG/DL
POTASSIUM SERPL-SCNC: 4.2 MMOL/L
PROT SERPL-MCNC: 8 G/DL
SODIUM SERPL-SCNC: 140 MMOL/L
TRIGL SERPL-MCNC: 106 MG/DL

## 2018-12-17 PROCEDURE — 36415 COLL VENOUS BLD VENIPUNCTURE: CPT | Mod: PO

## 2018-12-17 PROCEDURE — 99999 PR PBB SHADOW E&M-EST. PATIENT-LVL III: CPT | Mod: PBBFAC,,, | Performed by: FAMILY MEDICINE

## 2018-12-17 PROCEDURE — 80053 COMPREHEN METABOLIC PANEL: CPT

## 2018-12-17 PROCEDURE — 99214 OFFICE O/P EST MOD 30 MIN: CPT | Mod: S$PBB,,, | Performed by: FAMILY MEDICINE

## 2018-12-17 PROCEDURE — 99213 OFFICE O/P EST LOW 20 MIN: CPT | Mod: PBBFAC,PO | Performed by: FAMILY MEDICINE

## 2018-12-17 PROCEDURE — 80061 LIPID PANEL: CPT

## 2018-12-17 RX ORDER — CIPROFLOXACIN 500 MG/1
TABLET ORAL
Refills: 0 | COMMUNITY
Start: 2018-12-04 | End: 2019-01-30

## 2018-12-17 RX ORDER — ALPRAZOLAM 0.25 MG/1
0.25 TABLET ORAL 2 TIMES DAILY PRN
Qty: 45 TABLET | Refills: 0 | Status: SHIPPED | OUTPATIENT
Start: 2018-12-17 | End: 2020-01-01 | Stop reason: SDUPTHER

## 2018-12-17 RX ORDER — MECLIZINE HCL 12.5 MG 12.5 MG/1
12.5 TABLET ORAL 3 TIMES DAILY PRN
Qty: 60 TABLET | Refills: 1 | Status: ON HOLD | OUTPATIENT
Start: 2018-12-17 | End: 2021-01-01 | Stop reason: CLARIF

## 2018-12-17 RX ORDER — FLUCONAZOLE 150 MG/1
TABLET ORAL
COMMUNITY
Start: 2018-12-14 | End: 2019-08-01

## 2018-12-17 NOTE — TELEPHONE ENCOUNTER
Home Health Recert with [facility] ([place], [place]) [facility]-Dr. Viki Hinojosa. Pt received  services.

## 2018-12-17 NOTE — PROGRESS NOTES
"Subjective:       Patient ID: Destinee Luo is a 87 y.o. female.    Chief Complaint: Follow-up    Pt is known to me.   Pt is here for followup of chronic medical issues.  She had blood work drawn this morning and results are still pending.  She continues follow up with GYN and urologist for endometrial ca s/p surgery.  The pt is feeling well with no new complains today.  She does however still have frequent anxiety and rare panic attacks.  She has occasional vertigo--has seen ENT.  She uses meclizine with good results.      Review of Systems   Constitutional: Negative for activity change, appetite change, fatigue and unexpected weight change.   Eyes: Negative for visual disturbance.   Respiratory: Negative for cough, chest tightness and shortness of breath.    Cardiovascular: Negative for chest pain, palpitations and leg swelling.   Gastrointestinal: Negative for abdominal pain, constipation, diarrhea, nausea and vomiting.   Endocrine: Negative for cold intolerance, heat intolerance and polyuria.   Genitourinary: Negative for decreased urine volume and dysuria.   Musculoskeletal: Negative for arthralgias and back pain.   Skin: Negative for rash.   Neurological: Negative for numbness and headaches.   Psychiatric/Behavioral: Negative for dysphoric mood. The patient is nervous/anxious.        Objective:       Vitals:    12/17/18 0950   BP: (!) 132/94   Pulse: 97   Temp: 98.4 °F (36.9 °C)   SpO2: 99%   Weight: 68.8 kg (151 lb 10.1 oz)   Height: 5' 3" (1.6 m)     Physical Exam   Constitutional: She is oriented to person, place, and time. She appears well-developed and well-nourished.   HENT:   Head: Normocephalic.   Eyes: Conjunctivae and EOM are normal. Pupils are equal, round, and reactive to light.   Neck: Normal range of motion. Neck supple. No thyromegaly present.   Cardiovascular: Normal rate, regular rhythm and normal heart sounds.   Pulmonary/Chest: Effort normal and breath sounds normal.   Abdominal: Soft. Bowel " sounds are normal. There is no tenderness.   Musculoskeletal: Normal range of motion. She exhibits no tenderness or deformity.   Lymphadenopathy:     She has no cervical adenopathy.   Neurological: She is alert and oriented to person, place, and time. She displays normal reflexes. No cranial nerve deficit. She exhibits normal muscle tone. Coordination normal.   Skin: Skin is warm and dry.   Psychiatric: She has a normal mood and affect. Her behavior is normal.       Assessment:       1. Essential hypertension    2. Dyslipidemia    3. Endometrial cancer    4. Vertigo    5. Anxiety        Plan:       Detsinee was seen today for follow-up.    Diagnoses and all orders for this visit:    Essential hypertension    Dyslipidemia    Endometrial cancer    Vertigo  -     meclizine (ANTIVERT) 12.5 mg tablet; Take 1 tablet (12.5 mg total) by mouth 3 (three) times daily as needed.    Anxiety  -     ALPRAZolam (XANAX) 0.25 MG tablet; Take 1 tablet (0.25 mg total) by mouth 2 (two) times daily as needed for Anxiety. For anxiety      During this visit, I reviewed the pt's history, medications, allergies, and problem list.

## 2019-01-25 ENCOUNTER — TELEPHONE (OUTPATIENT)
Dept: UROLOGY | Facility: CLINIC | Age: 84
End: 2019-01-25

## 2019-01-25 NOTE — TELEPHONE ENCOUNTER
----- Message from Beverly Zhang sent at 1/25/2019  1:02 PM CST -----  Contact: Latasha  Name of Who is Calling:Latasha    What is the request in detail: Latasha with Gagandeep Larson MD. Office would like for patient to come in for an office visit sooner than first available 02/08/19 also states she sent over a some lab work for Dr. Arellano to look over on at the office visit . Please contact to further discuss and advise     Can the clinic reply by [facility]:     What Number to [facility] Back [facility] not [facility] [phone]

## 2019-01-25 NOTE — TELEPHONE ENCOUNTER
CT report in Epic from [place]. I spoke to granddaughter catrachita will  disk. Latasha from Dr. Riley office will fax us cystoscopy and path report when she receives this--it was done on 1/18/19.

## 2019-01-30 ENCOUNTER — ANESTHESIA EVENT (OUTPATIENT)
Dept: SURGERY | Facility: OTHER | Age: 84
End: 2019-01-30
Payer: MEDICARE

## 2019-01-30 ENCOUNTER — OFFICE VISIT (OUTPATIENT)
Dept: UROLOGY | Facility: CLINIC | Age: 84
End: 2019-01-30
Payer: MEDICARE

## 2019-01-30 ENCOUNTER — HOSPITAL ENCOUNTER (OUTPATIENT)
Dept: PREADMISSION TESTING | Facility: OTHER | Age: 84
Discharge: HOME | End: 2019-01-30
Attending: UROLOGY
Payer: MEDICARE

## 2019-01-30 VITALS
BODY MASS INDEX: 26.75 KG/M2 | WEIGHT: 151 LBS | SYSTOLIC BLOOD PRESSURE: 183 MMHG | HEIGHT: 63 IN | DIASTOLIC BLOOD PRESSURE: 72 MMHG | TEMPERATURE: 98 F | OXYGEN SATURATION: 97 % | HEART RATE: 76 BPM

## 2019-01-30 VITALS
WEIGHT: 151 LBS | HEART RATE: 79 BPM | BODY MASS INDEX: 26.75 KG/M2 | HEIGHT: 63 IN | SYSTOLIC BLOOD PRESSURE: 191 MMHG | DIASTOLIC BLOOD PRESSURE: 83 MMHG

## 2019-01-30 DIAGNOSIS — C66.1 URETERAL CANCER, RIGHT: Primary | ICD-10-CM

## 2019-01-30 DIAGNOSIS — N18.30 CKD (CHRONIC KIDNEY DISEASE) STAGE 3, GFR 30-59 ML/MIN: ICD-10-CM

## 2019-01-30 DIAGNOSIS — R82.90 ABNORMAL FINDING IN URINE: ICD-10-CM

## 2019-01-30 PROCEDURE — 88112 CYTOLOGY SPECIMEN-URINE: ICD-10-PCS | Mod: 26,,, | Performed by: PATHOLOGY

## 2019-01-30 PROCEDURE — 99215 PR OFFICE/OUTPT VISIT, EST, LEVL V, 40-54 MIN: ICD-10-PCS | Mod: S$GLB,,, | Performed by: UROLOGY

## 2019-01-30 PROCEDURE — 88112 CYTOPATH CELL ENHANCE TECH: CPT | Performed by: PATHOLOGY

## 2019-01-30 PROCEDURE — 87086 URINE CULTURE/COLONY COUNT: CPT

## 2019-01-30 PROCEDURE — 99215 OFFICE O/P EST HI 40 MIN: CPT | Mod: S$GLB,,, | Performed by: UROLOGY

## 2019-01-30 RX ORDER — SODIUM CHLORIDE, SODIUM LACTATE, POTASSIUM CHLORIDE, CALCIUM CHLORIDE 600; 310; 30; 20 MG/100ML; MG/100ML; MG/100ML; MG/100ML
INJECTION, SOLUTION INTRAVENOUS CONTINUOUS
Status: CANCELLED | OUTPATIENT
Start: 2019-01-30

## 2019-01-30 RX ORDER — LIDOCAINE HYDROCHLORIDE 20 MG/ML
JELLY TOPICAL ONCE
Status: CANCELLED | OUTPATIENT
Start: 2019-01-30 | End: 2019-01-30

## 2019-01-30 NOTE — H&P (VIEW-ONLY)
Subjective:      Destinee Luo is a 87 y.o. 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  No lesion on on-contrasted CT        The following portions of the patient's history were reviewed and updated as appropriate: allergies, current medications, past family history, past medical history, past social history, past surgical history and problem list.    Review of Systems  Pertinent items are noted in HPI.  A comprehensive multipoint review of systems was negative except as otherwise stated in the HPI.    Past Medical History:   Diagnosis Date    Anticoagulant long-term use     stopped Plavix and ASA 11/2017    High cholesterol     Hypertension     Macular degeneration     S/P complete hysterectomy 01/04/2018    Stroke 2012    TIA, no deficits     Past Surgical History:   Procedure Laterality Date    BIOPSY ENDOMETRIAL  11/22/2017    [facility] OR    COLONOSCOPY  prior to 2008    CYSTOSCOPY/ RETROGRADE PYELOGRAM/ STENT PLACEMENT/STENT EXCHANGE Right 11/15/2017    [facility] OR    HYSTERECTOMY  01/04/2018    NEPHRECTOMY- ROBOT -  POSS OPEN Right 1/4/2018    [facility]    NO PAST SURGERIES      REIMPLANTATION-URETER Right 1/4/2018    [facility]    URETERAL REIMPLANTION      URETERAL STENT PLACEMENT      URETERECTOMY Right 1/4/2018    [facility]    URETEROSCOPY      URETEROSCOPY - WITH BX MASS Right 11/22/2017    [facility]    URETEROSCOPY; biopsy Right 11/15/2017    [facility]    XI ROBOT ASSISTED LAPAROSCOPIC SALPINGO-OOPHERECTOMY Bilateral 1/4/2018     "[facility]    XI ROBOTIC ASSISTED LAPAROSCOPIC HYSTERECTOMY N/A 1/4/2018    [facility] by [facility] at [facility] OR       Review of patient's allergies indicates:   Allergen Reactions    Sulfa (sulfonamide antibiotics)     Codeine Anxiety          Objective:   Vitals: BP (!) 191/83 (BP Location: Right arm, Patient Position: Sitting, BP Method: Large (Automatic))   Pulse 79   Ht 5' 3" (1.6 m)   Wt 68.5 kg (151 lb)   BMI 26.75 kg/m²     Physical Exam   General: alert and oriented, no acute distress  Respiratory: Symmetric expansion, non-labored breathing  Cardiovascular: no peripheral edema  Abdomen: soft, non distended  Skin: normal coloration and turgor, no rashes, no suspicious skin lesions noted  Neuro: no gross deficits  Psych: normal judgment and insight, normal mood/affect and non-anxious    Physical Exam    Lab Review   Urinalysis demonstrates nitrite neg  +wbc, +rbc  Cs snet    Cytology sent      Lab Results   Component Value Date    WBC 7.10 03/05/2018    HGB 9.5 (L) 03/05/2018    HCT 30.6 (L) 03/05/2018    MCV 84 03/05/2018     (H) 03/05/2018     Lab Results   Component Value Date    CREATININE 1.4 12/17/2018    BUN 24 (H) 12/17/2018       Imaging  CT without contrast  No mass  No adenopathy  Right hydro with disorted bladder consistent with psoas hitch  Right renal atrophy    Assessment and Plan:   Ureteral cancer, right  -     Urine culture  -     Vital Signs ; Standing  -     POCT glucose; Standing  -     Notify physician ; Standing  -     Diet NPO; Standing  -     Case Request Operating Room: URETEROSCOPY; bilateral retorgrade pyelogram  -     Place in Outpatient - Extended Recovery; Standing  -     Place sequential compression device; Standing  -     Place KIANNA hose; Standing  -     Reason for No Pharmacological VTE Prophylaxis; Standing  -     Cytology, urine    CKD (chronic kidney disease) stage 3, GFR 30-59 ml/min  -     Urine culture  -     Cytology, " urine    Abnormal finding in urine   -     Urine culture  -     Cytology, urine    Other orders  -     IP VTE HIGH RISK PATIENT; Standing  -     ceFAZolin (ANCEF) 2 g in dextrose 5 % 50 mL IVPB  -     lidocaine HCl 2% urojet      Cysto B RPG and Right ureteroscopy with biopsy +/- ablation

## 2019-01-30 NOTE — DISCHARGE INSTRUCTIONS
PRE-ADMIT TESTING -  [phone]    [address]  [place] [facility]          Your surgery has been scheduled at [facility]. We are pleased to have the opportunity to serve you. For Further Information please call [phone].    On the day of surgery please report to the Information Desk on the 1st floor.    · CONTACT YOUR PHYSICIAN'S OFFICE THE DAY PRIOR TO YOUR SURGERY TO OBTAIN YOUR ARRIVAL TIME.     · The evening before surgery do not eat anything after 9 p.m. ( this includes hard candy, chewing gum and mints).  You may only have GATORADE, POWERADE AND WATER  from 9 p.m. until you leave your home.   DO NOT DRINK ANY LIQUIDS ON THE WAY TO THE HOSPITAL.      SPECIAL MEDICATION INSTRUCTIONS: TAKE medications checked off by the Anesthesiologist on your Medication List.    Angiogram Patients: Take medications as instructed by your physician, including aspirin.     Surgery Patients:    If you take ASPIRIN - Your PHYSICIAN/SURGEON will need to inform you IF/OR when you need to stop taking aspirin prior to your surgery.     Do Not take any medications containing IBUPROFEN.  Do Not Wear any make-up or dark nail polish   (especially eye make-up) to surgery. If you come to surgery with makeup on you will be required to remove the makeup or nail polish.    Do not shave your surgical area at least 5 days prior to your surgery. The surgical prep will be performed at the hospital according to Infection Control regulations.    Leave all valuables at home.   Do Not wear any jewelry or watches, including any metal in body piercings. Jewelry must be removed prior to coming to the hospital.  There is a possibility that rings that are unable to be removed may be cut off if they are on the surgical extremity.    Contact Lens must be removed before surgery. Either do not wear the contact lens or bring a case and solution for storage.  Please bring a container for eyeglasses or dentures as required.  Bring  any paperwork your physician has provided, such as consent forms,  history and physicals, doctor's orders, etc.   Bring comfortable clothes that are loose fitting to wear upon discharge. Take into consideration the type of surgery being performed.  Maintain your diet as advised per your physician the day prior to surgery.      Adequate rest the night before surgery is advised.   Park in the Parking lot behind the hospital or in the [place] [facility] across the street from the parking lot. Parking is complimentary.  If you will be discharged the same day as your procedure, please arrange for a responsible adult to drive you home or to accompany you if traveling by taxi.   YOU WILL NOT BE PERMITTED TO DRIVE OR TO LEAVE THE HOSPITAL ALONE AFTER SURGERY.   It is strongly recommended that you arrange for someone to remain with you for the first 24 hrs following your surgery.       Thank you for your cooperation.  The Staff of [facility].        Bathing Instructions                                                                 Please shower the evening before and morning of your procedure with    ANTIBACTERIAL SOAP. ( DIAL, etc )  Concentrate on the surgical area   for at least 3 minutes and rinse completely. Dry off as usual.   Do not use any deodorant, powder, body lotions, perfume, after shave or    cologne.

## 2019-01-30 NOTE — PROGRESS NOTES
Subjective:      Destinee Luo is a 87 y.o. 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  No lesion on on-contrasted CT        The following portions of the patient's history were reviewed and updated as appropriate: allergies, current medications, past family history, past medical history, past social history, past surgical history and problem list.    Review of Systems  Pertinent items are noted in HPI.  A comprehensive multipoint review of systems was negative except as otherwise stated in the HPI.    Past Medical History:   Diagnosis Date    Anticoagulant long-term use     stopped Plavix and ASA 11/2017    High cholesterol     Hypertension     Macular degeneration     S/P complete hysterectomy 01/04/2018    Stroke 2012    TIA, no deficits     Past Surgical History:   Procedure Laterality Date    BIOPSY ENDOMETRIAL  11/22/2017    [facility] OR    COLONOSCOPY  prior to 2008    CYSTOSCOPY/ RETROGRADE PYELOGRAM/ STENT PLACEMENT/STENT EXCHANGE Right 11/15/2017    [facility] OR    HYSTERECTOMY  01/04/2018    NEPHRECTOMY- ROBOT -  POSS OPEN Right 1/4/2018    [facility] OR    NO PAST SURGERIES      REIMPLANTATION-URETER Right 1/4/2018    [facility]    URETERAL REIMPLANTION      URETERAL STENT PLACEMENT      URETERECTOMY Right 1/4/2018    [facility] OR    URETEROSCOPY      URETEROSCOPY - WITH BX MASS Right 11/22/2017    [facility] OR    URETEROSCOPY; biopsy Right 11/15/2017    [facility] at [facility] OR    XI ROBOT ASSISTED LAPAROSCOPIC SALPINGO-OOPHERECTOMY Bilateral 1/4/2018     "[facility]    XI ROBOTIC ASSISTED LAPAROSCOPIC HYSTERECTOMY N/A 1/4/2018    [facility] by [facility] at [facility]       Review of patient's allergies indicates:   Allergen Reactions    Sulfa (sulfonamide antibiotics)     Codeine Anxiety          Objective:   Vitals: BP (!) 191/83 (BP Location: Right arm, Patient Position: Sitting, BP Method: Large (Automatic))   Pulse 79   Ht 5' 3" (1.6 m)   Wt 68.5 kg (151 lb)   BMI 26.75 kg/m²     Physical Exam   General: alert and oriented, no acute distress  Respiratory: Symmetric expansion, non-labored breathing  Cardiovascular: no peripheral edema  Abdomen: soft, non distended  Skin: normal coloration and turgor, no rashes, no suspicious skin lesions noted  Neuro: no gross deficits  Psych: normal judgment and insight, normal mood/affect and non-anxious    Physical Exam    Lab Review   Urinalysis demonstrates nitrite neg  +wbc, +rbc  Cs snet    Cytology sent      Lab Results   Component Value Date    WBC 7.10 03/05/2018    HGB 9.5 (L) 03/05/2018    HCT 30.6 (L) 03/05/2018    MCV 84 03/05/2018     (H) 03/05/2018     Lab Results   Component Value Date    CREATININE 1.4 12/17/2018    BUN 24 (H) 12/17/2018       Imaging  CT without contrast  No mass  No adenopathy  Right hydro with disorted bladder consistent with psoas hitch  Right renal atrophy    Assessment and Plan:   Ureteral cancer, right  -     Urine culture  -     Vital Signs ; Standing  -     POCT glucose; Standing  -     Notify physician ; Standing  -     Diet NPO; Standing  -     Case Request Operating Room: URETEROSCOPY; bilateral retorgrade pyelogram  -     Place in Outpatient - Extended Recovery; Standing  -     Place sequential compression device; Standing  -     Place KIANNA hose; Standing  -     Reason for No Pharmacological VTE Prophylaxis; Standing  -     Cytology, urine    CKD (chronic kidney disease) stage 3, GFR 30-59 ml/min  -     Urine culture  -     Cytology, " urine    Abnormal finding in urine   -     Urine culture  -     Cytology, urine    Other orders  -     IP VTE HIGH RISK PATIENT; Standing  -     ceFAZolin (ANCEF) 2 g in dextrose 5 % 50 mL IVPB  -     lidocaine HCl 2% urojet      Cysto B RPG and Right ureteroscopy with biopsy +/- ablation

## 2019-01-30 NOTE — ANESTHESIA PREPROCEDURE EVALUATION
01/30/2019  Destinee Luo is a 87 y.o., female.    Anesthesia Evaluation    I have reviewed the Patient Summary Reports.    I have reviewed the Nursing Notes.   I have reviewed the Medications.     Review of Systems  Anesthesia Hx:  No problems with previous Anesthesia  Denies Family Hx of Anesthesia complications.   Denies Personal Hx of Anesthesia complications.   Social:  Non-Smoker    Hematology/Oncology:  Hematology Normal      Current/Recent Cancer. Oncology Comments: bladder    EENT/Dental:EENT/Dental Normal   Cardiovascular:   Exercise tolerance: good Hypertension, well controlled Aortic valve insuff   Pulmonary:  Pulmonary Normal    Renal/:   Chronic Renal Disease, CRI    Musculoskeletal:  Musculoskeletal Normal    Neurological:   TIA, Denies CVA. Questionable TIA in remote past   Endocrine:  Endocrine Normal    Dermatological:  Skin Normal    Psych:  Psychiatric Normal           Physical Exam  General:  Well nourished    Airway/Jaw/Neck:  Airway Findings: Mouth Opening: Normal Tongue: Normal  General Airway Assessment: Adult  Mallampati: I  TM Distance: Normal, at least 6 cm  Jaw/Neck Findings:  Neck ROM: Normal ROM      Dental:  Dental Findings: In tact             Anesthesia Plan  Type of Anesthesia, risks & benefits discussed:  Anesthesia Type:  general  Patient's Preference:   Intra-op Monitoring Plan:   Intra-op Monitoring Plan Comments:   Post Op Pain Control Plan:   Post Op Pain Control Plan Comments:   Induction:    Beta Blocker:         Informed Consent: Patient understands risks and agrees with Anesthesia plan.  Questions answered. Anesthesia consent signed with patient.  ASA Score: 3     Day of Surgery Review of History & Physical:    H&P update referred to the surgeon.     Anesthesia Plan Notes: Labs from 12/18 OK        Ready For Surgery From Anesthesia Perspective.

## 2019-02-01 ENCOUNTER — TELEPHONE (OUTPATIENT)
Dept: UROLOGY | Facility: CLINIC | Age: 84
End: 2019-02-01

## 2019-02-01 ENCOUNTER — CLINICAL SUPPORT (OUTPATIENT)
Dept: UROLOGY | Facility: CLINIC | Age: 84
End: 2019-02-01
Payer: MEDICARE

## 2019-02-01 DIAGNOSIS — N28.89 URETERAL MASS: ICD-10-CM

## 2019-02-01 DIAGNOSIS — N28.89 URETERAL MASS: Primary | ICD-10-CM

## 2019-02-01 LAB
BACTERIA UR CULT: NORMAL
BACTERIA UR CULT: NORMAL

## 2019-02-01 PROCEDURE — 51701 PR INSERTION OF NON-INDWELLING BLADDER CATHETERIZATION FOR RESIDUAL UR: ICD-10-PCS | Mod: S$PBB,,, | Performed by: UROLOGY

## 2019-02-01 PROCEDURE — 51701 INSERT BLADDER CATHETER: CPT | Mod: PBBFAC,PO | Performed by: UROLOGY

## 2019-02-01 PROCEDURE — 51701 INSERT BLADDER CATHETER: CPT | Mod: S$PBB,,, | Performed by: UROLOGY

## 2019-02-01 PROCEDURE — 87086 URINE CULTURE/COLONY COUNT: CPT

## 2019-02-01 NOTE — TELEPHONE ENCOUNTER
Spoke with patient's daughter.  Patient is with her in [place].  Patient will go to primary care in [place] to have cath urine culture collected.

## 2019-02-01 NOTE — TELEPHONE ENCOUNTER
LM for patient to call office.  Dr mccabe would like patient to have catheterized urine specimen to send for urine culture today.

## 2019-02-01 NOTE — TELEPHONE ENCOUNTER
----- Message from Elzbieta Ware MA sent at 2/1/2019 10:58 AM CST -----      ----- Message -----  From: Kiet Arellano MD  Sent: 2/1/2019  10:48 AM  To: Adan Santa Staff    Get cath urine cs today please     I placed the order    thanks

## 2019-02-01 NOTE — PROGRESS NOTES
Pt was placed on table in supine position draped appropriately and catheterized using sterile technique. Bladder was drained of 90 mL of yellow color urine and a sample was sent for a culture.  Catheter was then removed and pt was able to site up. Pt tolerated procedure well.

## 2019-02-01 NOTE — TELEPHONE ENCOUNTER
----- Message from Amarilis Ramirez sent at 2/1/2019 11:22 AM CST -----  Contact: pt   Name of Who is Calling: STEPHAN GREGG [[id number]]      What is the request in detail: Patient is requesting a call from staff in regards to questions she has about the urine specimen needed. Please contact to further discuss and advise      Can the clinic reply by [facility]: No       [facility]: [phone]

## 2019-02-01 NOTE — TELEPHONE ENCOUNTER
----- Message from Kiet Arellano MD sent at 2/1/2019 10:48 AM CST -----  Get cath urine cs today please     I placed the order    thanks

## 2019-02-03 LAB — BACTERIA UR CULT: NORMAL

## 2019-02-05 ENCOUNTER — TELEPHONE (OUTPATIENT)
Dept: ADMINISTRATIVE | Facility: CLINIC | Age: 84
End: 2019-02-05

## 2019-02-05 NOTE — TELEPHONE ENCOUNTER
Home Health Recert with [facility]-Dr. Viki Hinojosa. Pt received  services.

## 2019-02-07 ENCOUNTER — TELEPHONE (OUTPATIENT)
Dept: UROLOGY | Facility: CLINIC | Age: 84
End: 2019-02-07

## 2019-02-07 ENCOUNTER — ANESTHESIA (OUTPATIENT)
Dept: SURGERY | Facility: OTHER | Age: 84
End: 2019-02-07
Payer: MEDICARE

## 2019-02-07 ENCOUNTER — HOSPITAL ENCOUNTER (OUTPATIENT)
Facility: OTHER | Age: 84
Discharge: HOME | End: 2019-02-07
Attending: UROLOGY | Admitting: UROLOGY
Payer: MEDICARE

## 2019-02-07 VITALS
HEART RATE: 85 BPM | HEIGHT: 63 IN | SYSTOLIC BLOOD PRESSURE: 171 MMHG | WEIGHT: 151 LBS | DIASTOLIC BLOOD PRESSURE: 73 MMHG | TEMPERATURE: 98 F | RESPIRATION RATE: 16 BRPM | OXYGEN SATURATION: 98 % | BODY MASS INDEX: 26.75 KG/M2

## 2019-02-07 DIAGNOSIS — C66.1 URETERAL CANCER, RIGHT: ICD-10-CM

## 2019-02-07 PROCEDURE — 71000015 HC POSTOP RECOV 1ST HR: Performed by: UROLOGY

## 2019-02-07 PROCEDURE — 52354 PR CYSTO/URETERO/PYELOSC,BX &/OR FULG LESN: ICD-10-PCS | Mod: RT,,, | Performed by: UROLOGY

## 2019-02-07 PROCEDURE — 63600175 PHARM REV CODE 636 W HCPCS: Performed by: NURSE ANESTHETIST, CERTIFIED REGISTERED

## 2019-02-07 PROCEDURE — 88305 TISSUE SPECIMEN TO PATHOLOGY - SURGERY: ICD-10-PCS | Mod: 26,,, | Performed by: PATHOLOGY

## 2019-02-07 PROCEDURE — 36000707: Performed by: UROLOGY

## 2019-02-07 PROCEDURE — 27201423 OPTIME MED/SURG SUP & DEVICES STERILE SUPPLY: Performed by: UROLOGY

## 2019-02-07 PROCEDURE — 52332 CYSTOSCOPY AND TREATMENT: CPT | Mod: 51,RT,, | Performed by: UROLOGY

## 2019-02-07 PROCEDURE — C1894 INTRO/SHEATH, NON-LASER: HCPCS | Performed by: UROLOGY

## 2019-02-07 PROCEDURE — C1769 GUIDE WIRE: HCPCS | Performed by: UROLOGY

## 2019-02-07 PROCEDURE — 37000009 HC ANESTHESIA EA ADD 15 MINS: Performed by: UROLOGY

## 2019-02-07 PROCEDURE — 71000033 HC RECOVERY, INTIAL HOUR: Performed by: UROLOGY

## 2019-02-07 PROCEDURE — 25500020 PHARM REV CODE 255: Performed by: UROLOGY

## 2019-02-07 PROCEDURE — 88305 TISSUE EXAM BY PATHOLOGIST: CPT | Mod: 26,,, | Performed by: PATHOLOGY

## 2019-02-07 PROCEDURE — 71000016 HC POSTOP RECOV ADDL HR: Performed by: UROLOGY

## 2019-02-07 PROCEDURE — 74420 PR  X-RAY RETROGRADE PYELOGRAM: ICD-10-PCS | Mod: 26,,, | Performed by: UROLOGY

## 2019-02-07 PROCEDURE — 37000008 HC ANESTHESIA 1ST 15 MINUTES: Performed by: UROLOGY

## 2019-02-07 PROCEDURE — 52354 CYSTOURETERO W/BIOPSY: CPT | Mod: RT,,, | Performed by: UROLOGY

## 2019-02-07 PROCEDURE — 88305 TISSUE EXAM BY PATHOLOGIST: CPT | Performed by: PATHOLOGY

## 2019-02-07 PROCEDURE — C1758 CATHETER, URETERAL: HCPCS | Performed by: UROLOGY

## 2019-02-07 PROCEDURE — 74420 UROGRAPHY RTRGR +-KUB: CPT | Mod: 26,,, | Performed by: UROLOGY

## 2019-02-07 PROCEDURE — 36000706: Performed by: UROLOGY

## 2019-02-07 PROCEDURE — 63600175 PHARM REV CODE 636 W HCPCS: Performed by: UROLOGY

## 2019-02-07 PROCEDURE — 25000003 PHARM REV CODE 250: Performed by: NURSE ANESTHETIST, CERTIFIED REGISTERED

## 2019-02-07 PROCEDURE — 25000003 PHARM REV CODE 250: Performed by: ANESTHESIOLOGY

## 2019-02-07 PROCEDURE — C2617 STENT, NON-COR, TEM W/O DEL: HCPCS | Performed by: UROLOGY

## 2019-02-07 PROCEDURE — 52332 PR CYSTOSCOPY,INSERT URETERAL STENT: ICD-10-PCS | Mod: 51,RT,, | Performed by: UROLOGY

## 2019-02-07 DEVICE — STENT URETERAL UNIV 7FR 22CM: Type: IMPLANTABLE DEVICE | Site: URETER | Status: FUNCTIONAL

## 2019-02-07 RX ORDER — FENTANYL CITRATE 50 UG/ML
25 INJECTION, SOLUTION INTRAMUSCULAR; INTRAVENOUS EVERY 5 MIN PRN
Status: DISCONTINUED | OUTPATIENT
Start: 2019-02-07 | End: 2019-02-07 | Stop reason: HOSPADM

## 2019-02-07 RX ORDER — CEFAZOLIN SODIUM 1 G/3ML
2 INJECTION, POWDER, FOR SOLUTION INTRAMUSCULAR; INTRAVENOUS
Status: DISCONTINUED | OUTPATIENT
Start: 2019-02-07 | End: 2019-02-07 | Stop reason: HOSPADM

## 2019-02-07 RX ORDER — SODIUM CHLORIDE 0.9 % (FLUSH) 0.9 %
3 SYRINGE (ML) INJECTION
Status: DISCONTINUED | OUTPATIENT
Start: 2019-02-07 | End: 2019-02-07 | Stop reason: HOSPADM

## 2019-02-07 RX ORDER — CIPROFLOXACIN 2 MG/ML
INJECTION, SOLUTION INTRAVENOUS
Status: DISCONTINUED | OUTPATIENT
Start: 2019-02-07 | End: 2019-02-07

## 2019-02-07 RX ORDER — LIDOCAINE HCL/PF 100 MG/5ML
SYRINGE (ML) INTRAVENOUS
Status: DISCONTINUED | OUTPATIENT
Start: 2019-02-07 | End: 2019-02-07

## 2019-02-07 RX ORDER — FLUCONAZOLE 2 MG/ML
100 INJECTION, SOLUTION INTRAVENOUS ONCE
Status: COMPLETED | OUTPATIENT
Start: 2019-02-07 | End: 2019-02-07

## 2019-02-07 RX ORDER — ONDANSETRON 2 MG/ML
4 INJECTION INTRAMUSCULAR; INTRAVENOUS DAILY PRN
Status: DISCONTINUED | OUTPATIENT
Start: 2019-02-07 | End: 2019-02-07 | Stop reason: HOSPADM

## 2019-02-07 RX ORDER — ONDANSETRON 2 MG/ML
INJECTION INTRAMUSCULAR; INTRAVENOUS
Status: DISCONTINUED | OUTPATIENT
Start: 2019-02-07 | End: 2019-02-07

## 2019-02-07 RX ORDER — MEPERIDINE HYDROCHLORIDE 25 MG/ML
12.5 INJECTION INTRAMUSCULAR; INTRAVENOUS; SUBCUTANEOUS ONCE AS NEEDED
Status: DISCONTINUED | OUTPATIENT
Start: 2019-02-07 | End: 2019-02-07 | Stop reason: HOSPADM

## 2019-02-07 RX ORDER — SODIUM CHLORIDE 9 MG/ML
INJECTION, SOLUTION INTRAVENOUS CONTINUOUS
Status: DISCONTINUED | OUTPATIENT
Start: 2019-02-07 | End: 2019-02-07 | Stop reason: HOSPADM

## 2019-02-07 RX ORDER — OXYCODONE HYDROCHLORIDE 5 MG/1
5 TABLET ORAL
Status: DISCONTINUED | OUTPATIENT
Start: 2019-02-07 | End: 2019-02-07 | Stop reason: HOSPADM

## 2019-02-07 RX ORDER — FENTANYL CITRATE 50 UG/ML
INJECTION, SOLUTION INTRAMUSCULAR; INTRAVENOUS
Status: DISCONTINUED | OUTPATIENT
Start: 2019-02-07 | End: 2019-02-07

## 2019-02-07 RX ORDER — SODIUM CHLORIDE, SODIUM LACTATE, POTASSIUM CHLORIDE, CALCIUM CHLORIDE 600; 310; 30; 20 MG/100ML; MG/100ML; MG/100ML; MG/100ML
INJECTION, SOLUTION INTRAVENOUS CONTINUOUS
Status: DISCONTINUED | OUTPATIENT
Start: 2019-02-07 | End: 2019-02-07 | Stop reason: HOSPADM

## 2019-02-07 RX ORDER — GLYCOPYRROLATE 0.2 MG/ML
INJECTION INTRAMUSCULAR; INTRAVENOUS
Status: DISCONTINUED | OUTPATIENT
Start: 2019-02-07 | End: 2019-02-07

## 2019-02-07 RX ORDER — PROPOFOL 10 MG/ML
VIAL (ML) INTRAVENOUS
Status: DISCONTINUED | OUTPATIENT
Start: 2019-02-07 | End: 2019-02-07

## 2019-02-07 RX ADMIN — SODIUM CHLORIDE, SODIUM LACTATE, POTASSIUM CHLORIDE, AND CALCIUM CHLORIDE: 600; 310; 30; 20 INJECTION, SOLUTION INTRAVENOUS at 09:02

## 2019-02-07 RX ADMIN — FENTANYL CITRATE 100 MCG: 50 INJECTION, SOLUTION INTRAMUSCULAR; INTRAVENOUS at 10:02

## 2019-02-07 RX ADMIN — CARBOXYMETHYLCELLULOSE SODIUM 2 DROP: 2.5 SOLUTION/ DROPS OPHTHALMIC at 10:02

## 2019-02-07 RX ADMIN — GLYCOPYRROLATE 0.2 MG: 0.2 INJECTION, SOLUTION INTRAMUSCULAR; INTRAVENOUS at 10:02

## 2019-02-07 RX ADMIN — FLUCONAZOLE, SODIUM CHLORIDE 100 MG: 2 INJECTION INTRAVENOUS at 10:02

## 2019-02-07 RX ADMIN — CIPROFLOXACIN 400 MG: 2 INJECTION, SOLUTION INTRAVENOUS at 10:02

## 2019-02-07 RX ADMIN — LIDOCAINE HYDROCHLORIDE 100 MG: 20 INJECTION, SOLUTION INTRAVENOUS at 10:02

## 2019-02-07 RX ADMIN — PROPOFOL 100 MG: 10 INJECTION, EMULSION INTRAVENOUS at 10:02

## 2019-02-07 RX ADMIN — ONDANSETRON 4 MG: 2 INJECTION INTRAMUSCULAR; INTRAVENOUS at 10:02

## 2019-02-07 NOTE — OR NURSING
Pt continues without c/o pain at thsi time. No change from previous assessment. Prepared for transfer to acu.

## 2019-02-07 NOTE — OP NOTE
DATE OF PROCEDURE:  02/07/2019.    SURGEON:  Kiet Arellano M.D.    PROCEDURES:  Cystoscopy with examination under anesthesia and bilateral   retrograde pyelograms, ureteroscopy with right renal pelvic tumor biopsy   ablation and fulguration and right ureteral stent placement.    PREOPERATIVE DIAGNOSES:  1.  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  2.  Right renal pelvic tumor.    POSTOPERATIVE DIAGNOSES:  1.  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  2.  Right renal pelvic tumor.    ANESTHESIA:  General.    COMPLICATIONS:  None.    HISTORY:  Ms. Luo is an 87-year-old woman who is approximately one year postop   robotic right distal ureterectomy and ureteral reimplantation.  She had a small   recurrence in the bladder, which was managed [place] Dr[place]  He has been managing her.  He tells me that she has   developed a filling defect in the right renal pelvis and gross hematuria.  She   presents for evaluation of the same.  Voided cytology is negative.  Preop   antibiotics were administered.  TEDs and SCDs were applied.  Cross-sectional   imaging reveals no adenopathy.  No mass and no radiographic evidence of   recurrence.  Bimanual examination was performed.  No masses are palpable.    Cystoscopy is performed with 30 and 70 degree lenses.  The bladder is distorted   as anticipated following a right psoas hitch and ureteral reimplantation.    However, there are no tumors and no stones in the bladder.  There was   significant prolapse.  This was reduced with a Betadine-soaked sponge stick.  A   left retrograde pyelogram was performed.  The left side is unremarkable.  The   ureter was normal in course and caliber.  There is no hydronephrosis and no   filling defects.  The right cheri-ureteral orifice is visible at the dome of the   bladder.  This was widely patent.  A  wire was advanced and a 5-Niuean open-ended   catheter was advanced over the wire.  Right retrograde pyelogram was performed.    The ureter is mildly dilated consistent with a refluxing anastomosis.  There   was a filling defect in the renal pelvis.  A second wire is placed and a   10-Niuean 35 cm double-J ureteral access sheath is utilized.  Prior to this, I   attempted ureteroscopy with the semirigid scope.  I was not able to advance   beyond the proximal ureter.  The ureteral access sheath was placed over the wire   under fluoroscopic guidance.  The inside wire and inner dilator were removed.    The flexible ureteroscope was advanced and nephroscopy was performed.  Four   tumors were identified.  Two tumors were sessile.  These were in the more   cephalad portion of the renal pelvis.  There were two papillary tumors in the   more caudal portion of the renal pelvis just proximal to the ureteropelvic   junction.  Both of these papillary tumors were removed with a NCircle basket.    There was adequate tissue removed.  This was sent for pathologic analysis.  The   holmium laser was used.  I ablated a portion of the more proximal tumor;   however, visualization was quite poor.  I fulgurated the areas of biopsy.  At   the end of the procedure, there was no active bleeding, but the visualization   limited further fulguration.  The sheath was removed.  A 7-Niuean 22 cm double-J   ureteral stent was placed.  There was clear efflux from the side holes and no   active bleeding at the end of the case.  The bladder was drained.  The patient   tolerated the procedure well.    She will follow up with me in one week to discuss the pathology results and to   discuss the treatment options.      [facility]  dd: 02/07/2019 11:51:34 (CST)  td: 02/07/2019 12:42:48 (CST)  Doc ID   #[id number]  Job ID #[id number]    CC:

## 2019-02-07 NOTE — ANESTHESIA POSTPROCEDURE EVALUATION
"Anesthesia Post Evaluation    Patient: Destinee CARDONA Major    Procedure(s) Performed: Procedure(s) (LRB):  URETEROSCOPY; bilateral retorgrade pyelogram (Bilateral)  CYSTOSCOPY, WITH URETERAL STENT INSERTION, BIOPSY AND ABLATION RIGHT RENAL PELVIC TUMOR (Right)    Final Anesthesia Type: general  Patient location during evaluation: PACU  Patient participation: Yes- Able to Participate  Level of consciousness: awake and alert  Post-procedure vital signs: reviewed and stable  Pain management: adequate  Airway patency: patent  PONV status at discharge: No PONV  Anesthetic complications: no      Cardiovascular status: blood pressure returned to baseline  Respiratory status: unassisted  Hydration status: euvolemic  Follow-up not needed.        Visit Vitals  BP (!) 158/77 (BP Location: Right arm, Patient Position: Lying)   Pulse 83   Temp 36.7 °C (98 °F) (Oral)   Resp 16   Ht 5' 3" (1.6 m)   Wt 68.5 kg (150 lb 16 oz)   SpO2 96%   Breastfeeding? No   BMI 26.75 kg/m²       Pain/Anabella Score: Anabella Score: 10 (2/7/2019 12:12 PM)        "
Diabetes    High cholesterol    HTN (hypertension)    Hypothyroid

## 2019-02-07 NOTE — DISCHARGE INSTRUCTIONS
Treating Kidney Stones: Ureteroscopic Stone Removal    Ureteroscopic stone removal may be done before, after, or instead of other treatments. If you need this procedure, your healthcare provider will discuss its risks and possible complications. You will be told how to prepare. And you will be told about anesthesia that will keep you pain-free during treatment.       A ureteroscope lets your doctor see your stone before removing it.   Removing the stone through the ureter  Ureteroscopic stone removal extracts a small stone in your ureter without an incision. Your doctor places a viewing tube (ureteroscope) in your ureter. A wire basket inserted through the tube removes the stone. Sometimes, a laser or a mechanical device is used to break up the stone. A soft tube may be left in your ureter briefly to drain urine.     The stone may be fragmented. The stone is then withdrawn or passed.     Your recovery  This is an outpatient or overnight procedure. For a few days after surgery, you may feel some pain when you urinate. Or you may need to urinate more often, or have bloody urine. You may have a ureteral stent. This is a soft tube that prevents blockage from swelling after the procedure. The stent is removed when the swelling goes down, often within days. Follow up as instructed to check for any new stones.    When to call your healthcare provider  Call your healthcare provider right away if:  · You have sudden pain or flank pain  · You have a fever over 100.4°F (38°C)  · You have nausea that lasts for days  · You have heavy bleeding when you urinate  · You have heavy bleeding through your drainage tube  · You have swelling or redness around your incision       Discharge Instructions: After Your Surgery  Youve just had surgery. During surgery, you were given medicine called anesthesia to keep you relaxed and free of pain. After surgery, you may have some pain or nausea. This is common. Here are some tips for  feeling better and getting well after surgery.     Stay on schedule with your medicine.     Going home  Your healthcare provider will show you how to take care of yourself when you go home. He or she will also answer your questions. Have an adult family member or friend drive you home. For the first 24 hours after your surgery:    · Do not drive or use heavy equipment.  · Do not make important decisions or sign legal papers.  · Do not drink alcohol.  · Have someone stay with you, if needed. He or she can watch for problems and help keep you safe.    Be sure to go to all follow-up visits with your healthcare provider. And rest after your surgery for as long as your healthcare provider tells you to.    Coping with pain  If you have pain after surgery, pain medicine will help you feel better. Take it as told, before pain becomes severe. Also, ask your healthcare provider or pharmacist about other ways to control pain. This might be with heat, ice, or relaxation. And follow any other instructions your surgeon or nurse gives you.    Tips for taking pain medicine  To get the best relief possible, remember these points:    · Pain medicines can upset your stomach. Taking them with a little food may help.  · Most pain relievers taken by mouth need at least 20 to 30 minutes to start to work.  · Taking medicine on a schedule can help you remember to take it. Try to time your medicine so that you can take it before starting an activity. This might be before you get dressed, go for a walk, or sit down for dinner.  · Constipation is a common side effect of pain medicines. Call your healthcare provider before taking any medicines such as laxatives or stool softeners to help ease constipation. Also ask if you should skip any foods. Drinking lots of fluids and eating foods such as fruits and vegetables that are high in fiber can also help. Remember, do not take laxatives unless your surgeon has prescribed them.  · Drinking alcohol  Statement Selected and taking pain medicine can cause dizziness and slow your breathing. It can even be deadly. Do not drink alcohol while taking pain medicine.  · Pain medicine can make you react more slowly to things. Do not drive or run machinery while taking pain medicine.    Your healthcare provider may tell you to take acetaminophen to help ease your pain. Ask him or her how much you are supposed to take each day. Acetaminophen or other pain relievers may interact with your prescription medicines or other over-the-counter (OTC) medicines. Some prescription medicines have acetaminophen and other ingredients. Using both prescription and OTC acetaminophen for pain can cause you to overdose. Read the labels on your OTC medicines with care. This will help you to clearly know the list of ingredients, how much to take, and any warnings. It may also help you not take too much acetaminophen. If you have questions or do not understand the information, ask your pharmacist or healthcare provider to explain it to you before you take the OTC medicine.    Managing nausea  Some people have an upset stomach after surgery. This is often because of anesthesia, pain, or pain medicine, or the stress of surgery. These tips will help you handle nausea and eat healthy foods as you get better. If you were on a special food plan before surgery, ask your healthcare provider if you should follow it while you get better. These tips may help:    · Do not push yourself to eat. Your body will tell you when to eat and how much.  · Start off with clear liquids and soup. They are easier to digest.  · Next try semi-solid foods, such as mashed potatoes, applesauce, and gelatin, as you feel ready.  · Slowly move to solid foods. Dont eat fatty, rich, or spicy foods at first.  · Do not force yourself to have 3 large meals a day. Instead eat smaller amounts more often.  · Take pain medicines with a small amount of solid food, such as crackers or toast, to avoid  nausea.     Call your surgeon if  · You still have pain an hour after taking medicine. The medicine may not be strong enough.  · You feel too sleepy, dizzy, or groggy. The medicine may be too strong.  · You have side effects like nausea, vomiting, or skin changes, such as rash, itching, or hives.       If you have obstructive sleep apnea  You were given anesthesia medicine during surgery to keep you comfortable and free of pain. After surgery, you may have more apnea spells because of this medicine and other medicines you were given. The spells may last longer than usual.   At home:    · Keep using the continuous positive airway pressure (CPAP) device when you sleep. Unless your healthcare provider tells you not to, use it when you sleep, day or night. CPAP is a common device used to treat obstructive sleep apnea.  · Talk with your provider before taking any pain medicine, muscle relaxants, or sedatives. Your provider will tell you about the possible dangers of taking these medicines.    Date Last Reviewed: 12/1/2016 © 2000-2017 [employer]. [address] [place] [address], [place] [address]. All rights reserved. This information is not intended as a substitute for professional medical care. Always follow your healthcare professional's instructions.    PLEASE FOLLOW ANY OTHER INSTRUCTIONS PROVIDED TO YOU BY DR. ALLISON!

## 2019-02-07 NOTE — DISCHARGE SUMMARY
[facility]  Discharge Note  Short Stay    Admit Date: 2/7/2019    Discharge Date and Time: No discharge date for patient encounter.     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       Discharged Condition: good    Hospital Course: Patient was admitted for an outpatient procedure and tolerated the procedure well with no complications.    Final Diagnoses: Same as principal problem.    Disposition: Home or Self Care    Follow up/Patient Instructions:    Medications:  Reconciled Home Medications:      Medication List      CHANGE how you take these medications    docusate sodium 100 MG capsule  Commonly known as:  COLACE  Take 1 capsule (100 mg total) by mouth 2 (two) times daily.  What changed:    · when to take this  · reasons to take this        CONTINUE taking these medications    acetaminophen 325 MG tablet  Commonly known as:  TYLENOL  Take 1 tablet (325 mg total) by mouth every 6 (six) hours as needed for Pain.     ALPRAZolam 0.25 MG tablet  Commonly known as:  XANAX  Take 1 tablet (0.25 mg total) by mouth 2 (two) times daily as needed for Anxiety. For anxiety     amLODIPine 2.5 MG tablet  Commonly known as:  NORVASC  Take 1 tablet (2.5 mg total) by mouth once daily.     aspirin-calcium carbonate 81 mg-300 mg calcium(777 mg) Tab  Take by mouth.     cranberry 400 mg Cap  Take by mouth.     fenofibrate 145 MG tablet  Commonly known as:  TRICOR  TAKE 1 TABLET BY MOUTH EVERY DAY     fluconazole 150 MG Tab  Commonly known as:  DIFLUCAN     Lactobacillus acidophilus Cap  Take by mouth.     loratadine 10 mg tablet  Commonly known as:  CLARITIN  Take 10 mg by mouth as needed for Allergies.     meclizine 12.5 mg tablet  Commonly known as:  ANTIVERT  Take 1 tablet (12.5 mg total) by mouth 3 (three) times daily as  needed.     multivitamin capsule  Take 1 capsule by mouth.     nitrofurantoin (macrocrystal-monohydrate) 100 MG capsule  Commonly known as:  MACROBID  Take 100 mg by mouth nightly.     pantoprazole 40 MG tablet  Commonly known as:  PROTONIX  Take 1 tablet (40 mg total) by mouth once daily.     vitamin D 1000 units Tab  Commonly known as:  VITAMIN D3  Take 1,000 Units by mouth once daily.          Discharge Procedure Orders   Diet general     Call MD for:  temperature >100.4     Call MD for:  persistent nausea and vomiting     Call MD for:  severe uncontrolled pain     Call MD for:  difficulty breathing, headache or visual disturbances     Call MD for:  redness, tenderness, or signs of infection (pain, swelling, redness, odor or green/yellow discharge around incision site)     Call MD for:  hives     Call MD for:  persistent dizziness or light-headedness     Call MD for:  extreme fatigue     Call MD for:   Order Comments: Call MD or go to the nearest ER if you are unable to urinate.     Follow-up Information     Kiet Arellano MD On 2/15/2019.    Specialty:  Urology  Contact information:  [address] [address]  [phone]                   Discharge Procedure Orders (must include Diet, Follow-up, Activity):   Discharge Procedure Orders (must include Diet, Follow-up, Activity)   Diet general     Call MD for:  temperature >100.4     Call MD for:  persistent nausea and vomiting     Call MD for:  severe uncontrolled pain     Call MD for:  difficulty breathing, headache or visual disturbances     Call MD for:  redness, tenderness, or signs of infection (pain, swelling, redness, odor or green/yellow discharge around incision site)     Call MD for:  hives     Call MD for:  persistent dizziness or light-headedness     Call MD for:  extreme fatigue     Call MD for:   Order Comments: Call MD or go to the nearest ER if you are unable to urinate.

## 2019-02-07 NOTE — INTERVAL H&P NOTE
The patient has been examined and the H&P has been reviewed:    urine cs neg  Cytology no malignant cells; +candida    Anesthesia/Surgery risks, benefits and alternative options discussed and understood by patient/family.          There are no hospital problems to display for this patient.

## 2019-02-07 NOTE — TRANSFER OF CARE
"Anesthesia Transfer of Care Note    Patient: Destinee CARDONA Major    Procedure(s) Performed: Procedure(s) (LRB):  URETEROSCOPY; bilateral retorgrade pyelogram (Bilateral)  CYSTOSCOPY, WITH URETERAL STENT INSERTION, BIOPSY AND ABLATION RIGHT RENAL PELVIC TUMOR (Right)    Patient location: PACU    Anesthesia Type: general    Transport from OR: Transported from OR on 2-3 L/min O2 by NC with adequate spontaneous ventilation    Post pain: adequate analgesia    Post assessment: no apparent anesthetic complications    Post vital signs: stable    Level of consciousness: awake    Nausea/Vomiting: no nausea/vomiting    Complications: none    Transfer of care protocol was followed      Last vitals:   Visit Vitals  BP (!) 179/76 (BP Location: Right arm, Patient Position: Lying)   Pulse 87   Temp 36.8 °C (98.2 °F) (Oral)   Resp 16   Ht 5' 3" (1.6 m)   Wt 68.5 kg (150 lb 16 oz)   SpO2 97%   Breastfeeding? No   BMI 26.75 kg/m²     "

## 2019-02-07 NOTE — TELEPHONE ENCOUNTER
----- Message from Kiet Arellano MD sent at 2/7/2019 11:45 AM CST -----  Please make appt to see me next Friday to discuss path    (we may need to schedule a nephrectomy)    [place] harden

## 2019-02-12 ENCOUNTER — PATIENT MESSAGE (OUTPATIENT)
Dept: UROLOGY | Facility: CLINIC | Age: 84
End: 2019-02-12

## 2019-02-15 ENCOUNTER — OFFICE VISIT (OUTPATIENT)
Dept: UROLOGY | Facility: CLINIC | Age: 84
End: 2019-02-15
Payer: MEDICARE

## 2019-02-15 VITALS
BODY MASS INDEX: 26.75 KG/M2 | DIASTOLIC BLOOD PRESSURE: 74 MMHG | HEART RATE: 88 BPM | HEIGHT: 63 IN | WEIGHT: 151 LBS | SYSTOLIC BLOOD PRESSURE: 149 MMHG

## 2019-02-15 DIAGNOSIS — R82.90 ABNORMAL FINDING IN URINE: ICD-10-CM

## 2019-02-15 DIAGNOSIS — C66.1 URETERAL CANCER, RIGHT: Primary | ICD-10-CM

## 2019-02-15 PROCEDURE — 99214 OFFICE O/P EST MOD 30 MIN: CPT | Mod: S$GLB,,, | Performed by: UROLOGY

## 2019-02-15 PROCEDURE — 87086 URINE CULTURE/COLONY COUNT: CPT

## 2019-02-15 PROCEDURE — 99214 PR OFFICE/OUTPT VISIT, EST, LEVL IV, 30-39 MIN: ICD-10-PCS | Mod: S$GLB,,, | Performed by: UROLOGY

## 2019-02-15 NOTE — H&P (VIEW-ONLY)
Subjective:      Destinee Luo is a 87 y.o. female who returns today regarding her     Here to discuss biopsy results from recent ureteroscopic biopsy of right renal pelvic lesion.    She continues to have some gross hematur ia but no clots.  Otherwise she is doing well with no complaints.  Some left lower abdominal discomfort    The following portions of the patient's history were reviewed and updated as appropriate: allergies, current medications, past family history, past medical history, past social history, past surgical history and problem list.    Review of Systems  Pertinent items are noted in HPI.  A comprehensive multipoint review of systems was negative except as otherwise stated in the HPI.    Past Medical History:   Diagnosis Date    Anticoagulant long-term use     stopped Plavix and ASA 11/2017    High cholesterol     Hypertension     Macular degeneration     S/P complete hysterectomy 01/04/2018    Stroke 2012    TIA, no deficits     Past Surgical History:   Procedure Laterality Date    BIOPSY ENDOMETRIAL  11/22/2017    [facility] at [facility] OR    COLONOSCOPY  prior to 2008    CYSTOSCOPY, WITH URETERAL STENT INSERTION, BIOPSY AND ABLATION RIGHT RENAL PELVIC TUMOR Right 2/7/2019    [facility] at [facility] OR    CYSTOSCOPY/ RETROGRADE PYELOGRAM/ STENT PLACEMENT/STENT EXCHANGE Right 11/15/2017    [facility] at [facility] OR    HYSTERECTOMY  01/04/2018    NEPHRECTOMY- ROBOT -  POSS OPEN Right 1/4/2018    [facility] at [facility] OR    NO PAST SURGERIES      REIMPLANTATION-URETER Right 1/4/2018    [facility] at [facility] OR    URETERAL REIMPLANTION      URETERAL STENT PLACEMENT      URETERECTOMY Right 1/4/2018    [facility] at [facility] OR    URETEROSCOPY      URETEROSCOPY - WITH BX MASS Right 11/22/2017    [facility] at [facility] OR    URETEROSCOPY; bilateral retorgrade  "pyelogram Bilateral 2/7/2019    [facility] at [facility] OR    URETEROSCOPY; biopsy Right 11/15/2017    [facility] at [facility] OR    XI ROBOT ASSISTED LAPAROSCOPIC SALPINGO-OOPHERECTOMY Bilateral 1/4/2018    Performed by Carlos Mann MD at [facility] OR    XI ROBOTIC ASSISTED LAPAROSCOPIC HYSTERECTOMY N/A 1/4/2018    Performed by Carlos Mann [facility] at [facility] OR       Review of patient's allergies indicates:   Allergen Reactions    Sulfa (sulfonamide antibiotics) Other (See Comments)     Unknown    Codeine Anxiety          Objective:   Vitals: Ht 5' 3" (1.6 m)   Wt 68.5 kg (151 lb 0.2 oz)   BMI 26.75 kg/m²     Physical Exam   General: alert and oriented, no acute distress  Respiratory: Symmetric expansion, non-labored breathing  Cardiovascular: no peripheral edema  Abdomen: soft, non distended  No upper abdominal scars.  The scars from her previous hysterectomy and reimplant are well healed and difficult to visualize  Skin: normal coloration and turgor, no rashes, no suspicious skin lesions noted  Neuro: no gross deficits  Psych: normal judgment and insight, normal mood/affect and non-anxious    Physical Exam    Lab Review   Urinalysis demonstrates positive leukocytes urine culture sent    Lab Results   Component Value Date    WBC 7.10 03/05/2018    HGB 9.5 (L) 03/05/2018    HCT 30.6 (L) 03/05/2018    MCV 84 03/05/2018     (H) 03/05/2018     Lab Results   Component Value Date    CREATININE 1.4 12/17/2018    BUN 24 (H) 12/17/2018     SPECIMEN  1) Right renal pelvis tumor.  FINAL PATHOLOGIC DIAGNOSIS  RIGHT RENAL PELVIS TUMOR, BIOPSY:  -High grade papillary urothelial carcinoma.  -No invasion into lamina propria.  Diagnosed by: Henna Velásquez M.D.  (Electronically Signed: 2019-02-13 10:03:12)    Imaging    Recent left retrograde pyelogram negative    Right retrograde pyelogram confirms filling defects in the right renal pelvis near the UPJ which were partially ablated at the time " of the biopsy    Assessment and Plan:   Ureteral cancer, right  High-grade [id number] recurrence and right renal pelvis  -     Urine culture    Abnormal finding in urine   -     Urine culture    B RPG    We discussed robotic nephroureterectomy in detail.  I explained that this is the standard approach for a high-grade upper tract urothelial cancer particularly when it is multifocal.  Given her advanced age, she prefers a less aggressive approach.  We will schedule repeat ureteroscopy with ablation

## 2019-02-15 NOTE — PROGRESS NOTES
Subjective:      Destinee Luo is a 87 y.o. female who returns today regarding her     Here to discuss biopsy results from recent ureteroscopic biopsy of right renal pelvic lesion.    She continues to have some gross hematur ia but no clots.  Otherwise she is doing well with no complaints.  Some left lower abdominal discomfort    The following portions of the patient's history were reviewed and updated as appropriate: allergies, current medications, past family history, past medical history, past social history, past surgical history and problem list.    Review of Systems  Pertinent items are noted in HPI.  A comprehensive multipoint review of systems was negative except as otherwise stated in the HPI.    Past Medical History:   Diagnosis Date    Anticoagulant long-term use     stopped Plavix and ASA 11/2017    High cholesterol     Hypertension     Macular degeneration     S/P complete hysterectomy 01/04/2018    Stroke 2012    TIA, no deficits     Past Surgical History:   Procedure Laterality Date    BIOPSY ENDOMETRIAL  11/22/2017    [facility] OR    COLONOSCOPY  prior to 2008    CYSTOSCOPY, WITH URETERAL STENT INSERTION, BIOPSY AND ABLATION RIGHT RENAL PELVIC TUMOR Right 2/7/2019    [facility] OR    CYSTOSCOPY/ RETROGRADE PYELOGRAM/ STENT PLACEMENT/STENT EXCHANGE Right 11/15/2017    [facility] at [facility] OR    HYSTERECTOMY  01/04/2018    NEPHRECTOMY- ROBOT -  POSS OPEN Right 1/4/2018    [facility] at [facility] OR    NO PAST SURGERIES      REIMPLANTATION-URETER Right 1/4/2018    [facility] at [facility] OR    URETERAL REIMPLANTION      URETERAL STENT PLACEMENT      URETERECTOMY Right 1/4/2018    [facility] at [facility] OR    URETEROSCOPY      URETEROSCOPY - WITH BX MASS Right 11/22/2017    [facility] at [facility] OR    URETEROSCOPY; bilateral retorgrade  "pyelogram Bilateral 2/7/2019    [facility] OR    URETEROSCOPY; biopsy Right 11/15/2017    [facility] by [facility] at [facility] OR    XI ROBOT ASSISTED LAPAROSCOPIC SALPINGO-OOPHERECTOMY Bilateral 1/4/2018    Performed by Carlos aMnn MD at [facility] OR    XI ROBOTIC ASSISTED LAPAROSCOPIC HYSTERECTOMY N/A 1/4/2018    Performed by Carlos Mann MD at [facility] OR       Review of patient's allergies indicates:   Allergen Reactions    Sulfa (sulfonamide antibiotics) Other (See Comments)     Unknown    Codeine Anxiety          Objective:   Vitals: Ht 5' 3" (1.6 m)   Wt 68.5 kg (151 lb 0.2 oz)   BMI 26.75 kg/m²     Physical Exam   General: alert and oriented, no acute distress  Respiratory: Symmetric expansion, non-labored breathing  Cardiovascular: no peripheral edema  Abdomen: soft, non distended  No upper abdominal scars.  The scars from her previous hysterectomy and reimplant are well healed and difficult to visualize  Skin: normal coloration and turgor, no rashes, no suspicious skin lesions noted  Neuro: no gross deficits  Psych: normal judgment and insight, normal mood/affect and non-anxious    Physical Exam    Lab Review   Urinalysis demonstrates positive leukocytes urine culture sent    Lab Results   Component Value Date    WBC 7.10 03/05/2018    HGB 9.5 (L) 03/05/2018    HCT 30.6 (L) 03/05/2018    MCV 84 03/05/2018     (H) 03/05/2018     Lab Results   Component Value Date    CREATININE 1.4 12/17/2018    BUN 24 (H) 12/17/2018     SPECIMEN  1) Right renal pelvis tumor.  FINAL PATHOLOGIC DIAGNOSIS  RIGHT RENAL PELVIS TUMOR, BIOPSY:  -High grade papillary urothelial carcinoma.  -No invasion into lamina propria.  Diagnosed by: Henna Velásquez M.D.  (Electronically Signed: 2019-02-13 10:03:12)    Imaging    Recent left retrograde pyelogram negative    Right retrograde pyelogram confirms filling defects in the right renal pelvis near the UPJ which were partially ablated at the time " of the biopsy    Assessment and Plan:   Ureteral cancer, right  High-grade [id number] recurrence and right renal pelvis  -     Urine culture    Abnormal finding in urine   -     Urine culture    B RPG    We discussed robotic nephroureterectomy in detail.  I explained that this is the standard approach for a high-grade upper tract urothelial cancer particularly when it is multifocal.  Given her advanced age, she prefers a less aggressive approach.  We will schedule repeat ureteroscopy with ablation

## 2019-02-17 LAB — BACTERIA UR CULT: NORMAL

## 2019-02-18 NOTE — PROGRESS NOTES
Dr Arellano forwarded these results to the patient via [facility].  He will discuss the results with the patient in person at the next appointment.  The patient was instructed to make an appointment if she does not already have one scheduled.

## 2019-02-26 DIAGNOSIS — C68.9 UROTHELIAL CANCER: ICD-10-CM

## 2019-02-26 DIAGNOSIS — C66.1 URETERAL CANCER, RIGHT: Primary | ICD-10-CM

## 2019-02-26 RX ORDER — LIDOCAINE HYDROCHLORIDE 20 MG/ML
JELLY TOPICAL ONCE
Status: CANCELLED | OUTPATIENT
Start: 2019-02-26 | End: 2019-02-26

## 2019-03-01 ENCOUNTER — TELEPHONE (OUTPATIENT)
Dept: UROLOGY | Facility: CLINIC | Age: 84
End: 2019-03-01

## 2019-03-01 NOTE — TELEPHONE ENCOUNTER
----- Message from Sujey Gil sent at 3/1/2019 12:04 PM CST -----  Contact: Pt  Name of Who is Calling: STEPHAN GREGG [[id number]]    What is the request in detail: Pt is returning Elzbieta's call in regards to her procedure. Please call to further discuss and advise.     Can the clinic reply by [facility]: No     What Number to Call Back if not in [facility]: [phone]

## 2019-03-06 ENCOUNTER — PATIENT MESSAGE (OUTPATIENT)
Dept: FAMILY MEDICINE | Facility: CLINIC | Age: 84
End: 2019-03-06

## 2019-03-06 ENCOUNTER — ANESTHESIA EVENT (OUTPATIENT)
Dept: SURGERY | Facility: OTHER | Age: 84
End: 2019-03-06
Payer: MEDICARE

## 2019-03-06 ENCOUNTER — HOSPITAL ENCOUNTER (OUTPATIENT)
Facility: OTHER | Age: 84
Discharge: HOME | End: 2019-03-06
Attending: UROLOGY | Admitting: UROLOGY
Payer: MEDICARE

## 2019-03-06 ENCOUNTER — ANESTHESIA (OUTPATIENT)
Dept: SURGERY | Facility: OTHER | Age: 84
End: 2019-03-06
Payer: MEDICARE

## 2019-03-06 VITALS
HEIGHT: 63 IN | HEART RATE: 88 BPM | RESPIRATION RATE: 18 BRPM | BODY MASS INDEX: 26.58 KG/M2 | SYSTOLIC BLOOD PRESSURE: 170 MMHG | OXYGEN SATURATION: 100 % | DIASTOLIC BLOOD PRESSURE: 85 MMHG | TEMPERATURE: 98 F | WEIGHT: 150 LBS

## 2019-03-06 DIAGNOSIS — C68.9 UROTHELIAL CANCER: ICD-10-CM

## 2019-03-06 DIAGNOSIS — R53.1 GENERALIZED WEAKNESS: Primary | ICD-10-CM

## 2019-03-06 DIAGNOSIS — C66.1 URETERAL CANCER, RIGHT: ICD-10-CM

## 2019-03-06 PROCEDURE — 27201423 OPTIME MED/SURG SUP & DEVICES STERILE SUPPLY: Performed by: UROLOGY

## 2019-03-06 PROCEDURE — 63600175 PHARM REV CODE 636 W HCPCS: Performed by: NURSE ANESTHETIST, CERTIFIED REGISTERED

## 2019-03-06 PROCEDURE — C2617 STENT, NON-COR, TEM W/O DEL: HCPCS | Performed by: UROLOGY

## 2019-03-06 PROCEDURE — 36000709 HC OR TIME LEV III EA ADD 15 MIN: Performed by: UROLOGY

## 2019-03-06 PROCEDURE — 63600175 PHARM REV CODE 636 W HCPCS: Performed by: UROLOGY

## 2019-03-06 PROCEDURE — 52354 CYSTOURETERO W/BIOPSY: CPT | Mod: RT,,, | Performed by: UROLOGY

## 2019-03-06 PROCEDURE — C1758 CATHETER, URETERAL: HCPCS | Performed by: UROLOGY

## 2019-03-06 PROCEDURE — C1894 INTRO/SHEATH, NON-LASER: HCPCS | Performed by: UROLOGY

## 2019-03-06 PROCEDURE — 37000009 HC ANESTHESIA EA ADD 15 MINS: Performed by: UROLOGY

## 2019-03-06 PROCEDURE — C1769 GUIDE WIRE: HCPCS | Performed by: UROLOGY

## 2019-03-06 PROCEDURE — 71000015 HC POSTOP RECOV 1ST HR: Performed by: UROLOGY

## 2019-03-06 PROCEDURE — 74420 PR  X-RAY RETROGRADE PYELOGRAM: ICD-10-PCS | Mod: 26,,, | Performed by: UROLOGY

## 2019-03-06 PROCEDURE — 52332 CYSTOSCOPY AND TREATMENT: CPT | Mod: 51,RT,, | Performed by: UROLOGY

## 2019-03-06 PROCEDURE — 71000033 HC RECOVERY, INTIAL HOUR: Performed by: UROLOGY

## 2019-03-06 PROCEDURE — 25500020 PHARM REV CODE 255: Performed by: UROLOGY

## 2019-03-06 PROCEDURE — 25000003 PHARM REV CODE 250: Performed by: NURSE ANESTHETIST, CERTIFIED REGISTERED

## 2019-03-06 PROCEDURE — 71000016 HC POSTOP RECOV ADDL HR: Performed by: UROLOGY

## 2019-03-06 PROCEDURE — 36000708 HC OR TIME LEV III 1ST 15 MIN: Performed by: UROLOGY

## 2019-03-06 PROCEDURE — 52354 PR CYSTO/URETERO/PYELOSC,BX &/OR FULG LESN: ICD-10-PCS | Mod: RT,,, | Performed by: UROLOGY

## 2019-03-06 PROCEDURE — 37000008 HC ANESTHESIA 1ST 15 MINUTES: Performed by: UROLOGY

## 2019-03-06 PROCEDURE — 52332 PR CYSTOSCOPY,INSERT URETERAL STENT: ICD-10-PCS | Mod: 51,RT,, | Performed by: UROLOGY

## 2019-03-06 PROCEDURE — 25000003 PHARM REV CODE 250: Performed by: ANESTHESIOLOGY

## 2019-03-06 PROCEDURE — 74420 UROGRAPHY RTRGR +-KUB: CPT | Mod: 26,,, | Performed by: UROLOGY

## 2019-03-06 DEVICE — STENT URETERAL UNIV 7FR 22CM: Type: IMPLANTABLE DEVICE | Site: URETER | Status: FUNCTIONAL

## 2019-03-06 RX ORDER — GLYCOPYRROLATE 0.2 MG/ML
INJECTION INTRAMUSCULAR; INTRAVENOUS
Status: DISCONTINUED | OUTPATIENT
Start: 2019-03-06 | End: 2019-03-06

## 2019-03-06 RX ORDER — OXYCODONE HYDROCHLORIDE 5 MG/1
5 TABLET ORAL
Status: DISCONTINUED | OUTPATIENT
Start: 2019-03-06 | End: 2019-03-06 | Stop reason: HOSPADM

## 2019-03-06 RX ORDER — FENTANYL CITRATE 50 UG/ML
25 INJECTION, SOLUTION INTRAMUSCULAR; INTRAVENOUS EVERY 5 MIN PRN
Status: DISCONTINUED | OUTPATIENT
Start: 2019-03-06 | End: 2019-03-06 | Stop reason: HOSPADM

## 2019-03-06 RX ORDER — MEPERIDINE HYDROCHLORIDE 25 MG/ML
12.5 INJECTION INTRAMUSCULAR; INTRAVENOUS; SUBCUTANEOUS ONCE AS NEEDED
Status: DISCONTINUED | OUTPATIENT
Start: 2019-03-06 | End: 2019-03-06 | Stop reason: HOSPADM

## 2019-03-06 RX ORDER — ONDANSETRON HYDROCHLORIDE 2 MG/ML
INJECTION, SOLUTION INTRAMUSCULAR; INTRAVENOUS
Status: DISCONTINUED | OUTPATIENT
Start: 2019-03-06 | End: 2019-03-06

## 2019-03-06 RX ORDER — LIDOCAINE HYDROCHLORIDE 20 MG/ML
JELLY TOPICAL ONCE
Status: DISCONTINUED | OUTPATIENT
Start: 2019-03-06 | End: 2019-03-06 | Stop reason: HOSPADM

## 2019-03-06 RX ORDER — ONDANSETRON 2 MG/ML
4 INJECTION INTRAMUSCULAR; INTRAVENOUS DAILY PRN
Status: DISCONTINUED | OUTPATIENT
Start: 2019-03-06 | End: 2019-03-06 | Stop reason: HOSPADM

## 2019-03-06 RX ORDER — PROPOFOL 10 MG/ML
VIAL (ML) INTRAVENOUS
Status: DISCONTINUED | OUTPATIENT
Start: 2019-03-06 | End: 2019-03-06

## 2019-03-06 RX ORDER — SODIUM CHLORIDE, SODIUM LACTATE, POTASSIUM CHLORIDE, CALCIUM CHLORIDE 600; 310; 30; 20 MG/100ML; MG/100ML; MG/100ML; MG/100ML
INJECTION, SOLUTION INTRAVENOUS CONTINUOUS PRN
Status: DISCONTINUED | OUTPATIENT
Start: 2019-03-06 | End: 2019-03-06

## 2019-03-06 RX ORDER — SODIUM CHLORIDE 9 MG/ML
INJECTION, SOLUTION INTRAVENOUS CONTINUOUS
Status: DISCONTINUED | OUTPATIENT
Start: 2019-03-06 | End: 2019-03-06 | Stop reason: HOSPADM

## 2019-03-06 RX ORDER — LIDOCAINE HCL/PF 100 MG/5ML
SYRINGE (ML) INTRAVENOUS
Status: DISCONTINUED | OUTPATIENT
Start: 2019-03-06 | End: 2019-03-06

## 2019-03-06 RX ORDER — SODIUM CHLORIDE 0.9 % (FLUSH) 0.9 %
3 SYRINGE (ML) INJECTION
Status: DISCONTINUED | OUTPATIENT
Start: 2019-03-06 | End: 2019-03-06 | Stop reason: HOSPADM

## 2019-03-06 RX ORDER — PHENYLEPHRINE HYDROCHLORIDE 10 MG/ML
INJECTION INTRAVENOUS
Status: DISCONTINUED | OUTPATIENT
Start: 2019-03-06 | End: 2019-03-06

## 2019-03-06 RX ORDER — FENTANYL CITRATE 50 UG/ML
INJECTION, SOLUTION INTRAMUSCULAR; INTRAVENOUS
Status: DISCONTINUED | OUTPATIENT
Start: 2019-03-06 | End: 2019-03-06

## 2019-03-06 RX ORDER — HYDROMORPHONE HYDROCHLORIDE 2 MG/ML
0.4 INJECTION, SOLUTION INTRAMUSCULAR; INTRAVENOUS; SUBCUTANEOUS EVERY 5 MIN PRN
Status: DISCONTINUED | OUTPATIENT
Start: 2019-03-06 | End: 2019-03-06 | Stop reason: HOSPADM

## 2019-03-06 RX ADMIN — FENTANYL CITRATE 50 MCG: 50 INJECTION, SOLUTION INTRAMUSCULAR; INTRAVENOUS at 08:03

## 2019-03-06 RX ADMIN — PHENYLEPHRINE HYDROCHLORIDE 200 MCG: 10 INJECTION INTRAVENOUS at 08:03

## 2019-03-06 RX ADMIN — ONDANSETRON 4 MG: 2 INJECTION, SOLUTION INTRAMUSCULAR; INTRAVENOUS at 08:03

## 2019-03-06 RX ADMIN — PROPOFOL 170 MG: 10 INJECTION, EMULSION INTRAVENOUS at 07:03

## 2019-03-06 RX ADMIN — GLYCOPYRROLATE 0.2 MG: 0.2 INJECTION, SOLUTION INTRAMUSCULAR; INTRAVENOUS at 08:03

## 2019-03-06 RX ADMIN — LIDOCAINE HYDROCHLORIDE 40 MG: 20 INJECTION, SOLUTION INTRAVENOUS at 07:03

## 2019-03-06 RX ADMIN — CEFTRIAXONE 1 G: 1 INJECTION, SOLUTION INTRAVENOUS at 07:03

## 2019-03-06 RX ADMIN — SODIUM CHLORIDE, SODIUM LACTATE, POTASSIUM CHLORIDE, AND CALCIUM CHLORIDE: 600; 310; 30; 20 INJECTION, SOLUTION INTRAVENOUS at 07:03

## 2019-03-06 RX ADMIN — CARBOXYMETHYLCELLULOSE SODIUM 2 DROP: 2.5 SOLUTION/ DROPS OPHTHALMIC at 07:03

## 2019-03-06 RX ADMIN — PHENYLEPHRINE HYDROCHLORIDE 100 MCG: 10 INJECTION INTRAVENOUS at 08:03

## 2019-03-06 RX ADMIN — FENTANYL CITRATE 50 MCG: 50 INJECTION, SOLUTION INTRAMUSCULAR; INTRAVENOUS at 07:03

## 2019-03-06 NOTE — ANESTHESIA PREPROCEDURE EVALUATION
03/06/2019  Destinee Luo is a 87 y.o., female.    Pre-op Assessment    I have reviewed the Patient Summary Reports.     I have reviewed the Nursing Notes.   I have reviewed the Medications.     Review of Systems  Anesthesia Hx:  No problems with previous Anesthesia  Denies Family Hx of Anesthesia complications.   Denies Personal Hx of Anesthesia complications.   Social:  Non-Smoker    Hematology/Oncology:  Hematology Normal      Current/Recent Cancer. Oncology Comments: bladder    EENT/Dental:EENT/Dental Normal   Cardiovascular:   Exercise tolerance: good Hypertension, well controlled Aortic valve insuff   Pulmonary:  Pulmonary Normal    Renal/:   Chronic Renal Disease, CRI    Musculoskeletal:  Musculoskeletal Normal    Neurological:   TIA, Denies CVA. Questionable TIA in remote past   Endocrine:  Endocrine Normal    Dermatological:  Skin Normal    Psych:  Psychiatric Normal           Physical Exam  General:  Well nourished    Airway/Jaw/Neck:  Airway Findings: Mouth Opening: Normal Tongue: Normal  General Airway Assessment: Adult  Mallampati: I  TM Distance: Normal, at least 6 cm  Jaw/Neck Findings:  Neck ROM: Normal ROM      Dental:  Dental Findings: In tact             Anesthesia Plan  Type of Anesthesia, risks & benefits discussed:  Anesthesia Type:  general  Patient's Preference:   Intra-op Monitoring Plan: standard ASA monitors  Intra-op Monitoring Plan Comments:   Post Op Pain Control Plan: multimodal analgesia  Post Op Pain Control Plan Comments:   Induction:   IV  Beta Blocker:         Informed Consent: Patient understands risks and agrees with Anesthesia plan.  Questions answered. Anesthesia consent signed with patient.  ASA Score: 3     Day of Surgery Review of History & Physical:    H&P update referred to the surgeon.     Anesthesia Plan Notes: Labs OK        Ready For Surgery From Anesthesia  Perspective.

## 2019-03-06 NOTE — INTERVAL H&P NOTE
The patient has been examined and the H&P has been reviewed:    urine cs neg    Anesthesia/Surgery risks, benefits and alternative options discussed and understood by patient/family.          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

## 2019-03-06 NOTE — OP NOTE
DATE OF PROCEDURE:  03/06/2019.    SURGEON:  Kiet Arellano M.D.    PROCEDURE PERFORMED:  1.  Right ureteroscopy with ablation of renal pelvic tumors.  2.  Cystoscopy with right retrograde pyelogram and ureteral stent change.    ANESTHESIA:  General.    COMPLICATIONS:  None.    PREOPERATIVE DIAGNOSIS:  Urothelial cancer.    POSTOPERATIVE DIAGNOSIS:  Urothelial cancer.    ESTIMATED BLOOD LOSS:  Minimal.    HISTORY:  Ms. Luo is an 87-year-old woman with a history of urothelial cancer.    Last year, she underwent right robotic distal ureterectomy   with ureteral reimplantation for a distal ureteral tumor.  She now has a   recurrence in the right renal pelvis.  She underwent a biopsy and ablation of   this last month.  The tumors only could be partially ablated due to visualization   at that time.  Biopsy revealed high-grade papillary urothelial cancer.  She   presents for repeat attempts at ablation.  We have discussed nephroureterectomy   with the patient.  Due to her age and comorbidities, she would like to avoid any   major surgery if at all possible.  We discussed all the risks and benefits of   the above and I answered all of her questions.  The family understands that from   an oncologic standpoint, this may not be optimal.  However, she wishes to avoid   any major procedures as stated above.  Preop urine culture is negative.  Preop   antibiotics and TEDs and SCDs are applied.    PROCEDURE IN DETAIL:  The patient was brought to Cysto and undergoes general   endotracheal anesthesia.  She was placed in the dorsal lithotomy position.  The   genital area is prepped and draped in a sterile fashion.   film was   Obtained.  The right ureteral stent was sitting somewhat lower than expected   and the coil was on the left side of the true pelvis - likely produced in the   patient's left-sided discomfort.  Cystoscopy was performed with 30 and 70 degree   lenses.  There are no tumors and no stones in the bladder.  The  stent is   emanating from the cheri-ureteral orifice at the dome and is contacting the left   side of the trigone.  The distal coil was grasped and removed and a wire was advanced   through the stent and the stent is removed.  A dual lumen catheter was advanced   over the wire.  A right retrograde pyelogram was performed.  There are no   filling defects identified.  There is an extrarenal pelvis and the calices are   nondilated.  A second wire was advanced through the dual lumen catheter and the   dual lumen catheter was removed.  A 12-Faroese 35 cm double-J ureteral access   sheath is placed.  The inner sheath is removed and the inner wire is removed.    The wire on the outside of the sheath remained in place.  The flexible   ureteroscope was advanced into the renal pelvis.  The calices are mapped in the   renal pelvis is evaluated along with the ureteropelvic junction.  There are only   two areas of residual tumor identified.  Both of these are in the renal pelvis.    These are not significantly changed from when they were visualized last month.    The Olympus laser was utilized and using the tumor ablation and hemostasis   settings both tumors are ablated.  There is some bleeding from some   neovascularity at the inferior edge of the more superior tumor.  This is   cauterized with hemostasis setting.  Visualization is poor.  However, there was   no active bleeding identified.  Due to the poor visualization, I cannot be   certain that all of the tumor has been ablated.  We reevaluated the kidney with   the flexible ureteroscope.  I removed the laser in an attempt to improve flow and   increased visibility.  However, the visibility is still rather poor.  I chose   to terminate the procedure due to poor visibility.  The sheath and the   ureteroscope are removed.  The ureter was evaluated during this process.  There   were no ureteral strictures, no tumors in the ureter.  The wire is backloaded on   to the cystoscope.   A 7-Vietnamese 22 cm double-J ureteral stent was placed.  The   stent coils well in the kidney and in the bladder.  The new stent appears to be   sitting in a better location and I suspect this will help with her left-sided   discomfort.  I reviewed her recent retrograde pyelogram and there is no evidence   of obstruction or filling defect on the left side.  Therefore, I chose not to   repeat a left retrograde pyelogram.  The patient will follow up in two weeks.  We will likely schedule repeat ureteroscopy in   three months.      CHARLES/BLAKE  dd: 03/06/2019 09:23:31 (CST)  td: 03/06/2019 09:48:33 (CST)  Doc ID   #[id number]  Job ID #[id number]    CC:

## 2019-03-06 NOTE — TRANSFER OF CARE
"Anesthesia Transfer of Care Note    Patient: Destinee CARDONA Major    Procedure(s) Performed: Procedure(s) (LRB):  CYSTOURETEROSCOPY, WITH RETROGRADE PYELOGRAM AND URETERAL STENT INSERTION (Right)  ABLATION, NEOPLASM, KIDNEY, USING LASER (Right)  REMOVAL-STENT (Right)    Patient location: PACU    Anesthesia Type: general    Transport from OR: Transported from OR on 2-3 L/min O2 by NC with adequate spontaneous ventilation    Post pain: adequate analgesia    Post assessment: no apparent anesthetic complications    Post vital signs: stable    Level of consciousness: awake    Nausea/Vomiting: no nausea/vomiting    Complications: none    Transfer of care protocol was followed      Last vitals:   Visit Vitals  BP (!) 152/72 (BP Location: Right arm, Patient Position: Lying)   Pulse 107   Temp 36.4 °C (97.6 °F) (Oral)   Resp 16   Ht 5' 3" (1.6 m)   Wt 68 kg (150 lb)   SpO2 97%   Breastfeeding? No   BMI 26.57 kg/m²     "

## 2019-03-06 NOTE — DISCHARGE INSTRUCTIONS
Anesthesia: After Your Surgery  Youve just had surgery. During surgery, you received medication called anesthesia to keep you comfortable and pain-free. After surgery, you may experience some pain or nausea. This is common. Here are some tips for feeling better and recovering after surgery.    Going home  Your doctor or nurse will show you how to take care of yourself when you go home. He or she will also answer your questions. Have an adult family member or friend drive you home. For the first 24 hours after your surgery:  · Do not drive or use heavy equipment.  · Do not make important decisions or sign legal documents.  · Avoid alcohol.  · Have someone stay with you, if needed. He or she can watch for problems and help keep you safe.  Be sure to keep all follow-up appointments with your doctor. And rest after your procedure for as long as your doctor tells you to.    Coping with pain  If you have pain after surgery, pain medication will help you feel better. Take it as directed, before pain becomes severe. Also, ask your doctor or pharmacist about other ways to control pain, such as with heat, ice, and relaxation. And follow any other instructions your surgeon or nurse gives you.    URINARY RETENTION  Should you experience a decrease in your urine output or are unable to urinate following surgery, this can be due to the medications given during surgery.  We recommend you going to the nearest Emergency Department.    Tips for taking pain medication  To get the best relief possible, remember these points:  · Pain medications can upset your stomach. Taking them with a little food may help.  · Most pain relievers taken by mouth need at least 20 to 30 minutes to take effect.  · Taking medication on a schedule can help you remember to take it. Try to time your medication so that you can take it before beginning an activity, such as dressing, walking, or sitting down for dinner.  · Constipation is a common side  effect of pain medications. Contact your doctor before taking any medications like laxatives or stool softeners to help relieve constipation. Also ask about any dietary restrictions, because drinking lots of fluids and eating foods like fruits and vegetables that are high in fiber can also help. Remember, dont take laxatives unless your surgeon has prescribed them.  · Mixing alcohol and pain medication can cause dizziness and slow your breathing. It can even be fatal. Dont drink alcohol while taking pain medication.  · Pain medication can slow your reflexes. Dont drive or operate machinery while taking pain medication.  If your health care provider tells you to take acetaminophen to help relieve your pain, ask him or her how much you are supposed to take each day. (Acetaminophen is the generic name for Tylenol and other brand-name pain relievers.) Acetaminophen or other pain relievers may interact with your prescription medicines or other over-the-counter (OTC) drugs. Some prescription medications contain acetaminophen along with other active ingredients. Using both prescription and OTC acetaminophen for pain can cause you to overdose. The FDA recommends that you read the labels on your OTC medications carefully. This will help you to clearly understand the list of active ingredients, dosing instructions, and any warnings. It may also help you avoid taking too much acetaminophen. If you have questions or don't understand the information, ask your pharmacist or health care provider to explain it to you before you take the OTC medication.    Managing nausea  Some people have an upset stomach after surgery. This is often due to anesthesia, pain, pain medications, or the stress of surgery. The following tips will help you manage nausea and get good nutrition as you recover. If you were on a special diet before surgery, ask your doctor if you should follow it during recovery. These tips may help:  · Dont push  yourself to eat. Your body will tell you when to eat and how much.  · Start off with clear liquids and soup. They are easier to digest.  · Progress to semi-solid foods (mashed potatoes, applesauce, and gelatin) as you feel ready.  · Slowly move to solid foods. Dont eat fatty, rich, or spicy foods at first.  · Dont force yourself to have three large meals a day. Instead, eat smaller amounts more often.  · Take pain medications with a small amount of solid food, such as crackers or toast to avoid nausea.      Call your surgeon if    · You feel too sleepy, dizzy, or groggy (medication may be too strong).  · You have side effects like nausea, vomiting, or skin changes (rash, itching, or hives).   © 8727-5691 [employer] [address] [place] [address], [place] [address]. All rights reserved. This information is not intended as a substitute for professional medical care. Always follow your healthcare professional's instructions.

## 2019-03-06 NOTE — ANESTHESIA POSTPROCEDURE EVALUATION
"Anesthesia Post Evaluation    Patient: Destinee CARDONA Major    Procedure(s) Performed: Procedure(s) (LRB):  CYSTOURETEROSCOPY, WITH RETROGRADE PYELOGRAM AND URETERAL STENT INSERTION (Right)  ABLATION, NEOPLASM, KIDNEY, USING LASER (Right)  REMOVAL-STENT (Right)    Final Anesthesia Type: general  Patient location during evaluation: PACU  Patient participation: Yes- Able to Participate  Level of consciousness: awake and alert  Post-procedure vital signs: reviewed and stable  Pain management: adequate  Airway patency: patent  PONV status at discharge: No PONV  Anesthetic complications: no      Cardiovascular status: blood pressure returned to baseline  Respiratory status: unassisted  Hydration status: euvolemic  Follow-up not needed.        Visit Vitals  BP (!) 170/85 (BP Location: Right arm, Patient Position: Sitting)   Pulse 88   Temp 36.6 °C (97.8 °F) (Oral)   Resp 18   Ht 5' 3" (1.6 m)   Wt 68 kg (150 lb)   SpO2 100%   Breastfeeding? No   BMI 26.57 kg/m²       Pain/Anabella Score: Anabella Score: 10 (3/6/2019 10:50 AM)        "

## 2019-03-07 ENCOUNTER — TELEPHONE (OUTPATIENT)
Dept: UROLOGY | Facility: CLINIC | Age: 84
End: 2019-03-07

## 2019-03-07 NOTE — TELEPHONE ENCOUNTER
----- Message from Beverly Zhang sent at 3/7/2019 10:05 AM CST -----  Contact: Ara     Name of Who is Calling:Ara     What is the request in detail: Patient would like a call back regarding follow up appointment time , patient call patient granddaughter   Please contact to further discuss and advise    Can the clinic reply by [facility]: No    What Number to Call Back if not in [facility]: [phone]

## 2019-03-13 NOTE — DISCHARGE SUMMARY
[facility]  Discharge Note  Short Stay    Admit Date: 3/6/2019    Discharge Date and Time: 3/6/2019 11:25 AM     Attending Physician: No att. providers found     Discharge Provider: Kiet Arellano    Diagnoses:  Active Hospital Problems    Diagnosis  POA    *Urothelial cancer [C68.9]  Yes      Resolved Hospital Problems   No resolved problems to display.       Discharged Condition: good    Hospital Course: Patient was admitted for an outpatient procedure and tolerated the procedure well with no complications.    Final Diagnoses: Same as principal problem.    Disposition: Home or Self Care    Follow up/Patient Instructions:    Medications:  Reconciled Home Medications:      Medication List      CHANGE how you take these medications    docusate sodium 100 MG capsule  Commonly known as:  COLACE  Take 1 capsule (100 mg total) by mouth 2 (two) times daily.  What changed:    · when to take this  · reasons to take this        CONTINUE taking these medications    acetaminophen 325 MG tablet  Commonly known as:  TYLENOL  Take 1 tablet (325 mg total) by mouth every 6 (six) hours as needed for Pain.     ALPRAZolam 0.25 MG tablet  Commonly known as:  XANAX  Take 1 tablet (0.25 mg total) by mouth 2 (two) times daily as needed for Anxiety. For anxiety     amLODIPine 2.5 MG tablet  Commonly known as:  NORVASC  Take 1 tablet (2.5 mg total) by mouth once daily.     aspirin-calcium carbonate 81 mg-300 mg calcium(777 mg) Tab  Take by mouth.     cranberry 400 mg Cap  Take by mouth.     fenofibrate 145 MG tablet  Commonly known as:  TRICOR  TAKE 1 TABLET BY MOUTH EVERY DAY     fluconazole 150 MG Tab  Commonly known as:  DIFLUCAN     Lactobacillus acidophilus Cap  Take by mouth.     loratadine 10 mg tablet  Commonly known as:  CLARITIN  Take 10 mg by mouth as needed for Allergies.     meclizine 12.5 mg tablet  Commonly known as:  ANTIVERT  Take 1 tablet (12.5 mg total) by mouth 3 (three) times daily as needed.      multivitamin capsule  Take 1 capsule by mouth.     nitrofurantoin (macrocrystal-monohydrate) 100 MG capsule  Commonly known as:  MACROBID  Take 100 mg by mouth nightly.     pantoprazole 40 MG tablet  Commonly known as:  PROTONIX  Take 1 tablet (40 mg total) by mouth once daily.     vitamin D 1000 units Tab  Commonly known as:  VITAMIN D3  Take 1,000 Units by mouth once daily.          Discharge Procedure Orders   Diet general     Call MD for:  temperature >100.4     Call MD for:  persistent nausea and vomiting     Call MD for:  severe uncontrolled pain     Call MD for:  difficulty breathing, headache or visual disturbances     Call MD for:  redness, tenderness, or signs of infection (pain, swelling, redness, odor or green/yellow discharge around incision site)     Call MD for:  hives     Call MD for:  persistent dizziness or light-headedness     Call MD for:  extreme fatigue     Call MD for:   Order Comments: Call MD or go to the nearest ER if you are unable to urinate.     Activity as tolerated     Follow-up Information     Kiet Arellano MD On 3/19/2019.    Specialty:  Urology  Contact information:  [address] [address]  [phone]                   Discharge Procedure Orders (must include Diet, Follow-up, Activity):   Discharge Procedure Orders (must include Diet, Follow-up, Activity)   Diet general     Call MD for:  temperature >100.4     Call MD for:  persistent nausea and vomiting     Call MD for:  severe uncontrolled pain     Call MD for:  difficulty breathing, headache or visual disturbances     Call MD for:  redness, tenderness, or signs of infection (pain, swelling, redness, odor or green/yellow discharge around incision site)     Call MD for:  hives     Call MD for:  persistent dizziness or light-headedness     Call MD for:  extreme fatigue     Call MD for:   Order Comments: Call MD or go to the nearest ER if you are unable to urinate.     Activity as tolerated

## 2019-03-26 ENCOUNTER — OFFICE VISIT (OUTPATIENT)
Dept: UROLOGY | Facility: CLINIC | Age: 84
End: 2019-03-26
Payer: MEDICARE

## 2019-03-26 VITALS
SYSTOLIC BLOOD PRESSURE: 157 MMHG | BODY MASS INDEX: 26.57 KG/M2 | WEIGHT: 149.94 LBS | HEART RATE: 74 BPM | HEIGHT: 63 IN | DIASTOLIC BLOOD PRESSURE: 73 MMHG

## 2019-03-26 DIAGNOSIS — C68.9 UROTHELIAL CANCER: Primary | ICD-10-CM

## 2019-03-26 LAB
BILIRUB SERPL-MCNC: ABNORMAL MG/DL
BLOOD URINE, POC: 250
COLOR, POC UA: 1.01
GLUCOSE UR QL STRIP: ABNORMAL
KETONES UR QL STRIP: ABNORMAL
LEUKOCYTE ESTERASE URINE, POC: ABNORMAL
NITRITE, POC UA: ABNORMAL
PH, POC UA: 5
PROTEIN, POC: 30
SPECIFIC GRAVITY, POC UA: 1.01
UROBILINOGEN, POC UA: ABNORMAL

## 2019-03-26 PROCEDURE — 99214 PR OFFICE/OUTPT VISIT, EST, LEVL IV, 30-39 MIN: ICD-10-PCS | Mod: 25,S$GLB,, | Performed by: UROLOGY

## 2019-03-26 PROCEDURE — 99214 OFFICE O/P EST MOD 30 MIN: CPT | Mod: 25,S$GLB,, | Performed by: UROLOGY

## 2019-03-26 PROCEDURE — 81002 POCT URINE DIPSTICK WITHOUT MICROSCOPE: ICD-10-PCS | Mod: S$GLB,,, | Performed by: UROLOGY

## 2019-03-26 PROCEDURE — 81002 URINALYSIS NONAUTO W/O SCOPE: CPT | Mod: S$GLB,,, | Performed by: UROLOGY

## 2019-03-26 RX ORDER — LIDOCAINE HYDROCHLORIDE 20 MG/ML
JELLY TOPICAL ONCE
Status: CANCELLED | OUTPATIENT
Start: 2019-03-26 | End: 2019-03-26

## 2019-03-26 NOTE — PROGRESS NOTES
"Subjective:      Destinee Luo is a 87 y.o. female who returns today regarding her     Two weeks status post ablation of right renal pelvic tumors.  These were high-grade on previous biopsies but appear superficial.  All of the visible tumor was ablated however there was some bleeding which limited visualization.    She is doing very well since the procedure.  She is not having him any hematuria and she is no longer having pain associated with the stent.  Overall she is doing well.    The following portions of the patient's history were reviewed and updated as appropriate: allergies, current medications, past family history, past medical history, past social history, past surgical history and problem list.    Review of Systems  Pertinent items are noted in HPI.  A comprehensive multipoint review of systems was negative except as otherwise stated in the HPI.     Objective:   Vitals: BP (!) 157/73 (BP Location: Left arm, Patient Position: Sitting, BP Method: Large (Automatic))   Pulse 74   Ht 5' 3" (1.6 m)   Wt 68 kg (149 lb 14.6 oz)   BMI 26.56 kg/m²     Physical Exam   General: alert and oriented, no acute distress  Respiratory: Symmetric expansion, non-labored breathing  Cardiovascular: normal to inspection  Abdomen: non distended   Skin: normal coloration and turgor, no rashes, no suspicious skin lesions noted  Neuro: no gross deficits  Psych: normal judgment and insight, normal mood/affect and non-anxious    Physical Exam    Lab Review   Urinalysis demonstrates nitrite negative positive white cells positive red cells grossly clear    Lab Results   Component Value Date    WBC 5.20 02/15/2019    HGB 11.6 (L) 02/15/2019    HCT 36.2 (L) 02/15/2019    MCV 87 02/15/2019     02/15/2019     Lab Results   Component Value Date    CREATININE 1.4 12/17/2018    BUN 24 (H) 12/17/2018       Imaging  -  Assessment and Plan:   Urothelial cancer  -     POCT URINE DIPSTICK WITHOUT MICROSCOPE  -     Diet NPO; Standing  -  "    Case Request Operating Room: URETEROSCOPY      We plan a repeat ureteroscopy Thursday June 13th with a preop visit with me and anesthesia on Tuesday June 4th    If she has any UTI symptoms in the interim she will see Dr. Edwin Larson on the [place]

## 2019-04-02 ENCOUNTER — TELEPHONE (OUTPATIENT)
Dept: FAMILY MEDICINE | Facility: CLINIC | Age: 84
End: 2019-04-02

## 2019-04-02 NOTE — TELEPHONE ENCOUNTER
----- Message from Gwendolyn Marte sent at 4/2/2019 11:47 AM CDT -----  Contact: [facility] [phone]  Please fax the progress notes for her last appt to [phone].  Thank you!

## 2019-04-05 ENCOUNTER — TELEPHONE (OUTPATIENT)
Dept: FAMILY MEDICINE | Facility: CLINIC | Age: 84
End: 2019-04-05

## 2019-04-05 NOTE — TELEPHONE ENCOUNTER
----- Message from RT Noelle sent at 4/4/2019  3:04 PM CDT -----  Contact: Snow,Medical Records, [phone] [facility]  Snow,Medical Records, [phone] [facility], requesting the pt's progress notes please fax to , thanks.

## 2019-04-09 DIAGNOSIS — I10 ESSENTIAL HYPERTENSION: ICD-10-CM

## 2019-04-10 RX ORDER — AMLODIPINE BESYLATE 2.5 MG/1
2.5 TABLET ORAL DAILY
Qty: 90 TABLET | Refills: 1 | Status: SHIPPED | OUTPATIENT
Start: 2019-04-10 | End: 2019-11-06 | Stop reason: SDUPTHER

## 2019-04-18 ENCOUNTER — TELEPHONE (OUTPATIENT)
Dept: GYNECOLOGIC ONCOLOGY | Facility: CLINIC | Age: 84
End: 2019-04-18

## 2019-04-22 ENCOUNTER — OFFICE VISIT (OUTPATIENT)
Dept: GYNECOLOGIC ONCOLOGY | Facility: CLINIC | Age: 84
End: 2019-04-22
Payer: MEDICARE

## 2019-04-22 VITALS
SYSTOLIC BLOOD PRESSURE: 179 MMHG | HEART RATE: 78 BPM | WEIGHT: 152 LBS | BODY MASS INDEX: 26.93 KG/M2 | DIASTOLIC BLOOD PRESSURE: 75 MMHG

## 2019-04-22 DIAGNOSIS — C66.1 URETERAL CANCER, RIGHT: ICD-10-CM

## 2019-04-22 DIAGNOSIS — C54.1 ENDOMETRIAL CANCER: Primary | ICD-10-CM

## 2019-04-22 PROCEDURE — 99213 OFFICE O/P EST LOW 20 MIN: CPT | Mod: PBBFAC | Performed by: OBSTETRICS & GYNECOLOGY

## 2019-04-22 PROCEDURE — 99214 PR OFFICE/OUTPT VISIT, EST, LEVL IV, 30-39 MIN: ICD-10-PCS | Mod: S$PBB,,, | Performed by: OBSTETRICS & GYNECOLOGY

## 2019-04-22 PROCEDURE — 99999 PR PBB SHADOW E&M-EST. PATIENT-LVL III: ICD-10-PCS | Mod: PBBFAC,,, | Performed by: OBSTETRICS & GYNECOLOGY

## 2019-04-22 PROCEDURE — 99999 PR PBB SHADOW E&M-EST. PATIENT-LVL III: CPT | Mod: PBBFAC,,, | Performed by: OBSTETRICS & GYNECOLOGY

## 2019-04-22 PROCEDURE — 99214 OFFICE O/P EST MOD 30 MIN: CPT | Mod: S$PBB,,, | Performed by: OBSTETRICS & GYNECOLOGY

## 2019-04-22 NOTE — PROGRESS NOTES
Subjective:      Patient ID: Destinee Luo is a 87 y.o. female.    Chief Complaint: Endometrial Cancer (3 mo follow up)    Treatment History  Stage IBG3 (clinical) endometrial cancer  RALH/BSO in combo with a right ureteral resection and ureteroneocystostomy by Dr. Arellano on 1/4/18  Completed VB to 21 Gy on 6/7/18    HPI  Here today for continued surveillance.  Had ablation of renal tumor recently.  Denies VB, F/C, N/V.   Review of Systems   Constitutional: Negative for activity change, appetite change, chills, fatigue and fever.   HENT: Negative for hearing loss, mouth sores, nosebleeds, sore throat and tinnitus.    Eyes: Negative for visual disturbance.   Respiratory: Negative for cough, chest tightness, shortness of breath and wheezing.    Cardiovascular: Negative for chest pain and leg swelling.   Gastrointestinal: Negative for abdominal distention, abdominal pain, blood in stool, constipation, diarrhea, nausea and vomiting.   Genitourinary: Negative for dysuria, flank pain, frequency, hematuria, pelvic pain, vaginal bleeding, vaginal discharge and vaginal pain.   Musculoskeletal: Negative for arthralgias and back pain.   Skin: Negative for rash.   Neurological: Negative for dizziness, seizures, syncope, weakness and numbness.   Hematological: Does not bruise/bleed easily.   Psychiatric/Behavioral: Negative for confusion and sleep disturbance. The patient is not nervous/anxious.        Objective:   Physical Exam:   Constitutional: She appears well-developed and well-nourished. No distress.    HENT:   Head: Normocephalic and atraumatic.    Eyes: No scleral icterus.    Neck: Normal range of motion. Neck supple.    Cardiovascular: Normal rate and intact distal pulses.  Exam reveals no cyanosis and no edema.     Pulmonary/Chest: Effort normal. No respiratory distress. She exhibits no tenderness.        Abdominal: Soft. She exhibits no distension (healed incisions), no fluid wave, no ascites and no mass. There is no  tenderness. There is no rebound and no guarding. No hernia.     Genitourinary: Rectum normal and vagina normal. Pelvic exam was performed with patient supine. There is no rash, tenderness or lesion on the right labia. There is no rash, tenderness or lesion on the left labia. Uterus is absent. There is an absent adnexa. Right adnexum displays no mass, no tenderness and no fullness. Left adnexum displays no mass, no tenderness and no fullness. No bleeding (cuff without defect or lesion) or unspecified prolapse of vaginal walls in the vagina. No vaginal discharge found. Vaginal cuff normal.Labial bartholins normal.Cervix exhibits absence.              Lymphadenopathy:     She has no cervical adenopathy.        Right: No inguinal adenopathy present.        Left: No inguinal adenopathy present.     Skin: No cyanosis.        Assessment:     1. Endometrial cancer    2. Ureteral cancer, right        Plan:       Overall continues to do well.  IRMA on exam today.  Will have her f/u at 3 month intervals given r/o recurrence.

## 2019-05-08 ENCOUNTER — TELEPHONE (OUTPATIENT)
Dept: FAMILY MEDICINE | Facility: CLINIC | Age: 84
End: 2019-05-08

## 2019-05-08 NOTE — TELEPHONE ENCOUNTER
----- Message from Jeffy Mendez sent at 5/8/2019  7:15 AM CDT -----  Contact: [facility], Rubina Cespedes want to speak with a nurse regarding patient having chronic diarrhea need some medical advice please call back at [phone]   19:08

## 2019-05-14 ENCOUNTER — HOSPITAL ENCOUNTER (OUTPATIENT)
Dept: PREADMISSION TESTING | Facility: OTHER | Age: 84
Discharge: HOME | End: 2019-05-14
Attending: UROLOGY
Payer: MEDICARE

## 2019-05-14 ENCOUNTER — ANESTHESIA EVENT (OUTPATIENT)
Dept: SURGERY | Facility: OTHER | Age: 84
End: 2019-05-14
Payer: MEDICARE

## 2019-05-14 ENCOUNTER — OFFICE VISIT (OUTPATIENT)
Dept: UROLOGY | Facility: CLINIC | Age: 84
End: 2019-05-14
Payer: MEDICARE

## 2019-05-14 VITALS
WEIGHT: 151 LBS | OXYGEN SATURATION: 97 % | HEART RATE: 71 BPM | HEIGHT: 63 IN | SYSTOLIC BLOOD PRESSURE: 148 MMHG | DIASTOLIC BLOOD PRESSURE: 63 MMHG | BODY MASS INDEX: 26.75 KG/M2 | TEMPERATURE: 97 F

## 2019-05-14 VITALS
SYSTOLIC BLOOD PRESSURE: 171 MMHG | WEIGHT: 151.88 LBS | DIASTOLIC BLOOD PRESSURE: 74 MMHG | HEART RATE: 72 BPM | BODY MASS INDEX: 26.91 KG/M2 | HEIGHT: 63 IN

## 2019-05-14 DIAGNOSIS — N18.30 CKD (CHRONIC KIDNEY DISEASE) STAGE 3, GFR 30-59 ML/MIN: ICD-10-CM

## 2019-05-14 DIAGNOSIS — R82.90 ABNORMAL FINDING IN URINE: ICD-10-CM

## 2019-05-14 DIAGNOSIS — C68.9 UROTHELIAL CANCER: Primary | ICD-10-CM

## 2019-05-14 DIAGNOSIS — C68.9 UROTHELIAL CANCER: ICD-10-CM

## 2019-05-14 LAB
ABO + RH BLD: NORMAL
ANION GAP SERPL CALC-SCNC: 9 MMOL/L (ref 8–16)
BASOPHILS # BLD AUTO: 0.02 K/UL (ref 0–0.2)
BASOPHILS NFR BLD: 0.4 % (ref 0–1.9)
BILIRUB SERPL-MCNC: ABNORMAL MG/DL
BLD GP AB SCN CELLS X3 SERPL QL: NORMAL
BLOOD URINE, POC: 250
BUN SERPL-MCNC: 19 MG/DL (ref 8–23)
CALCIUM SERPL-MCNC: 9.7 MG/DL (ref 8.7–10.5)
CHLORIDE SERPL-SCNC: 106 MMOL/L (ref 95–110)
CO2 SERPL-SCNC: 24 MMOL/L (ref 23–29)
COLOR, POC UA: YELLOW
CREAT SERPL-MCNC: 1.2 MG/DL (ref 0.5–1.4)
DIFFERENTIAL METHOD: ABNORMAL
EOSINOPHIL # BLD AUTO: 0.1 K/UL (ref 0–0.5)
EOSINOPHIL NFR BLD: 1.7 % (ref 0–8)
ERYTHROCYTE [DISTWIDTH] IN BLOOD BY AUTOMATED COUNT: 14.9 % (ref 11.5–14.5)
EST. GFR  (AFRICAN AMERICAN): 47 ML/MIN/1.73 M^2
EST. GFR  (NON AFRICAN AMERICAN): 41 ML/MIN/1.73 M^2
GLUCOSE SERPL-MCNC: 84 MG/DL (ref 70–110)
GLUCOSE UR QL STRIP: ABNORMAL
HCT VFR BLD AUTO: 37.2 % (ref 37–48.5)
HGB BLD-MCNC: 12 G/DL (ref 12–16)
KETONES UR QL STRIP: ABNORMAL
LEUKOCYTE ESTERASE URINE, POC: ABNORMAL
LYMPHOCYTES # BLD AUTO: 1.1 K/UL (ref 1–4.8)
LYMPHOCYTES NFR BLD: 24.5 % (ref 18–48)
MCH RBC QN AUTO: 27.5 PG (ref 27–31)
MCHC RBC AUTO-ENTMCNC: 32.3 G/DL (ref 32–36)
MCV RBC AUTO: 85 FL (ref 82–98)
MONOCYTES # BLD AUTO: 0.5 K/UL (ref 0.3–1)
MONOCYTES NFR BLD: 11.2 % (ref 4–15)
NEUTROPHILS # BLD AUTO: 2.9 K/UL (ref 1.8–7.7)
NEUTROPHILS NFR BLD: 62.2 % (ref 38–73)
NITRITE, POC UA: ABNORMAL
PH, POC UA: 4
PLATELET # BLD AUTO: 269 K/UL (ref 150–350)
PMV BLD AUTO: 10.5 FL (ref 9.2–12.9)
POTASSIUM SERPL-SCNC: 4 MMOL/L (ref 3.5–5.1)
PROTEIN, POC: 30
RBC # BLD AUTO: 4.36 M/UL (ref 4–5.4)
SODIUM SERPL-SCNC: 139 MMOL/L (ref 136–145)
SPECIFIC GRAVITY, POC UA: 1
UROBILINOGEN, POC UA: ABNORMAL
WBC # BLD AUTO: 4.65 K/UL (ref 3.9–12.7)

## 2019-05-14 PROCEDURE — 87086 URINE CULTURE/COLONY COUNT: CPT

## 2019-05-14 PROCEDURE — 81002 POCT URINE DIPSTICK WITHOUT MICROSCOPE: ICD-10-PCS | Mod: S$GLB,,, | Performed by: UROLOGY

## 2019-05-14 PROCEDURE — 85025 COMPLETE CBC W/AUTO DIFF WBC: CPT

## 2019-05-14 PROCEDURE — 80048 BASIC METABOLIC PNL TOTAL CA: CPT

## 2019-05-14 PROCEDURE — 99214 PR OFFICE/OUTPT VISIT, EST, LEVL IV, 30-39 MIN: ICD-10-PCS | Mod: 25,S$GLB,, | Performed by: UROLOGY

## 2019-05-14 PROCEDURE — 99214 OFFICE O/P EST MOD 30 MIN: CPT | Mod: 25,S$GLB,, | Performed by: UROLOGY

## 2019-05-14 PROCEDURE — 86850 RBC ANTIBODY SCREEN: CPT

## 2019-05-14 PROCEDURE — 81002 URINALYSIS NONAUTO W/O SCOPE: CPT | Mod: S$GLB,,, | Performed by: UROLOGY

## 2019-05-14 PROCEDURE — 36415 COLL VENOUS BLD VENIPUNCTURE: CPT

## 2019-05-14 RX ORDER — LIDOCAINE HYDROCHLORIDE 20 MG/ML
JELLY TOPICAL ONCE
Status: CANCELLED | OUTPATIENT
Start: 2019-05-14 | End: 2019-05-14

## 2019-05-14 RX ORDER — SODIUM CHLORIDE, SODIUM LACTATE, POTASSIUM CHLORIDE, CALCIUM CHLORIDE 600; 310; 30; 20 MG/100ML; MG/100ML; MG/100ML; MG/100ML
INJECTION, SOLUTION INTRAVENOUS CONTINUOUS
Status: CANCELLED | OUTPATIENT
Start: 2019-05-14

## 2019-05-14 RX ORDER — LIDOCAINE HYDROCHLORIDE 10 MG/ML
0.5 INJECTION, SOLUTION EPIDURAL; INFILTRATION; INTRACAUDAL; PERINEURAL ONCE
Status: CANCELLED | OUTPATIENT
Start: 2019-05-14 | End: 2019-05-14

## 2019-05-14 NOTE — DISCHARGE INSTRUCTIONS
PRE-ADMIT TESTING -  [phone]    [address]  [facility]          Your surgery has been scheduled at [facility]. We are pleased to have the opportunity to serve you. For Further Information please call [phone].    On the day of surgery please report to the Information Desk on the 1st floor.    · CONTACT YOUR PHYSICIAN'S OFFICE THE DAY PRIOR TO YOUR SURGERY TO OBTAIN YOUR ARRIVAL TIME.     · The evening before surgery do not eat anything after 9 p.m. ( this includes hard candy, chewing gum and mints).  You may only have GATORADE, POWERADE AND WATER  from 9 p.m. until you leave your home.   DO NOT DRINK ANY LIQUIDS ON THE WAY TO THE HOSPITAL.      SPECIAL MEDICATION INSTRUCTIONS: TAKE medications checked off by the Anesthesiologist on your Medication List.    Angiogram Patients: Take medications as instructed by your physician, including aspirin.     Surgery Patients:    If you take ASPIRIN - Your PHYSICIAN/SURGEON will need to inform you IF/OR when you need to stop taking aspirin prior to your surgery.     Do Not take any medications containing IBUPROFEN.  Do Not Wear any make-up or dark nail polish   (especially eye make-up) to surgery. If you come to surgery with makeup on you will be required to remove the makeup or nail polish.    Do not shave your surgical area at least 5 days prior to your surgery. The surgical prep will be performed at the hospital according to Infection Control regulations.    Leave all valuables at home.   Do Not wear any jewelry or watches, including any metal in body piercings. Jewelry must be removed prior to coming to the hospital.  There is a possibility that rings that are unable to be removed may be cut off if they are on the surgical extremity.    Contact Lens must be removed before surgery. Either do not wear the contact lens or bring a case and solution for storage.  Please bring a container for eyeglasses or dentures as required.  Bring  any paperwork your physician has provided, such as consent forms,  history and physicals, doctor's orders, etc.   Bring comfortable clothes that are loose fitting to wear upon discharge. Take into consideration the type of surgery being performed.  Maintain your diet as advised per your physician the day prior to surgery.      Adequate rest the night before surgery is advised.   Park in the Parking lot behind the hospital or in the [facility] across the street from the parking lot. Parking is complimentary.  If you will be discharged the same day as your procedure, please arrange for a responsible adult to drive you home or to accompany you if traveling by taxi.   YOU WILL NOT BE PERMITTED TO DRIVE OR TO LEAVE THE HOSPITAL ALONE AFTER SURGERY.   It is strongly recommended that you arrange for someone to remain with you for the first 24 hrs following your surgery.       Thank you for your cooperation.  The Staff of [facility].                Bathing Instructions with Hibiclens     Shower the evening before and morning of your procedure with Hibiclens:   Wash your face with water and your regular face wash/soap   Apply Hibiclens directly on your skin or on a wet washcloth and wash gently. When showering: Move away from the shower stream when applying Hibiclens to avoid rinsing off too soon.   Rinse thoroughly with warm water   Do not dilute Hibiclens         Dry off as usual, do not use any deodorant, powder, body lotions, perfume, after shave or cologne.

## 2019-05-14 NOTE — H&P (VIEW-ONLY)
Subjective:      Destinee Luo is a 87 y.o. female who returns today regarding her     Preop for right ureteroscopy and tumor ablation.  The patient has a high-grade but superficial right upper tract urothelial tumor.  Given her age and comorbidities she has opted not to have chemotherapy and nephro ureterectomy.     Single episode of gross hematuria last week    The following portions of the patient's history were reviewed and updated as appropriate: allergies, current medications, past family history, past medical history, past social history, past surgical history and problem list.    Review of Systems  Pertinent items are noted in HPI.  A comprehensive multipoint review of systems was negative except as otherwise stated in the HPI.    Past Medical History:   Diagnosis Date    Anticoagulant long-term use     stopped Plavix and ASA 11/2017    High cholesterol     Hypertension     Macular degeneration     S/P complete hysterectomy 01/04/2018    Stroke 2012    TIA, no deficits     Past Surgical History:   Procedure Laterality Date    ABLATION, NEOPLASM, KIDNEY, USING LASER Right 3/6/2019    [facility]    BIOPSY ENDOMETRIAL  11/22/2017    [facility]    COLONOSCOPY  prior to 2008    CYSTOSCOPY, WITH URETERAL STENT INSERTION, BIOPSY AND ABLATION RIGHT RENAL PELVIC TUMOR Right 2/7/2019    [facility]    CYSTOSCOPY/ RETROGRADE PYELOGRAM/ STENT PLACEMENT/STENT EXCHANGE Right 11/15/2017    [facility]    CYSTOURETEROSCOPY, WITH RETROGRADE PYELOGRAM AND URETERAL STENT INSERTION Right 3/6/2019    [facility]    HYSTERECTOMY  01/04/2018    NEPHRECTOMY- ROBOT -  POSS OPEN Right 1/4/2018    [facility]    NO PAST SURGERIES      REIMPLANTATION-URETER Right 1/4/2018    [facility]     "REMOVAL-STENT Right 3/6/2019    [facility]    URETERAL REIMPLANTION      URETERAL STENT PLACEMENT      URETERECTOMY Right 1/4/2018    [facility]    URETEROSCOPY      URETEROSCOPY - WITH BX MASS Right 11/22/2017    [facility]    URETEROSCOPY; bilateral retorgrade pyelogram Bilateral 2/7/2019    [facility]    URETEROSCOPY; biopsy Right 11/15/2017    [facility]    XI ROBOT ASSISTED LAPAROSCOPIC SALPINGO-OOPHERECTOMY Bilateral 1/4/2018    [facility]formed by Carlos Mann MD at [facility]    XI ROBOTIC ASSISTED LAPAROSCOPIC HYSTERECTOMY N/A 1/4/2018    [facility] by Carlos Mann [facility] at [facility]       Review of patient's allergies indicates:   Allergen Reactions    Sulfa (sulfonamide antibiotics) Other (See Comments)     Unknown    Codeine Anxiety          Objective:   Vitals: BP (!) 171/74 (BP Location: Left arm, Patient Position: Sitting, BP Method: Large (Automatic))   Pulse 72   Ht 5' 3" (1.6 m)   Wt 68.9 kg (151 lb 14.4 oz)   BMI 26.91 kg/m²     Physical Exam   General: alert and oriented, no acute distress  Respiratory: Symmetric expansion, non-labored breathing  Cardiovascular: no peripheral edema  Abdomen: soft, non distended  Skin: normal coloration and turgor, no rashes, no suspicious skin lesions noted  Neuro: no gross deficits  Psych: normal judgment and insight, normal mood/affect and non-anxious    Physical Exam    Lab Review   Urinalysis demonstrates positive leukocytes negative nitrites urine culture sent  Lab Results   Component Value Date    WBC 5.20 02/15/2019    HGB 11.6 (L) 02/15/2019    HCT 36.2 (L) 02/15/2019    MCV 87 02/15/2019     02/15/2019     Lab Results   Component Value Date    CREATININE 1.4 12/17/2018    BUN 24 (H) 12/17/2018       Imaging  -  Assessment and Plan:   Urothelial cancer  -     POCT URINE DIPSTICK " WITHOUT MICROSCOPE  -     Urine culture  -     Diet NPO; Standing  -     Type And Screen Preop; Future; Expected date: 05/14/2019    CKD (chronic kidney disease) stage 3, GFR 30-59 ml/min  -     POCT URINE DIPSTICK WITHOUT MICROSCOPE  -     Urine culture    Abnormal finding in urine   -     Urine culture      Schedule right ureteroscopy with tumor ablation May 23, 2019.  Preop urine culture.  We will start preoperative antibiotics once the culture results are available.  Preop visit with anesthesia.  We again discussed that the optimal treatment for her disease from a oncologic standpoint is preoperative chemotherapy and nephro ureterectomy.  However given her age and comorbidities she prefers a conservative approach

## 2019-05-14 NOTE — PROGRESS NOTES
Subjective:      Destinee Luo is a 87 y.o. female who returns today regarding her     Preop for right ureteroscopy and tumor ablation.  The patient has a high-grade but superficial right upper tract urothelial tumor.  Given her age and comorbidities she has opted not to have chemotherapy and nephro ureterectomy.     Single episode of gross hematuria last week    The following portions of the patient's history were reviewed and updated as appropriate: allergies, current medications, past family history, past medical history, past social history, past surgical history and problem list.    Review of Systems  Pertinent items are noted in HPI.  A comprehensive multipoint review of systems was negative except as otherwise stated in the HPI.    Past Medical History:   Diagnosis Date    Anticoagulant long-term use     stopped Plavix and ASA 11/2017    High cholesterol     Hypertension     Macular degeneration     S/P complete hysterectomy 01/04/2018    Stroke 2012    TIA, no deficits     Past Surgical History:   Procedure Laterality Date    ABLATION, NEOPLASM, KIDNEY, USING LASER Right 3/6/2019    [facility]    BIOPSY ENDOMETRIAL  11/22/2017    [facility]    COLONOSCOPY  prior to 2008    CYSTOSCOPY, WITH URETERAL STENT INSERTION, BIOPSY AND ABLATION RIGHT RENAL PELVIC TUMOR Right 2/7/2019    [facility] at [facility]    CYSTOSCOPY/ RETROGRADE PYELOGRAM/ STENT PLACEMENT/STENT EXCHANGE Right 11/15/2017    [facility] OR    CYSTOURETEROSCOPY, WITH RETROGRADE PYELOGRAM AND URETERAL STENT INSERTION Right 3/6/2019    [facility]    HYSTERECTOMY  01/04/2018    NEPHRECTOMY- ROBOT -  POSS OPEN Right 1/4/2018    [facility]    NO PAST SURGERIES      REIMPLANTATION-URETER Right 1/4/2018    [facility]     "REMOVAL-STENT Right 3/6/2019    [facility]    URETERAL REIMPLANTION      URETERAL STENT PLACEMENT      URETERECTOMY Right 1/4/2018    [facility]    URETEROSCOPY      URETEROSCOPY - WITH BX MASS Right 11/22/2017    [facility]    URETEROSCOPY; bilateral retorgrade pyelogram Bilateral 2/7/2019    [facility]    URETEROSCOPY; biopsy Right 11/15/2017    [facility]    XI ROBOT ASSISTED LAPAROSCOPIC SALPINGO-OOPHERECTOMY Bilateral 1/4/2018    [facility] by Carlos Mann [facility]    XI ROBOTIC ASSISTED LAPAROSCOPIC HYSTERECTOMY N/A 1/4/2018    [facility] OR       Review of patient's allergies indicates:   Allergen Reactions    Sulfa (sulfonamide antibiotics) Other (See Comments)     Unknown    Codeine Anxiety          Objective:   Vitals: BP (!) 171/74 (BP Location: Left arm, Patient Position: Sitting, BP Method: Large (Automatic))   Pulse 72   Ht 5' 3" (1.6 m)   Wt 68.9 kg (151 lb 14.4 oz)   BMI 26.91 kg/m²     Physical Exam   General: alert and oriented, no acute distress  Respiratory: Symmetric expansion, non-labored breathing  Cardiovascular: no peripheral edema  Abdomen: soft, non distended  Skin: normal coloration and turgor, no rashes, no suspicious skin lesions noted  Neuro: no gross deficits  Psych: normal judgment and insight, normal mood/affect and non-anxious    Physical Exam    Lab Review   Urinalysis demonstrates positive leukocytes negative nitrites urine culture sent  Lab Results   Component Value Date    WBC 5.20 02/15/2019    HGB 11.6 (L) 02/15/2019    HCT 36.2 (L) 02/15/2019    MCV 87 02/15/2019     02/15/2019     Lab Results   Component Value Date    CREATININE 1.4 12/17/2018    BUN 24 (H) 12/17/2018       Imaging  -  Assessment and Plan:   Urothelial cancer  -     POCT URINE DIPSTICK " WITHOUT MICROSCOPE  -     Urine culture  -     Diet NPO; Standing  -     Type And Screen Preop; Future; Expected date: 05/14/2019    CKD (chronic kidney disease) stage 3, GFR 30-59 ml/min  -     POCT URINE DIPSTICK WITHOUT MICROSCOPE  -     Urine culture    Abnormal finding in urine   -     Urine culture      Schedule right ureteroscopy with tumor ablation May 23, 2019.  Preop urine culture.  We will start preoperative antibiotics once the culture results are available.  Preop visit with anesthesia.  We again discussed that the optimal treatment for her disease from a oncologic standpoint is preoperative chemotherapy and nephro ureterectomy.  However given her age and comorbidities she prefers a conservative approach

## 2019-05-14 NOTE — ANESTHESIA PREPROCEDURE EVALUATION
05/14/2019  Destinee Luo is a 87 y.o., female.    Anesthesia Evaluation    I have reviewed the Patient Summary Reports.    I have reviewed the Nursing Notes.   I have reviewed the Medications.     Review of Systems  Anesthesia Hx:  No problems with previous Anesthesia  History of prior surgery of interest to airway management or planning: Previous anesthesia: General GA Cysto Stent March 2019 Yaz[address] Dr Love with general anesthesia.  Procedure performed at an [facility]. Denies Family Hx of Anesthesia complications.   Denies Personal Hx of Anesthesia complications.   Social:  Non-Smoker    Hematology/Oncology:  Hematology Normal      Current/Recent Cancer. Oncology Comments: Bladder,mult prev GA for bladder proceedures    EENT/Dental:EENT/Dental Normal   Cardiovascular:   Exercise tolerance: good Hypertension, well controlled Aortic valve insuff   Pulmonary:  Pulmonary Normal    Renal/:   Chronic Renal Disease, CRI    Musculoskeletal:  Musculoskeletal Normal    Neurological:   TIA, Denies CVA. Questionable TIA in remote past   Endocrine:  Endocrine Normal    Dermatological:  Skin Normal    Psych:  Psychiatric Normal           Physical Exam  General:  Well nourished    Airway/Jaw/Neck:  Airway Findings: Mouth Opening: Normal Tongue: Normal  General Airway Assessment: Adult  Mallampati: II     Eyes/Ears/Nose:  Eyes/Ears/Nose Findings:    Dental:  Dental Findings: Upper front caps        Mental Status:  Mental Status Findings:  Cooperative, Alert and Oriented         Anesthesia Plan  Type of Anesthesia, risks & benefits discussed:  Anesthesia Type:  general  Patient's Preference:   Intra-op Monitoring Plan: standard ASA monitors  Intra-op Monitoring Plan Comments:   Post Op Pain Control Plan: multimodal analgesia  Post Op Pain Control Plan Comments:   Induction:   IV  Beta Blocker:         Informed  Consent: Patient understands risks and agrees with Anesthesia plan.  Questions answered. Anesthesia consent signed with patient.  ASA Score: 3     Day of Surgery Review of History & Physical:    H&P update referred to the surgeon.     Anesthesia Plan Notes: Mult prev cysto and stents        Ready For Surgery From Anesthesia Perspective.

## 2019-05-16 LAB
BACTERIA UR CULT: NORMAL
BACTERIA UR CULT: NORMAL

## 2019-05-17 NOTE — PROGRESS NOTES
Dr Arellano [facility] these results [facility] the [facility]  Please have her come to the [facility] office Monday for a catheterized urine culture.  We need these results before her procedure on Thursday

## 2019-05-20 RX ORDER — FENOFIBRATE 145 MG/1
TABLET, FILM COATED ORAL
Qty: 90 TABLET | Refills: 1 | Status: SHIPPED | OUTPATIENT
Start: 2019-05-20 | End: 2020-01-14 | Stop reason: SDUPTHER

## 2019-05-22 ENCOUNTER — TELEPHONE (OUTPATIENT)
Dept: UROLOGY | Facility: CLINIC | Age: 84
End: 2019-05-22

## 2019-05-23 ENCOUNTER — ANESTHESIA (OUTPATIENT)
Dept: SURGERY | Facility: OTHER | Age: 84
End: 2019-05-23
Payer: MEDICARE

## 2019-05-23 ENCOUNTER — HOSPITAL ENCOUNTER (OUTPATIENT)
Facility: OTHER | Age: 84
Discharge: HOME | End: 2019-05-23
Attending: UROLOGY | Admitting: UROLOGY
Payer: MEDICARE

## 2019-05-23 VITALS
TEMPERATURE: 98 F | HEART RATE: 75 BPM | WEIGHT: 151 LBS | BODY MASS INDEX: 26.75 KG/M2 | DIASTOLIC BLOOD PRESSURE: 66 MMHG | RESPIRATION RATE: 16 BRPM | OXYGEN SATURATION: 99 % | HEIGHT: 63 IN | SYSTOLIC BLOOD PRESSURE: 154 MMHG

## 2019-05-23 DIAGNOSIS — C68.9 UROTHELIAL CANCER: ICD-10-CM

## 2019-05-23 PROCEDURE — 88300 TISSUE SPECIMEN TO PATHOLOGY - SURGERY: ICD-10-PCS | Mod: 26,,, | Performed by: PATHOLOGY

## 2019-05-23 PROCEDURE — 71000015 HC POSTOP RECOV 1ST HR: Performed by: UROLOGY

## 2019-05-23 PROCEDURE — 36000706: Performed by: UROLOGY

## 2019-05-23 PROCEDURE — 37000008 HC ANESTHESIA 1ST 15 MINUTES: Performed by: UROLOGY

## 2019-05-23 PROCEDURE — 74420 UROGRAPHY RTRGR +-KUB: CPT | Mod: 26,,, | Performed by: UROLOGY

## 2019-05-23 PROCEDURE — 71000033 HC RECOVERY, INTIAL HOUR: Performed by: UROLOGY

## 2019-05-23 PROCEDURE — 37000009 HC ANESTHESIA EA ADD 15 MINS: Performed by: UROLOGY

## 2019-05-23 PROCEDURE — 52332 PR CYSTOSCOPY,INSERT URETERAL STENT: ICD-10-PCS | Mod: 51,RT,, | Performed by: UROLOGY

## 2019-05-23 PROCEDURE — 88300 SURGICAL PATH GROSS: CPT | Mod: 26,,, | Performed by: PATHOLOGY

## 2019-05-23 PROCEDURE — C1894 INTRO/SHEATH, NON-LASER: HCPCS | Performed by: UROLOGY

## 2019-05-23 PROCEDURE — 63600175 PHARM REV CODE 636 W HCPCS: Performed by: UROLOGY

## 2019-05-23 PROCEDURE — 74420 PR  X-RAY RETROGRADE PYELOGRAM: ICD-10-PCS | Mod: 26,,, | Performed by: UROLOGY

## 2019-05-23 PROCEDURE — 36000707: Performed by: UROLOGY

## 2019-05-23 PROCEDURE — 52354 PR CYSTO/URETERO/PYELOSC,BX &/OR FULG LESN: ICD-10-PCS | Mod: RT,,, | Performed by: UROLOGY

## 2019-05-23 PROCEDURE — 52332 CYSTOSCOPY AND TREATMENT: CPT | Mod: 51,RT,, | Performed by: UROLOGY

## 2019-05-23 PROCEDURE — C1758 CATHETER, URETERAL: HCPCS | Performed by: UROLOGY

## 2019-05-23 PROCEDURE — C1769 GUIDE WIRE: HCPCS | Performed by: UROLOGY

## 2019-05-23 PROCEDURE — 52354 CYSTOURETERO W/BIOPSY: CPT | Mod: RT,,, | Performed by: UROLOGY

## 2019-05-23 PROCEDURE — 63600175 PHARM REV CODE 636 W HCPCS: Performed by: NURSE ANESTHETIST, CERTIFIED REGISTERED

## 2019-05-23 PROCEDURE — 27201423 OPTIME MED/SURG SUP & DEVICES STERILE SUPPLY: Performed by: UROLOGY

## 2019-05-23 PROCEDURE — 88300 SURGICAL PATH GROSS: CPT | Performed by: PATHOLOGY

## 2019-05-23 PROCEDURE — 71000016 HC POSTOP RECOV ADDL HR: Performed by: UROLOGY

## 2019-05-23 PROCEDURE — C2617 STENT, NON-COR, TEM W/O DEL: HCPCS | Performed by: UROLOGY

## 2019-05-23 PROCEDURE — 25500020 PHARM REV CODE 255: Performed by: UROLOGY

## 2019-05-23 PROCEDURE — 25000003 PHARM REV CODE 250: Performed by: ANESTHESIOLOGY

## 2019-05-23 DEVICE — STENT URETERAL UNIV 7FR 22CM: Type: IMPLANTABLE DEVICE | Site: URETER | Status: FUNCTIONAL

## 2019-05-23 RX ORDER — ACETAMINOPHEN 325 MG/1
650 TABLET ORAL EVERY 4 HOURS PRN
Status: DISCONTINUED | OUTPATIENT
Start: 2019-05-23 | End: 2019-05-23 | Stop reason: HOSPADM

## 2019-05-23 RX ORDER — SODIUM CHLORIDE, SODIUM LACTATE, POTASSIUM CHLORIDE, CALCIUM CHLORIDE 600; 310; 30; 20 MG/100ML; MG/100ML; MG/100ML; MG/100ML
INJECTION, SOLUTION INTRAVENOUS CONTINUOUS
Status: DISCONTINUED | OUTPATIENT
Start: 2019-05-23 | End: 2019-05-23 | Stop reason: HOSPADM

## 2019-05-23 RX ORDER — LIDOCAINE HYDROCHLORIDE 20 MG/ML
JELLY TOPICAL ONCE
Status: DISCONTINUED | OUTPATIENT
Start: 2019-05-23 | End: 2019-05-23 | Stop reason: HOSPADM

## 2019-05-23 RX ORDER — SODIUM CHLORIDE 0.9 % (FLUSH) 0.9 %
3 SYRINGE (ML) INJECTION
Status: DISCONTINUED | OUTPATIENT
Start: 2019-05-23 | End: 2019-05-23 | Stop reason: HOSPADM

## 2019-05-23 RX ORDER — SODIUM CHLORIDE, SODIUM LACTATE, POTASSIUM CHLORIDE, CALCIUM CHLORIDE 600; 310; 30; 20 MG/100ML; MG/100ML; MG/100ML; MG/100ML
INJECTION, SOLUTION INTRAVENOUS CONTINUOUS PRN
Status: DISCONTINUED | OUTPATIENT
Start: 2019-05-23 | End: 2019-05-23

## 2019-05-23 RX ORDER — PROPOFOL 10 MG/ML
VIAL (ML) INTRAVENOUS
Status: DISCONTINUED | OUTPATIENT
Start: 2019-05-23 | End: 2019-05-23

## 2019-05-23 RX ORDER — HYDROMORPHONE HYDROCHLORIDE 2 MG/ML
0.4 INJECTION, SOLUTION INTRAMUSCULAR; INTRAVENOUS; SUBCUTANEOUS EVERY 5 MIN PRN
Status: DISCONTINUED | OUTPATIENT
Start: 2019-05-23 | End: 2019-05-23 | Stop reason: HOSPADM

## 2019-05-23 RX ORDER — FLUCONAZOLE 2 MG/ML
200 INJECTION, SOLUTION INTRAVENOUS ONCE
Status: COMPLETED | OUTPATIENT
Start: 2019-05-23 | End: 2019-05-23

## 2019-05-23 RX ORDER — PHENAZOPYRIDINE HYDROCHLORIDE 100 MG/1
200 TABLET, FILM COATED ORAL 3 TIMES DAILY PRN
Qty: 15 TABLET | Refills: 0 | Status: SHIPPED | OUTPATIENT
Start: 2019-05-23 | End: 2019-06-02

## 2019-05-23 RX ORDER — LIDOCAINE HYDROCHLORIDE 10 MG/ML
0.5 INJECTION, SOLUTION EPIDURAL; INFILTRATION; INTRACAUDAL; PERINEURAL ONCE
Status: DISCONTINUED | OUTPATIENT
Start: 2019-05-23 | End: 2019-05-23 | Stop reason: HOSPADM

## 2019-05-23 RX ORDER — MEPERIDINE HYDROCHLORIDE 25 MG/ML
12.5 INJECTION INTRAMUSCULAR; INTRAVENOUS; SUBCUTANEOUS ONCE AS NEEDED
Status: DISCONTINUED | OUTPATIENT
Start: 2019-05-23 | End: 2019-05-23 | Stop reason: HOSPADM

## 2019-05-23 RX ORDER — LIDOCAINE HCL/PF 100 MG/5ML
SYRINGE (ML) INTRAVENOUS
Status: DISCONTINUED | OUTPATIENT
Start: 2019-05-23 | End: 2019-05-23

## 2019-05-23 RX ORDER — FENTANYL CITRATE 50 UG/ML
INJECTION, SOLUTION INTRAMUSCULAR; INTRAVENOUS
Status: DISCONTINUED | OUTPATIENT
Start: 2019-05-23 | End: 2019-05-23

## 2019-05-23 RX ORDER — ACETAMINOPHEN 10 MG/ML
INJECTION, SOLUTION INTRAVENOUS
Status: DISCONTINUED | OUTPATIENT
Start: 2019-05-23 | End: 2019-05-23

## 2019-05-23 RX ORDER — ONDANSETRON 2 MG/ML
4 INJECTION INTRAMUSCULAR; INTRAVENOUS DAILY PRN
Status: DISCONTINUED | OUTPATIENT
Start: 2019-05-23 | End: 2019-05-23 | Stop reason: HOSPADM

## 2019-05-23 RX ORDER — OXYCODONE HYDROCHLORIDE 5 MG/1
5 TABLET ORAL
Status: DISCONTINUED | OUTPATIENT
Start: 2019-05-23 | End: 2019-05-23 | Stop reason: HOSPADM

## 2019-05-23 RX ORDER — CEFAZOLIN SODIUM 1 G/3ML
2 INJECTION, POWDER, FOR SOLUTION INTRAMUSCULAR; INTRAVENOUS
Status: DISCONTINUED | OUTPATIENT
Start: 2019-05-23 | End: 2019-05-23

## 2019-05-23 RX ORDER — SODIUM CHLORIDE 9 MG/ML
INJECTION, SOLUTION INTRAVENOUS CONTINUOUS
Status: DISCONTINUED | OUTPATIENT
Start: 2019-05-23 | End: 2019-05-23 | Stop reason: HOSPADM

## 2019-05-23 RX ORDER — FLUCONAZOLE 100 MG/1
100 TABLET ORAL DAILY
Qty: 5 TABLET | Refills: 0 | Status: SHIPPED | OUTPATIENT
Start: 2019-05-23 | End: 2019-05-28

## 2019-05-23 RX ADMIN — FENTANYL CITRATE 25 MCG: 50 INJECTION, SOLUTION INTRAMUSCULAR; INTRAVENOUS at 07:05

## 2019-05-23 RX ADMIN — PROPOFOL 20 MG: 10 INJECTION, EMULSION INTRAVENOUS at 07:05

## 2019-05-23 RX ADMIN — SODIUM CHLORIDE, SODIUM LACTATE, POTASSIUM CHLORIDE, AND CALCIUM CHLORIDE: 600; 310; 30; 20 INJECTION, SOLUTION INTRAVENOUS at 06:05

## 2019-05-23 RX ADMIN — LIDOCAINE HYDROCHLORIDE 50 MG: 20 INJECTION, SOLUTION INTRAVENOUS at 07:05

## 2019-05-23 RX ADMIN — FLUCONAZOLE 200 MG: 2 INJECTION, SOLUTION INTRAVENOUS at 07:05

## 2019-05-23 RX ADMIN — CEFTRIAXONE 1 G: 1 INJECTION, SOLUTION INTRAVENOUS at 07:05

## 2019-05-23 RX ADMIN — LIDOCAINE HYDROCHLORIDE 50 MG: 20 INJECTION, SOLUTION INTRAVENOUS at 08:05

## 2019-05-23 RX ADMIN — PROPOFOL 100 MG: 10 INJECTION, EMULSION INTRAVENOUS at 07:05

## 2019-05-23 RX ADMIN — ACETAMINOPHEN 1000 MG: 10 INJECTION, SOLUTION INTRAVENOUS at 07:05

## 2019-05-23 NOTE — DISCHARGE INSTRUCTIONS
Anesthesia: After Your Surgery  Youve just had surgery. During surgery, you received medication called anesthesia to keep you comfortable and pain-free. After surgery, you may experience some pain or nausea. This is common. Here are some tips for feeling better and recovering after surgery.    Going home  Your doctor or nurse will show you how to take care of yourself when you go home. He or she will also answer your questions. Have an adult family member or friend drive you home. For the first 24 hours after your surgery:    · Do not drive or use heavy equipment.  · Do not make important decisions or sign legal documents.  · Avoid alcohol.  · Have someone stay with you, if needed. He or she can watch for problems and help keep you safe.    Be sure to keep all follow-up appointments with your doctor. And rest after your procedure for as long as your doctor tells you to.    Coping with pain  If you have pain after surgery, pain medication will help you feel better. Take it as directed, before pain becomes severe. Also, ask your doctor or pharmacist about other ways to control pain, such as with heat, ice, and relaxation. And follow any other instructions your surgeon or nurse gives you.    URINARY RETENTION  Should you experience a decrease in your urine output or are unable to urinate following surgery, this can be due to the medications given during surgery.  We recommend you going to the nearest Emergency Department.    Tips for taking pain medication  To get the best relief possible, remember these points:    · Pain medications can upset your stomach. Taking them with a little food may help.  · Most pain relievers taken by mouth need at least 20 to 30 minutes to take effect.  · Taking medication on a schedule can help you remember to take it. Try to time your medication so that you can take it before beginning an activity, such as dressing, walking, or sitting down for dinner.  · Constipation is a  common side effect of pain medications. Contact your doctor before taking any medications like laxatives or stool softeners to help relieve constipation. Also ask about any dietary restrictions, because drinking lots of fluids and eating foods like fruits and vegetables that are high in fiber can also help. Remember, dont take laxatives unless your surgeon has prescribed them.  · Mixing alcohol and pain medication can cause dizziness and slow your breathing. It can even be fatal. Dont drink alcohol while taking pain medication.  · Pain medication can slow your reflexes. Dont drive or operate machinery while taking pain medication.    If your health care provider tells you to take acetaminophen to help relieve your pain, ask him or her how much you are supposed to take each day. (Acetaminophen is the generic name for Tylenol and other brand-name pain relievers.) Acetaminophen or other pain relievers may interact with your prescription medicines or other over-the-counter (OTC) drugs. Some prescription medications contain acetaminophen along with other active ingredients. Using both prescription and OTC acetaminophen for pain can cause you to overdose. The FDA recommends that you read the labels on your OTC medications carefully. This will help you to clearly understand the list of active ingredients, dosing instructions, and any warnings. It may also help you avoid taking too much acetaminophen. If you have questions or don't understand the information, ask your pharmacist or health care provider to explain it to you before you take the OTC medication.    Managing nausea  Some people have an upset stomach after surgery. This is often due to anesthesia, pain, pain medications, or the stress of surgery. The following tips will help you manage nausea and get good nutrition as you recover. If you were on a special diet before surgery, ask your doctor if you should follow it during recovery. These tips may  help:    · Dont push yourself to eat. Your body will tell you when to eat and how much.  · Start off with clear liquids and soup. They are easier to digest.  · Progress to semi-solid foods (mashed potatoes, applesauce, and gelatin) as you feel ready.  · Slowly move to solid foods. Dont eat fatty, rich, or spicy foods at first.  · Dont force yourself to have three large meals a day. Instead, eat smaller amounts more often.  · Take pain medications with a small amount of solid food, such as crackers or toast to avoid nausea.      Call your surgeon if    · You feel too sleepy, dizzy, or groggy (medication may be too strong).  · You have side effects like nausea, vomiting, or skin changes (rash, itching, or hives).     © 6935-2067 [employer]. [address] [place] [address], [place] [address]. All rights reserved. This information is not intended as a substitute for professional medical care. Always follow your healthcare professional's instructions.    PLEASE FOLLOW ANY OTHER INSTRUCTIONS PROVIDED TO YOU BY DR. ALLISON!

## 2019-05-23 NOTE — INTERVAL H&P NOTE
The patient has been examined and the H&P has been reviewed:    I concur with the findings and changes have been noted since the H&P was written: cs candida; will give diflucan preop    Anesthesia/Surgery risks, benefits and alternative options discussed and understood by patient/family.          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

## 2019-05-23 NOTE — TRANSFER OF CARE
"Anesthesia Transfer of Care Note    Patient: Destinee CARDONA Major    Procedure(s) Performed: Procedure(s) (LRB):  URETEROSCOPY (Right)  STENT, URETERAL (Right)    Patient location: PACU    Anesthesia Type: general    Transport from OR: Transported from OR on 2-3 L/min O2 by NC with adequate spontaneous ventilation    Post pain: adequate analgesia    Post assessment: no apparent anesthetic complications    Post vital signs: stable    Level of consciousness: awake    Nausea/Vomiting: no nausea/vomiting    Complications: none    Transfer of care protocol was followed      Last vitals:   Visit Vitals  BP (!) 165/74 (BP Location: Right arm, Patient Position: Lying)   Pulse 81   Temp 36.5 °C (97.7 °F) (Oral)   Resp 16   Ht 5' 3" (1.6 m)   Wt 68.5 kg (151 lb)   SpO2 (!) 94%   Breastfeeding? No   BMI 26.75 kg/m²     "

## 2019-05-23 NOTE — ANESTHESIA POSTPROCEDURE EVALUATION
Anesthesia Post Evaluation    Patient: Destinee CARDONA Major    Procedure(s) Performed: Procedure(s) (LRB):  URETEROSCOPY (Right)  STENT, URETERAL (Right)    Final Anesthesia Type: general  Patient location during evaluation: PACU  Patient participation: Yes- Able to Participate  Level of consciousness: awake and alert  Post-procedure vital signs: reviewed and stable  Pain management: adequate  Airway patency: patent  PONV status at discharge: No PONV  Anesthetic complications: no      Cardiovascular status: blood pressure returned to baseline  Respiratory status: unassisted, spontaneous ventilation and room air  Hydration status: euvolemic  Follow-up not needed.          Vitals Value Taken Time   /73 5/23/2019  9:15 AM   Temp 36.7 °C (98.1 °F) 5/23/2019  9:15 AM   Pulse 80 5/23/2019  9:15 AM   Resp 18 5/23/2019  9:15 AM   SpO2 100 % 5/23/2019  9:15 AM         Event Time     Out of Recovery 09:12:44          Pain/Anabella Score: Anabella Score: 10 (5/23/2019  9:15 AM)

## 2019-05-23 NOTE — OP NOTE
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  The patient was given Diflucan preoperative preoperatively based on her preop urine culture .  She also received Rocephin.  The patient was brought to cysto and undergoes anesthesia.  She is placed in the dorsal lithotomy position.  Sterile prep and drape was performed.   film reveals a right stent in the anticipated location.  The 22 Scottish cystoscope was advanced into the bladder.  Cystoscopy was performed with 30 and 70 degree lenses.  The distal end of the stent is removed the wires advanced through the stent.  A dual-lumen catheter was advanced over the wire.  A retrograde pyelogram was performed.  There are no filling defects in the renal pelvis on like a previous retrograde pyelogram.  A 2nd wire was advanced through the dual-lumen catheter.  The dual-lumen catheter was removed and a 10 Scottish 35 cm access sheath was placed. The flexible ureteral scope was advanced through the sheath.  Visualization is limited by hematuria but there only appears to be 1 tumor present.  This is lateral in the renal pelvis.  Utilizing the holmium laser with the tumor ablation and hemostasis settings the tumors ablated down to its base.  At the end of the procedure there is no visible tumor remaining.  Again visual ability is limited however.  There is no active bleeding.  A retrograde pyelogram was performed through the scope.  There is no  extravasation of contrast and no filling defects.  The wire was backloaded on the cystoscope.  A 7 French 22 cm double-J ureteral stent is placed the stent coiled well in the kidney and in the dome of the bladder.  The patient has a previous ureteral reimplantation therefore her right ureteral orifices at the dome of the bladder.  The proximal end of the coil is double looped in the kidney but the stent appears remaining good position and is draining well    She will be discharged when criteria met and she will follow up in the office in 2 months.  We will schedule repeat ureteroscopy in 3 months.  She will complete a 5 day course of Diflucan    Specimens:   Specimen (12h ago, onward)    Start     Ordered    05/23/19 0805  Specimen to Pathology - Surgery  Once     Comments:  1. Stent removed from right ureter for gross ID     Start Status     05/23/19 0805 In process Order ID: [id number]       05/23/19 0809                  Condition: Good    Disposition: PACU - hemodynamically stable.    Attestation: I performed the procedure.    Discharge Note    SUMMARY     Admit Date: 5/23/2019    Discharge Date and Time: 5/23/2019 10:19 AM    Hospital Course (synopsis of major diagnoses, care, treatment, and services provided during the course of the hospital stay):  The patient care for an outpatient procedure.  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        CHANGE how you take these medications    docusate sodium 100 MG capsule  Commonly known as:  COLACE  Take 1 capsule (100 mg total) by mouth 2 (two) times  daily.  What changed:    · when to take this  · reasons to take this     * fluconazole 150 MG Tab  Commonly known as:  DIFLUCAN  What changed:  Another medication with the same name was added. Make sure you understand how and when to take each.     * fluconazole 100 MG tablet  Commonly known as:  DIFLUCAN  Take 1 tablet (100 mg total) by mouth once daily. for 5 days  What changed:  You were already taking a medication with the same name, and this prescription was added. Make sure you understand how and when to take each.         * This list has 2 medication(s) that are the same as other medications prescribed for you. Read the directions carefully, and ask your doctor or other care provider to review them with you.            CONTINUE taking these medications    acetaminophen 325 MG tablet  Commonly known as:  TYLENOL  Take 1 tablet (325 mg total) by mouth every 6 (six) hours as needed for Pain.     ALPRAZolam 0.25 MG tablet  Commonly known as:  XANAX  Take 1 tablet (0.25 mg total) by mouth 2 (two) times daily as needed for Anxiety. For anxiety     amLODIPine 2.5 MG tablet  Commonly known as:  NORVASC  Take 1 tablet (2.5 mg total) by mouth once daily.     cranberry 400 mg Cap  Take by mouth.     fenofibrate 145 MG tablet  Commonly known as:  TRICOR  TAKE 1 TABLET BY MOUTH EVERY DAY     Lactobacillus acidophilus Cap  Take by mouth.     loratadine 10 mg tablet  Commonly known as:  CLARITIN  Take 10 mg by mouth as needed for Allergies.     meclizine 12.5 mg tablet  Commonly known as:  ANTIVERT  Take 1 tablet (12.5 mg total) by mouth 3 (three) times daily as needed.     multivitamin capsule  Take 1 capsule by mouth.     nitrofurantoin (macrocrystal-monohydrate) 100 MG capsule  Commonly known as:  MACROBID  Take 100 mg by mouth nightly.     pantoprazole 40 MG tablet  Commonly known as:  PROTONIX  Take 1 tablet (40 mg total) by mouth once daily.     vitamin D 1000 units Tab  Commonly known as:  VITAMIN D3  Take 1,000  Units by mouth once daily.          Discharge Procedure Orders   Diet general     Call MD for:  temperature >100.4     Call MD for:  persistent nausea and vomiting     Call MD for:  severe uncontrolled pain     Call MD for:  difficulty breathing, headache or visual disturbances     Call MD for:  redness, tenderness, or signs of infection (pain, swelling, redness, odor or green/yellow discharge around incision site)     Call MD for:  hives     Call MD for:  persistent dizziness or light-headedness     Call MD for:  extreme fatigue     Call MD for:   Order Comments: Call MD or go to the nearest ER if you are unable to urinate.

## 2019-06-17 ENCOUNTER — EXTERNAL HOME HEALTH (OUTPATIENT)
Dept: HOME HEALTH SERVICES | Facility: HOSPITAL | Age: 84
End: 2019-06-17
Payer: MEDICARE

## 2019-06-19 ENCOUNTER — OFFICE VISIT (OUTPATIENT)
Dept: FAMILY MEDICINE | Facility: CLINIC | Age: 84
End: 2019-06-19
Payer: MEDICARE

## 2019-06-19 VITALS
DIASTOLIC BLOOD PRESSURE: 72 MMHG | WEIGHT: 150.13 LBS | BODY MASS INDEX: 26.6 KG/M2 | HEIGHT: 63 IN | HEART RATE: 88 BPM | SYSTOLIC BLOOD PRESSURE: 132 MMHG

## 2019-06-19 DIAGNOSIS — C66.1 URETERAL CANCER, RIGHT: ICD-10-CM

## 2019-06-19 DIAGNOSIS — E78.5 DYSLIPIDEMIA: ICD-10-CM

## 2019-06-19 DIAGNOSIS — F41.9 ANXIETY: ICD-10-CM

## 2019-06-19 DIAGNOSIS — Z86.79 HISTORY OF AORTIC VALVE INSUFFICIENCY: ICD-10-CM

## 2019-06-19 DIAGNOSIS — Z86.73 HISTORY OF TRANSIENT ISCHEMIC ATTACK (TIA): ICD-10-CM

## 2019-06-19 DIAGNOSIS — I10 ESSENTIAL HYPERTENSION: Primary | ICD-10-CM

## 2019-06-19 DIAGNOSIS — I77.9 BILATERAL CAROTID ARTERY DISEASE, UNSPECIFIED TYPE: ICD-10-CM

## 2019-06-19 DIAGNOSIS — N18.30 STAGE 3 CHRONIC KIDNEY DISEASE: ICD-10-CM

## 2019-06-19 DIAGNOSIS — C54.1 ENDOMETRIAL CANCER: ICD-10-CM

## 2019-06-19 PROCEDURE — 99214 OFFICE O/P EST MOD 30 MIN: CPT | Mod: S$PBB,,, | Performed by: FAMILY MEDICINE

## 2019-06-19 PROCEDURE — 99999 PR PBB SHADOW E&M-EST. PATIENT-LVL III: CPT | Mod: PBBFAC,,, | Performed by: FAMILY MEDICINE

## 2019-06-19 PROCEDURE — 99214 PR OFFICE/OUTPT VISIT, EST, LEVL IV, 30-39 MIN: ICD-10-PCS | Mod: S$PBB,,, | Performed by: FAMILY MEDICINE

## 2019-06-19 PROCEDURE — 99999 PR PBB SHADOW E&M-EST. PATIENT-LVL III: ICD-10-PCS | Mod: PBBFAC,,, | Performed by: FAMILY MEDICINE

## 2019-06-19 PROCEDURE — 99213 OFFICE O/P EST LOW 20 MIN: CPT | Mod: PBBFAC,PO | Performed by: FAMILY MEDICINE

## 2019-06-19 NOTE — PROGRESS NOTES
"Subjective:       Patient ID: Destinee Luo is a 87 y.o. female.    Chief Complaint: Annual Exam    Pt is known to me.  Pt is here for followup of chronic medical issues.  She had an ablation of her urothelial carcinoma per her urologist last month and is doing well.  She has a stent.  She has occasional expected hematuria.  She has some chronic anxiety.  She does not take the Xanax because she worries it will make her "woozy."  The pt thinks she has had her pneumonia vaccines but wants to check with her old PCP.    Review of Systems   Constitutional: Negative for activity change, appetite change, fatigue and unexpected weight change.   Eyes: Negative for visual disturbance.   Respiratory: Negative for cough, chest tightness and shortness of breath.    Cardiovascular: Negative for chest pain, palpitations and leg swelling.   Gastrointestinal: Negative for abdominal pain, constipation, diarrhea, nausea and vomiting.   Endocrine: Negative for cold intolerance, heat intolerance and polyuria.   Genitourinary: Negative for decreased urine volume and dysuria.   Musculoskeletal: Negative for arthralgias and back pain.   Skin: Negative for rash.   Neurological: Negative for numbness and headaches.   Psychiatric/Behavioral: Negative for dysphoric mood. The patient is nervous/anxious.        Objective:       Vitals:    06/19/19 1046   BP: 132/72   BP Location: Left arm   Patient Position: Sitting   BP Method: Medium (Manual)   Pulse: 88   Weight: 68.1 kg (150 lb 2.1 oz)   Height: 5' 3" (1.6 m)     Physical Exam   Constitutional: She is oriented to person, place, and time. She appears well-developed and well-nourished.   HENT:   Head: Normocephalic.   Eyes: Pupils are equal, round, and reactive to light. Conjunctivae and EOM are normal.   Neck: Normal range of motion. Neck supple. No thyromegaly present.   Cardiovascular: Normal rate, regular rhythm and normal heart sounds.   Pulmonary/Chest: Effort normal and breath sounds " normal.   Abdominal: Soft. Bowel sounds are normal. There is no tenderness.   Musculoskeletal: Normal range of motion. She exhibits no tenderness or deformity.   Lymphadenopathy:     She has no cervical adenopathy.   Neurological: She is alert and oriented to person, place, and time. She displays normal reflexes. No cranial nerve deficit. She exhibits normal muscle tone. Coordination normal.   Skin: Skin is warm and dry.   Psychiatric: She has a normal mood and affect. Her behavior is normal.       Assessment:       1. Essential hypertension    2. Dyslipidemia    3. Bilateral carotid artery disease, unspecified type    4. History of transient ischemic attack (TIA)    5. History of aortic valve insufficiency    6. Stage 3 chronic kidney disease    7. Ureteral cancer, right    8. Endometrial cancer    9. Anxiety        Plan:       Destinee was seen today for annual exam.    Diagnoses and all orders for this visit:    Essential hypertension    Dyslipidemia    Bilateral carotid artery disease, unspecified type    History of transient ischemic attack (TIA)    History of aortic valve insufficiency    Stage 3 chronic kidney disease    Ureteral cancer, right    Endometrial cancer    Anxiety      During this visit, I reviewed the pt's history, medications, allergies, and problem list.    - Continue current therapy  - Serial blood pressure monitoring  - Diet and exercise education.  She wants to try off Pantoprazole--she is having no GERD symptoms

## 2019-06-21 ENCOUNTER — TELEPHONE (OUTPATIENT)
Dept: UROLOGY | Facility: CLINIC | Age: 84
End: 2019-06-21

## 2019-06-21 NOTE — TELEPHONE ENCOUNTER
----- Message from Niesha Champagne sent at 6/21/2019 11:25 AM CDT -----  Contact: [facility]  Name of Who is Calling: STEPHAN GREGG [[id number]]      What is the request in detail calling to have pt's home health orders signed..please advise      Can the clinic reply by [facility]: no      What Number to Call Back if not in [facility]: [phone] Esther

## 2019-07-09 ENCOUNTER — TELEPHONE (OUTPATIENT)
Dept: GYNECOLOGIC ONCOLOGY | Facility: CLINIC | Age: 84
End: 2019-07-09

## 2019-07-27 PROCEDURE — G0179 MD RECERTIFICATION HHA PT: HCPCS | Mod: ,,, | Performed by: FAMILY MEDICINE

## 2019-07-27 PROCEDURE — G0179 PR HOME HEALTH MD RECERTIFICATION: ICD-10-PCS | Mod: ,,, | Performed by: FAMILY MEDICINE

## 2019-07-29 DIAGNOSIS — K21.9 GASTROESOPHAGEAL REFLUX DISEASE WITHOUT ESOPHAGITIS: ICD-10-CM

## 2019-07-29 RX ORDER — PANTOPRAZOLE SODIUM 40 MG/1
40 TABLET, DELAYED RELEASE ORAL DAILY
Qty: 90 TABLET | Refills: 2 | Status: SHIPPED | OUTPATIENT
Start: 2019-07-29 | End: 2020-01-01 | Stop reason: SDUPTHER

## 2019-07-30 ENCOUNTER — PATIENT OUTREACH (OUTPATIENT)
Dept: ADMINISTRATIVE | Facility: OTHER | Age: 84
End: 2019-07-30

## 2019-08-01 ENCOUNTER — EXTERNAL HOME HEALTH (OUTPATIENT)
Dept: HOME HEALTH SERVICES | Facility: HOSPITAL | Age: 84
End: 2019-08-01
Payer: MEDICARE

## 2019-08-01 ENCOUNTER — OFFICE VISIT (OUTPATIENT)
Dept: UROLOGY | Facility: CLINIC | Age: 84
End: 2019-08-01
Payer: MEDICARE

## 2019-08-01 VITALS
DIASTOLIC BLOOD PRESSURE: 72 MMHG | BODY MASS INDEX: 26.58 KG/M2 | SYSTOLIC BLOOD PRESSURE: 173 MMHG | WEIGHT: 150 LBS | HEIGHT: 63 IN | HEART RATE: 89 BPM

## 2019-08-01 DIAGNOSIS — C68.9 UROTHELIAL CANCER: Primary | ICD-10-CM

## 2019-08-01 DIAGNOSIS — R82.90 ABNORMAL FINDING IN URINE: ICD-10-CM

## 2019-08-01 DIAGNOSIS — N18.30 CKD (CHRONIC KIDNEY DISEASE) STAGE 3, GFR 30-59 ML/MIN: ICD-10-CM

## 2019-08-01 PROCEDURE — 99214 OFFICE O/P EST MOD 30 MIN: CPT | Mod: S$GLB,,, | Performed by: UROLOGY

## 2019-08-01 PROCEDURE — 88112 CYTOPATH CELL ENHANCE TECH: CPT | Mod: 26,,, | Performed by: PATHOLOGY

## 2019-08-01 PROCEDURE — 88112 CYTOLOGY SPECIMEN-URINE: ICD-10-PCS | Mod: 26,,, | Performed by: PATHOLOGY

## 2019-08-01 PROCEDURE — 87086 URINE CULTURE/COLONY COUNT: CPT

## 2019-08-01 PROCEDURE — 99214 PR OFFICE/OUTPT VISIT, EST, LEVL IV, 30-39 MIN: ICD-10-PCS | Mod: S$GLB,,, | Performed by: UROLOGY

## 2019-08-01 PROCEDURE — 88112 CYTOPATH CELL ENHANCE TECH: CPT | Performed by: PATHOLOGY

## 2019-08-01 RX ORDER — LIDOCAINE HYDROCHLORIDE 20 MG/ML
JELLY TOPICAL ONCE
Status: CANCELLED | OUTPATIENT
Start: 2019-08-01 | End: 2019-08-01

## 2019-08-01 NOTE — H&P (VIEW-ONLY)
Subjective:      Destinee Luo is a 87 y.o. female who returns today regarding her     Right upper tract urothelial cancer.  Presents for right ureteroscopy and possible tumor ablation.    The following portions of the patient's history were reviewed and updated as appropriate: allergies, current medications, past family history, past medical history, past social history, past surgical history and problem list.    Review of Systems  Pertinent items are noted in HPI.  A comprehensive multipoint review of systems was negative except as otherwise stated in the HPI.    Past Medical History:   Diagnosis Date    Anticoagulant long-term use     stopped Plavix and ASA 11/2017    Cancer     uro    High cholesterol     Hypertension     Macular degeneration     S/P complete hysterectomy 01/04/2018    Stroke 2012    TIA, no deficits     Past Surgical History:   Procedure Laterality Date    ABLATION, NEOPLASM, KIDNEY, USING LASER Right 3/6/2019    [facility]    BIOPSY ENDOMETRIAL  11/22/2017    [facility]    COLONOSCOPY  prior to 2008    CYSTOSCOPY, WITH URETERAL STENT INSERTION, BIOPSY AND ABLATION RIGHT RENAL PELVIC TUMOR Right 2/7/2019    [facility]    CYSTOSCOPY/ RETROGRADE PYELOGRAM/ STENT PLACEMENT/STENT EXCHANGE Right 11/15/2017    [facility]    CYSTOURETEROSCOPY, WITH RETROGRADE PYELOGRAM AND URETERAL STENT INSERTION Right 3/6/2019    [facility]    HYSTERECTOMY  01/04/2018    NEPHRECTOMY- ROBOT -  POSS OPEN Right 1/4/2018    [facility]    REIMPLANTATION-URETER Right 1/4/2018    [facility]    REMOVAL-STENT Right 3/6/2019    [facility]    STENT, URETERAL Right 5/23/2019    [facility]    URETERAL REIMPLANTION    "   URETERAL STENT PLACEMENT      URETERECTOMY Right 1/4/2018    [facility]    URETEROSCOPY      URETEROSCOPY Right 5/23/2019    [facility]    URETEROSCOPY - WITH BX MASS Right 11/22/2017    [facility]    URETEROSCOPY; bilateral retorgrade pyelogram Bilateral 2/7/2019    [facility] OR    URETEROSCOPY; biopsy Right 11/15/2017    [facility] OR    XI ROBOT ASSISTED LAPAROSCOPIC SALPINGO-OOPHERECTOMY Bilateral 1/4/2018    [facility] Carlos Mann [facility] at [facility] OR    XI ROBOTIC ASSISTED LAPAROSCOPIC HYSTERECTOMY N/A 1/4/2018    [facility] OR       Review of patient's allergies indicates:   Allergen Reactions    Sulfa (sulfonamide antibiotics) Other (See Comments)     Unknown    Codeine Anxiety          Objective:   Vitals: BP (!) 173/72   Pulse 89   Ht 5' 3" (1.6 m)   Wt 68 kg (150 lb)   BMI 26.57 kg/m²     Physical Exam   General: alert and oriented, no acute distress  Respiratory: Symmetric expansion, non-labored breathing  Cardiovascular: no peripheral edema  Abdomen: soft, non distended  Skin: normal coloration and turgor, no rashes, no suspicious skin lesions noted  Neuro: no gross deficits  Psych: normal judgment and insight, normal mood/affect and non-anxious    Physical Exam    Lab Review   Urinalysis demonstrates urine culture sent  Lab Results   Component Value Date    WBC 4.65 05/14/2019    HGB 12.0 05/14/2019    HCT 37.2 05/14/2019    MCV 85 05/14/2019     05/14/2019     Lab Results   Component Value Date    CREATININE 1.2 05/14/2019    BUN 19 05/14/2019       Imaging  -  Assessment and Plan:   Urothelial cancer  -     Cytology, urine  -     Urine culture    CKD (chronic kidney disease) stage 3, GFR 30-59 ml/min  -     Urine culture    Abnormal finding in urine   -     Urine culture      Schedule right " ureteroscopy with possible tumor ablation August 15, 2019  Preop urine culture.  We will start antibiotics if necessary preoperatively once a culture and sensitivity results are available

## 2019-08-01 NOTE — PROGRESS NOTES
Subjective:      Destinee Luo is a 87 y.o. female who returns today regarding her     Right upper tract urothelial cancer.  Presents for right ureteroscopy and possible tumor ablation.    The following portions of the patient's history were reviewed and updated as appropriate: allergies, current medications, past family history, past medical history, past social history, past surgical history and problem list.    Review of Systems  Pertinent items are noted in HPI.  A comprehensive multipoint review of systems was negative except as otherwise stated in the HPI.    Past Medical History:   Diagnosis Date    Anticoagulant long-term use     stopped Plavix and ASA 11/2017    Cancer     uro    High cholesterol     Hypertension     Macular degeneration     S/P complete hysterectomy 01/04/2018    Stroke 2012    TIA, no deficits     Past Surgical History:   Procedure Laterality Date    ABLATION, NEOPLASM, KIDNEY, USING LASER Right 3/6/2019    [facility]    BIOPSY ENDOMETRIAL  11/22/2017    [facility]    COLONOSCOPY  prior to 2008    CYSTOSCOPY, WITH URETERAL STENT INSERTION, BIOPSY AND ABLATION RIGHT RENAL PELVIC TUMOR Right 2/7/2019    [facility] OR    CYSTOSCOPY/ RETROGRADE PYELOGRAM/ STENT PLACEMENT/STENT EXCHANGE Right 11/15/2017    [facility] OR    CYSTOURETEROSCOPY, WITH RETROGRADE PYELOGRAM AND URETERAL STENT INSERTION Right 3/6/2019    [facility] OR    HYSTERECTOMY  01/04/2018    NEPHRECTOMY- ROBOT -  POSS OPEN Right 1/4/2018    [facility] OR    REIMPLANTATION-URETER Right 1/4/2018    [facility] OR    REMOVAL-STENT Right 3/6/2019    [facility] OR    STENT, URETERAL Right 5/23/2019    [facility] OR    URETERAL REIMPLANTION    "   URETERAL STENT PLACEMENT      URETERECTOMY Right 1/4/2018    [facility]    URETEROSCOPY      URETEROSCOPY Right 5/23/2019    [facility]    URETEROSCOPY - WITH BX MASS Right 11/22/2017    [facility] OR    URETEROSCOPY; bilateral retorgrade pyelogram Bilateral 2/7/2019    [facility]    URETEROSCOPY; biopsy Right 11/15/2017    [facility] OR    XI ROBOT ASSISTED LAPAROSCOPIC SALPINGO-OOPHERECTOMY Bilateral 1/4/2018    [facility] Mann [facility] at [facility] OR    XI ROBOTIC ASSISTED LAPAROSCOPIC HYSTERECTOMY N/A 1/4/2018    [facility] at [facility] OR       Review of patient's allergies indicates:   Allergen Reactions    Sulfa (sulfonamide antibiotics) Other (See Comments)     Unknown    Codeine Anxiety          Objective:   Vitals: BP (!) 173/72   Pulse 89   Ht 5' 3" (1.6 m)   Wt 68 kg (150 lb)   BMI 26.57 kg/m²     Physical Exam   General: alert and oriented, no acute distress  Respiratory: Symmetric expansion, non-labored breathing  Cardiovascular: no peripheral edema  Abdomen: soft, non distended  Skin: normal coloration and turgor, no rashes, no suspicious skin lesions noted  Neuro: no gross deficits  Psych: normal judgment and insight, normal mood/affect and non-anxious    Physical Exam    Lab Review   Urinalysis demonstrates urine culture sent  Lab Results   Component Value Date    WBC 4.65 05/14/2019    HGB 12.0 05/14/2019    HCT 37.2 05/14/2019    MCV 85 05/14/2019     05/14/2019     Lab Results   Component Value Date    CREATININE 1.2 05/14/2019    BUN 19 05/14/2019       Imaging  -  Assessment and Plan:   Urothelial cancer  -     Cytology, urine  -     Urine culture    CKD (chronic kidney disease) stage 3, GFR 30-59 ml/min  -     Urine culture    Abnormal finding in urine   -     Urine culture      Schedule right " ureteroscopy with possible tumor ablation August 15, 2019  Preop urine culture.  We will start antibiotics if necessary preoperatively once a culture and sensitivity results are available

## 2019-08-03 LAB
BACTERIA UR CULT: NORMAL
BACTERIA UR CULT: NORMAL

## 2019-08-12 ENCOUNTER — PATIENT MESSAGE (OUTPATIENT)
Dept: SURGERY | Facility: OTHER | Age: 84
End: 2019-08-12

## 2019-08-13 ENCOUNTER — TELEPHONE (OUTPATIENT)
Dept: UROLOGY | Facility: CLINIC | Age: 84
End: 2019-08-13

## 2019-08-13 NOTE — TELEPHONE ENCOUNTER
----- Message from Destinee Luo sent at 8/12/2019  6:15 PM CDT -----  Regarding: RE:Reminder for Upcoming Procedure  Contact: [phone]  This date is not correct. While in the office Daxa was supposed to change it to the 22nd of Aug as we could not make it on he 15th. Please check the record  ----- Message -----  From: Kiet Arellano MD  Sent: 8/8/2019  8:30 AM CDT  To: Destinee Luo  Subject: Reminder for Upcoming Procedure  [facility]  [address] [address]    08/08/2019      Dear Destinee,      This is a reminder for your upcoming procedure with Kiet Arellano MD on 8/15/2019. We will contact you again before the day of your procedure with your scheduled arrival time unless you have already received this information from your physicians office.         If you have questions or scheduling concerns, you can contact your physicians office:   Kiet Arellano MD  Phone Number: [phone]      Sincerely,     [facility]   [address] [address]

## 2019-08-19 ENCOUNTER — ANESTHESIA EVENT (OUTPATIENT)
Dept: SURGERY | Facility: OTHER | Age: 84
End: 2019-08-19
Payer: MEDICARE

## 2019-08-22 ENCOUNTER — TELEPHONE (OUTPATIENT)
Dept: UROLOGY | Facility: CLINIC | Age: 84
End: 2019-08-22

## 2019-08-22 ENCOUNTER — PATIENT MESSAGE (OUTPATIENT)
Dept: UROLOGY | Facility: CLINIC | Age: 84
End: 2019-08-22

## 2019-08-22 ENCOUNTER — ANESTHESIA (OUTPATIENT)
Dept: SURGERY | Facility: OTHER | Age: 84
End: 2019-08-22
Payer: MEDICARE

## 2019-08-22 ENCOUNTER — HOSPITAL ENCOUNTER (OUTPATIENT)
Facility: OTHER | Age: 84
Discharge: HOME | End: 2019-08-22
Attending: UROLOGY | Admitting: UROLOGY
Payer: MEDICARE

## 2019-08-22 VITALS
WEIGHT: 150 LBS | HEIGHT: 63 IN | TEMPERATURE: 98 F | DIASTOLIC BLOOD PRESSURE: 88 MMHG | OXYGEN SATURATION: 96 % | SYSTOLIC BLOOD PRESSURE: 179 MMHG | HEART RATE: 80 BPM | BODY MASS INDEX: 26.58 KG/M2 | RESPIRATION RATE: 16 BRPM

## 2019-08-22 DIAGNOSIS — N18.30 CKD (CHRONIC KIDNEY DISEASE) STAGE 3, GFR 30-59 ML/MIN: ICD-10-CM

## 2019-08-22 DIAGNOSIS — R82.90 ABNORMAL FINDING IN URINE: ICD-10-CM

## 2019-08-22 DIAGNOSIS — C68.9 UROTHELIAL CANCER: ICD-10-CM

## 2019-08-22 PROCEDURE — 88300 TISSUE SPECIMEN TO PATHOLOGY - SURGERY: ICD-10-PCS | Mod: 26,,, | Performed by: PATHOLOGY

## 2019-08-22 PROCEDURE — C2617 STENT, NON-COR, TEM W/O DEL: HCPCS | Performed by: UROLOGY

## 2019-08-22 PROCEDURE — 88305 TISSUE EXAM BY PATHOLOGIST: CPT | Mod: 26,,, | Performed by: PATHOLOGY

## 2019-08-22 PROCEDURE — 36000706: Performed by: UROLOGY

## 2019-08-22 PROCEDURE — 52235 PR CYSTOURETHROSCOPY,FULGUR 2-5 CM LESN: ICD-10-PCS | Mod: ,,, | Performed by: UROLOGY

## 2019-08-22 PROCEDURE — 74420 PR  X-RAY RETROGRADE PYELOGRAM: ICD-10-PCS | Mod: 26,,, | Performed by: UROLOGY

## 2019-08-22 PROCEDURE — C1758 CATHETER, URETERAL: HCPCS | Performed by: UROLOGY

## 2019-08-22 PROCEDURE — 88305 TISSUE EXAM BY PATHOLOGIST: CPT | Performed by: PATHOLOGY

## 2019-08-22 PROCEDURE — 71000033 HC RECOVERY, INTIAL HOUR: Performed by: UROLOGY

## 2019-08-22 PROCEDURE — 37000008 HC ANESTHESIA 1ST 15 MINUTES: Performed by: UROLOGY

## 2019-08-22 PROCEDURE — 25500020 PHARM REV CODE 255: Performed by: UROLOGY

## 2019-08-22 PROCEDURE — 37000009 HC ANESTHESIA EA ADD 15 MINS: Performed by: UROLOGY

## 2019-08-22 PROCEDURE — 74420 UROGRAPHY RTRGR +-KUB: CPT | Mod: 26,,, | Performed by: UROLOGY

## 2019-08-22 PROCEDURE — 88300 SURGICAL PATH GROSS: CPT | Mod: 26,,, | Performed by: PATHOLOGY

## 2019-08-22 PROCEDURE — 71000016 HC POSTOP RECOV ADDL HR: Performed by: UROLOGY

## 2019-08-22 PROCEDURE — 52235 CYSTOSCOPY AND TREATMENT: CPT | Mod: ,,, | Performed by: UROLOGY

## 2019-08-22 PROCEDURE — 36000707: Performed by: UROLOGY

## 2019-08-22 PROCEDURE — 71000015 HC POSTOP RECOV 1ST HR: Performed by: UROLOGY

## 2019-08-22 PROCEDURE — 63600175 PHARM REV CODE 636 W HCPCS: Performed by: STUDENT IN AN ORGANIZED HEALTH CARE EDUCATION/TRAINING PROGRAM

## 2019-08-22 PROCEDURE — 63600175 PHARM REV CODE 636 W HCPCS: Performed by: NURSE ANESTHETIST, CERTIFIED REGISTERED

## 2019-08-22 PROCEDURE — 25000003 PHARM REV CODE 250: Performed by: NURSE ANESTHETIST, CERTIFIED REGISTERED

## 2019-08-22 PROCEDURE — 63600175 PHARM REV CODE 636 W HCPCS: Performed by: ANESTHESIOLOGY

## 2019-08-22 PROCEDURE — 27201423 OPTIME MED/SURG SUP & DEVICES STERILE SUPPLY: Performed by: UROLOGY

## 2019-08-22 PROCEDURE — 88305 TISSUE SPECIMEN TO PATHOLOGY - SURGERY: ICD-10-PCS | Mod: 26,,, | Performed by: PATHOLOGY

## 2019-08-22 DEVICE — STENT URETERAL UNIV 7FR 22CM: Type: IMPLANTABLE DEVICE | Site: URETER | Status: FUNCTIONAL

## 2019-08-22 RX ORDER — ONDANSETRON 2 MG/ML
4 INJECTION INTRAMUSCULAR; INTRAVENOUS DAILY PRN
Status: DISCONTINUED | OUTPATIENT
Start: 2019-08-22 | End: 2019-08-22 | Stop reason: HOSPADM

## 2019-08-22 RX ORDER — GLYCOPYRROLATE 0.2 MG/ML
INJECTION INTRAMUSCULAR; INTRAVENOUS
Status: DISCONTINUED | OUTPATIENT
Start: 2019-08-22 | End: 2019-08-22

## 2019-08-22 RX ORDER — PHENAZOPYRIDINE HYDROCHLORIDE 100 MG/1
100 TABLET, FILM COATED ORAL ONCE
Status: DISCONTINUED | OUTPATIENT
Start: 2019-08-22 | End: 2019-08-22 | Stop reason: HOSPADM

## 2019-08-22 RX ORDER — LIDOCAINE HCL/PF 100 MG/5ML
SYRINGE (ML) INTRAVENOUS
Status: DISCONTINUED | OUTPATIENT
Start: 2019-08-22 | End: 2019-08-22

## 2019-08-22 RX ORDER — LIDOCAINE HYDROCHLORIDE 20 MG/ML
JELLY TOPICAL ONCE
Status: DISCONTINUED | OUTPATIENT
Start: 2019-08-22 | End: 2019-08-22 | Stop reason: HOSPADM

## 2019-08-22 RX ORDER — ROCURONIUM BROMIDE 10 MG/ML
INJECTION, SOLUTION INTRAVENOUS
Status: DISCONTINUED | OUTPATIENT
Start: 2019-08-22 | End: 2019-08-22

## 2019-08-22 RX ORDER — PHENAZOPYRIDINE HYDROCHLORIDE 100 MG/1
100 TABLET, FILM COATED ORAL 3 TIMES DAILY PRN
Qty: 10 TABLET | Refills: 0 | Status: SHIPPED | OUTPATIENT
Start: 2019-08-22 | End: 2019-09-01

## 2019-08-22 RX ORDER — PROPOFOL 10 MG/ML
VIAL (ML) INTRAVENOUS
Status: DISCONTINUED | OUTPATIENT
Start: 2019-08-22 | End: 2019-08-22

## 2019-08-22 RX ORDER — CEFAZOLIN SODIUM 1 G/3ML
2 INJECTION, POWDER, FOR SOLUTION INTRAMUSCULAR; INTRAVENOUS
Status: DISCONTINUED | OUTPATIENT
Start: 2019-08-22 | End: 2019-08-22

## 2019-08-22 RX ORDER — FENTANYL CITRATE 50 UG/ML
INJECTION, SOLUTION INTRAMUSCULAR; INTRAVENOUS
Status: DISCONTINUED | OUTPATIENT
Start: 2019-08-22 | End: 2019-08-22

## 2019-08-22 RX ORDER — NEOSTIGMINE METHYLSULFATE 1 MG/ML
INJECTION, SOLUTION INTRAVENOUS
Status: DISCONTINUED | OUTPATIENT
Start: 2019-08-22 | End: 2019-08-22

## 2019-08-22 RX ORDER — OXYCODONE HYDROCHLORIDE 5 MG/1
5 TABLET ORAL
Status: DISCONTINUED | OUTPATIENT
Start: 2019-08-22 | End: 2019-08-22 | Stop reason: HOSPADM

## 2019-08-22 RX ORDER — SODIUM CHLORIDE 0.9 % (FLUSH) 0.9 %
3 SYRINGE (ML) INJECTION
Status: DISCONTINUED | OUTPATIENT
Start: 2019-08-22 | End: 2019-08-22 | Stop reason: HOSPADM

## 2019-08-22 RX ORDER — HYDROCODONE BITARTRATE AND ACETAMINOPHEN 5; 325 MG/1; MG/1
1 TABLET ORAL EVERY 4 HOURS PRN
Status: DISCONTINUED | OUTPATIENT
Start: 2019-08-22 | End: 2019-08-22 | Stop reason: HOSPADM

## 2019-08-22 RX ORDER — HYDROMORPHONE HYDROCHLORIDE 2 MG/ML
0.4 INJECTION, SOLUTION INTRAMUSCULAR; INTRAVENOUS; SUBCUTANEOUS EVERY 5 MIN PRN
Status: DISCONTINUED | OUTPATIENT
Start: 2019-08-22 | End: 2019-08-22 | Stop reason: HOSPADM

## 2019-08-22 RX ORDER — DIPHENHYDRAMINE HYDROCHLORIDE 50 MG/ML
12.5 INJECTION INTRAMUSCULAR; INTRAVENOUS EVERY 30 MIN PRN
Status: DISCONTINUED | OUTPATIENT
Start: 2019-08-22 | End: 2019-08-22 | Stop reason: HOSPADM

## 2019-08-22 RX ADMIN — PROMETHAZINE HYDROCHLORIDE 6.25 MG: 25 INJECTION INTRAMUSCULAR; INTRAVENOUS at 02:08

## 2019-08-22 RX ADMIN — NEOSTIGMINE METHYLSULFATE 3 MG: 1 INJECTION INTRAVENOUS at 11:08

## 2019-08-22 RX ADMIN — LIDOCAINE HYDROCHLORIDE 50 MG: 20 INJECTION, SOLUTION INTRAVENOUS at 10:08

## 2019-08-22 RX ADMIN — FENTANYL CITRATE 25 MCG: 50 INJECTION, SOLUTION INTRAMUSCULAR; INTRAVENOUS at 10:08

## 2019-08-22 RX ADMIN — Medication 25 MG: at 11:08

## 2019-08-22 RX ADMIN — FENTANYL CITRATE 25 MCG: 50 INJECTION, SOLUTION INTRAMUSCULAR; INTRAVENOUS at 11:08

## 2019-08-22 RX ADMIN — ONDANSETRON 4 MG: 2 INJECTION INTRAMUSCULAR; INTRAVENOUS at 12:08

## 2019-08-22 RX ADMIN — Medication 25 MG: at 10:08

## 2019-08-22 RX ADMIN — CEFTRIAXONE 1 G: 1 INJECTION, SOLUTION INTRAVENOUS at 10:08

## 2019-08-22 RX ADMIN — ROCURONIUM BROMIDE 25 MG: 10 INJECTION, SOLUTION INTRAVENOUS at 10:08

## 2019-08-22 RX ADMIN — PROPOFOL 100 MG: 10 INJECTION, EMULSION INTRAVENOUS at 10:08

## 2019-08-22 RX ADMIN — GLYCOPYRROLATE 0.6 MG: 0.2 INJECTION, SOLUTION INTRAMUSCULAR; INTRAVENOUS at 11:08

## 2019-08-22 NOTE — OR NURSING
C/o dribbling of urine and constant sensation of need ing to urinate.  Dr. Arellano called, pyridium order received.

## 2019-08-22 NOTE — PLAN OF CARE
Destinee Luo has met all discharge criteria from Phase II. Vital Signs are stable, ambulating  without difficulty. Nausea has now subsided. Discharge instructions given, patient verbalized understanding. Discharged from facility via wheelchair in stable condition.

## 2019-08-22 NOTE — TELEPHONE ENCOUNTER
----- Message from Carlos Ding MD sent at 8/22/2019 11:25 AM CDT -----  Hey, this patient just had surgery with Dr. Arellano. Can you set her up for an appointment in 2 months?    Thanks,  Carlos Ding

## 2019-08-22 NOTE — ANESTHESIA POSTPROCEDURE EVALUATION
Anesthesia Post Evaluation    Patient: Destinee CARDONA Major    Procedure(s) Performed: Procedure(s) (LRB):  URETEROSCOPY; laser ablation of tumor (Right)    Final Anesthesia Type: general  Patient location during evaluation: PACU  Patient participation: Yes- Able to Participate  Level of consciousness: awake and alert  Post-procedure vital signs: reviewed and stable  Pain management: adequate  Airway patency: patent  PONV status at discharge: No PONV  Anesthetic complications: no      Cardiovascular status: blood pressure returned to baseline and stable  Respiratory status: unassisted, spontaneous ventilation and room air  Hydration status: euvolemic  Follow-up not needed.          Vitals Value Taken Time   /85 8/22/2019 12:29 PM   Temp 36.3 °C (97.4 °F) 8/22/2019 12:20 PM   Pulse 76 8/22/2019 12:31 PM   Resp 18 8/22/2019 12:20 PM   SpO2 98 % 8/22/2019 12:31 PM   Vitals shown include unvalidated device data.      Event Time     Out of Recovery 12:33:00          Pain/Anabella Score: Anabella Score: 10 (8/22/2019 12:23 PM)

## 2019-08-22 NOTE — DISCHARGE SUMMARY
[facility]  Discharge Note  Short Stay    Admit Date: 8/22/2019    Discharge Date and Time: 08/22/2019 11:35 AM      Attending Physician: Kiet Arellano MD     Discharge Provider: Carlos Ding    Diagnoses:  Active Hospital Problems    Diagnosis  POA    Urothelial cancer [C68.9]  Yes      Resolved Hospital Problems   No resolved problems to display.       Discharged Condition: good    Hospital Course: Patient was admitted for cystoscopy, bladder biopsy and fulguration and right ureteral stent exchange with right retrograde pyelogram and tolerated the procedure well with no complications. The patient was discharged home in good condition on the same day.       Final Diagnoses: Same as principal problem.    Disposition: Home or Self Care    Follow up/Patient Instructions:    Medications:  Reconciled Home Medications:   Current Discharge Medication List      START taking these medications    Details   phenazopyridine (PYRIDIUM) 100 MG tablet Take 1 tablet (100 mg total) by mouth 3 (three) times daily as needed for Pain.  Qty: 10 tablet, Refills: 0         CONTINUE these medications which have NOT CHANGED    Details   acetaminophen (TYLENOL) 325 MG tablet Take 1 tablet (325 mg total) by mouth every 6 (six) hours as needed for Pain.  Refills: 0      ALPRAZolam (XANAX) 0.25 MG tablet Take 1 tablet (0.25 mg total) by mouth 2 (two) times daily as needed for Anxiety. For anxiety  Qty: 45 tablet, Refills: 0    Associated Diagnoses: Anxiety      amLODIPine (NORVASC) 2.5 MG tablet Take 1 tablet (2.5 mg total) by mouth once daily.  Qty: 90 tablet, Refills: 1    Associated Diagnoses: Essential hypertension      cranberry 400 mg Cap Take by mouth once daily.       docusate sodium (COLACE) 100 MG capsule Take 1 capsule (100 mg total) by mouth 2 (two) times daily.  Refills: 0      fenofibrate (TRICOR) 145 MG tablet TAKE 1 TABLET BY MOUTH EVERY DAY  Qty: 90 tablet, Refills: 1    Comments: This prescription was filled  on 5/20/2019. Any refills authorized will be placed on file.      Lactobacillus acidophilus Cap Take by mouth.      loratadine (CLARITIN) 10 mg tablet Take 10 mg by mouth as needed for Allergies.      meclizine (ANTIVERT) 12.5 mg tablet Take 1 tablet (12.5 mg total) by mouth 3 (three) times daily as needed.  Qty: 60 tablet, Refills: 1    Associated Diagnoses: Vertigo      multivitamin capsule Take 1 capsule by mouth.      nitrofurantoin, macrocrystal-monohydrate, (MACROBID) 100 MG capsule Take 100 mg by mouth nightly.  Refills: 6      pantoprazole (PROTONIX) 40 MG tablet Take 1 tablet (40 mg total) by mouth once daily.  Qty: 90 tablet, Refills: 2    Associated Diagnoses: Gastroesophageal reflux disease without esophagitis      vitamin D 1000 units Tab Take 1,000 Units by mouth once daily.           Discharge Procedure Orders   Diet general     Call MD for:  extreme fatigue     Call MD for:  persistent dizziness or light-headedness     Call MD for:  hives     Call MD for:  redness, tenderness, or signs of infection (pain, swelling, redness, odor or green/yellow discharge around incision site)     Call MD for:  difficulty breathing, headache or visual disturbances     Call MD for:  severe uncontrolled pain     Call MD for:  persistent nausea and vomiting     Call MD for:  temperature >100.4     Follow-up Information     Kiet Arellano MD In 2 months.    Specialty:  Urology  Contact information:  [address] [address]  [phone]

## 2019-08-22 NOTE — TRANSFER OF CARE
"Anesthesia Transfer of Care Note    Patient: Destinee CARDONA Major    Procedure(s) Performed: Procedure(s) (LRB):  URETEROSCOPY; laser ablation of tumor (Right)    Patient location: PACU    Anesthesia Type: general    Transport from OR: Transported from OR on 2-3 L/min O2 by NC with adequate spontaneous ventilation    Post pain: adequate analgesia    Post assessment: no apparent anesthetic complications    Post vital signs: stable    Level of consciousness: awake, alert and oriented    Nausea/Vomiting: no nausea/vomiting    Complications: none    Transfer of care protocol was followed      Last vitals:   Visit Vitals  BP (!) 199/81 (BP Location: Right arm, Patient Position: Sitting)   Pulse 78   Temp 37.1 °C (98.7 °F) (Oral)   Resp 18   Ht 5' 3" (1.6 m)   Wt 68 kg (150 lb)   SpO2 99%   Breastfeeding? No   BMI 26.57 kg/m²     " no headache

## 2019-08-22 NOTE — INTERVAL H&P NOTE
The patient has been examined and the H&P has been reviewed:    I concur with the findings and no changes have occurred since H&P was written.    Anesthesia/Surgery risks, benefits and alternative options discussed and understood by patient/family.          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

## 2019-08-22 NOTE — TELEPHONE ENCOUNTER
----- Message from Monroe Arellano sent at 8/22/2019 12:06 PM CDT -----  Contact: Stefany (LESTER)  Name of Who is Calling: Stefany (LESTER)      What is the request in detail: Would like to speak with physician in regards to knowing if the patient needs a prescription for pain upon discharge. Please advise      Can the clinic reply by [facility]: no      What Number to Call Back if not in [facility]: [phone]

## 2019-08-22 NOTE — ANESTHESIA PREPROCEDURE EVALUATION
08/22/2019  Destinee Luo is a 87 y.o., female.    Anesthesia Evaluation    I have reviewed the Patient Summary Reports.    I have reviewed the Nursing Notes.   I have reviewed the Medications.     Review of Systems  Anesthesia Hx:  No problems with previous Anesthesia  History of prior surgery of interest to airway management or planning: Previous anesthesia: General 5/19 ureteroscopy with general anesthesia.  Procedure performed at an [facility]. Airway issues documented on chart review include mask, easy, laryngeal mask airway used  Denies Family Hx of Anesthesia complications.   Denies Personal Hx of Anesthesia complications.   Social:  Non-Smoker    Hematology/Oncology:  Hematology Normal      Current/Recent Cancer. Oncology Comments: Bladder,mult prev GA for bladder proceedures    EENT/Dental:EENT/Dental Normal   Cardiovascular:   Exercise tolerance: good Hypertension, well controlled Aortic valve insuff   Pulmonary:  Pulmonary Normal    Renal/:   Chronic Renal Disease, CRI    Musculoskeletal:  Musculoskeletal Normal    Neurological:   TIA, Denies CVA. Questionable TIA in remote past    vertigo   Endocrine:  Endocrine Normal    Dermatological:  Skin Normal    Psych:  Psychiatric Normal           Physical Exam  General:  Well nourished    Airway/Jaw/Neck:  Airway Findings: Mouth Opening: Normal Tongue: Normal  General Airway Assessment: Adult  Mallampati: II     Eyes/Ears/Nose:  Eyes/Ears/Nose Findings:    Dental:  Dental Findings: Upper front caps        Mental Status:  Mental Status Findings:  Cooperative, Alert and Oriented         Anesthesia Plan  Type of Anesthesia, risks & benefits discussed:  Anesthesia Type:  general  Patient's Preference:   Intra-op Monitoring Plan: standard ASA monitors  Intra-op Monitoring Plan Comments:   Post Op Pain Control Plan: multimodal analgesia  Post Op Pain  Control Plan Comments:   Induction:   IV  Beta Blocker:         Informed Consent: Patient understands risks and agrees with Anesthesia plan.  Questions answered. Anesthesia consent signed with patient.  ASA Score: 3     Day of Surgery Review of History & Physical:    H&P update referred to the surgeon.     Anesthesia Plan Notes: Mult prev cysto and stents    Labs in epic from 5/19, WNL x eGFR 40's        Ready For Surgery From Anesthesia Perspective.

## 2019-08-22 NOTE — DISCHARGE INSTRUCTIONS
Anesthesia: After Your Surgery  Youve just had surgery. During surgery, you received medication called anesthesia to keep you comfortable and pain-free. After surgery, you may experience some pain or nausea. This is common. Here are some tips for feeling better and recovering after surgery.    Going home  Your doctor or nurse will show you how to take care of yourself when you go home. He or she will also answer your questions. Have an adult family member or friend drive you home. For the first 24 hours after your surgery:  · Do not drive or use heavy equipment.  · Do not make important decisions or sign legal documents.  · Avoid alcohol.  · Have someone stay with you, if needed. He or she can watch for problems and help keep you safe.  · Take your time getting up from a seated or lying position. You may experience dizziness for 24 hours  Be sure to keep all follow-up appointments with your doctor. And rest after your procedure for as long as your doctor tells you to.    Coping with pain  If you have pain after surgery, pain medication will help you feel better. Take it as directed, before pain becomes severe. Also, ask your doctor or pharmacist about other ways to control pain, such as with heat, ice, and relaxation. And follow any other instructions your surgeon or nurse gives you.    URINARY RETENTION  Should you experience a decrease in your urine output or are unable to urinate following surgery, this can be due to the medications given during surgery.  We recommend you going to the nearest Emergency Department.    Tips for taking pain medication  To get the best relief possible, remember these points:  · Pain medications can upset your stomach. Taking them with a little food may help.  · Most pain relievers taken by mouth need at least 20 to 30 minutes to take effect.  · Taking medication on a schedule can help you remember to take it. Try to time your medication so that you can take it before beginning an  activity, such as dressing, walking, or sitting down for dinner.  · Constipation is a common side effect of pain medications. Contact your doctor before taking any medications like laxatives or stool softeners to help relieve constipation. Also ask about any dietary restrictions, because drinking lots of fluids and eating foods like fruits and vegetables that are high in fiber can also help. Remember, dont take laxatives unless your surgeon has prescribed them.  · Mixing alcohol and pain medication can cause dizziness and slow your breathing. It can even be fatal. Dont drink alcohol while taking pain medication.  · Pain medication can slow your reflexes. Dont drive or operate machinery while taking pain medication.  If your health care provider tells you to take acetaminophen to help relieve your pain, ask him or her how much you are supposed to take each day. (Acetaminophen is the generic name for Tylenol and other brand-name pain relievers.) Acetaminophen or other pain relievers may interact with your prescription medicines or other over-the-counter (OTC) drugs. Some prescription medications contain acetaminophen along with other active ingredients. Using both prescription and OTC acetaminophen for pain can cause you to overdose. The FDA recommends that you read the labels on your OTC medications carefully. This will help you to clearly understand the list of active ingredients, dosing instructions, and any warnings. It may also help you avoid taking too much acetaminophen. If you have questions or don't understand the information, ask your pharmacist or health care provider to explain it to you before you take the OTC medication.    Managing nausea  Some people have an upset stomach after surgery. This is often due to anesthesia, pain, pain medications, or the stress of surgery. The following tips will help you manage nausea and get good nutrition as you recover. If you were on a special diet before surgery,  ask your doctor if you should follow it during recovery. These tips may help:  · Dont push yourself to eat. Your body will tell you when to eat and how much.  · Start off with clear liquids and soup. They are easier to digest.  · Progress to semi-solid foods (mashed potatoes, applesauce, and gelatin) as you feel ready.  · Slowly move to solid foods. Dont eat fatty, rich, or spicy foods at first.  · Dont force yourself to have three large meals a day. Instead, eat smaller amounts more often.  · Take pain medications with a small amount of solid food, such as crackers or toast to avoid nausea.      Call your surgeon if    · You feel too sleepy, dizzy, or groggy (medication may be too strong).  · You have side effects like nausea, vomiting, or skin changes (rash, itching, or hives).   © 4006-5292 [email or web address]. [address] [place] [address], [place] [address]. All rights reserved. This information is not intended as a substitute for professional medical care. Always follow your healthcare professional's instructions.                PLEASE FOLLOW ANY ADDITIONAL INSTRUCTIONS GIVEN TO YOU BY DR ALLISON!

## 2019-08-22 NOTE — OP NOTE
[facility]  Surgery Department  Operative Note    SUMMARY     Date of Procedure: 8/22/2019     Procedure: Procedure(s) (LRB):  URETEROSCOPY; laser ablation of tumor (Right)     Surgeon(s) and Role:     * Kiet Arellano MD - Primary        Assistant: Carlos Ding MD PGY2    Pre-Operative Diagnosis: Urothelial cancer [C68.9]  CKD (chronic kidney disease) stage 3, GFR 30-59 ml/min [N18.3]    Post-Operative Diagnosis: Post-Op Diagnosis Codes:     * Urothelial cancer [C68.9]     * CKD (chronic kidney disease) stage 3, GFR 30-59 ml/min [N18.3]    Anesthesia: General    Complications: No    Estimated Blood Loss (EBL): * No values recorded between 8/22/2019 10:40 AM and 8/22/2019 11:41 AM *           Specimens:   Specimen (12h ago, onward)    Start     Ordered    08/22/19 1117  Specimen to Pathology - Surgery  Once     Comments:  1) Bladder Tumor 2) Stent for Gross ID     Start Status     08/22/19 1117 Collected (08/22/19 1117) Order ID: [id number]       08/22/19 1117                  Condition: Good    Disposition: PACU - hemodynamically stable.    Attestation: I performed the procedure.    History:  87-year-old female with a history of urothelial cancer.  Approximately 2 years ago she underwent robotic right distal ureterectomy and ureteral reimplantation for a distal ureteral tumor.  She has developed recurrences in the right renal pelvis.  Due to her age and comorbidities, she has declined nephro ureterectomy and has been managed conservatively with endoscopic treatment.  She presents for cystoscopy, evaluation of the right upper tracts and possible tumor fulguration.  Informed consent obtained.  Preop urine culture was positive only for multiple organisms and appeared to be contaminated.  Preop Rocephin is administered. Meir's and SCDs were applied    Procedure In Detail:  The patient brought to the operating room and undergo general endotracheal anesthesia.  Sterile prep and drape was performed.   Time-out was performed.  Cystoscopy was performed with 30 and 70 degree lenses.  The stent is seen emanating from the cheri ureteral orifice on the right side of the bladder. The left ureteral orifice is in the anticipated location.  The bladder is elongated as anticipated with the previous psoas hitch and ureteral reimplantation.  On the right lateral wall the bladder caudal to the right cheri ureteral orifice there is a cluster of 6 small tumors.  Five these are approximately the with of the Bugbee.  The other is approximately 3 cm across.  The 5 smallest lesions were fulgurated.  The largest lesion is completely removed with the cold cup biopsy forceps.  Following this the entire area is fulgurated.  There is a trace amount of perivesical fat at the base which is visible but there is no obvious bladder perforation.  The stents removed and a wire was passed through the stent.  A 5 Japanese open-ended catheter was advanced over the wire.  A retrograde pyelogram was performed.  There is some narrowing at the right ureteropelvic junction but no obvious filling defects.  The wire was replaced.  The open-ended catheter was removed.  A 7 Japanese 22 cm double-J ureteral stent was placed and coiled well in the kidney and the bladder. The bladder was again evaluated there is good hemostasis.  There is no evidence of perforation at the biopsy site.  I chose not to perform ureteroscopy because I was concerned that distention of the bladder may produce perforation at the biopsy site.    She will be discharged home when criteria are met.  She will follow up in 2 months and we will schedule repeat endoscopy 3 months from now.  I will call the patient when pathology results are available.  End dictation

## 2019-09-25 PROCEDURE — G0179 PR HOME HEALTH MD RECERTIFICATION: ICD-10-PCS | Mod: ,,, | Performed by: FAMILY MEDICINE

## 2019-09-25 PROCEDURE — G0179 MD RECERTIFICATION HHA PT: HCPCS | Mod: ,,, | Performed by: FAMILY MEDICINE

## 2019-09-27 ENCOUNTER — PATIENT MESSAGE (OUTPATIENT)
Dept: UROLOGY | Facility: CLINIC | Age: 84
End: 2019-09-27

## 2019-09-27 ENCOUNTER — PATIENT MESSAGE (OUTPATIENT)
Dept: FAMILY MEDICINE | Facility: CLINIC | Age: 84
End: 2019-09-27

## 2019-10-03 ENCOUNTER — EXTERNAL HOME HEALTH (OUTPATIENT)
Dept: HOME HEALTH SERVICES | Facility: HOSPITAL | Age: 84
End: 2019-10-03
Payer: MEDICARE

## 2019-10-04 ENCOUNTER — TELEPHONE (OUTPATIENT)
Dept: HOME HEALTH SERVICES | Facility: HOSPITAL | Age: 84
End: 2019-10-04

## 2019-10-08 ENCOUNTER — TELEPHONE (OUTPATIENT)
Dept: ADMINISTRATIVE | Facility: OTHER | Age: 84
End: 2019-10-08

## 2019-10-09 ENCOUNTER — OFFICE VISIT (OUTPATIENT)
Dept: GYNECOLOGIC ONCOLOGY | Facility: CLINIC | Age: 84
End: 2019-10-09
Payer: MEDICARE

## 2019-10-09 VITALS — HEART RATE: 68 BPM | DIASTOLIC BLOOD PRESSURE: 72 MMHG | SYSTOLIC BLOOD PRESSURE: 152 MMHG

## 2019-10-09 DIAGNOSIS — C54.1 ENDOMETRIAL CANCER: Primary | ICD-10-CM

## 2019-10-09 DIAGNOSIS — C68.9 UROTHELIAL CANCER: ICD-10-CM

## 2019-10-09 DIAGNOSIS — N18.30 STAGE 3 CHRONIC KIDNEY DISEASE: ICD-10-CM

## 2019-10-09 PROCEDURE — 99999 PR PBB SHADOW E&M-EST. PATIENT-LVL III: ICD-10-PCS | Mod: PBBFAC,,, | Performed by: OBSTETRICS & GYNECOLOGY

## 2019-10-09 PROCEDURE — 99214 PR OFFICE/OUTPT VISIT, EST, LEVL IV, 30-39 MIN: ICD-10-PCS | Mod: S$PBB,,, | Performed by: OBSTETRICS & GYNECOLOGY

## 2019-10-09 PROCEDURE — 99999 PR PBB SHADOW E&M-EST. PATIENT-LVL III: CPT | Mod: PBBFAC,,, | Performed by: OBSTETRICS & GYNECOLOGY

## 2019-10-09 PROCEDURE — 99213 OFFICE O/P EST LOW 20 MIN: CPT | Mod: PBBFAC | Performed by: OBSTETRICS & GYNECOLOGY

## 2019-10-09 PROCEDURE — 99214 OFFICE O/P EST MOD 30 MIN: CPT | Mod: S$PBB,,, | Performed by: OBSTETRICS & GYNECOLOGY

## 2019-10-09 RX ORDER — CIPROFLOXACIN 500 MG/1
500 TABLET ORAL 2 TIMES DAILY
Refills: 0 | COMMUNITY
Start: 2019-10-02 | End: 2019-11-12

## 2019-10-09 NOTE — PROGRESS NOTES
Subjective:      Patient ID: Destinee Luo is a 87 y.o. female.    Chief Complaint: Follow-up (3 month f/u- endometrial)    Treatment History  Stage IBG3 (clinical) endometrial cancer  RALH/BSO in combo with a right ureteral resection and ureteroneocystostomy by Dr. Arellano on 1/4/18  Completed VB to 21 Gy on 6/7/18    Follow-up   Pertinent negatives include no abdominal pain, arthralgias, chest pain, chills, coughing, fatigue, fever, nausea, numbness, rash, sore throat, vomiting or weakness.     Here today for continued surveillance.  Had ablation of renal tumor recently in April.  Denies VB, F/C, N/V.   Review of Systems   Constitutional: Negative for activity change, appetite change, chills, fatigue and fever.   HENT: Negative for hearing loss, mouth sores, nosebleeds, sore throat and tinnitus.    Eyes: Negative for visual disturbance.   Respiratory: Negative for cough, chest tightness, shortness of breath and wheezing.    Cardiovascular: Negative for chest pain and leg swelling.   Gastrointestinal: Negative for abdominal distention, abdominal pain, blood in stool, constipation, diarrhea, nausea and vomiting.   Genitourinary: Negative for dysuria, flank pain, frequency, hematuria, pelvic pain, vaginal bleeding, vaginal discharge and vaginal pain.   Musculoskeletal: Negative for arthralgias and back pain.   Skin: Negative for rash.   Neurological: Negative for dizziness, seizures, syncope, weakness and numbness.   Hematological: Does not bruise/bleed easily.   Psychiatric/Behavioral: Negative for confusion and sleep disturbance. The patient is not nervous/anxious.        Objective:   Physical Exam:   Constitutional: She appears well-developed and well-nourished. No distress.    HENT:   Head: Normocephalic and atraumatic.    Eyes: No scleral icterus.    Neck: Normal range of motion. Neck supple.    Cardiovascular: Normal rate and intact distal pulses.  Exam reveals no cyanosis and no edema.     Pulmonary/Chest:  Effort normal. No respiratory distress. She exhibits no tenderness.        Abdominal: Soft. She exhibits no distension (healed incisions), no fluid wave, no ascites and no mass. There is no tenderness. There is no rebound and no guarding. No hernia.     Genitourinary: Rectum normal and vagina normal. Pelvic exam was performed with patient supine. There is no rash, tenderness or lesion on the right labia. There is no rash, tenderness or lesion on the left labia. Uterus is absent. There is an absent adnexa. Right adnexum displays no mass, no tenderness and no fullness. Left adnexum displays no mass, no tenderness and no fullness. No bleeding (cuff without defect or lesion) or unspecified prolapse of vaginal walls in the vagina. No vaginal discharge found. Vaginal cuff normal.Labial bartholins normal.Cervix exhibits absence.              Lymphadenopathy:     She has no cervical adenopathy.        Right: No inguinal adenopathy present.        Left: No inguinal adenopathy present.     Skin: No cyanosis.        Assessment:     1. Endometrial cancer    2. Urothelial cancer    3. Stage 3 chronic kidney disease        Plan:       Overall continues to do well.  IRMA on exam today.  Will have her f/u at 6 month intervals.

## 2019-10-18 ENCOUNTER — PATIENT OUTREACH (OUTPATIENT)
Dept: ADMINISTRATIVE | Facility: OTHER | Age: 84
End: 2019-10-18

## 2019-10-22 ENCOUNTER — PATIENT MESSAGE (OUTPATIENT)
Dept: UROLOGY | Facility: CLINIC | Age: 84
End: 2019-10-22

## 2019-10-22 ENCOUNTER — OFFICE VISIT (OUTPATIENT)
Dept: UROLOGY | Facility: CLINIC | Age: 84
End: 2019-10-22
Payer: MEDICARE

## 2019-10-22 VITALS
SYSTOLIC BLOOD PRESSURE: 175 MMHG | WEIGHT: 150 LBS | HEIGHT: 63 IN | DIASTOLIC BLOOD PRESSURE: 73 MMHG | HEART RATE: 83 BPM | BODY MASS INDEX: 26.58 KG/M2

## 2019-10-22 DIAGNOSIS — C68.9 UROTHELIAL CANCER: Primary | ICD-10-CM

## 2019-10-22 DIAGNOSIS — R82.71 BACTERIURIA: ICD-10-CM

## 2019-10-22 DIAGNOSIS — N18.30 CKD (CHRONIC KIDNEY DISEASE) STAGE 3, GFR 30-59 ML/MIN: ICD-10-CM

## 2019-10-22 PROCEDURE — 87086 URINE CULTURE/COLONY COUNT: CPT

## 2019-10-22 PROCEDURE — 99214 OFFICE O/P EST MOD 30 MIN: CPT | Mod: S$GLB,,, | Performed by: UROLOGY

## 2019-10-22 PROCEDURE — 99214 PR OFFICE/OUTPT VISIT, EST, LEVL IV, 30-39 MIN: ICD-10-PCS | Mod: S$GLB,,, | Performed by: UROLOGY

## 2019-10-22 RX ORDER — LIDOCAINE HYDROCHLORIDE 20 MG/ML
JELLY TOPICAL ONCE
Status: CANCELLED | OUTPATIENT
Start: 2019-10-22 | End: 2019-10-22

## 2019-10-22 NOTE — H&P (VIEW-ONLY)
Subjective:      Destinee Luo is a 87 y.o. female who returns today regarding her     Preop for right ureteroscopy and possible tumor ablation.  We will also perform cystoscopy to re-evaluate the recent area which was resected in the bladder.    The following portions of the patient's history were reviewed and updated as appropriate: allergies, current medications, past family history, past medical history, past social history, past surgical history and problem list.    Review of Systems  Pertinent items are noted in HPI.  A comprehensive multipoint review of systems was negative except as otherwise stated in the HPI.    Past Medical History:   Diagnosis Date    Anticoagulant long-term use     stopped Plavix and ASA 11/2017    Cancer     uro    High cholesterol     Hypertension     Macular degeneration     S/P complete hysterectomy 01/04/2018    Stroke 2012    TIA, no deficits     Past Surgical History:   Procedure Laterality Date    ABLATION OF NEOPLASM OF KIDNEY USING LASER Right 3/6/2019    Procedure: ABLATION, NEOPLASM, KIDNEY, USING LASER;  Surgeon: Kiet Arellano MD;  Location: [facility];  Service: Urology;  Laterality: Right;    COLONOSCOPY  prior to 2008    CYSTOSCOPY W/ URETERAL STENT PLACEMENT Right 2/7/2019    Procedure: CYSTOSCOPY, WITH URETERAL STENT INSERTION, BIOPSY AND ABLATION RIGHT RENAL PELVIC TUMOR;  Surgeon: Kiet Arellano MD;  Location: [facility];  Service: Urology;  Laterality: Right;    CYSTOSCOPY WITH URETEROSCOPY, RETROGRADE PYELOGRAPHY, AND INSERTION OF STENT  8/22/2019    Procedure: CYSTOSCOPY, WITH RETROGRADE PYELOGRAM AND URETERAL STENT INSERTION;  Surgeon: Kiet Arellano MD;  Location: [facility];  Service: Urology;;    CYSTOURETEROSCOPY WITH RETROGRADE PYELOGRAPHY AND INSERTION OF STENT INTO URETER Right 3/6/2019    Procedure: CYSTOURETEROSCOPY, WITH RETROGRADE PYELOGRAM AND URETERAL STENT INSERTION;  Surgeon: Kiet Arellano MD;  Location: [facility];  Service: Urology;   "Laterality: Right;    EYE SURGERY      HYSTERECTOMY  01/04/2018    URETERAL REIMPLANTION      URETERAL STENT PLACEMENT      URETEROSCOPY      URETEROSCOPY Bilateral 2/7/2019    Procedure: URETEROSCOPY; bilateral retorgrade pyelogram;  Surgeon: Kiet Arellano MD;  Location: [facility];  Service: Urology;  Laterality: Bilateral;    URETEROSCOPY Right 5/23/2019    Procedure: URETEROSCOPY;  Surgeon: Kiet Arellano MD;  Location: [facility];  Service: Urology;  Laterality: Right;    URETEROSCOPY Right 8/22/2019    Procedure: URETEROSCOPY; laser ablation of tumor;  Surgeon: Kiet Arellano MD;  Location: [facility];  Service: Urology;  Laterality: Right;       Review of patient's allergies indicates:   Allergen Reactions    Sulfa (sulfonamide antibiotics) Other (See Comments)     Unknown    Codeine Anxiety     Pt is not sure what she can take           Objective:   Vitals: BP (!) 175/73 (BP Location: Right arm, Patient Position: Sitting)   Pulse 83   Ht 5' 3" (1.6 m)   Wt 68 kg (150 lb)   BMI 26.57 kg/m²     Physical Exam   General: alert and oriented, no acute distress  Respiratory: Symmetric expansion, non-labored breathing  Cardiovascular: no peripheral edema  Abdomen: soft, non distended  Skin: normal coloration and turgor, no rashes, no suspicious skin lesions noted  Neuro: no gross deficits  Psych: normal judgment and insight, normal mood/affect and non-anxious    Physical Exam    Lab Review   Urinalysis demonstrates positive leukocytes nitrite negative urine culture sent    Lab Results   Component Value Date    WBC 4.65 05/14/2019    HGB 12.0 05/14/2019    HCT 37.2 05/14/2019    MCV 85 05/14/2019     05/14/2019     Lab Results   Component Value Date    CREATININE 1.2 05/14/2019    BUN 19 05/14/2019       Imaging  Left retrograde pyelogram negative February 2019    Assessment and Plan:   Urothelial cancer  -     Urine culture    CKD (chronic kidney disease) stage 3, GFR 30-59 ml/min  -     Urine " culture    Bacteriuria   -     Urine culture      Schedule cystoscopy with right retrograde pyelogram and right ureteroscopy with possible tumor ablation November 2019    Patient has declined nephro ureterectomy and prefers less aggressive treatment due to her age and comorbidities

## 2019-10-22 NOTE — PROGRESS NOTES
Subjective:      Destinee Luo is a 87 y.o. female who returns today regarding her     Preop for right ureteroscopy and possible tumor ablation.  We will also perform cystoscopy to re-evaluate the recent area which was resected in the bladder.    The following portions of the patient's history were reviewed and updated as appropriate: allergies, current medications, past family history, past medical history, past social history, past surgical history and problem list.    Review of Systems  Pertinent items are noted in HPI.  A comprehensive multipoint review of systems was negative except as otherwise stated in the HPI.    Past Medical History:   Diagnosis Date    Anticoagulant long-term use     stopped Plavix and ASA 11/2017    Cancer     uro    High cholesterol     Hypertension     Macular degeneration     S/P complete hysterectomy 01/04/2018    Stroke 2012    TIA, no deficits     Past Surgical History:   Procedure Laterality Date    ABLATION OF NEOPLASM OF KIDNEY USING LASER Right 3/6/2019    Procedure: ABLATION, NEOPLASM, KIDNEY, USING LASER;  Surgeon: Kiet Arellano MD;  Location: [facility];  Service: Urology;  Laterality: Right;    COLONOSCOPY  prior to 2008    CYSTOSCOPY W/ URETERAL STENT PLACEMENT Right 2/7/2019    Procedure: CYSTOSCOPY, WITH URETERAL STENT INSERTION, BIOPSY AND ABLATION RIGHT RENAL PELVIC TUMOR;  Surgeon: Kiet Arellano MD;  Location: [facility];  Service: Urology;  Laterality: Right;    CYSTOSCOPY WITH URETEROSCOPY, RETROGRADE PYELOGRAPHY, AND INSERTION OF STENT  8/22/2019    Procedure: CYSTOSCOPY, WITH RETROGRADE PYELOGRAM AND URETERAL STENT INSERTION;  Surgeon: Kiet Arellano MD;  Location: [facility];  Service: Urology;;    CYSTOURETEROSCOPY WITH RETROGRADE PYELOGRAPHY AND INSERTION OF STENT INTO URETER Right 3/6/2019    Procedure: CYSTOURETEROSCOPY, WITH RETROGRADE PYELOGRAM AND URETERAL STENT INSERTION;  Surgeon: Kiet Arellano MD;  Location: [facility];  Service: Urology;   "Laterality: Right;    EYE SURGERY      HYSTERECTOMY  01/04/2018    URETERAL REIMPLANTION      URETERAL STENT PLACEMENT      URETEROSCOPY      URETEROSCOPY Bilateral 2/7/2019    Procedure: URETEROSCOPY; bilateral retorgrade pyelogram;  Surgeon: Kiet Arellano MD;  Location: [facility];  Service: Urology;  Laterality: Bilateral;    URETEROSCOPY Right 5/23/2019    Procedure: URETEROSCOPY;  Surgeon: Kiet Arellano MD;  Location: [facility];  Service: Urology;  Laterality: Right;    URETEROSCOPY Right 8/22/2019    Procedure: URETEROSCOPY; laser ablation of tumor;  Surgeon: Kiet Arellano MD;  Location: [facility];  Service: Urology;  Laterality: Right;       Review of patient's allergies indicates:   Allergen Reactions    Sulfa (sulfonamide antibiotics) Other (See Comments)     Unknown    Codeine Anxiety     Pt is not sure what she can take           Objective:   Vitals: BP (!) 175/73 (BP Location: Right arm, Patient Position: Sitting)   Pulse 83   Ht 5' 3" (1.6 m)   Wt 68 kg (150 lb)   BMI 26.57 kg/m²     Physical Exam   General: alert and oriented, no acute distress  Respiratory: Symmetric expansion, non-labored breathing  Cardiovascular: no peripheral edema  Abdomen: soft, non distended  Skin: normal coloration and turgor, no rashes, no suspicious skin lesions noted  Neuro: no gross deficits  Psych: normal judgment and insight, normal mood/affect and non-anxious    Physical Exam    Lab Review   Urinalysis demonstrates positive leukocytes nitrite negative urine culture sent    Lab Results   Component Value Date    WBC 4.65 05/14/2019    HGB 12.0 05/14/2019    HCT 37.2 05/14/2019    MCV 85 05/14/2019     05/14/2019     Lab Results   Component Value Date    CREATININE 1.2 05/14/2019    BUN 19 05/14/2019       Imaging  Left retrograde pyelogram negative February 2019    Assessment and Plan:   Urothelial cancer  -     Urine culture    CKD (chronic kidney disease) stage 3, GFR 30-59 ml/min  -     Urine " culture    Bacteriuria   -     Urine culture      Schedule cystoscopy with right retrograde pyelogram and right ureteroscopy with possible tumor ablation November 2019    Patient has declined nephro ureterectomy and prefers less aggressive treatment due to her age and comorbidities

## 2019-10-23 ENCOUNTER — ANESTHESIA EVENT (OUTPATIENT)
Dept: SURGERY | Facility: OTHER | Age: 84
End: 2019-10-23
Payer: MEDICARE

## 2019-10-23 ENCOUNTER — TELEPHONE (OUTPATIENT)
Dept: UROLOGY | Facility: CLINIC | Age: 84
End: 2019-10-23

## 2019-10-23 LAB — BACTERIA UR CULT: NO GROWTH

## 2019-10-23 NOTE — TELEPHONE ENCOUNTER
Spoke to pre-op to find out if the patient can be pre-op over the phone, pre-op stated they will speak to the doctor on today and contact the office

## 2019-10-24 ENCOUNTER — TELEPHONE (OUTPATIENT)
Dept: HOME HEALTH SERVICES | Facility: HOSPITAL | Age: 84
End: 2019-10-24

## 2019-10-24 NOTE — PROGRESS NOTES
Dr Arellano forwarded these results to the patient via [employer].  He will discuss the results with the patient in person at the next appointment.  The patient was instructed to make an appointment if she does not already have one scheduled.

## 2019-11-06 DIAGNOSIS — I10 ESSENTIAL HYPERTENSION: ICD-10-CM

## 2019-11-06 NOTE — PROGRESS NOTES
Refill Authorization Note     is requesting a refill authorization.    Brief assessment and rationale for refill: REVIEW: abnormal vitals / needs appt(BP check)  Name and strength of medication: amLODIPine (NORVASC) 2.5 MG tablet  Medication-related problems identified: Requires appointment    Medication Therapy Plan: BP elevated; needs appt (BP check)    Medication reconciliation completed: No   Pharmacist Review Requested: Yes                     Comments:   Requested Prescriptions   Pending Prescriptions Disp Refills    amLODIPine (NORVASC) 2.5 MG tablet 90 tablet 0     Sig: Take 1 tablet (2.5 mg total) by mouth once daily.       Cardiovascular:  Calcium Channel Blockers Failed - 11/6/2019 10:03 AM        Failed - Last BP in normal range within 360 days     BP Readings from Last 3 Encounters:   10/22/19 (!) 175/73   10/09/19 (!) 152/72   08/22/19 (!) 179/88              Passed - Patient is at least 18 years old        Passed - Office visit in past 12 months or future 90 days     Recent Outpatient Visits            2 weeks ago Urothelial cancer    [facility] Urology [facility] [address] [place] Arellano MD    4 weeks ago Endometrial cancer    [facility] - GYN Oncology Carlos Mann MD    3 months ago [facility]thelial cancer    [facility] [address] Kiet Arellano MD    4 months ago Essential hypertension    [facility] NATIVIDAD Hinojosa MD    5 months ago [facility]thelial cancer    [facility] [address] Kiet Arellano MD          Future Appointments              In 1 month NATIVIDAD Hinojosa MD [facility], [place]    In 5 months MD [facility] - GYN Oncology, [facility]

## 2019-11-07 RX ORDER — AMLODIPINE BESYLATE 2.5 MG/1
2.5 TABLET ORAL DAILY
Qty: 90 TABLET | Refills: 0 | Status: SHIPPED | OUTPATIENT
Start: 2019-11-07 | End: 2020-02-10

## 2019-11-07 NOTE — TELEPHONE ENCOUNTER
Refill Authorization Note     is requesting a refill authorization.    Brief assessment and rationale for refill: APPROVE: prr  Name and strength of medication: amLODIPine (NORVASC) 2.5 MG tablet       Medication Therapy Plan: BP elevated last few checks; pt has f/u scheduled with primary care in 1 month    Medication reconciliation completed: No   Pharmacist Review Requested: Yes                     Comments:   Requested Prescriptions   Pending Prescriptions Disp Refills    amLODIPine (NORVASC) 2.5 MG tablet 90 tablet 0     Sig: Take 1 tablet (2.5 mg total) by mouth once daily.       Cardiovascular:  Calcium Channel Blockers Failed - 11/6/2019  2:33 PM        Failed - Last BP in normal range within 360 days     BP Readings from Last 3 Encounters:   10/22/19 (!) 175/73   10/09/19 (!) 152/72   08/22/19 (!) 179/88              Passed - Patient is at least 18 years old        Passed - Office visit in past 12 months or future 90 days     Recent Outpatient Visits            2 weeks ago Urothelial cancer    [facility] [address] Kiet Arellano MD    4 weeks ago Endometrial cancer    [facility] Carlos Mann MD    3 months ago [facility]thelial [facility] [address] Kiet Arellano MD    4 months ago Essential hypertension    [facility] NATIVIDAD Hinojosa MD    5 months ago Urothelial cancer    [facility] [address] Kiet Arellano MD          Future Appointments              In 1 month NATIVIDAD Hinojosa MD [facility] [place]    In 5 months MD [facility], [facility]                Appointments past 12m or future 3m    Date Provider   Last Visit   6/19/2019 NATIVIDAD Hinojosa MD   Next Visit   12/16/2019 NATIVIDAD Hinojosa MD

## 2019-11-13 ENCOUNTER — TELEPHONE (OUTPATIENT)
Dept: UROLOGY | Facility: CLINIC | Age: 84
End: 2019-11-13

## 2019-11-14 ENCOUNTER — HOSPITAL ENCOUNTER (OUTPATIENT)
Facility: OTHER | Age: 84
Discharge: HOME | End: 2019-11-14
Attending: UROLOGY | Admitting: UROLOGY
Payer: MEDICARE

## 2019-11-14 VITALS
BODY MASS INDEX: 26.58 KG/M2 | TEMPERATURE: 98 F | OXYGEN SATURATION: 99 % | RESPIRATION RATE: 16 BRPM | HEART RATE: 84 BPM | DIASTOLIC BLOOD PRESSURE: 68 MMHG | HEIGHT: 63 IN | SYSTOLIC BLOOD PRESSURE: 134 MMHG | WEIGHT: 150 LBS

## 2019-11-14 DIAGNOSIS — C68.9 UROTHELIAL CANCER: ICD-10-CM

## 2019-11-14 PROCEDURE — 76000 FLUOROSCOPY <1 HR PHYS/QHP: CPT | Mod: 26,59,, | Performed by: UROLOGY

## 2019-11-14 PROCEDURE — 37000008 HC ANESTHESIA 1ST 15 MINUTES: Performed by: UROLOGY

## 2019-11-14 PROCEDURE — 71000033 HC RECOVERY, INTIAL HOUR: Performed by: UROLOGY

## 2019-11-14 PROCEDURE — 63600175 PHARM REV CODE 636 W HCPCS: Performed by: NURSE ANESTHETIST, CERTIFIED REGISTERED

## 2019-11-14 PROCEDURE — 63600175 PHARM REV CODE 636 W HCPCS: Performed by: UROLOGY

## 2019-11-14 PROCEDURE — 88300 SURGICAL PATH GROSS: CPT | Performed by: PATHOLOGY

## 2019-11-14 PROCEDURE — 52332 CYSTOSCOPY AND TREATMENT: CPT | Mod: 51,RT,, | Performed by: UROLOGY

## 2019-11-14 PROCEDURE — 52352 CYSTOURETERO W/STONE REMOVE: CPT | Mod: RT,,, | Performed by: UROLOGY

## 2019-11-14 PROCEDURE — C1894 INTRO/SHEATH, NON-LASER: HCPCS | Performed by: UROLOGY

## 2019-11-14 PROCEDURE — 74420 UROGRAPHY RTRGR +-KUB: CPT | Mod: 26,,, | Performed by: UROLOGY

## 2019-11-14 PROCEDURE — C1758 CATHETER, URETERAL: HCPCS | Performed by: UROLOGY

## 2019-11-14 PROCEDURE — 74420 PR  X-RAY RETROGRADE PYELOGRAM: ICD-10-PCS | Mod: 26,,, | Performed by: UROLOGY

## 2019-11-14 PROCEDURE — 25500020 PHARM REV CODE 255: Performed by: UROLOGY

## 2019-11-14 PROCEDURE — 52354 CYSTOURETERO W/BIOPSY: CPT | Mod: RT,,, | Performed by: UROLOGY

## 2019-11-14 PROCEDURE — 88300 SURGICAL PATH GROSS: CPT | Mod: 26,59,, | Performed by: PATHOLOGY

## 2019-11-14 PROCEDURE — 88300 PR  SURG PATH,GROSS,LEVEL I: ICD-10-PCS | Mod: 26,59,, | Performed by: PATHOLOGY

## 2019-11-14 PROCEDURE — 25000003 PHARM REV CODE 250: Performed by: NURSE ANESTHETIST, CERTIFIED REGISTERED

## 2019-11-14 PROCEDURE — 36000706: Performed by: UROLOGY

## 2019-11-14 PROCEDURE — 63600175 PHARM REV CODE 636 W HCPCS: Performed by: ANESTHESIOLOGY

## 2019-11-14 PROCEDURE — 71000015 HC POSTOP RECOV 1ST HR: Performed by: UROLOGY

## 2019-11-14 PROCEDURE — C1769 GUIDE WIRE: HCPCS | Performed by: UROLOGY

## 2019-11-14 PROCEDURE — 52332 PR CYSTOSCOPY,INSERT URETERAL STENT: ICD-10-PCS | Mod: 51,RT,, | Performed by: UROLOGY

## 2019-11-14 PROCEDURE — 36000707: Performed by: UROLOGY

## 2019-11-14 PROCEDURE — C2617 STENT, NON-COR, TEM W/O DEL: HCPCS | Performed by: UROLOGY

## 2019-11-14 PROCEDURE — 88300 SURGICAL PATH GROSS: CPT | Mod: 26,,, | Performed by: PATHOLOGY

## 2019-11-14 PROCEDURE — 52352 PR CYSTO/URETERO/PYELOSCOPY, CALCULUS TX: ICD-10-PCS | Mod: RT,,, | Performed by: UROLOGY

## 2019-11-14 PROCEDURE — 71000016 HC POSTOP RECOV ADDL HR: Performed by: UROLOGY

## 2019-11-14 PROCEDURE — 25000003 PHARM REV CODE 250: Performed by: ANESTHESIOLOGY

## 2019-11-14 PROCEDURE — 88300 PR  SURG PATH,GROSS,LEVEL I: ICD-10-PCS | Mod: 26,,, | Performed by: PATHOLOGY

## 2019-11-14 PROCEDURE — 37000009 HC ANESTHESIA EA ADD 15 MINS: Performed by: UROLOGY

## 2019-11-14 PROCEDURE — 88300 SURGICAL PATH GROSS: CPT | Mod: 59 | Performed by: PATHOLOGY

## 2019-11-14 PROCEDURE — 82365 CALCULUS SPECTROSCOPY: CPT

## 2019-11-14 PROCEDURE — 76000 PR  FLUOROSCOPE EXAMINATION: ICD-10-PCS | Mod: 26,59,, | Performed by: UROLOGY

## 2019-11-14 PROCEDURE — 27201423 OPTIME MED/SURG SUP & DEVICES STERILE SUPPLY: Performed by: UROLOGY

## 2019-11-14 PROCEDURE — 52354 PR CYSTO/URETERO/PYELOSC,BX &/OR FULG LESN: ICD-10-PCS | Mod: RT,,, | Performed by: UROLOGY

## 2019-11-14 DEVICE — STENT URETERAL UNIV 7FR 22CM: Type: IMPLANTABLE DEVICE | Site: URETER | Status: FUNCTIONAL

## 2019-11-14 RX ORDER — OXYCODONE HYDROCHLORIDE 5 MG/1
5 TABLET ORAL
Status: DISCONTINUED | OUTPATIENT
Start: 2019-11-14 | End: 2019-11-14 | Stop reason: HOSPADM

## 2019-11-14 RX ORDER — ONDANSETRON 2 MG/ML
4 INJECTION INTRAMUSCULAR; INTRAVENOUS DAILY PRN
Status: DISCONTINUED | OUTPATIENT
Start: 2019-11-14 | End: 2019-11-14 | Stop reason: HOSPADM

## 2019-11-14 RX ORDER — POTASSIUM CITRATE 10 MEQ/1
10 TABLET, EXTENDED RELEASE ORAL 2 TIMES DAILY WITH MEALS
Qty: 60 TABLET | Refills: 11 | Status: ON HOLD | OUTPATIENT
Start: 2019-11-14 | End: 2020-02-10 | Stop reason: SDUPTHER

## 2019-11-14 RX ORDER — TRAMADOL HYDROCHLORIDE 50 MG/1
50 TABLET ORAL EVERY 6 HOURS PRN
Qty: 5 TABLET | Refills: 0 | Status: SHIPPED | OUTPATIENT
Start: 2019-11-14 | End: 2020-01-01

## 2019-11-14 RX ORDER — SODIUM CHLORIDE, SODIUM LACTATE, POTASSIUM CHLORIDE, CALCIUM CHLORIDE 600; 310; 30; 20 MG/100ML; MG/100ML; MG/100ML; MG/100ML
INJECTION, SOLUTION INTRAVENOUS CONTINUOUS
Status: ACTIVE | OUTPATIENT
Start: 2019-11-14

## 2019-11-14 RX ORDER — GLYCOPYRROLATE 0.2 MG/ML
INJECTION INTRAMUSCULAR; INTRAVENOUS
Status: DISCONTINUED | OUTPATIENT
Start: 2019-11-14 | End: 2019-11-14

## 2019-11-14 RX ORDER — FENTANYL CITRATE 50 UG/ML
INJECTION, SOLUTION INTRAMUSCULAR; INTRAVENOUS
Status: DISCONTINUED | OUTPATIENT
Start: 2019-11-14 | End: 2019-11-14

## 2019-11-14 RX ORDER — LIDOCAINE HYDROCHLORIDE 20 MG/ML
JELLY TOPICAL ONCE
Status: DISCONTINUED | OUTPATIENT
Start: 2019-11-14 | End: 2019-11-14 | Stop reason: HOSPADM

## 2019-11-14 RX ORDER — ONDANSETRON 8 MG/1
8 TABLET, ORALLY DISINTEGRATING ORAL
Status: COMPLETED | OUTPATIENT
Start: 2019-11-14 | End: 2019-11-14

## 2019-11-14 RX ORDER — PHENYLEPHRINE HYDROCHLORIDE 10 MG/ML
INJECTION INTRAVENOUS
Status: DISCONTINUED | OUTPATIENT
Start: 2019-11-14 | End: 2019-11-14

## 2019-11-14 RX ORDER — SODIUM CHLORIDE 0.9 % (FLUSH) 0.9 %
3 SYRINGE (ML) INJECTION
Status: DISCONTINUED | OUTPATIENT
Start: 2019-11-14 | End: 2019-11-14 | Stop reason: HOSPADM

## 2019-11-14 RX ORDER — CEFAZOLIN SODIUM 1 G/3ML
2 INJECTION, POWDER, FOR SOLUTION INTRAMUSCULAR; INTRAVENOUS
Status: COMPLETED | OUTPATIENT
Start: 2019-11-14 | End: 2019-11-14

## 2019-11-14 RX ORDER — LIDOCAINE HCL/PF 100 MG/5ML
SYRINGE (ML) INTRAVENOUS
Status: DISCONTINUED | OUTPATIENT
Start: 2019-11-14 | End: 2019-11-14

## 2019-11-14 RX ORDER — HYDROMORPHONE HYDROCHLORIDE 2 MG/ML
0.4 INJECTION, SOLUTION INTRAMUSCULAR; INTRAVENOUS; SUBCUTANEOUS EVERY 5 MIN PRN
Status: DISCONTINUED | OUTPATIENT
Start: 2019-11-14 | End: 2019-11-14 | Stop reason: HOSPADM

## 2019-11-14 RX ORDER — ONDANSETRON HYDROCHLORIDE 2 MG/ML
INJECTION, SOLUTION INTRAMUSCULAR; INTRAVENOUS
Status: DISCONTINUED | OUTPATIENT
Start: 2019-11-14 | End: 2019-11-14

## 2019-11-14 RX ORDER — PROPOFOL 10 MG/ML
VIAL (ML) INTRAVENOUS
Status: DISCONTINUED | OUTPATIENT
Start: 2019-11-14 | End: 2019-11-14

## 2019-11-14 RX ORDER — MEPERIDINE HYDROCHLORIDE 25 MG/ML
12.5 INJECTION INTRAMUSCULAR; INTRAVENOUS; SUBCUTANEOUS ONCE AS NEEDED
Status: COMPLETED | OUTPATIENT
Start: 2019-11-14 | End: 2019-11-14

## 2019-11-14 RX ADMIN — CEFAZOLIN 2 G: 330 INJECTION, POWDER, FOR SOLUTION INTRAMUSCULAR; INTRAVENOUS at 09:11

## 2019-11-14 RX ADMIN — FENTANYL CITRATE 50 MCG: 50 INJECTION, SOLUTION INTRAMUSCULAR; INTRAVENOUS at 09:11

## 2019-11-14 RX ADMIN — PROPOFOL 160 MG: 10 INJECTION, EMULSION INTRAVENOUS at 09:11

## 2019-11-14 RX ADMIN — PHENYLEPHRINE HYDROCHLORIDE 100 MCG: 10 INJECTION INTRAVENOUS at 09:11

## 2019-11-14 RX ADMIN — FENTANYL CITRATE 25 MCG: 50 INJECTION, SOLUTION INTRAMUSCULAR; INTRAVENOUS at 09:11

## 2019-11-14 RX ADMIN — ONDANSETRON 8 MG: 8 TABLET, ORALLY DISINTEGRATING ORAL at 08:11

## 2019-11-14 RX ADMIN — LIDOCAINE HYDROCHLORIDE 40 MG: 20 INJECTION, SOLUTION INTRAVENOUS at 09:11

## 2019-11-14 RX ADMIN — ONDANSETRON 4 MG: 2 INJECTION, SOLUTION INTRAMUSCULAR; INTRAVENOUS at 09:11

## 2019-11-14 RX ADMIN — Medication 10 MG: at 09:11

## 2019-11-14 RX ADMIN — MEPERIDINE HYDROCHLORIDE 12.5 MG: 25 INJECTION INTRAMUSCULAR; INTRAVENOUS; SUBCUTANEOUS at 11:11

## 2019-11-14 RX ADMIN — SODIUM CHLORIDE, SODIUM LACTATE, POTASSIUM CHLORIDE, AND CALCIUM CHLORIDE: 600; 310; 30; 20 INJECTION, SOLUTION INTRAVENOUS at 08:11

## 2019-11-14 RX ADMIN — FENTANYL CITRATE 25 MCG: 50 INJECTION, SOLUTION INTRAMUSCULAR; INTRAVENOUS at 10:11

## 2019-11-14 RX ADMIN — GLYCOPYRROLATE 0.2 MG: 0.2 INJECTION, SOLUTION INTRAMUSCULAR; INTRAVENOUS at 09:11

## 2019-11-14 NOTE — OP NOTE
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 Cystoscopy with 30 and 70 degree lens  2. Right ureteral stent exchange  3. Rigid ureteroscopy with basket extraction of stones encrusting ureteral stent  4. Flexible ureteroscopy   5. Right pyeloscopy  6. Laser ablation of urothelial cancer burden in right kidney   7. Fluoro < 1 h    Specimen(s):   R ureteral stent   R ureteral stone     Staff Surgeon: Kiet Arellano MD    Assistant Surgeon: Elvia العراقي MD    Anesthesia: General endotracheal anesthesia    Indications: Destinee Luo is a 87 y.o. female s/p distal right ureterectomy and psoas hitch for TCC in 2018. 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 She has been managed conservatively. Every three months, she undergoes ureteroscopy with laser tumor ablation and an indwelling ureteral stent exchange.     Findings:   - Initial cystoscopy done with 30 and 70 degree lens showed 3 papillary tumors - one at the L posterior wall and 2 at the bladder base which seemed to be over sites of previous ablation  - stent seen emanating from the site of ureteral reimplant at the right dome of the bladder  -  there was resistance upon removal of the stent, requiring manipulation of the stent and pushing of stones with the rigid ureteroscope in order to free it from the ureter  - remaining stones in the ureter were basket extracted   - 10/12 35cm access sheath was inserted in order to form pyeloscopy  - right retrograde pyelogram performed which showed no obvious filling defects but there was some narrowing at the UPJ   - tumor in Right kidney was ablated as much as possible, but it was not extensive as vision was limited by bleeding     1 baskets were used throughout the case.      Estimated Blood Loss: min    Drains: 6 Fr x 22 cm JJ ureteral stent without strings    Procedure in detail:  After informed consent was obtained, the patient was brought the the cystoscopy suite and placed in the supine position.  SCDs were applied and working.  Anesthesia was administered.  The patient was then placed in the dorsal lithotomy position and prepped and draped in the usual sterile fashion.      A rigid cystoscope in a 22 Fr sheath was introduced into the patient's urethra.  This passed easily.  The entire urethra was visualized which showed no strictures or masses.  Formal cystoscopy was performed which showed 3 papillary tumors - one at the L posterior wall and 2 at the bladder base which seemed to be over sites of previous ablation. The general shape of the bladder was abnormal but explained by prior surgery.     The left ureteral orifice was visualized in normal anatomic position. The right ureteral orifice was at the right bladder dome with the stent seen emanating from it. The stent was grasped with stent graspers and brought to the meatus. Due to stent encrustation we were not able to feed the motion wire through the stent. We were also not able to insert the wire alongside using a cystoscope. We switched to 8 Fr rigid ureteroscopy and were able to insert the wire alongside the indwelling stent. Its placement in the right  renal pelvis was confirmed with fluoroscopy. We were able to enter the right ureter over the wire with the ureteroscope. Manipulation of the encrusted ureter with ureteroscope allowed for it to be freed and pulled out. Using Encircle basket, the remaining stones were basket extracted. A guidewire was inserted through the ureteroscope and the ureteroscope was removed.     We then placed a 10/12 35cm access sheath in standard fashion. The flexible scope was then inserted. However, there was bleeding that persistent that limited our vision. A retrograde pyelogram was performed that showed no extravasation or fillings defects. There was a slight narrowing the the ureteropelvic junction. Using a laser, the papillary tumor was ablation as vision would allow. This was not extensive.     The flexible ureteroscope and access sheath were removed while checking the ureter for signs of damage. There was none. A 6 Fr x 22 cm JJ ureteral stent without strings was passed over the wire and up into the renal pelvis using fluoro.  When the coil appeared to be in good position in the kidney and the radio-opaque marker of the pusher was at the inferior pubis, the wire was removed under continuous fluoro.  Good coils were seen in the kidney and the bladder using fluoro.    The patient tolerated the procedure well and was transferred to the recovery room in stable condition.      Disposition:  The patient will follow up with Judy Burroughs NP in 2 months to discuss her next stent exchange.  She will get a BMP in 1 week.     Elvia العراقي MD

## 2019-11-14 NOTE — DISCHARGE SUMMARY
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       Discharged Condition: good    Hospital Course: Patient was admitted for ureteroscopy and laser ablation of tumor, stent exchange and tolerated the procedure well with no complications. The patient was discharged home in good condition on the same day.       Final Diagnoses: Same as principal problem.    Disposition: Home or Self Care    Follow up/Patient Instructions:    Medications:  Reconciled Home Medications:   Current Discharge Medication List      START taking these medications    Details   potassium citrate (UROCIT-K) 10 mEq (1,080 mg) TbSR Take 1 tablet (10 mEq total) by mouth 2 (two) times daily with meals.  Qty: 60 tablet, Refills: 11      traMADol (ULTRAM) 50 mg tablet Take 1 tablet (50 mg total) by mouth every 6 (six) hours as needed for Pain.  Qty: 5 tablet, Refills: 0    Comments: Quantity prescribed more than 7 day supply? No         CONTINUE these medications which have NOT CHANGED    Details   acetaminophen (TYLENOL) 325 MG tablet Take 1 tablet (325 mg total) by mouth every 6 (six) hours as needed for Pain.  Refills: 0      ALPRAZolam (XANAX) 0.25 MG tablet Take 1 tablet (0.25 mg total) by mouth 2 (two) times daily as needed for Anxiety. For anxiety  Qty: 45 tablet, Refills: 0    Associated Diagnoses: Anxiety      amLODIPine (NORVASC) 2.5 MG tablet Take 1 tablet (2.5 mg total) by mouth once daily.  Qty: 90 tablet, Refills: 0    Associated Diagnoses: Essential hypertension      cranberry 400 mg Cap Take by mouth once daily.       fenofibrate (TRICOR) 145 MG tablet TAKE 1 TABLET BY MOUTH EVERY DAY  Qty: 90 tablet, Refills: 1     Comments: This prescription was filled on 5/20/2019. Any refills authorized will be placed on file.      Lactobacillus acidophilus Cap Take by mouth.      multivitamin capsule Take 1 capsule by mouth.      nitrofurantoin, macrocrystal-monohydrate, (MACROBID) 100 MG capsule Take 100 mg by mouth nightly.  Refills: 6      pantoprazole (PROTONIX) 40 MG tablet Take 1 tablet (40 mg total) by mouth once daily.  Qty: 90 tablet, Refills: 2    Associated Diagnoses: Gastroesophageal reflux disease without esophagitis      vitamin D 1000 units Tab Take 1,000 Units by mouth once daily.      docusate sodium (COLACE) 100 MG capsule Take 1 capsule (100 mg total) by mouth 2 (two) times daily.  Refills: 0      loratadine (CLARITIN) 10 mg tablet Take 10 mg by mouth as needed for Allergies.      meclizine (ANTIVERT) 12.5 mg tablet Take 1 tablet (12.5 mg total) by mouth 3 (three) times daily as needed.  Qty: 60 tablet, Refills: 1    Associated Diagnoses: Vertigo           Discharge Procedure Orders   BASIC METABOLIC PANEL   Standing Status: Future Standing Exp. Date: 01/12/21     Diet Adult Regular     Notify your health care provider if you experience any of the following:  temperature >100.4     Notify your health care provider if you experience any of the following:  persistent nausea and vomiting or diarrhea     Notify your health care provider if you experience any of the following:  severe uncontrolled pain     Notify your health care provider if you experience any of the following:  redness, tenderness, or signs of infection (pain, swelling, redness, odor or green/yellow discharge around incision site)     No dressing needed     Activity as tolerated     Follow-up Information     Judy Burroughs NP In 2 months.    Specialty:  Urology  Why:  discuss next stent exchange   Contact information:  [address] [facility] [address] [phone]                   Discharge Procedure Orders (must include Diet, Follow-up,  Activity):   Discharge Procedure Orders (must include Diet, Follow-up, Activity)   BASIC METABOLIC PANEL   Standing Status: Future Standing Exp. Date: 01/12/21     Diet Adult Regular     Notify your health care provider if you experience any of the following:  temperature >100.4     Notify your health care provider if you experience any of the following:  persistent nausea and vomiting or diarrhea     Notify your health care provider if you experience any of the following:  severe uncontrolled pain     Notify your health care provider if you experience any of the following:  redness, tenderness, or signs of infection (pain, swelling, redness, odor or green/yellow discharge around incision site)     No dressing needed     Activity as tolerated

## 2019-11-14 NOTE — ANESTHESIA POSTPROCEDURE EVALUATION
Anesthesia Post Evaluation    Patient: Destinee CARDONA Major    Procedure(s) Performed: Procedure(s) (LRB):  URETEROSCOPY (Right)  LITHOTRIPSY, USING LASER  REMOVAL, STENT, URETER (Right)  CYSTOSCOPY, WITH URETERAL STENT INSERTION (Right)  PYELOGRAM, RETROGRADE    Final Anesthesia Type: general    Patient location during evaluation: PACU  Patient participation: Yes- Able to Participate  Level of consciousness: awake and alert  Post-procedure vital signs: reviewed and stable  Pain management: adequate  Airway patency: patent    PONV status at discharge: No PONV  Anesthetic complications: no      Cardiovascular status: blood pressure returned to baseline  Respiratory status: unassisted  Hydration status: euvolemic  Follow-up not needed.          Vitals Value Taken Time   /63 11/14/2019 11:26 AM   Temp 36.4 °C (97.5 °F) 11/14/2019 11:26 AM   Pulse 83 11/14/2019 11:26 AM   Resp 16 11/14/2019 11:26 AM   SpO2 100 % 11/14/2019 11:26 AM         Event Time     Out of Recovery 11:20:25          Pain/Anabella Score: Pain Rating Prior to Med Admin: 0 (11/14/2019 11:01 AM)  Anabella Score: 9 (11/14/2019 11:07 AM)

## 2019-11-14 NOTE — DISCHARGE INSTRUCTIONS
We are going to prescribe potassium citrate for you to take daily to prevent stone encrustation of your indwelling stent.    Please get a BMP (blood work) in 1 week. We will write your order for this.     Please follow up with Judy Burroughs NP in 2 months to plan for your next stent exchange.

## 2019-11-14 NOTE — INTERVAL H&P NOTE
The patient has been examined and the H&P has been reviewed:    I concur with the findings and changes have been noted since the H&P was written: urine cs no growth    Anesthesia/Surgery risks, benefits and alternative options discussed and understood by patient/family.          There are no hospital problems to display for this patient.

## 2019-11-14 NOTE — ANESTHESIA PREPROCEDURE EVALUATION
11/14/2019  Destinee Luo is a 87 y.o., female.    Pre-op Assessment    I have reviewed the Patient Summary Reports.     I have reviewed the Nursing Notes.   I have reviewed the Medications.     Review of Systems  Anesthesia Hx:  No problems with previous Anesthesia  History of prior surgery of interest to airway management or planning: Previous anesthesia: General 5/19 ureteroscopy with general anesthesia.  Procedure performed at an [facility]. Airway issues documented on chart review include mask, easy, laryngeal mask airway used  Denies Family Hx of Anesthesia complications.   Denies Personal Hx of Anesthesia complications.   Social:  Non-Smoker    Hematology/Oncology:  Hematology Normal      Current/Recent Cancer. Oncology Comments: Bladder,mult prev GA for bladder proceedures    EENT/Dental:EENT/Dental Normal   Cardiovascular:   Exercise tolerance: good Hypertension, well controlled Aortic valve insuff   Pulmonary:  Pulmonary Normal    Renal/:   Chronic Renal Disease, CRI    Musculoskeletal:  Musculoskeletal Normal    Neurological:   TIA, Denies CVA. Questionable TIA in remote past    vertigo   Endocrine:  Endocrine Normal    Dermatological:  Skin Normal    Psych:  Psychiatric Normal           Physical Exam  General:  Well nourished    Airway/Jaw/Neck:  Airway Findings: Mouth Opening: Normal Tongue: Normal  General Airway Assessment: Adult  Mallampati: II     Eyes/Ears/Nose:  Eyes/Ears/Nose Findings:    Dental:  Dental Findings: Upper front caps        Mental Status:  Mental Status Findings:  Cooperative, Alert and Oriented         Anesthesia Plan  Type of Anesthesia, risks & benefits discussed:  Anesthesia Type:  general  Patient's Preference:   Intra-op Monitoring Plan: standard ASA monitors  Intra-op Monitoring Plan Comments:   Post Op Pain Control Plan: multimodal analgesia  Post Op Pain  Control Plan Comments:   Induction:   IV  Beta Blocker:         Informed Consent: Patient understands risks and agrees with Anesthesia plan.  Questions answered. Anesthesia consent signed with patient.  ASA Score: 3     Day of Surgery Review of History & Physical:    H&P update referred to the surgeon.     Anesthesia Plan Notes: Mult prev cysto and stents    Labs in epic from 5/19, WNL x eGFR 40's        Ready For Surgery From Anesthesia Perspective.

## 2019-11-14 NOTE — TRANSFER OF CARE
"Anesthesia Transfer of Care Note    Patient: Destinee CARDONA Major    Procedure(s) Performed: Procedure(s) (LRB):  URETEROSCOPY (Right)  LITHOTRIPSY, USING LASER  REMOVAL, STENT, URETER (Right)  CYSTOSCOPY, WITH URETERAL STENT INSERTION (Right)  PYELOGRAM, RETROGRADE    Patient location: PACU    Anesthesia Type: general    Transport from OR: Transported from OR on 2-3 L/min O2 by NC with adequate spontaneous ventilation    Post pain: adequate analgesia    Post assessment: no apparent anesthetic complications and tolerated procedure well    Post vital signs: stable    Level of consciousness: awake    Nausea/Vomiting: no nausea/vomiting    Complications: none    Transfer of care protocol was followed      Last vitals:   Visit Vitals  BP (!) 154/74 (BP Location: Left arm, Patient Position: Lying)   Pulse 85   Temp 36.6 °C (97.8 °F) (Oral)   Resp 16   Ht 5' 3" (1.6 m)   Wt 68 kg (150 lb)   SpO2 99%   Breastfeeding? No   BMI 26.57 kg/m²     "

## 2019-11-17 ENCOUNTER — PATIENT MESSAGE (OUTPATIENT)
Dept: UROLOGY | Facility: CLINIC | Age: 84
End: 2019-11-17

## 2019-11-20 LAB
COMPN STONE: NORMAL
SPECIMEN SOURCE: NORMAL
STONE ANALYSIS IR-IMP: NORMAL

## 2019-11-21 ENCOUNTER — ANESTHESIA (OUTPATIENT)
Dept: SURGERY | Facility: OTHER | Age: 84
End: 2019-11-21
Payer: MEDICARE

## 2019-11-21 NOTE — PROGRESS NOTES
Dr Arellano forwarded these results to the patient via [employer].  He will discuss the results with the patient in person at the next appointment.  The patient was instructed to make an appointment if she does not already have one scheduled.

## 2019-11-24 PROCEDURE — G0179 PR HOME HEALTH MD RECERTIFICATION: ICD-10-PCS | Mod: ,,, | Performed by: FAMILY MEDICINE

## 2019-11-24 PROCEDURE — G0179 MD RECERTIFICATION HHA PT: HCPCS | Mod: ,,, | Performed by: FAMILY MEDICINE

## 2019-11-26 ENCOUNTER — LAB VISIT (OUTPATIENT)
Dept: LAB | Facility: HOSPITAL | Age: 84
End: 2019-11-26
Attending: UROLOGY
Payer: MEDICARE

## 2019-11-26 DIAGNOSIS — I13.10 HYPERTENSIVE HEART AND RENAL DISEASE: Primary | ICD-10-CM

## 2019-11-26 DIAGNOSIS — N18.30 CHRONIC KIDNEY DISEASE, STAGE III (MODERATE): ICD-10-CM

## 2019-11-26 LAB
ANION GAP SERPL CALC-SCNC: 8 MMOL/L (ref 8–16)
BUN SERPL-MCNC: 23 MG/DL (ref 8–23)
CALCIUM SERPL-MCNC: 9.3 MG/DL (ref 8.7–10.5)
CHLORIDE SERPL-SCNC: 106 MMOL/L (ref 95–110)
CO2 SERPL-SCNC: 25 MMOL/L (ref 23–29)
CREAT SERPL-MCNC: 1.3 MG/DL (ref 0.5–1.4)
EST. GFR  (AFRICAN AMERICAN): 42.6 ML/MIN/1.73 M^2
EST. GFR  (NON AFRICAN AMERICAN): 37 ML/MIN/1.73 M^2
GLUCOSE SERPL-MCNC: 74 MG/DL (ref 70–110)
POTASSIUM SERPL-SCNC: 3.9 MMOL/L (ref 3.5–5.1)
SODIUM SERPL-SCNC: 139 MMOL/L (ref 136–145)

## 2019-11-26 PROCEDURE — 80048 BASIC METABOLIC PNL TOTAL CA: CPT

## 2019-12-05 ENCOUNTER — EXTERNAL HOME HEALTH (OUTPATIENT)
Dept: HOME HEALTH SERVICES | Facility: HOSPITAL | Age: 84
End: 2019-12-05
Payer: MEDICARE

## 2019-12-15 ENCOUNTER — PATIENT MESSAGE (OUTPATIENT)
Dept: UROLOGY | Facility: CLINIC | Age: 84
End: 2019-12-15

## 2019-12-16 ENCOUNTER — TELEPHONE (OUTPATIENT)
Dept: UROLOGY | Facility: CLINIC | Age: 84
End: 2019-12-16

## 2019-12-16 ENCOUNTER — OFFICE VISIT (OUTPATIENT)
Dept: FAMILY MEDICINE | Facility: CLINIC | Age: 84
End: 2019-12-16
Payer: MEDICARE

## 2019-12-16 VITALS
SYSTOLIC BLOOD PRESSURE: 136 MMHG | BODY MASS INDEX: 26.57 KG/M2 | HEIGHT: 63 IN | HEART RATE: 82 BPM | DIASTOLIC BLOOD PRESSURE: 80 MMHG | OXYGEN SATURATION: 95 % | WEIGHT: 149.94 LBS

## 2019-12-16 DIAGNOSIS — C68.9 UROTHELIAL CANCER: ICD-10-CM

## 2019-12-16 DIAGNOSIS — R29.898 MUSCULAR DECONDITIONING: ICD-10-CM

## 2019-12-16 DIAGNOSIS — E78.5 DYSLIPIDEMIA: ICD-10-CM

## 2019-12-16 DIAGNOSIS — I10 ESSENTIAL HYPERTENSION: Primary | ICD-10-CM
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
## 2019-12-16 DIAGNOSIS — N18.30 STAGE 3 CHRONIC KIDNEY DISEASE: ICD-10-CM

## 2019-12-16 DIAGNOSIS — F41.9 ANXIETY: ICD-10-CM

## 2019-12-16 DIAGNOSIS — I77.9 BILATERAL CAROTID ARTERY DISEASE, UNSPECIFIED TYPE: ICD-10-CM

## 2019-12-16 DIAGNOSIS — Z86.73 HISTORY OF TRANSIENT ISCHEMIC ATTACK (TIA): ICD-10-CM

## 2019-12-16 PROBLEM — I82.401 ACUTE DEEP VEIN THROMBOSIS (DVT) OF RIGHT LOWER EXTREMITY: Status: RESOLVED | Noted: 2018-03-21 | Resolved: 2019-12-16

## 2019-12-16 PROBLEM — C54.1 ENDOMETRIAL CANCER: Status: RESOLVED | Noted: 2017-12-06 | Resolved: 2019-12-16

## 2019-12-16 PROCEDURE — 99999 PR PBB SHADOW E&M-EST. PATIENT-LVL III: CPT | Mod: PBBFAC,,, | Performed by: FAMILY MEDICINE

## 2019-12-16 PROCEDURE — 99214 PR OFFICE/OUTPT VISIT, EST, LEVL IV, 30-39 MIN: ICD-10-PCS | Mod: S$PBB,,, | Performed by: FAMILY MEDICINE

## 2019-12-16 PROCEDURE — 99214 OFFICE O/P EST MOD 30 MIN: CPT | Mod: S$PBB,,, | Performed by: FAMILY MEDICINE

## 2019-12-16 PROCEDURE — 99213 OFFICE O/P EST LOW 20 MIN: CPT | Mod: PBBFAC,PO | Performed by: FAMILY MEDICINE

## 2019-12-16 PROCEDURE — 99999 PR PBB SHADOW E&M-EST. PATIENT-LVL III: ICD-10-PCS | Mod: PBBFAC,,, | Performed by: FAMILY MEDICINE

## 2019-12-16 NOTE — PROGRESS NOTES
"Subjective:       Patient ID: Destinee uLo is a 88 y.o. female.    Chief Complaint: Follow-up (6 month)    Pt is known to me.  Pt is here for followup of chronic medical issues.  The pt fell about a month ago in her kitchen.  She bruised up her left upper arm.  She states that she is fine now.  The pt continues to see Dr. Arellano for bladder tumors.    The pt is having much difficulty getting out of her chair at home.  She is alone at home.  She inquires about a lift chair.    Review of Systems   Constitutional: Negative for activity change and unexpected weight change.   HENT: Negative for hearing loss, rhinorrhea and trouble swallowing.    Eyes: Negative for discharge and visual disturbance.   Respiratory: Negative for chest tightness and wheezing.    Cardiovascular: Negative for chest pain and palpitations.   Gastrointestinal: Negative for blood in stool, constipation, diarrhea and vomiting.   Endocrine: Negative for polydipsia.   Genitourinary: Positive for difficulty urinating and hematuria (chronic). Negative for dysuria and menstrual problem.   Musculoskeletal: Positive for arthralgias. Negative for joint swelling and neck pain.   Neurological: Positive for weakness. Negative for headaches.   Psychiatric/Behavioral: Negative for confusion and dysphoric mood.       Objective:       Vitals:    12/16/19 0950   BP: 136/80   BP Location: Left arm   Patient Position: Sitting   BP Method: Medium (Manual)   Pulse: 82   SpO2: 95%   Weight: 68 kg (149 lb 14.6 oz)   Height: 5' 3" (1.6 m)     Physical Exam   Constitutional: She is oriented to person, place, and time. She appears well-developed and well-nourished.   Pt is unable to get up on exam table even with assistance   HENT:   Head: Normocephalic.   Eyes: Pupils are equal, round, and reactive to light. Conjunctivae and EOM are normal.   Neck: Normal range of motion. Neck supple. No thyromegaly present.   Cardiovascular: Normal rate, regular rhythm and normal heart " sounds.   Pulmonary/Chest: Effort normal and breath sounds normal.   Abdominal: Soft. Bowel sounds are normal. There is no tenderness.   Musculoskeletal: Normal range of motion. She exhibits no tenderness or deformity.   Lymphadenopathy:     She has no cervical adenopathy.   Neurological: She is alert and oriented to person, place, and time. She displays normal reflexes. No cranial nerve deficit. She exhibits normal muscle tone. Coordination normal.   Skin: Skin is warm and dry.   Psychiatric: She has a normal mood and affect. Her behavior is normal.       Assessment:       1. Essential hypertension    2. Dyslipidemia    3. Bilateral carotid artery disease, unspecified type    4. Anxiety    5. History of transient ischemic attack (TIA)    6. Stage 3 chronic kidney disease    7. Urothelial cancer    8. Fall, initial encounter    9. Muscular deconditioning        Plan:       Destinee was seen today for follow-up.    Diagnoses and all orders for this visit:    Essential hypertension    Dyslipidemia    Bilateral carotid artery disease, unspecified type    Anxiety    History of transient ischemic attack (TIA)    Stage 3 chronic kidney disease    Urothelial cancer    Fall, initial encounter  -     Ambulatory referral to Home Health    Muscular deconditioning  -     Ambulatory referral to Home Health      During this visit, I reviewed the pt's history, medications, allergies, and problem list.

## 2019-12-16 NOTE — TELEPHONE ENCOUNTER
----- Message from Ashley Palma MA sent at 12/16/2019  7:30 AM CST -----  Balaji    Can you call the above patient and assist her on making an appointment with her Granddaughter please      Sary,   Can my grandmother see Judy in January 20th?  It's a day off for me for [employer] day.   Thanks

## 2019-12-19 LAB
FINAL PATHOLOGIC DIAGNOSIS: NORMAL
GROSS: NORMAL

## 2019-12-26 LAB
FINAL PATHOLOGIC DIAGNOSIS: NORMAL
GROSS: NORMAL

## 2020-01-01 ENCOUNTER — TELEPHONE (OUTPATIENT)
Dept: FAMILY MEDICINE | Facility: CLINIC | Age: 85
End: 2020-01-01

## 2020-01-01 ENCOUNTER — DOCUMENT SCAN (OUTPATIENT)
Dept: HOME HEALTH SERVICES | Facility: HOSPITAL | Age: 85
End: 2020-01-01
Payer: MEDICARE

## 2020-01-01 ENCOUNTER — TELEPHONE (OUTPATIENT)
Dept: UROLOGY | Facility: CLINIC | Age: 85
End: 2020-01-01

## 2020-01-01 ENCOUNTER — PATIENT MESSAGE (OUTPATIENT)
Dept: UROLOGY | Facility: CLINIC | Age: 85
End: 2020-01-01

## 2020-01-01 ENCOUNTER — PATIENT OUTREACH (OUTPATIENT)
Dept: ADMINISTRATIVE | Facility: OTHER | Age: 85
End: 2020-01-01

## 2020-01-01 ENCOUNTER — CLINICAL SUPPORT (OUTPATIENT)
Dept: URGENT CARE | Facility: CLINIC | Age: 85
End: 2020-01-01
Payer: MEDICARE

## 2020-01-01 ENCOUNTER — PATIENT MESSAGE (OUTPATIENT)
Dept: HEMATOLOGY/ONCOLOGY | Facility: CLINIC | Age: 85
End: 2020-01-01

## 2020-01-01 ENCOUNTER — OFFICE VISIT (OUTPATIENT)
Dept: UROLOGY | Facility: CLINIC | Age: 85
End: 2020-01-01
Payer: MEDICARE

## 2020-01-01 ENCOUNTER — ANESTHESIA EVENT (OUTPATIENT)
Dept: SURGERY | Facility: OTHER | Age: 85
End: 2020-01-01
Payer: MEDICARE

## 2020-01-01 ENCOUNTER — PATIENT MESSAGE (OUTPATIENT)
Dept: FAMILY MEDICINE | Facility: CLINIC | Age: 85
End: 2020-01-01

## 2020-01-01 ENCOUNTER — LAB VISIT (OUTPATIENT)
Dept: LAB | Facility: HOSPITAL | Age: 85
End: 2020-01-01
Attending: UROLOGY
Payer: MEDICARE

## 2020-01-01 ENCOUNTER — DOCUMENTATION ONLY (OUTPATIENT)
Dept: INFUSION THERAPY | Facility: HOSPITAL | Age: 85
End: 2020-01-01

## 2020-01-01 ENCOUNTER — HOSPITAL ENCOUNTER (OUTPATIENT)
Facility: OTHER | Age: 85
Discharge: HOME | End: 2020-09-24
Attending: UROLOGY | Admitting: UROLOGY
Payer: MEDICARE

## 2020-01-01 ENCOUNTER — PATIENT MESSAGE (OUTPATIENT)
Dept: OTHER | Facility: OTHER | Age: 85
End: 2020-01-01

## 2020-01-01 ENCOUNTER — INFUSION (OUTPATIENT)
Dept: INFUSION THERAPY | Facility: OTHER | Age: 85
End: 2020-01-01
Attending: STUDENT IN AN ORGANIZED HEALTH CARE EDUCATION/TRAINING PROGRAM
Payer: MEDICARE

## 2020-01-01 ENCOUNTER — PATIENT OUTREACH (OUTPATIENT)
Dept: HOME HEALTH SERVICES | Facility: HOSPITAL | Age: 85
End: 2020-01-01

## 2020-01-01 ENCOUNTER — PATIENT MESSAGE (OUTPATIENT)
Dept: ADMINISTRATIVE | Facility: HOSPITAL | Age: 85
End: 2020-01-01

## 2020-01-01 ENCOUNTER — OFFICE VISIT (OUTPATIENT)
Dept: HEMATOLOGY/ONCOLOGY | Facility: CLINIC | Age: 85
End: 2020-01-01
Payer: MEDICARE

## 2020-01-01 ENCOUNTER — EXTERNAL HOME HEALTH (OUTPATIENT)
Dept: HOME HEALTH SERVICES | Facility: HOSPITAL | Age: 85
End: 2020-01-01
Payer: MEDICARE

## 2020-01-01 ENCOUNTER — HOSPITAL ENCOUNTER (OUTPATIENT)
Dept: RADIOLOGY | Facility: HOSPITAL | Age: 85
Discharge: HOME | End: 2020-10-07
Attending: UROLOGY
Payer: MEDICARE

## 2020-01-01 ENCOUNTER — HOSPITAL ENCOUNTER (OUTPATIENT)
Dept: RADIOLOGY | Facility: HOSPITAL | Age: 85
Discharge: HOME | End: 2020-10-15
Attending: UROLOGY
Payer: MEDICARE

## 2020-01-01 ENCOUNTER — ANESTHESIA (OUTPATIENT)
Dept: SURGERY | Facility: OTHER | Age: 85
End: 2020-01-01
Payer: MEDICARE

## 2020-01-01 ENCOUNTER — LAB VISIT (OUTPATIENT)
Dept: LAB | Facility: OTHER | Age: 85
End: 2020-01-01
Attending: INTERNAL MEDICINE
Payer: MEDICARE

## 2020-01-01 ENCOUNTER — HOSPITAL ENCOUNTER (OUTPATIENT)
Dept: RADIOLOGY | Facility: HOSPITAL | Age: 85
Discharge: HOME | End: 2020-12-28
Attending: INTERNAL MEDICINE
Payer: MEDICARE

## 2020-01-01 ENCOUNTER — HOSPITAL ENCOUNTER (OUTPATIENT)
Dept: RADIOLOGY | Facility: HOSPITAL | Age: 85
Discharge: HOME | End: 2020-10-02
Attending: UROLOGY
Payer: MEDICARE

## 2020-01-01 ENCOUNTER — HOSPITAL ENCOUNTER (OUTPATIENT)
Facility: OTHER | Age: 85
Discharge: HOME | End: 2020-11-05
Attending: RADIOLOGY | Admitting: RADIOLOGY
Payer: MEDICARE

## 2020-01-01 ENCOUNTER — TELEPHONE (OUTPATIENT)
Dept: HEMATOLOGY/ONCOLOGY | Facility: CLINIC | Age: 85
End: 2020-01-01

## 2020-01-01 ENCOUNTER — CLINICAL SUPPORT (OUTPATIENT)
Dept: HEMATOLOGY/ONCOLOGY | Facility: CLINIC | Age: 85
End: 2020-01-01
Payer: MEDICARE

## 2020-01-01 ENCOUNTER — OUTPATIENT CASE MANAGEMENT (OUTPATIENT)
Dept: ADMINISTRATIVE | Facility: OTHER | Age: 85
End: 2020-01-01

## 2020-01-01 ENCOUNTER — OFFICE VISIT (OUTPATIENT)
Dept: FAMILY MEDICINE | Facility: CLINIC | Age: 85
End: 2020-01-01
Payer: MEDICARE

## 2020-01-01 ENCOUNTER — OFFICE VISIT (OUTPATIENT)
Dept: GYNECOLOGIC ONCOLOGY | Facility: CLINIC | Age: 85
End: 2020-01-01
Payer: MEDICARE

## 2020-01-01 ENCOUNTER — HOSPITAL ENCOUNTER (OUTPATIENT)
Facility: OTHER | Age: 85
Discharge: HOME | End: 2020-06-20
Attending: UROLOGY | Admitting: UROLOGY
Payer: MEDICARE

## 2020-01-01 ENCOUNTER — LAB VISIT (OUTPATIENT)
Dept: FAMILY MEDICINE | Facility: CLINIC | Age: 85
End: 2020-01-01
Attending: UROLOGY
Payer: MEDICARE

## 2020-01-01 VITALS
OXYGEN SATURATION: 100 % | HEART RATE: 83 BPM | BODY MASS INDEX: 24.65 KG/M2 | TEMPERATURE: 97 F | OXYGEN SATURATION: 100 % | BODY MASS INDEX: 25.88 KG/M2 | HEIGHT: 63 IN | TEMPERATURE: 97 F | RESPIRATION RATE: 16 BRPM | DIASTOLIC BLOOD PRESSURE: 63 MMHG | WEIGHT: 140.63 LBS | HEIGHT: 62 IN | DIASTOLIC BLOOD PRESSURE: 64 MMHG | HEART RATE: 86 BPM | SYSTOLIC BLOOD PRESSURE: 118 MMHG | SYSTOLIC BLOOD PRESSURE: 140 MMHG | WEIGHT: 139.13 LBS | RESPIRATION RATE: 18 BRPM

## 2020-01-01 VITALS
WEIGHT: 138.44 LBS | SYSTOLIC BLOOD PRESSURE: 140 MMHG | TEMPERATURE: 97 F | DIASTOLIC BLOOD PRESSURE: 63 MMHG | BODY MASS INDEX: 25.48 KG/M2 | RESPIRATION RATE: 16 BRPM | HEART RATE: 85 BPM | OXYGEN SATURATION: 99 % | HEIGHT: 62 IN

## 2020-01-01 VITALS
DIASTOLIC BLOOD PRESSURE: 70 MMHG | SYSTOLIC BLOOD PRESSURE: 152 MMHG | WEIGHT: 143.06 LBS | HEIGHT: 63 IN | BODY MASS INDEX: 25.35 KG/M2 | HEART RATE: 78 BPM

## 2020-01-01 VITALS
SYSTOLIC BLOOD PRESSURE: 135 MMHG | HEIGHT: 63 IN | HEIGHT: 63 IN | TEMPERATURE: 98 F | DIASTOLIC BLOOD PRESSURE: 71 MMHG | WEIGHT: 140 LBS | DIASTOLIC BLOOD PRESSURE: 64 MMHG | RESPIRATION RATE: 18 BRPM | OXYGEN SATURATION: 96 % | WEIGHT: 140 LBS | HEART RATE: 84 BPM | SYSTOLIC BLOOD PRESSURE: 152 MMHG | HEART RATE: 91 BPM | BODY MASS INDEX: 24.8 KG/M2 | BODY MASS INDEX: 24.8 KG/M2

## 2020-01-01 VITALS
HEART RATE: 90 BPM | WEIGHT: 138 LBS | TEMPERATURE: 98 F | SYSTOLIC BLOOD PRESSURE: 159 MMHG | HEIGHT: 62 IN | RESPIRATION RATE: 16 BRPM | OXYGEN SATURATION: 98 % | DIASTOLIC BLOOD PRESSURE: 67 MMHG | BODY MASS INDEX: 25.4 KG/M2

## 2020-01-01 VITALS
RESPIRATION RATE: 18 BRPM | BODY MASS INDEX: 25.5 KG/M2 | DIASTOLIC BLOOD PRESSURE: 70 MMHG | HEIGHT: 63 IN | HEART RATE: 89 BPM | TEMPERATURE: 100 F | WEIGHT: 143.94 LBS | SYSTOLIC BLOOD PRESSURE: 140 MMHG

## 2020-01-01 VITALS
DIASTOLIC BLOOD PRESSURE: 72 MMHG | BODY MASS INDEX: 26.4 KG/M2 | WEIGHT: 149 LBS | HEART RATE: 77 BPM | HEIGHT: 63 IN | SYSTOLIC BLOOD PRESSURE: 182 MMHG

## 2020-01-01 VITALS
DIASTOLIC BLOOD PRESSURE: 65 MMHG | BODY MASS INDEX: 25.08 KG/M2 | SYSTOLIC BLOOD PRESSURE: 144 MMHG | OXYGEN SATURATION: 100 % | RESPIRATION RATE: 18 BRPM | WEIGHT: 141.56 LBS | HEART RATE: 85 BPM | TEMPERATURE: 98 F | HEIGHT: 63 IN

## 2020-01-01 VITALS
OXYGEN SATURATION: 97 % | WEIGHT: 145 LBS | SYSTOLIC BLOOD PRESSURE: 128 MMHG | BODY MASS INDEX: 25.69 KG/M2 | HEIGHT: 63 IN | DIASTOLIC BLOOD PRESSURE: 60 MMHG | RESPIRATION RATE: 16 BRPM | TEMPERATURE: 98 F | HEART RATE: 86 BPM

## 2020-01-01 VITALS
WEIGHT: 143 LBS | HEART RATE: 85 BPM | SYSTOLIC BLOOD PRESSURE: 184 MMHG | BODY MASS INDEX: 25.33 KG/M2 | DIASTOLIC BLOOD PRESSURE: 77 MMHG

## 2020-01-01 VITALS
DIASTOLIC BLOOD PRESSURE: 65 MMHG | HEIGHT: 63 IN | HEART RATE: 88 BPM | WEIGHT: 140 LBS | SYSTOLIC BLOOD PRESSURE: 149 MMHG | BODY MASS INDEX: 24.8 KG/M2

## 2020-01-01 VITALS
OXYGEN SATURATION: 98 % | DIASTOLIC BLOOD PRESSURE: 63 MMHG | HEART RATE: 94 BPM | TEMPERATURE: 98 F | RESPIRATION RATE: 16 BRPM | SYSTOLIC BLOOD PRESSURE: 138 MMHG

## 2020-01-01 DIAGNOSIS — C66.1 URETERAL CANCER, RIGHT: Primary | ICD-10-CM

## 2020-01-01 DIAGNOSIS — E78.5 DYSLIPIDEMIA: ICD-10-CM

## 2020-01-01 DIAGNOSIS — C68.9 UROTHELIAL CANCER: Primary | ICD-10-CM

## 2020-01-01 DIAGNOSIS — C79.51 SECONDARY MALIGNANT NEOPLASM OF BONE: ICD-10-CM

## 2020-01-01 DIAGNOSIS — C68.9 MALIGNANT NEOPLASM OF URINARY ORGAN, UNSPECIFIED: ICD-10-CM

## 2020-01-01 DIAGNOSIS — F41.9 ANXIETY: ICD-10-CM

## 2020-01-01 DIAGNOSIS — C68.9 UROTHELIAL CARCINOMA: Primary | ICD-10-CM

## 2020-01-01 DIAGNOSIS — C54.1 ENDOMETRIAL CANCER: ICD-10-CM

## 2020-01-01 DIAGNOSIS — C65.1 MALIGNANT NEOPLASM OF RIGHT RENAL PELVIS: ICD-10-CM

## 2020-01-01 DIAGNOSIS — Z85.42 HISTORY OF ENDOMETRIAL CANCER: ICD-10-CM

## 2020-01-01 DIAGNOSIS — C66.1 MALIGNANT NEOPLASM OF RIGHT URETER: ICD-10-CM

## 2020-01-01 DIAGNOSIS — C68.9 UROTHELIAL CARCINOMA: ICD-10-CM

## 2020-01-01 DIAGNOSIS — R30.0 DYSURIA: ICD-10-CM

## 2020-01-01 DIAGNOSIS — R53.1 GENERALIZED WEAKNESS: Primary | ICD-10-CM

## 2020-01-01 DIAGNOSIS — C79.51 METASTASIS TO BONE: ICD-10-CM

## 2020-01-01 DIAGNOSIS — C68.9 UROTHELIAL CANCER: ICD-10-CM

## 2020-01-01 DIAGNOSIS — I10 ESSENTIAL HYPERTENSION: ICD-10-CM

## 2020-01-01 DIAGNOSIS — D64.9 ANEMIA, UNSPECIFIED TYPE: Primary | ICD-10-CM

## 2020-01-01 DIAGNOSIS — Z03.818 ENCNTR FOR OBS FOR SUSP EXPSR TO OTH BIOLG AGENTS RULED OUT: Primary | ICD-10-CM

## 2020-01-01 DIAGNOSIS — N18.30 CHRONIC KIDNEY DISEASE, STAGE III (MODERATE): Primary | ICD-10-CM

## 2020-01-01 DIAGNOSIS — C78.00 MALIGNANT NEOPLASM METASTATIC TO LUNG, UNSPECIFIED LATERALITY: ICD-10-CM

## 2020-01-01 DIAGNOSIS — R30.0 DYSURIA: Primary | ICD-10-CM

## 2020-01-01 DIAGNOSIS — D50.0 IRON DEFICIENCY ANEMIA DUE TO CHRONIC BLOOD LOSS: ICD-10-CM

## 2020-01-01 DIAGNOSIS — I70.0 AORTIC ATHEROSCLEROSIS: ICD-10-CM

## 2020-01-01 DIAGNOSIS — Z13.9 SCREENING FOR CONDITION: Primary | ICD-10-CM

## 2020-01-01 DIAGNOSIS — N18.30 STAGE 3 CHRONIC KIDNEY DISEASE, UNSPECIFIED WHETHER STAGE 3A OR 3B CKD: Primary | ICD-10-CM

## 2020-01-01 DIAGNOSIS — D50.0 IRON DEFICIENCY ANEMIA DUE TO CHRONIC BLOOD LOSS: Primary | ICD-10-CM

## 2020-01-01 DIAGNOSIS — F32.9 REACTIVE DEPRESSION: ICD-10-CM

## 2020-01-01 DIAGNOSIS — I77.9 BILATERAL CAROTID ARTERY DISEASE, UNSPECIFIED TYPE: ICD-10-CM

## 2020-01-01 DIAGNOSIS — R91.1 PULMONARY NODULE: ICD-10-CM

## 2020-01-01 DIAGNOSIS — R91.1 LUNG NODULE: ICD-10-CM

## 2020-01-01 DIAGNOSIS — N80.129 ENDOMETRIOMA: ICD-10-CM

## 2020-01-01 DIAGNOSIS — Z91.81 AT HIGH RISK FOR INJURY RELATED TO FALL: Primary | ICD-10-CM

## 2020-01-01 DIAGNOSIS — N20.0 NEPHROLITHIASIS: Primary | ICD-10-CM

## 2020-01-01 DIAGNOSIS — C78.7 METASTASIS TO LIVER: ICD-10-CM

## 2020-01-01 DIAGNOSIS — C78.7 SECONDARY LIVER CANCER: ICD-10-CM

## 2020-01-01 DIAGNOSIS — N39.0 URINARY TRACT INFECTION, SITE NOT SPECIFIED: ICD-10-CM

## 2020-01-01 DIAGNOSIS — R31.0 GROSS HEMATURIA: ICD-10-CM

## 2020-01-01 DIAGNOSIS — K21.9 GASTROESOPHAGEAL REFLUX DISEASE WITHOUT ESOPHAGITIS: ICD-10-CM

## 2020-01-01 DIAGNOSIS — R35.0 URINARY FREQUENCY: ICD-10-CM

## 2020-01-01 LAB
ADEQUACY: NORMAL
ALBUMIN SERPL BCP-MCNC: 2.9 G/DL (ref 3.5–5.2)
ALP SERPL-CCNC: 72 U/L (ref 55–135)
ALT SERPL W/O P-5'-P-CCNC: 12 U/L (ref 10–44)
ANION GAP SERPL CALC-SCNC: 10 MMOL/L (ref 8–16)
ANION GAP SERPL CALC-SCNC: 12 MMOL/L (ref 8–16)
ANION GAP SERPL CALC-SCNC: 7 MMOL/L (ref 8–16)
ANION GAP SERPL CALC-SCNC: 8 MMOL/L (ref 8–16)
AST SERPL-CCNC: 14 U/L (ref 10–40)
BACTERIA UR CULT: NO GROWTH
BACTERIA UR CULT: NO GROWTH
BACTERIA UR CULT: NORMAL
BASOPHILS # BLD AUTO: 0.02 K/UL (ref 0–0.2)
BASOPHILS # BLD AUTO: 0.03 K/UL (ref 0–0.2)
BASOPHILS # BLD AUTO: 0.04 K/UL (ref 0–0.2)
BASOPHILS # BLD AUTO: 0.05 K/UL (ref 0–0.2)
BASOPHILS NFR BLD: 0.2 % (ref 0–1.9)
BASOPHILS NFR BLD: 0.5 % (ref 0–1.9)
BASOPHILS NFR BLD: 0.7 % (ref 0–1.9)
BASOPHILS NFR BLD: 0.7 % (ref 0–1.9)
BILIRUB SERPL-MCNC: 0.2 MG/DL (ref 0.1–1)
BILIRUB SERPL-MCNC: ABNORMAL MG/DL
BILIRUB SERPL-MCNC: NEGATIVE MG/DL
BLOOD URINE, POC: 250
BLOOD URINE, POC: 250
BUN SERPL-MCNC: 17 MG/DL (ref 8–23)
BUN SERPL-MCNC: 19 MG/DL (ref 8–23)
BUN SERPL-MCNC: 19 MG/DL (ref 8–23)
BUN SERPL-MCNC: 23 MG/DL (ref 8–23)
CALCIUM SERPL-MCNC: 8.5 MG/DL (ref 8.7–10.5)
CALCIUM SERPL-MCNC: 8.6 MG/DL (ref 8.7–10.5)
CALCIUM SERPL-MCNC: 8.9 MG/DL (ref 8.7–10.5)
CALCIUM SERPL-MCNC: 9 MG/DL (ref 8.7–10.5)
CHLORIDE SERPL-SCNC: 104 MMOL/L (ref 95–110)
CHLORIDE SERPL-SCNC: 105 MMOL/L (ref 95–110)
CHLORIDE SERPL-SCNC: 106 MMOL/L (ref 95–110)
CHLORIDE SERPL-SCNC: 107 MMOL/L (ref 95–110)
CLARITY, POC UA: ABNORMAL
CLARITY, POC UA: CLEAR
CO2 SERPL-SCNC: 20 MMOL/L (ref 23–29)
CO2 SERPL-SCNC: 22 MMOL/L (ref 23–29)
CO2 SERPL-SCNC: 23 MMOL/L (ref 23–29)
CO2 SERPL-SCNC: 23 MMOL/L (ref 23–29)
COLOR, POC UA: ABNORMAL
COLOR, POC UA: ABNORMAL
CREAT SERPL-MCNC: 1 MG/DL (ref 0.5–1.4)
CREAT SERPL-MCNC: 1.1 MG/DL (ref 0.5–1.4)
CREAT SERPL-MCNC: 1.1 MG/DL (ref 0.5–1.4)
CREAT SERPL-MCNC: 1.3 MG/DL (ref 0.5–1.4)
DIFFERENTIAL METHOD: ABNORMAL
EOSINOPHIL # BLD AUTO: 0 K/UL (ref 0–0.5)
EOSINOPHIL # BLD AUTO: 0.1 K/UL (ref 0–0.5)
EOSINOPHIL NFR BLD: 0.5 % (ref 0–8)
EOSINOPHIL NFR BLD: 0.9 % (ref 0–8)
EOSINOPHIL NFR BLD: 1.5 % (ref 0–8)
EOSINOPHIL NFR BLD: 2.1 % (ref 0–8)
ERYTHROCYTE [DISTWIDTH] IN BLOOD BY AUTOMATED COUNT: 17.9 % (ref 11.5–14.5)
ERYTHROCYTE [DISTWIDTH] IN BLOOD BY AUTOMATED COUNT: 18.3 % (ref 11.5–14.5)
ERYTHROCYTE [DISTWIDTH] IN BLOOD BY AUTOMATED COUNT: 19.1 % (ref 11.5–14.5)
ERYTHROCYTE [DISTWIDTH] IN BLOOD BY AUTOMATED COUNT: 19.8 % (ref 11.5–14.5)
EST. GFR  (AFRICAN AMERICAN): 42 ML/MIN/1.73 M^2
EST. GFR  (AFRICAN AMERICAN): 52 ML/MIN/1.73 M^2
EST. GFR  (AFRICAN AMERICAN): 52 ML/MIN/1.73 M^2
EST. GFR  (AFRICAN AMERICAN): 58 ML/MIN/1.73 M^2
EST. GFR  (NON AFRICAN AMERICAN): 37 ML/MIN/1.73 M^2
EST. GFR  (NON AFRICAN AMERICAN): 45 ML/MIN/1.73 M^2
EST. GFR  (NON AFRICAN AMERICAN): 45 ML/MIN/1.73 M^2
EST. GFR  (NON AFRICAN AMERICAN): 50 ML/MIN/1.73 M^2
FERRITIN SERPL-MCNC: 24 NG/ML (ref 20–300)
FINAL PATHOLOGIC DIAGNOSIS: NORMAL
GLUCOSE SERPL-MCNC: 101 MG/DL (ref 70–110)
GLUCOSE SERPL-MCNC: 92 MG/DL (ref 70–110)
GLUCOSE SERPL-MCNC: 94 MG/DL (ref 70–110)
GLUCOSE SERPL-MCNC: 95 MG/DL (ref 70–110)
GLUCOSE UR QL STRIP: ABNORMAL
GLUCOSE UR QL STRIP: NORMAL
GROSS: NORMAL
HCT VFR BLD AUTO: 28.6 % (ref 37–48.5)
HCT VFR BLD AUTO: 31.8 % (ref 37–48.5)
HCT VFR BLD AUTO: 32.6 % (ref 37–48.5)
HCT VFR BLD AUTO: 32.9 % (ref 37–48.5)
HGB BLD-MCNC: 10 G/DL (ref 12–16)
HGB BLD-MCNC: 8.7 G/DL (ref 12–16)
HGB BLD-MCNC: 9.5 G/DL (ref 12–16)
HGB BLD-MCNC: 9.5 G/DL (ref 12–16)
IMM GRANULOCYTES # BLD AUTO: 0.01 K/UL (ref 0–0.04)
IMM GRANULOCYTES # BLD AUTO: 0.02 K/UL (ref 0–0.04)
IMM GRANULOCYTES NFR BLD AUTO: 0.2 % (ref 0–0.5)
IMM GRANULOCYTES NFR BLD AUTO: 0.2 % (ref 0–0.5)
IMM GRANULOCYTES NFR BLD AUTO: 0.3 % (ref 0–0.5)
IMM GRANULOCYTES NFR BLD AUTO: 0.3 % (ref 0–0.5)
IRON SERPL-MCNC: 11 UG/DL (ref 30–160)
KETONES UR QL STRIP: ABNORMAL
KETONES UR QL STRIP: NEGATIVE
LEUKOCYTE ESTERASE URINE, POC: ABNORMAL
LEUKOCYTE ESTERASE URINE, POC: ABNORMAL
LYMPHOCYTES # BLD AUTO: 1.1 K/UL (ref 1–4.8)
LYMPHOCYTES # BLD AUTO: 1.1 K/UL (ref 1–4.8)
LYMPHOCYTES # BLD AUTO: 1.3 K/UL (ref 1–4.8)
LYMPHOCYTES # BLD AUTO: 1.4 K/UL (ref 1–4.8)
LYMPHOCYTES NFR BLD: 13.2 % (ref 18–48)
LYMPHOCYTES NFR BLD: 17.7 % (ref 18–48)
LYMPHOCYTES NFR BLD: 19.9 % (ref 18–48)
LYMPHOCYTES NFR BLD: 21.4 % (ref 18–48)
MAGNESIUM SERPL-MCNC: 2 MG/DL (ref 1.6–2.6)
MCH RBC QN AUTO: 21.7 PG (ref 27–31)
MCH RBC QN AUTO: 21.7 PG (ref 27–31)
MCH RBC QN AUTO: 22.1 PG (ref 27–31)
MCH RBC QN AUTO: 22.6 PG (ref 27–31)
MCHC RBC AUTO-ENTMCNC: 29.1 G/DL (ref 32–36)
MCHC RBC AUTO-ENTMCNC: 29.9 G/DL (ref 32–36)
MCHC RBC AUTO-ENTMCNC: 30.4 G/DL (ref 32–36)
MCHC RBC AUTO-ENTMCNC: 30.4 G/DL (ref 32–36)
MCV RBC AUTO: 73 FL (ref 82–98)
MCV RBC AUTO: 73 FL (ref 82–98)
MCV RBC AUTO: 74 FL (ref 82–98)
MCV RBC AUTO: 75 FL (ref 82–98)
MONOCYTES # BLD AUTO: 0.7 K/UL (ref 0.3–1)
MONOCYTES # BLD AUTO: 0.8 K/UL (ref 0.3–1)
MONOCYTES # BLD AUTO: 0.9 K/UL (ref 0.3–1)
MONOCYTES # BLD AUTO: 0.9 K/UL (ref 0.3–1)
MONOCYTES NFR BLD: 11 % (ref 4–15)
MONOCYTES NFR BLD: 11 % (ref 4–15)
MONOCYTES NFR BLD: 11.4 % (ref 4–15)
MONOCYTES NFR BLD: 14.8 % (ref 4–15)
NEUTROPHILS # BLD AUTO: 4 K/UL (ref 1.8–7.7)
NEUTROPHILS # BLD AUTO: 4.1 K/UL (ref 1.8–7.7)
NEUTROPHILS # BLD AUTO: 4.6 K/UL (ref 1.8–7.7)
NEUTROPHILS # BLD AUTO: 6.3 K/UL (ref 1.8–7.7)
NEUTROPHILS NFR BLD: 64.6 % (ref 38–73)
NEUTROPHILS NFR BLD: 64.8 % (ref 38–73)
NEUTROPHILS NFR BLD: 67.2 % (ref 38–73)
NEUTROPHILS NFR BLD: 74.9 % (ref 38–73)
NITRITE, POC UA: ABNORMAL
NITRITE, POC UA: NEGATIVE
NRBC BLD-RTO: 0 /100 WBC
PH, POC UA: 5
PH, POC UA: 6
PHOSPHATE SERPL-MCNC: 2.9 MG/DL (ref 2.7–4.5)
PLATELET # BLD AUTO: 283 K/UL (ref 150–350)
PLATELET # BLD AUTO: 348 K/UL (ref 150–350)
PLATELET # BLD AUTO: 364 K/UL (ref 150–350)
PLATELET # BLD AUTO: 410 K/UL (ref 150–350)
PMV BLD AUTO: 10 FL (ref 9.2–12.9)
PMV BLD AUTO: 10.2 FL (ref 9.2–12.9)
PMV BLD AUTO: 9 FL (ref 9.2–12.9)
PMV BLD AUTO: 9.3 FL (ref 9.2–12.9)
POCT GLUCOSE: 84 MG/DL (ref 70–110)
POTASSIUM SERPL-SCNC: 4.2 MMOL/L (ref 3.5–5.1)
POTASSIUM SERPL-SCNC: 4.4 MMOL/L (ref 3.5–5.1)
PROT SERPL-MCNC: 6.7 G/DL (ref 6–8.4)
PROTEIN, POC: 30
PROTEIN, POC: ABNORMAL
RBC # BLD AUTO: 3.93 M/UL (ref 4–5.4)
RBC # BLD AUTO: 4.37 M/UL (ref 4–5.4)
RBC # BLD AUTO: 4.37 M/UL (ref 4–5.4)
RBC # BLD AUTO: 4.43 M/UL (ref 4–5.4)
SARS-COV-2 RDRP RESP QL NAA+PROBE: NEGATIVE
SARS-COV-2 RNA RESP QL NAA+PROBE: NOT DETECTED
SARS-COV-2 RNA RESP QL NAA+PROBE: NOT DETECTED
SATURATED IRON: 3 % (ref 20–50)
SODIUM SERPL-SCNC: 134 MMOL/L (ref 136–145)
SODIUM SERPL-SCNC: 136 MMOL/L (ref 136–145)
SODIUM SERPL-SCNC: 138 MMOL/L (ref 136–145)
SODIUM SERPL-SCNC: 139 MMOL/L (ref 136–145)
SPECIFIC GRAVITY, POC UA: 1
SPECIFIC GRAVITY, POC UA: 1.01
TOTAL IRON BINDING CAPACITY: 342 UG/DL (ref 250–450)
TRANSFERRIN SERPL-MCNC: 231 MG/DL (ref 200–375)
UROBILINOGEN, POC UA: ABNORMAL
UROBILINOGEN, POC UA: NORMAL
WBC # BLD AUTO: 6.13 K/UL (ref 3.9–12.7)
WBC # BLD AUTO: 6.27 K/UL (ref 3.9–12.7)
WBC # BLD AUTO: 6.88 K/UL (ref 3.9–12.7)
WBC # BLD AUTO: 8.43 K/UL (ref 3.9–12.7)

## 2020-01-01 PROCEDURE — 99999 PR PBB SHADOW E&M-EST. PATIENT-LVL III: CPT | Mod: PBBFAC,,, | Performed by: OBSTETRICS & GYNECOLOGY

## 2020-01-01 PROCEDURE — 88305 TISSUE EXAM BY PATHOLOGIST: CPT | Mod: 26,,, | Performed by: PATHOLOGY

## 2020-01-01 PROCEDURE — 71260 CT THORAX DX C+: CPT | Mod: TC,PO

## 2020-01-01 PROCEDURE — 71260 CT CHEST WITH CONTRAST: ICD-10-PCS | Mod: 26,,, | Performed by: RADIOLOGY

## 2020-01-01 PROCEDURE — 88305 TISSUE EXAM BY PATHOLOGIST: ICD-10-PCS | Mod: 26,,, | Performed by: PATHOLOGY

## 2020-01-01 PROCEDURE — A9552 F18 FDG: HCPCS

## 2020-01-01 PROCEDURE — 99152 MOD SED SAME PHYS/QHP 5/>YRS: CPT | Performed by: RADIOLOGY

## 2020-01-01 PROCEDURE — 88341 IMHCHEM/IMCYTCHM EA ADD ANTB: CPT | Performed by: PATHOLOGY

## 2020-01-01 PROCEDURE — 63600175 PHARM REV CODE 636 W HCPCS: Performed by: RADIOLOGY

## 2020-01-01 PROCEDURE — 25500020 PHARM REV CODE 255: Performed by: UROLOGY

## 2020-01-01 PROCEDURE — 88333 PATH CONSLTJ SURG CYTO XM 1: CPT | Mod: 26,,, | Performed by: PATHOLOGY

## 2020-01-01 PROCEDURE — 99214 PR OFFICE/OUTPT VISIT, EST, LEVL IV, 30-39 MIN: ICD-10-PCS | Mod: S$PBB,,, | Performed by: OBSTETRICS & GYNECOLOGY

## 2020-01-01 PROCEDURE — 88309 TISSUE EXAM BY PATHOLOGIST: CPT | Performed by: PATHOLOGY

## 2020-01-01 PROCEDURE — 88342 IMHCHEM/IMCYTCHM 1ST ANTB: CPT | Mod: 26,,, | Performed by: PATHOLOGY

## 2020-01-01 PROCEDURE — 52235 CYSTOSCOPY AND TREATMENT: CPT | Mod: 51,,, | Performed by: UROLOGY

## 2020-01-01 PROCEDURE — 99999 PR PBB SHADOW E&M-EST. PATIENT-LVL IV: CPT | Mod: PBBFAC,,, | Performed by: FAMILY MEDICINE

## 2020-01-01 PROCEDURE — 63600175 PHARM REV CODE 636 W HCPCS: Performed by: NURSE PRACTITIONER

## 2020-01-01 PROCEDURE — 99214 OFFICE O/P EST MOD 30 MIN: CPT | Mod: S$PBB,,, | Performed by: FAMILY MEDICINE

## 2020-01-01 PROCEDURE — 80048 BASIC METABOLIC PNL TOTAL CA: CPT

## 2020-01-01 PROCEDURE — 36415 COLL VENOUS BLD VENIPUNCTURE: CPT

## 2020-01-01 PROCEDURE — 99999 PR PBB SHADOW E&M-EST. PATIENT-LVL IV: CPT | Mod: PBBFAC,,, | Performed by: INTERNAL MEDICINE

## 2020-01-01 PROCEDURE — 88342 CHG IMMUNOCYTOCHEMISTRY: ICD-10-PCS | Mod: 26,,, | Performed by: PATHOLOGY

## 2020-01-01 PROCEDURE — 25000003 PHARM REV CODE 250: Performed by: INTERNAL MEDICINE

## 2020-01-01 PROCEDURE — G0179 MD RECERTIFICATION HHA PT: HCPCS | Mod: ,,, | Performed by: FAMILY MEDICINE

## 2020-01-01 PROCEDURE — 36000706: Performed by: UROLOGY

## 2020-01-01 PROCEDURE — 99215 OFFICE O/P EST HI 40 MIN: CPT | Mod: PBBFAC,PN | Performed by: NURSE PRACTITIONER

## 2020-01-01 PROCEDURE — 36000707: Performed by: UROLOGY

## 2020-01-01 PROCEDURE — 71046 X-RAY EXAM CHEST 2 VIEWS: CPT | Mod: 26,,, | Performed by: RADIOLOGY

## 2020-01-01 PROCEDURE — 88341 IMHCHEM/IMCYTCHM EA ADD ANTB: CPT | Mod: 26,,, | Performed by: PATHOLOGY

## 2020-01-01 PROCEDURE — 99215 PR OFFICE/OUTPT VISIT, EST, LEVL V, 40-54 MIN: ICD-10-PCS | Mod: S$PBB,,, | Performed by: INTERNAL MEDICINE

## 2020-01-01 PROCEDURE — 99999 PR PBB SHADOW E&M-EST. PATIENT-LVL III: ICD-10-PCS | Mod: PBBFAC,,, | Performed by: OBSTETRICS & GYNECOLOGY

## 2020-01-01 PROCEDURE — 99205 PR OFFICE/OUTPT VISIT, NEW, LEVL V, 60-74 MIN: ICD-10-PCS | Mod: S$PBB,,, | Performed by: INTERNAL MEDICINE

## 2020-01-01 PROCEDURE — 99999 PR PBB SHADOW E&M-EST. PATIENT-LVL V: ICD-10-PCS | Mod: PBBFAC,,, | Performed by: INTERNAL MEDICINE

## 2020-01-01 PROCEDURE — 52318 REMOVE BLADDER STONE: CPT | Mod: ,,, | Performed by: UROLOGY

## 2020-01-01 PROCEDURE — 25000003 PHARM REV CODE 250: Performed by: STUDENT IN AN ORGANIZED HEALTH CARE EDUCATION/TRAINING PROGRAM

## 2020-01-01 PROCEDURE — 88342 IMHCHEM/IMCYTCHM 1ST ANTB: CPT | Performed by: PATHOLOGY

## 2020-01-01 PROCEDURE — G0179 PR HOME HEALTH MD RECERTIFICATION: ICD-10-PCS | Mod: ,,, | Performed by: FAMILY MEDICINE

## 2020-01-01 PROCEDURE — U0003 INFECTIOUS AGENT DETECTION BY NUCLEIC ACID (DNA OR RNA); SEVERE ACUTE RESPIRATORY SYNDROME CORONAVIRUS 2 (SARS-COV-2) (CORONAVIRUS DISEASE [COVID-19]), AMPLIFIED PROBE TECHNIQUE, MAKING USE OF HIGH THROUGHPUT TECHNOLOGIES AS DESCRIBED BY CMS-2020-01-R: HCPCS

## 2020-01-01 PROCEDURE — 88300 PR  SURG PATH,GROSS,LEVEL I: ICD-10-PCS | Mod: 26,,, | Performed by: PATHOLOGY

## 2020-01-01 PROCEDURE — 74177 CT ABD & PELVIS W/CONTRAST: CPT | Mod: 26,,, | Performed by: RADIOLOGY

## 2020-01-01 PROCEDURE — 99214 OFFICE O/P EST MOD 30 MIN: CPT | Mod: PBBFAC,PN | Performed by: INTERNAL MEDICINE

## 2020-01-01 PROCEDURE — 94761 N-INVAS EAR/PLS OXIMETRY MLT: CPT

## 2020-01-01 PROCEDURE — 52235 CYSTOSCOPY AND TREATMENT: CPT | Mod: ,,, | Performed by: UROLOGY

## 2020-01-01 PROCEDURE — 25000003 PHARM REV CODE 250: Performed by: NURSE ANESTHETIST, CERTIFIED REGISTERED

## 2020-01-01 PROCEDURE — 63600175 PHARM REV CODE 636 W HCPCS: Mod: JG | Performed by: INTERNAL MEDICINE

## 2020-01-01 PROCEDURE — 74178 CT UROGRAM ABD PELVIS W WO: ICD-10-PCS | Mod: 26,,, | Performed by: RADIOLOGY

## 2020-01-01 PROCEDURE — 52332 CYSTOSCOPY AND TREATMENT: CPT | Mod: 59,RT,, | Performed by: UROLOGY

## 2020-01-01 PROCEDURE — 88300 SURGICAL PATH GROSS: CPT | Mod: 26,,, | Performed by: PATHOLOGY

## 2020-01-01 PROCEDURE — C2617 STENT, NON-COR, TEM W/O DEL: HCPCS | Performed by: UROLOGY

## 2020-01-01 PROCEDURE — 78815 NM PET CT ROUTINE: ICD-10-PCS | Mod: 26,PI,, | Performed by: RADIOLOGY

## 2020-01-01 PROCEDURE — 25000003 PHARM REV CODE 250: Performed by: ANESTHESIOLOGY

## 2020-01-01 PROCEDURE — 63600175 PHARM REV CODE 636 W HCPCS: Performed by: NURSE ANESTHETIST, CERTIFIED REGISTERED

## 2020-01-01 PROCEDURE — 99214 PR OFFICE/OUTPT VISIT, EST, LEVL IV, 30-39 MIN: ICD-10-PCS | Mod: S$PBB,,, | Performed by: NURSE PRACTITIONER

## 2020-01-01 PROCEDURE — 87086 URINE CULTURE/COLONY COUNT: CPT

## 2020-01-01 PROCEDURE — 99999 PR PBB SHADOW E&M-EST. PATIENT-LVL IV: ICD-10-PCS | Mod: PBBFAC,,, | Performed by: FAMILY MEDICINE

## 2020-01-01 PROCEDURE — 99153 MOD SED SAME PHYS/QHP EA: CPT | Performed by: RADIOLOGY

## 2020-01-01 PROCEDURE — 99999 PR PBB SHADOW E&M-EST. PATIENT-LVL V: CPT | Mod: PBBFAC,,, | Performed by: NURSE PRACTITIONER

## 2020-01-01 PROCEDURE — 37000008 HC ANESTHESIA 1ST 15 MINUTES: Performed by: UROLOGY

## 2020-01-01 PROCEDURE — 96365 THER/PROPH/DIAG IV INF INIT: CPT

## 2020-01-01 PROCEDURE — 74177 CT CHEST ABDOMEN PELVIS WITH CONTRAST (XPD): ICD-10-PCS | Mod: 26,,, | Performed by: RADIOLOGY

## 2020-01-01 PROCEDURE — 27201423 OPTIME MED/SURG SUP & DEVICES STERILE SUPPLY: Performed by: UROLOGY

## 2020-01-01 PROCEDURE — 74178 CT ABD&PLV WO CNTR FLWD CNTR: CPT | Mod: TC,PO

## 2020-01-01 PROCEDURE — 63600175 PHARM REV CODE 636 W HCPCS: Performed by: ANESTHESIOLOGY

## 2020-01-01 PROCEDURE — C1758 CATHETER, URETERAL: HCPCS | Performed by: UROLOGY

## 2020-01-01 PROCEDURE — 71260 CT THORAX DX C+: CPT | Mod: 26,,, | Performed by: RADIOLOGY

## 2020-01-01 PROCEDURE — 99214 OFFICE O/P EST MOD 30 MIN: CPT | Mod: 25,S$GLB,, | Performed by: NURSE PRACTITIONER

## 2020-01-01 PROCEDURE — 88341 PR IHC OR ICC EACH ADD'L SINGLE ANTIBODY  STAINPR: ICD-10-PCS | Mod: 26,,, | Performed by: PATHOLOGY

## 2020-01-01 PROCEDURE — 88333 PATH CONSLTJ SURG CYTO XM 1: CPT | Performed by: PATHOLOGY

## 2020-01-01 PROCEDURE — 74177 CT ABD & PELVIS W/CONTRAST: CPT | Mod: TC,PO

## 2020-01-01 PROCEDURE — 78815 PET IMAGE W/CT SKULL-THIGH: CPT | Mod: TC

## 2020-01-01 PROCEDURE — 81002 URINALYSIS NONAUTO W/O SCOPE: CPT | Mod: S$GLB,,, | Performed by: NURSE PRACTITIONER

## 2020-01-01 PROCEDURE — 99024 POSTOP FOLLOW-UP VISIT: CPT | Mod: ,,, | Performed by: UROLOGY

## 2020-01-01 PROCEDURE — C1769 GUIDE WIRE: HCPCS | Performed by: UROLOGY

## 2020-01-01 PROCEDURE — 71000033 HC RECOVERY, INTIAL HOUR: Performed by: UROLOGY

## 2020-01-01 PROCEDURE — 99214 PR OFFICE/OUTPT VISIT, EST, LEVL IV, 30-39 MIN: ICD-10-PCS | Mod: 25,S$GLB,, | Performed by: NURSE PRACTITIONER

## 2020-01-01 PROCEDURE — 99999 PR PBB SHADOW E&M-EST. PATIENT-LVL V: CPT | Mod: PBBFAC,,, | Performed by: INTERNAL MEDICINE

## 2020-01-01 PROCEDURE — 99215 OFFICE O/P EST HI 40 MIN: CPT | Mod: S$PBB,,, | Performed by: INTERNAL MEDICINE

## 2020-01-01 PROCEDURE — 85025 COMPLETE CBC W/AUTO DIFF WBC: CPT

## 2020-01-01 PROCEDURE — 99205 OFFICE O/P NEW HI 60 MIN: CPT | Mod: S$PBB,,, | Performed by: INTERNAL MEDICINE

## 2020-01-01 PROCEDURE — 81002 POCT URINE DIPSTICK WITHOUT MICROSCOPE: ICD-10-PCS | Mod: S$GLB,,, | Performed by: NURSE PRACTITIONER

## 2020-01-01 PROCEDURE — 25000003 PHARM REV CODE 250: Performed by: UROLOGY

## 2020-01-01 PROCEDURE — 25000003 PHARM REV CODE 250: Performed by: SPECIALIST

## 2020-01-01 PROCEDURE — 71000039 HC RECOVERY, EACH ADD'L HOUR: Performed by: UROLOGY

## 2020-01-01 PROCEDURE — 99214 OFFICE O/P EST MOD 30 MIN: CPT | Mod: S$GLB,,, | Performed by: UROLOGY

## 2020-01-01 PROCEDURE — 74420 PR  X-RAY RETROGRADE PYELOGRAM: ICD-10-PCS | Mod: 26,,, | Performed by: UROLOGY

## 2020-01-01 PROCEDURE — 88305 TISSUE EXAM BY PATHOLOGIST: CPT | Performed by: PATHOLOGY

## 2020-01-01 PROCEDURE — 71046 X-RAY EXAM CHEST 2 VIEWS: CPT | Mod: TC,FY,PO

## 2020-01-01 PROCEDURE — 99214 OFFICE O/P EST MOD 30 MIN: CPT | Mod: S$PBB,,, | Performed by: OBSTETRICS & GYNECOLOGY

## 2020-01-01 PROCEDURE — 88300 SURGICAL PATH GROSS: CPT | Performed by: PATHOLOGY

## 2020-01-01 PROCEDURE — 99214 OFFICE O/P EST MOD 30 MIN: CPT | Mod: PBBFAC,25 | Performed by: INTERNAL MEDICINE

## 2020-01-01 PROCEDURE — 52318 PR LITHOLOPAXY; BY ANY MEANS, COMPLICATED/LARGE >2.5CM: ICD-10-PCS | Mod: ,,, | Performed by: UROLOGY

## 2020-01-01 PROCEDURE — 52235 PR CYSTOURETHROSCOPY,FULGUR 2-5 CM LESN: ICD-10-PCS | Mod: 51,,, | Performed by: UROLOGY

## 2020-01-01 PROCEDURE — 99999 PR PBB SHADOW E&M-EST. PATIENT-LVL IV: ICD-10-PCS | Mod: PBBFAC,,, | Performed by: INTERNAL MEDICINE

## 2020-01-01 PROCEDURE — 25500020 PHARM REV CODE 255: Mod: PO | Performed by: UROLOGY

## 2020-01-01 PROCEDURE — 82728 ASSAY OF FERRITIN: CPT

## 2020-01-01 PROCEDURE — 74178 CT ABD&PLV WO CNTR FLWD CNTR: CPT | Mod: 26,,, | Performed by: RADIOLOGY

## 2020-01-01 PROCEDURE — A9698 NON-RAD CONTRAST MATERIALNOC: HCPCS | Mod: PO | Performed by: INTERNAL MEDICINE

## 2020-01-01 PROCEDURE — 99215 OFFICE O/P EST HI 40 MIN: CPT | Mod: PBBFAC | Performed by: INTERNAL MEDICINE

## 2020-01-01 PROCEDURE — 52235 PR CYSTOURETHROSCOPY,FULGUR 2-5 CM LESN: ICD-10-PCS | Mod: ,,, | Performed by: UROLOGY

## 2020-01-01 PROCEDURE — 71046 XR CHEST PA AND LATERAL: ICD-10-PCS | Mod: 26,,, | Performed by: RADIOLOGY

## 2020-01-01 PROCEDURE — U0002 COVID-19 LAB TEST NON-CDC: HCPCS

## 2020-01-01 PROCEDURE — 88333 PR  INTRAOPERATIVE CYTO PATH CONSULT, INITIAL SITE: ICD-10-PCS | Mod: 26,,, | Performed by: PATHOLOGY

## 2020-01-01 PROCEDURE — 37000009 HC ANESTHESIA EA ADD 15 MINS: Performed by: UROLOGY

## 2020-01-01 PROCEDURE — 80053 COMPREHEN METABOLIC PANEL: CPT

## 2020-01-01 PROCEDURE — 99214 PR OFFICE/OUTPT VISIT, EST, LEVL IV, 30-39 MIN: ICD-10-PCS | Mod: S$PBB,,, | Performed by: FAMILY MEDICINE

## 2020-01-01 PROCEDURE — 99214 OFFICE O/P EST MOD 30 MIN: CPT | Mod: PBBFAC,PO | Performed by: FAMILY MEDICINE

## 2020-01-01 PROCEDURE — 83735 ASSAY OF MAGNESIUM: CPT

## 2020-01-01 PROCEDURE — 99213 OFFICE O/P EST LOW 20 MIN: CPT | Mod: PBBFAC | Performed by: OBSTETRICS & GYNECOLOGY

## 2020-01-01 PROCEDURE — 78815 PET IMAGE W/CT SKULL-THIGH: CPT | Mod: 26,PI,, | Performed by: RADIOLOGY

## 2020-01-01 PROCEDURE — 71260 CT CHEST ABDOMEN PELVIS WITH CONTRAST (XPD): ICD-10-PCS | Mod: 26,,, | Performed by: RADIOLOGY

## 2020-01-01 PROCEDURE — 99214 OFFICE O/P EST MOD 30 MIN: CPT | Mod: S$PBB,,, | Performed by: NURSE PRACTITIONER

## 2020-01-01 PROCEDURE — 83540 ASSAY OF IRON: CPT

## 2020-01-01 PROCEDURE — 87086 URINE CULTURE/COLONY COUNT: CPT | Mod: GA

## 2020-01-01 PROCEDURE — 99024 PR POST-OP FOLLOW-UP VISIT: ICD-10-PCS | Mod: ,,, | Performed by: UROLOGY

## 2020-01-01 PROCEDURE — 99214 PR OFFICE/OUTPT VISIT, EST, LEVL IV, 30-39 MIN: ICD-10-PCS | Mod: S$GLB,,, | Performed by: UROLOGY

## 2020-01-01 PROCEDURE — 74420 UROGRAPHY RTRGR +-KUB: CPT | Mod: 26,,, | Performed by: UROLOGY

## 2020-01-01 PROCEDURE — 71000015 HC POSTOP RECOV 1ST HR: Performed by: UROLOGY

## 2020-01-01 PROCEDURE — 84100 ASSAY OF PHOSPHORUS: CPT

## 2020-01-01 PROCEDURE — 99999 PR PBB SHADOW E&M-EST. PATIENT-LVL V: ICD-10-PCS | Mod: PBBFAC,,, | Performed by: NURSE PRACTITIONER

## 2020-01-01 PROCEDURE — 25500020 PHARM REV CODE 255: Mod: PO | Performed by: INTERNAL MEDICINE

## 2020-01-01 PROCEDURE — 52332 PR CYSTOSCOPY,INSERT URETERAL STENT: ICD-10-PCS | Mod: 59,RT,, | Performed by: UROLOGY

## 2020-01-01 DEVICE — STENT URETERAL UNIV 8FR 22CM: Type: IMPLANTABLE DEVICE | Site: URETER | Status: FUNCTIONAL

## 2020-01-01 RX ORDER — NEOSTIGMINE METHYLSULFATE 1 MG/ML
INJECTION, SOLUTION INTRAVENOUS
Status: DISCONTINUED | OUTPATIENT
Start: 2020-01-01 | End: 2020-01-01

## 2020-01-01 RX ORDER — SODIUM CHLORIDE 9 MG/ML
INJECTION, SOLUTION INTRAVENOUS CONTINUOUS
Status: DISCONTINUED | OUTPATIENT
Start: 2020-01-01 | End: 2020-01-01 | Stop reason: HOSPADM

## 2020-01-01 RX ORDER — CEFAZOLIN SODIUM 1 G/3ML
2 INJECTION, POWDER, FOR SOLUTION INTRAMUSCULAR; INTRAVENOUS
Status: DISCONTINUED | OUTPATIENT
Start: 2020-01-01 | End: 2020-01-01 | Stop reason: HOSPADM

## 2020-01-01 RX ORDER — DIPHENHYDRAMINE HYDROCHLORIDE 50 MG/ML
50 INJECTION INTRAMUSCULAR; INTRAVENOUS ONCE AS NEEDED
Status: CANCELLED | OUTPATIENT
Start: 2020-01-01

## 2020-01-01 RX ORDER — GLYCOPYRROLATE 0.2 MG/ML
INJECTION INTRAMUSCULAR; INTRAVENOUS
Status: DISCONTINUED | OUTPATIENT
Start: 2020-01-01 | End: 2020-01-01

## 2020-01-01 RX ORDER — FENOFIBRATE 145 MG/1
145 TABLET, FILM COATED ORAL DAILY
Qty: 90 TABLET | Refills: 0 | Status: ON HOLD | OUTPATIENT
Start: 2020-01-01 | End: 2021-01-01 | Stop reason: CLARIF

## 2020-01-01 RX ORDER — SODIUM CHLORIDE 9 MG/ML
INJECTION, SOLUTION INTRAVENOUS CONTINUOUS
Status: CANCELLED | OUTPATIENT
Start: 2020-01-01

## 2020-01-01 RX ORDER — ONDANSETRON HYDROCHLORIDE 2 MG/ML
INJECTION, SOLUTION INTRAMUSCULAR; INTRAVENOUS
Status: DISCONTINUED | OUTPATIENT
Start: 2020-01-01 | End: 2020-01-01

## 2020-01-01 RX ORDER — ACETAMINOPHEN 325 MG/1
650 TABLET ORAL ONCE
Status: COMPLETED | OUTPATIENT
Start: 2020-01-01 | End: 2020-01-01

## 2020-01-01 RX ORDER — HYDROCODONE BITARTRATE AND ACETAMINOPHEN 5; 325 MG/1; MG/1
1 TABLET ORAL EVERY 4 HOURS PRN
Status: CANCELLED | OUTPATIENT
Start: 2020-01-01

## 2020-01-01 RX ORDER — SODIUM CHLORIDE 0.9 % (FLUSH) 0.9 %
10 SYRINGE (ML) INJECTION
Status: DISCONTINUED | OUTPATIENT
Start: 2020-01-01 | End: 2020-01-01 | Stop reason: HOSPADM

## 2020-01-01 RX ORDER — CIPROFLOXACIN 250 MG/1
250 TABLET, FILM COATED ORAL 2 TIMES DAILY
Qty: 4 TABLET | Refills: 0 | Status: SHIPPED | OUTPATIENT
Start: 2020-01-01 | End: 2020-01-01

## 2020-01-01 RX ORDER — SODIUM CHLORIDE, SODIUM LACTATE, POTASSIUM CHLORIDE, CALCIUM CHLORIDE 600; 310; 30; 20 MG/100ML; MG/100ML; MG/100ML; MG/100ML
INJECTION, SOLUTION INTRAVENOUS CONTINUOUS
Status: DISCONTINUED | OUTPATIENT
Start: 2020-01-01 | End: 2020-01-01

## 2020-01-01 RX ORDER — SODIUM CHLORIDE 0.9 % (FLUSH) 0.9 %
10 SYRINGE (ML) INJECTION
Status: CANCELLED | OUTPATIENT
Start: 2020-01-01

## 2020-01-01 RX ORDER — ALPRAZOLAM 0.25 MG/1
0.25 TABLET ORAL 2 TIMES DAILY PRN
Qty: 45 TABLET | Refills: 2 | Status: SHIPPED | OUTPATIENT
Start: 2020-01-01 | End: 2020-01-01

## 2020-01-01 RX ORDER — EPINEPHRINE 0.3 MG/.3ML
0.3 INJECTION SUBCUTANEOUS ONCE AS NEEDED
Status: CANCELLED | OUTPATIENT
Start: 2020-01-01

## 2020-01-01 RX ORDER — MIDAZOLAM HYDROCHLORIDE 1 MG/ML
INJECTION INTRAMUSCULAR; INTRAVENOUS
Status: DISCONTINUED | OUTPATIENT
Start: 2020-01-01 | End: 2020-01-01 | Stop reason: HOSPADM

## 2020-01-01 RX ORDER — DIPHENHYDRAMINE HYDROCHLORIDE 50 MG/ML
INJECTION INTRAMUSCULAR; INTRAVENOUS
Status: DISCONTINUED | OUTPATIENT
Start: 2020-01-01 | End: 2020-01-01

## 2020-01-01 RX ORDER — SODIUM CHLORIDE 0.9 % (FLUSH) 0.9 %
3 SYRINGE (ML) INJECTION
Status: CANCELLED | OUTPATIENT
Start: 2020-01-01

## 2020-01-01 RX ORDER — METHYLPREDNISOLONE SOD SUCC 125 MG
125 VIAL (EA) INJECTION ONCE AS NEEDED
Status: CANCELLED | OUTPATIENT
Start: 2020-01-01

## 2020-01-01 RX ORDER — LIDOCAINE HYDROCHLORIDE 20 MG/ML
INJECTION INTRAVENOUS
Status: DISCONTINUED | OUTPATIENT
Start: 2020-01-01 | End: 2020-01-01

## 2020-01-01 RX ORDER — MEPERIDINE HYDROCHLORIDE 25 MG/ML
12.5 INJECTION INTRAMUSCULAR; INTRAVENOUS; SUBCUTANEOUS ONCE AS NEEDED
Status: DISCONTINUED | OUTPATIENT
Start: 2020-01-01 | End: 2020-01-01 | Stop reason: HOSPADM

## 2020-01-01 RX ORDER — CIPROFLOXACIN 500 MG/1
250 TABLET ORAL EVERY 12 HOURS
Qty: 14 TABLET | Refills: 0 | Status: SHIPPED | OUTPATIENT
Start: 2020-01-01 | End: 2020-01-01

## 2020-01-01 RX ORDER — ACETAMINOPHEN 325 MG/1
650 TABLET ORAL EVERY 6 HOURS PRN
Status: DISCONTINUED | OUTPATIENT
Start: 2020-01-01 | End: 2020-01-01 | Stop reason: HOSPADM

## 2020-01-01 RX ORDER — ONDANSETRON 4 MG/1
4 TABLET, ORALLY DISINTEGRATING ORAL EVERY 6 HOURS PRN
Status: DISCONTINUED | OUTPATIENT
Start: 2020-01-01 | End: 2020-01-01 | Stop reason: HOSPADM

## 2020-01-01 RX ORDER — ALPRAZOLAM 0.25 MG/1
TABLET ORAL
Qty: 45 TABLET | Refills: 0 | Status: SHIPPED | OUTPATIENT
Start: 2020-01-01 | End: 2021-01-01

## 2020-01-01 RX ORDER — METHYLPREDNISOLONE SOD SUCC 125 MG
125 VIAL (EA) INJECTION ONCE AS NEEDED
Status: DISCONTINUED | OUTPATIENT
Start: 2020-01-01 | End: 2020-01-01 | Stop reason: HOSPADM

## 2020-01-01 RX ORDER — FENTANYL CITRATE 50 UG/ML
INJECTION, SOLUTION INTRAMUSCULAR; INTRAVENOUS
Status: DISCONTINUED | OUTPATIENT
Start: 2020-01-01 | End: 2020-01-01

## 2020-01-01 RX ORDER — HEPARIN 100 UNIT/ML
5 SYRINGE INTRAVENOUS
Status: CANCELLED | OUTPATIENT
Start: 2020-01-01

## 2020-01-01 RX ORDER — SODIUM CHLORIDE 0.9 % (FLUSH) 0.9 %
3 SYRINGE (ML) INJECTION
Status: DISCONTINUED | OUTPATIENT
Start: 2020-01-01 | End: 2020-01-01 | Stop reason: HOSPADM

## 2020-01-01 RX ORDER — HYDROCODONE BITARTRATE AND ACETAMINOPHEN 10; 325 MG/1; MG/1
1 TABLET ORAL EVERY 4 HOURS PRN
Status: CANCELLED | OUTPATIENT
Start: 2020-01-01

## 2020-01-01 RX ORDER — OXYCODONE HYDROCHLORIDE 5 MG/1
5 TABLET ORAL
Status: DISCONTINUED | OUTPATIENT
Start: 2020-01-01 | End: 2020-01-01 | Stop reason: HOSPADM

## 2020-01-01 RX ORDER — DIPHENHYDRAMINE HYDROCHLORIDE 50 MG/ML
25 INJECTION INTRAMUSCULAR; INTRAVENOUS EVERY 6 HOURS PRN
Status: DISCONTINUED | OUTPATIENT
Start: 2020-01-01 | End: 2020-01-01 | Stop reason: HOSPADM

## 2020-01-01 RX ORDER — PANTOPRAZOLE SODIUM 40 MG/1
40 TABLET, DELAYED RELEASE ORAL DAILY
Status: DISCONTINUED | OUTPATIENT
Start: 2020-01-01 | End: 2020-01-01 | Stop reason: HOSPADM

## 2020-01-01 RX ORDER — PHENYLEPHRINE HYDROCHLORIDE 10 MG/ML
INJECTION INTRAVENOUS
Status: DISCONTINUED | OUTPATIENT
Start: 2020-01-01 | End: 2020-01-01

## 2020-01-01 RX ORDER — HYDROMORPHONE HYDROCHLORIDE 2 MG/ML
0.4 INJECTION, SOLUTION INTRAMUSCULAR; INTRAVENOUS; SUBCUTANEOUS EVERY 5 MIN PRN
Status: DISCONTINUED | OUTPATIENT
Start: 2020-01-01 | End: 2020-01-01 | Stop reason: HOSPADM

## 2020-01-01 RX ORDER — HEPARIN 100 UNIT/ML
5 SYRINGE INTRAVENOUS
Status: DISCONTINUED | OUTPATIENT
Start: 2020-01-01 | End: 2020-01-01 | Stop reason: HOSPADM

## 2020-01-01 RX ORDER — FERROUS SULFATE 325(65) MG
325 TABLET ORAL
Status: ON HOLD | COMMUNITY
End: 2021-01-01 | Stop reason: CLARIF

## 2020-01-01 RX ORDER — DOCUSATE SODIUM 100 MG/1
100 CAPSULE, LIQUID FILLED ORAL 2 TIMES DAILY
Status: DISCONTINUED | OUTPATIENT
Start: 2020-01-01 | End: 2020-01-01 | Stop reason: HOSPADM

## 2020-01-01 RX ORDER — PROPOFOL 10 MG/ML
VIAL (ML) INTRAVENOUS
Status: DISCONTINUED | OUTPATIENT
Start: 2020-01-01 | End: 2020-01-01

## 2020-01-01 RX ORDER — ALPRAZOLAM 0.25 MG/1
0.25 TABLET ORAL 2 TIMES DAILY PRN
Status: DISCONTINUED | OUTPATIENT
Start: 2020-01-01 | End: 2020-01-01

## 2020-01-01 RX ORDER — OXYBUTYNIN CHLORIDE 5 MG/1
5 TABLET ORAL 3 TIMES DAILY PRN
Status: DISCONTINUED | OUTPATIENT
Start: 2020-01-01 | End: 2020-01-01 | Stop reason: HOSPADM

## 2020-01-01 RX ORDER — POTASSIUM CITRATE AND CITRIC ACID MONOHYDRATE 1100; 334 MG/5ML; MG/5ML
5 SOLUTION ORAL 2 TIMES DAILY WITH MEALS
Qty: 473 ML | Refills: 1 | Status: SHIPPED | OUTPATIENT
Start: 2020-01-01 | End: 2020-01-01

## 2020-01-01 RX ORDER — SODIUM CHLORIDE 9 MG/ML
INJECTION, SOLUTION INTRAVENOUS CONTINUOUS
Status: DISCONTINUED | OUTPATIENT
Start: 2020-01-01 | End: 2020-01-01

## 2020-01-01 RX ORDER — ROCURONIUM BROMIDE 10 MG/ML
INJECTION, SOLUTION INTRAVENOUS
Status: DISCONTINUED | OUTPATIENT
Start: 2020-01-01 | End: 2020-01-01

## 2020-01-01 RX ORDER — FENTANYL CITRATE 50 UG/ML
INJECTION, SOLUTION INTRAMUSCULAR; INTRAVENOUS
Status: DISCONTINUED | OUTPATIENT
Start: 2020-01-01 | End: 2020-01-01 | Stop reason: HOSPADM

## 2020-01-01 RX ORDER — LIDOCAINE HCL/PF 100 MG/5ML
SYRINGE (ML) INTRAVENOUS
Status: DISCONTINUED | OUTPATIENT
Start: 2020-01-01 | End: 2020-01-01

## 2020-01-01 RX ORDER — CEFAZOLIN SODIUM 2 G/50ML
2 SOLUTION INTRAVENOUS
Status: DISCONTINUED | OUTPATIENT
Start: 2020-01-01 | End: 2020-01-01 | Stop reason: HOSPADM

## 2020-01-01 RX ORDER — ONDANSETRON 8 MG/1
8 TABLET, ORALLY DISINTEGRATING ORAL ONCE
Status: COMPLETED | OUTPATIENT
Start: 2020-01-01 | End: 2020-01-01

## 2020-01-01 RX ORDER — ONDANSETRON 2 MG/ML
INJECTION INTRAMUSCULAR; INTRAVENOUS
Status: DISCONTINUED | OUTPATIENT
Start: 2020-01-01 | End: 2020-01-01

## 2020-01-01 RX ORDER — ONDANSETRON 4 MG/1
8 TABLET, FILM COATED ORAL ONCE
Status: COMPLETED | OUTPATIENT
Start: 2020-01-01 | End: 2020-01-01

## 2020-01-01 RX ORDER — PHENAZOPYRIDINE HYDROCHLORIDE 100 MG/1
100 TABLET, FILM COATED ORAL 3 TIMES DAILY PRN
Qty: 30 TABLET | Refills: 3 | Status: SHIPPED | OUTPATIENT
Start: 2020-01-01 | End: 2020-01-01

## 2020-01-01 RX ORDER — FAMOTIDINE 20 MG/1
20 TABLET, FILM COATED ORAL ONCE
Status: COMPLETED | OUTPATIENT
Start: 2020-01-01 | End: 2020-01-01

## 2020-01-01 RX ORDER — ALPRAZOLAM 0.25 MG/1
0.25 TABLET ORAL 2 TIMES DAILY PRN
Status: DISCONTINUED | OUTPATIENT
Start: 2020-01-01 | End: 2020-01-01 | Stop reason: HOSPADM

## 2020-01-01 RX ORDER — DEXAMETHASONE SODIUM PHOSPHATE 4 MG/ML
INJECTION, SOLUTION INTRA-ARTICULAR; INTRALESIONAL; INTRAMUSCULAR; INTRAVENOUS; SOFT TISSUE
Status: DISCONTINUED | OUTPATIENT
Start: 2020-01-01 | End: 2020-01-01

## 2020-01-01 RX ORDER — LIDOCAINE HYDROCHLORIDE 10 MG/ML
0.5 INJECTION, SOLUTION EPIDURAL; INFILTRATION; INTRACAUDAL; PERINEURAL ONCE
Status: DISCONTINUED | OUTPATIENT
Start: 2020-01-01 | End: 2020-01-01 | Stop reason: HOSPADM

## 2020-01-01 RX ORDER — POTASSIUM CITRATE 5 MEQ/1
10 TABLET, EXTENDED RELEASE ORAL 2 TIMES DAILY WITH MEALS
Status: DISCONTINUED | OUTPATIENT
Start: 2020-01-01 | End: 2020-01-01 | Stop reason: HOSPADM

## 2020-01-01 RX ORDER — DIPHENHYDRAMINE HYDROCHLORIDE 50 MG/ML
50 INJECTION INTRAMUSCULAR; INTRAVENOUS ONCE AS NEEDED
Status: DISCONTINUED | OUTPATIENT
Start: 2020-01-01 | End: 2020-01-01 | Stop reason: HOSPADM

## 2020-01-01 RX ORDER — ONDANSETRON 2 MG/ML
4 INJECTION INTRAMUSCULAR; INTRAVENOUS DAILY PRN
Status: DISCONTINUED | OUTPATIENT
Start: 2020-01-01 | End: 2020-01-01 | Stop reason: HOSPADM

## 2020-01-01 RX ORDER — ALPRAZOLAM 0.25 MG/1
0.25 TABLET ORAL 2 TIMES DAILY PRN
Qty: 45 TABLET | Refills: 2 | Status: SHIPPED | OUTPATIENT
Start: 2020-01-01 | End: 2020-01-01 | Stop reason: SDUPTHER

## 2020-01-01 RX ORDER — AMLODIPINE BESYLATE 2.5 MG/1
2.5 TABLET ORAL DAILY
Status: DISCONTINUED | OUTPATIENT
Start: 2020-01-01 | End: 2020-01-01 | Stop reason: HOSPADM

## 2020-01-01 RX ORDER — CIPROFLOXACIN 500 MG/1
500 TABLET ORAL 2 TIMES DAILY
Status: ON HOLD | COMMUNITY
End: 2020-01-01 | Stop reason: HOSPADM

## 2020-01-01 RX ORDER — HYOSCYAMINE SULFATE 0.125 MG
125 TABLET ORAL EVERY 4 HOURS PRN
Qty: 30 TABLET | Refills: 0 | Status: SHIPPED | OUTPATIENT
Start: 2020-01-01 | End: 2021-01-01

## 2020-01-01 RX ORDER — EPINEPHRINE 0.3 MG/.3ML
0.3 INJECTION SUBCUTANEOUS ONCE AS NEEDED
Status: DISCONTINUED | OUTPATIENT
Start: 2020-01-01 | End: 2020-01-01 | Stop reason: HOSPADM

## 2020-01-01 RX ORDER — PANTOPRAZOLE SODIUM 40 MG/1
40 TABLET, DELAYED RELEASE ORAL DAILY
Qty: 90 TABLET | Refills: 1 | Status: SHIPPED | OUTPATIENT
Start: 2020-01-01 | End: 2021-01-01 | Stop reason: SDUPTHER

## 2020-01-01 RX ADMIN — CEFAZOLIN SODIUM 2 G: 2 SOLUTION INTRAVENOUS at 11:09

## 2020-01-01 RX ADMIN — SODIUM CHLORIDE: 0.9 INJECTION, SOLUTION INTRAVENOUS at 08:11

## 2020-01-01 RX ADMIN — CARBOXYMETHYLCELLULOSE SODIUM 2 DROP: 2.5 SOLUTION/ DROPS OPHTHALMIC at 11:06

## 2020-01-01 RX ADMIN — DOCUSATE SODIUM 100 MG: 100 CAPSULE, LIQUID FILLED ORAL at 08:06

## 2020-01-01 RX ADMIN — PHENYLEPHRINE HYDROCHLORIDE 100 MCG: 10 INJECTION INTRAVENOUS at 12:09

## 2020-01-01 RX ADMIN — ROCURONIUM BROMIDE 30 MG: 10 INJECTION, SOLUTION INTRAVENOUS at 11:09

## 2020-01-01 RX ADMIN — DEXAMETHASONE SODIUM PHOSPHATE 4 MG: 4 INJECTION, SOLUTION INTRAMUSCULAR; INTRAVENOUS at 11:09

## 2020-01-01 RX ADMIN — DEXAMETHASONE SODIUM PHOSPHATE 8 MG: 4 INJECTION, SOLUTION INTRAMUSCULAR; INTRAVENOUS at 11:06

## 2020-01-01 RX ADMIN — IOHEXOL 75 ML: 350 INJECTION, SOLUTION INTRAVENOUS at 01:10

## 2020-01-01 RX ADMIN — PROPOFOL 140 MG: 10 INJECTION, EMULSION INTRAVENOUS at 11:06

## 2020-01-01 RX ADMIN — ACETAMINOPHEN 650 MG: 325 TABLET ORAL at 11:06

## 2020-01-01 RX ADMIN — CARBOXYMETHYLCELLULOSE SODIUM 2 DROP: 2.5 SOLUTION/ DROPS OPHTHALMIC at 11:09

## 2020-01-01 RX ADMIN — FENTANYL CITRATE 50 MCG: 50 INJECTION, SOLUTION INTRAMUSCULAR; INTRAVENOUS at 11:09

## 2020-01-01 RX ADMIN — SODIUM CHLORIDE, SODIUM LACTATE, POTASSIUM CHLORIDE, AND CALCIUM CHLORIDE: 600; 310; 30; 20 INJECTION, SOLUTION INTRAVENOUS at 11:09

## 2020-01-01 RX ADMIN — LIDOCAINE HYDROCHLORIDE 100 MG: 20 INJECTION, SOLUTION INTRAVENOUS at 11:09

## 2020-01-01 RX ADMIN — ALPRAZOLAM 0.25 MG: 0.25 TABLET ORAL at 05:06

## 2020-01-01 RX ADMIN — PANTOPRAZOLE SODIUM 40 MG: 40 TABLET, DELAYED RELEASE ORAL at 08:06

## 2020-01-01 RX ADMIN — FERRIC CARBOXYMALTOSE INJECTION 750 MG: 50 INJECTION, SOLUTION INTRAVENOUS at 08:11

## 2020-01-01 RX ADMIN — PROPOFOL 150 MG: 10 INJECTION, EMULSION INTRAVENOUS at 11:09

## 2020-01-01 RX ADMIN — SODIUM CHLORIDE 250 ML/HR: 0.9 INJECTION, SOLUTION INTRAVENOUS at 11:12

## 2020-01-01 RX ADMIN — ONDANSETRON 8 MG: 8 TABLET, ORALLY DISINTEGRATING ORAL at 10:09

## 2020-01-01 RX ADMIN — DOCUSATE SODIUM 100 MG: 100 CAPSULE, LIQUID FILLED ORAL at 09:06

## 2020-01-01 RX ADMIN — PHENYLEPHRINE HYDROCHLORIDE 200 MCG: 10 INJECTION INTRAVENOUS at 11:09

## 2020-01-01 RX ADMIN — PANTOPRAZOLE SODIUM 40 MG: 40 TABLET, DELAYED RELEASE ORAL at 09:06

## 2020-01-01 RX ADMIN — GLYCOPYRROLATE 0.8 MG: 0.2 INJECTION, SOLUTION INTRAMUSCULAR; INTRAVENOUS at 01:06

## 2020-01-01 RX ADMIN — ACETAMINOPHEN 650 MG: 325 TABLET ORAL at 10:06

## 2020-01-01 RX ADMIN — SODIUM CHLORIDE, SODIUM LACTATE, POTASSIUM CHLORIDE, AND CALCIUM CHLORIDE: 600; 310; 30; 20 INJECTION, SOLUTION INTRAVENOUS at 10:06

## 2020-01-01 RX ADMIN — ONDANSETRON 4 MG: 2 INJECTION INTRAMUSCULAR; INTRAVENOUS at 11:06

## 2020-01-01 RX ADMIN — DIPHENHYDRAMINE HYDROCHLORIDE 6.25 MG: 50 INJECTION, SOLUTION INTRAMUSCULAR; INTRAVENOUS at 11:09

## 2020-01-01 RX ADMIN — POTASSIUM CITRATE 10 MEQ: 5 TABLET ORAL at 08:06

## 2020-01-01 RX ADMIN — LIDOCAINE HYDROCHLORIDE 50 MG: 20 INJECTION, SOLUTION INTRAVENOUS at 11:06

## 2020-01-01 RX ADMIN — IOHEXOL 1000 ML: 9 SOLUTION ORAL at 11:12

## 2020-01-01 RX ADMIN — IOHEXOL 75 ML: 350 INJECTION, SOLUTION INTRAVENOUS at 11:12

## 2020-01-01 RX ADMIN — GLYCOPYRROLATE 0.4 MG: 0.2 INJECTION, SOLUTION INTRAMUSCULAR; INTRAVITREAL at 12:09

## 2020-01-01 RX ADMIN — POTASSIUM CITRATE 10 MEQ: 5 TABLET ORAL at 09:06

## 2020-01-01 RX ADMIN — POTASSIUM CITRATE 10 MEQ: 5 TABLET ORAL at 05:06

## 2020-01-01 RX ADMIN — PHENYLEPHRINE HYDROCHLORIDE 100 MCG: 10 INJECTION INTRAVENOUS at 11:09

## 2020-01-01 RX ADMIN — AMLODIPINE BESYLATE 2.5 MG: 2.5 TABLET ORAL at 08:06

## 2020-01-01 RX ADMIN — ONDANSETRON 4 MG: 2 INJECTION, SOLUTION INTRAMUSCULAR; INTRAVENOUS at 12:09

## 2020-01-01 RX ADMIN — FAMOTIDINE 20 MG: 20 TABLET, FILM COATED ORAL at 09:06

## 2020-01-01 RX ADMIN — ROCURONIUM BROMIDE 10 MG: 10 INJECTION, SOLUTION INTRAVENOUS at 12:06

## 2020-01-01 RX ADMIN — FERRIC CARBOXYMALTOSE INJECTION 750 MG: 50 INJECTION, SOLUTION INTRAVENOUS at 11:12

## 2020-01-01 RX ADMIN — NEOSTIGMINE METHYLSULFATE 3 MG: 1 INJECTION INTRAVENOUS at 12:09

## 2020-01-01 RX ADMIN — NEOSTIGMINE METHYLSULFATE 5 MG: 1 INJECTION INTRAVENOUS at 01:06

## 2020-01-01 RX ADMIN — GLYCOPYRROLATE 0.2 MG: 0.2 INJECTION, SOLUTION INTRAMUSCULAR; INTRAVITREAL at 11:09

## 2020-01-01 RX ADMIN — POTASSIUM CITRATE 10 MEQ: 5 TABLET ORAL at 04:06

## 2020-01-01 RX ADMIN — FENTANYL CITRATE 100 MCG: 50 INJECTION, SOLUTION INTRAMUSCULAR; INTRAVENOUS at 11:06

## 2020-01-01 RX ADMIN — OXYBUTYNIN CHLORIDE 5 MG: 5 TABLET ORAL at 10:06

## 2020-01-01 RX ADMIN — AMLODIPINE BESYLATE 2.5 MG: 2.5 TABLET ORAL at 09:06

## 2020-01-01 RX ADMIN — ONDANSETRON HYDROCHLORIDE 8 MG: 4 TABLET, FILM COATED ORAL at 09:06

## 2020-01-01 RX ADMIN — CEFAZOLIN 2 G: 330 INJECTION, POWDER, FOR SOLUTION INTRAMUSCULAR; INTRAVENOUS at 11:06

## 2020-01-01 RX ADMIN — IOHEXOL 125 ML: 350 INJECTION, SOLUTION INTRAVENOUS at 02:10

## 2020-01-14 ENCOUNTER — PATIENT MESSAGE (OUTPATIENT)
Dept: FAMILY MEDICINE | Facility: CLINIC | Age: 85
End: 2020-01-14

## 2020-01-14 DIAGNOSIS — E78.5 DYSLIPIDEMIA: Primary | ICD-10-CM

## 2020-01-14 RX ORDER — FENOFIBRATE 145 MG/1
145 TABLET, FILM COATED ORAL DAILY
Qty: 90 TABLET | Refills: 0 | Status: SHIPPED | OUTPATIENT
Start: 2020-01-14 | End: 2020-04-16 | Stop reason: SDUPTHER

## 2020-01-14 NOTE — TELEPHONE ENCOUNTER
Refill Authorization Note     is requesting a refill authorization.    Brief assessment and rationale for refill: APPROVE: needs labs     Medication-related problems identified: Requires labs    Medication Therapy Plan: NTBO (Lipid)                              Comments:     Requested Prescriptions   Signed Prescriptions Disp Refills    fenofibrate (TRICOR) 145 MG tablet 90 tablet 0     Sig: Take 1 tablet (145 mg total) by mouth once daily.       Cardiovascular:  Antilipid - Fibric Acid Derivatives Failed - 1/14/2020  9:22 AM        Failed - Lipid Panel completed in last 360 days     Lab Results   Component Value Date    CHOL 178 12/17/2018    HDL 60 12/17/2018    LDLCALC 96.8 12/17/2018    TRIG 106 12/17/2018             Failed - ALT is 94 or below and within 360 days     ALT   Date Value Ref Range Status   12/17/2018 22 10 - 44 U/L Final   03/05/2018 13 10 - 44 U/L Final              Failed - AST is 54 or below and within 360 days     AST   Date Value Ref Range Status   12/17/2018 24 10 - 40 U/L Final   03/05/2018 18 10 - 40 U/L Final              Passed - Patient is at least 18 years old        Passed - Office visit in past 12 months or future 90 days     Recent Outpatient Visits            4 weeks ago Essential hypertension    [facility] NATIVIDAD Hinojosa MD    2 months ago [facility]thelial cancer    [facility] [address] Kiet Arellano MD    3 months ago Endometrial cancer    [facility] - GYN Oncology Carlos Mann MD    5 months ago [facility]rothelial cancer    [facility] [address] Kiet Arellano MD    6 months ago Essential hypertension    [facility] NATIVIDAD Hinojosa MD          Future Appointments              In 6 days Judy Burroughs NP [facility] [address], [facility]    In 3 months MD [facility] - GYN Oncology, [facility]    In 5 months NATIVIDAD Hinojosa MD [facility] [place]                Passed -   is 1.4 or below and within 360 days     Creatinine   Date Value Ref Range Status   11/26/2019 1.3 0.5 - 1.4 mg/dL Final   05/14/2019 1.2 0.5 - 1.4 mg/dL Final   12/17/2018 1.4 0.5 - 1.4 mg/dL Final              Passed - eGFR is 30 or above and within 360 days     eGFR if non    Date Value Ref Range Status   11/26/2019 37.0 (A) >60 mL/min/1.73 m^2 Final     Comment:     Calculation used to obtain the estimated glomerular filtration  rate (eGFR) is the CKD-EPI equation.      05/14/2019 41 (A) >60 mL/min/1.73 m^2 Final     Comment:     Calculation used to obtain the estimated glomerular filtration  rate (eGFR) is the CKD-EPI equation.      12/17/2018 33.8 (A) >60 mL/min/1.73 m^2 Final     Comment:     Calculation used to obtain the estimated glomerular filtration  rate (eGFR) is the CKD-EPI equation.        eGFR if    Date Value Ref Range Status   11/26/2019 42.6 (A) >60 mL/min/1.73 m^2 Final   05/14/2019 47 (A) >60 mL/min/1.73 m^2 Final   12/17/2018 39.0 (A) >60 mL/min/1.73 m^2 Final              Passed - WBC in normal range and within 360 days     WBC   Date Value Ref Range Status   05/14/2019 4.65 3.90 - 12.70 K/uL Final   02/15/2019 5.20 3.90 - 12.70 K/uL Final   03/05/2018 7.10 3.90 - 12.70 K/uL Final

## 2020-01-14 NOTE — TELEPHONE ENCOUNTER
Labs scheduled.   .Called to let patient know that her prescriptions has been . sent to the pharmacy

## 2020-01-15 ENCOUNTER — PATIENT OUTREACH (OUTPATIENT)
Dept: ADMINISTRATIVE | Facility: OTHER | Age: 85
End: 2020-01-15

## 2020-01-15 RX ORDER — FENOFIBRATE 145 MG/1
TABLET, FILM COATED ORAL
Qty: 90 TABLET | Refills: 1 | OUTPATIENT
Start: 2020-01-15

## 2020-01-15 NOTE — PROGRESS NOTES
Quick DC. Duplicate Request  Refill Authorization Note     is requesting a refill authorization.    Brief assessment and rationale for refill: QUICK DC: duplicate request          Medication Therapy Plan: Duplicate request, quick dc; Handled in a previous enounter                              Comments:   Last Prescribed Info:    Authorizing Provider: NATIVIDAD Hinojosa MD GORGE #:  [id number] NPI:  [id number]    Ordering User:  RAMY VILLA               Original Order:  fenofibrate (TRICOR) 145 MG tablet [[id number]]      Pharmacy:  [address]        fenofibrate (TRICOR) 145 MG tablet 90 tablet 0 1/14/2020     Sig - Route: Take 1 tablet (145 mg total) by mouth once daily. - Oral    Sent to pharmacy as: fenofibrate (TRICOR) 145 MG tablet    Notes to Pharmacy: This prescription was filled on 5/20/2019. Any refills authorized will be placed on file.    E-Prescribing Status: Receipt confirmed by pharmacy (1/14/2020  9:46 AM CST)

## 2020-01-16 ENCOUNTER — LAB VISIT (OUTPATIENT)
Dept: LAB | Facility: HOSPITAL | Age: 85
End: 2020-01-16
Attending: FAMILY MEDICINE
Payer: MEDICARE

## 2020-01-16 DIAGNOSIS — E78.5 DYSLIPIDEMIA: ICD-10-CM

## 2020-01-16 PROCEDURE — 80061 LIPID PANEL: CPT

## 2020-01-16 PROCEDURE — 36415 COLL VENOUS BLD VENIPUNCTURE: CPT | Mod: PO

## 2020-01-17 LAB
CHOLEST SERPL-MCNC: 152 MG/DL (ref 120–199)
CHOLEST/HDLC SERPL: 2.6 {RATIO} (ref 2–5)
HDLC SERPL-MCNC: 58 MG/DL (ref 40–75)
HDLC SERPL: 38.2 % (ref 20–50)
LDLC SERPL CALC-MCNC: 65.6 MG/DL (ref 63–159)
NONHDLC SERPL-MCNC: 94 MG/DL
TRIGL SERPL-MCNC: 142 MG/DL (ref 30–150)

## 2020-01-20 ENCOUNTER — OFFICE VISIT (OUTPATIENT)
Dept: UROLOGY | Facility: CLINIC | Age: 85
End: 2020-01-20
Payer: MEDICARE

## 2020-01-20 VITALS
BODY MASS INDEX: 26.57 KG/M2 | HEIGHT: 63 IN | SYSTOLIC BLOOD PRESSURE: 169 MMHG | DIASTOLIC BLOOD PRESSURE: 80 MMHG | HEART RATE: 81 BPM | WEIGHT: 149.94 LBS

## 2020-01-20 DIAGNOSIS — R30.0 DYSURIA: Primary | ICD-10-CM

## 2020-01-20 DIAGNOSIS — C66.1 URETERAL CANCER, RIGHT: ICD-10-CM

## 2020-01-20 PROCEDURE — 81002 POCT URINE DIPSTICK WITHOUT MICROSCOPE: ICD-10-PCS | Mod: S$GLB,,, | Performed by: NURSE PRACTITIONER

## 2020-01-20 PROCEDURE — 1126F AMNT PAIN NOTED NONE PRSNT: CPT | Mod: S$GLB,,, | Performed by: NURSE PRACTITIONER

## 2020-01-20 PROCEDURE — 81002 URINALYSIS NONAUTO W/O SCOPE: CPT | Mod: S$GLB,,, | Performed by: NURSE PRACTITIONER

## 2020-01-20 PROCEDURE — 87086 URINE CULTURE/COLONY COUNT: CPT

## 2020-01-20 PROCEDURE — 99214 OFFICE O/P EST MOD 30 MIN: CPT | Mod: 25,S$GLB,, | Performed by: NURSE PRACTITIONER

## 2020-01-20 PROCEDURE — 1159F PR MEDICATION LIST DOCUMENTED IN MEDICAL RECORD: ICD-10-PCS | Mod: S$GLB,,, | Performed by: NURSE PRACTITIONER

## 2020-01-20 PROCEDURE — 1126F PR PAIN SEVERITY QUANTIFIED, NO PAIN PRESENT: ICD-10-PCS | Mod: S$GLB,,, | Performed by: NURSE PRACTITIONER

## 2020-01-20 PROCEDURE — 99214 PR OFFICE/OUTPT VISIT, EST, LEVL IV, 30-39 MIN: ICD-10-PCS | Mod: 25,S$GLB,, | Performed by: NURSE PRACTITIONER

## 2020-01-20 PROCEDURE — 1159F MED LIST DOCD IN RCRD: CPT | Mod: S$GLB,,, | Performed by: NURSE PRACTITIONER

## 2020-01-20 RX ORDER — SODIUM CHLORIDE 9 MG/ML
INJECTION, SOLUTION INTRAVENOUS CONTINUOUS
Status: CANCELLED | OUTPATIENT
Start: 2020-01-20

## 2020-01-20 NOTE — PROGRESS NOTES
"Subjective:      Destinee Luo is a 88 y.o. female who returns today regarding her urothelial carcinoma.    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 She has been managed conservatively. Every three months, she undergoes ureteroscopy with laser tumor ablation and ureteral stent exchange.     She reports ongoing malodorous urine, dysuria, and intermittent brown urine. Denies flank pain and fever/chills. Tolerating potassium citrate well. Denies adverse SE.      The following portions of the patient's history were reviewed and updated as appropriate: allergies, current medications, past family history, past medical history, past social history, past surgical history and problem list.    Review of Systems  Constitutional: no fever or chills  ENT: no nasal congestion or sore throat  Respiratory: no cough or shortness of breath  Cardiovascular: no chest pain or palpitations  Gastrointestinal: no nausea or vomiting, tolerating diet  Genitourinary: as per HPI  Hematologic/Lymphatic: no easy bruising or lymphadenopathy  Musculoskeletal: no arthralgias or myalgias  Neurological: no seizures or tremors  Behavioral/Psych: no auditory or visual hallucinations     Objective:   Vital Signs:BP (!) 169/80 (BP Location: Left arm, Patient Position: Sitting, BP Method: Large (Automatic))   Pulse 81   Ht 5' 3" (1.6 m)   Wt 68 kg (149 lb 14.6 oz)   BMI 26.56 kg/m²     Physical Exam   General: alert and oriented, no acute distress  Head: normocephalic, atraumatic  Neck: supple, no lymphadenopathy, normal ROM, no masses  Respiratory: Symmetric expansion, non-labored breathing  Cardiovascular: regular rate and rhythm, nomal pulses, no peripheral edema  Abdomen: soft, non tender, non distended, no palpable masses, no hernias, no hepatomegaly or splenomegaly  Pelvic: deferred  Lymphatic: no inguinal " nodes  Skin: normal coloration and turgor, no rashes, no suspicious skin lesions noted  Neuro: alert and oriented x3, no gross deficits  Psych: normal judgment and insight, normal mood/affect and non-anxious  No CVA tenderness    Lab Review   Urinalysis demonstrates positive for leukocytes (++), red blood cells (250 malka/mL), protein (++)  Lab Results   Component Value Date    WBC 4.65 05/14/2019    HGB 12.0 05/14/2019    HCT 37.2 05/14/2019    MCV 85 05/14/2019     05/14/2019     Lab Results   Component Value Date    CREATININE 1.3 11/26/2019    BUN 23 11/26/2019       Imaging   None    Assessment:     1. Dysuria    2.      Urothelial cancer, right     Plan:   Destinee was seen today for establish care.    Diagnoses and all orders for this visit:    Dysuria  -     POCT URINE DIPSTICK WITHOUT MICROSCOPE  -     Urine culture    Ureteral cancer, right    Plan:  --Urine culture today, will call with results   --Will proceed with cysto retrograde pyelogram and stent exchange +/- ureteral biopsy with Dr. Arellano on 2/13. Discussed the risks and benefits of the procedure. Answered all questions. Consent signed

## 2020-01-20 NOTE — H&P (VIEW-ONLY)
"Subjective:      Destinee Luo is a 88 y.o. female who returns today regarding her urothelial carcinoma.    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 She has been managed conservatively. Every three months, she undergoes ureteroscopy with laser tumor ablation and ureteral stent exchange.     She reports ongoing malodorous urine, dysuria, and intermittent brown urine. Denies flank pain and fever/chills. Tolerating potassium citrate well. Denies adverse SE.      The following portions of the patient's history were reviewed and updated as appropriate: allergies, current medications, past family history, past medical history, past social history, past surgical history and problem list.    Review of Systems  Constitutional: no fever or chills  ENT: no nasal congestion or sore throat  Respiratory: no cough or shortness of breath  Cardiovascular: no chest pain or palpitations  Gastrointestinal: no nausea or vomiting, tolerating diet  Genitourinary: as per HPI  Hematologic/Lymphatic: no easy bruising or lymphadenopathy  Musculoskeletal: no arthralgias or myalgias  Neurological: no seizures or tremors  Behavioral/Psych: no auditory or visual hallucinations     Objective:   Vital Signs:BP (!) 169/80 (BP Location: Left arm, Patient Position: Sitting, BP Method: Large (Automatic))   Pulse 81   Ht 5' 3" (1.6 m)   Wt 68 kg (149 lb 14.6 oz)   BMI 26.56 kg/m²     Physical Exam   General: alert and oriented, no acute distress  Head: normocephalic, atraumatic  Neck: supple, no lymphadenopathy, normal ROM, no masses  Respiratory: Symmetric expansion, non-labored breathing  Cardiovascular: regular rate and rhythm, nomal pulses, no peripheral edema  Abdomen: soft, non tender, non distended, no palpable masses, no hernias, no hepatomegaly or splenomegaly  Pelvic: deferred  Lymphatic: no inguinal " nodes  Skin: normal coloration and turgor, no rashes, no suspicious skin lesions noted  Neuro: alert and oriented x3, no gross deficits  Psych: normal judgment and insight, normal mood/affect and non-anxious  No CVA tenderness    Lab Review   Urinalysis demonstrates positive for leukocytes (++), red blood cells (250 mlaka/mL), protein (++)  Lab Results   Component Value Date    WBC 4.65 05/14/2019    HGB 12.0 05/14/2019    HCT 37.2 05/14/2019    MCV 85 05/14/2019     05/14/2019     Lab Results   Component Value Date    CREATININE 1.3 11/26/2019    BUN 23 11/26/2019       Imaging   None    Assessment:     1. Dysuria    2.      Urothelial cancer, right     Plan:   Destinee was seen today for establish care.    Diagnoses and all orders for this visit:    Dysuria  -     POCT URINE DIPSTICK WITHOUT MICROSCOPE  -     Urine culture    Ureteral cancer, right    Plan:  --Urine culture today, will call with results   --Will proceed with cysto retrograde pyelogram and stent exchange +/- ureteral biopsy with Dr. Arellano on 2/13. Discussed the risks and benefits of the procedure. Answered all questions. Consent signed

## 2020-01-21 LAB
BACTERIA UR CULT: NO GROWTH
BILIRUB SERPL-MCNC: NEGATIVE MG/DL
BLOOD URINE, POC: 250
COLOR, POC UA: YELLOW
GLUCOSE UR QL STRIP: NORMAL
KETONES UR QL STRIP: NEGATIVE
LEUKOCYTE ESTERASE URINE, POC: ABNORMAL
NITRITE, POC UA: NEGATIVE
PH, POC UA: 7
PROTEIN, POC: ABNORMAL
SPECIFIC GRAVITY, POC UA: 1.01
UROBILINOGEN, POC UA: NORMAL

## 2020-01-23 PROCEDURE — G0179 PR HOME HEALTH MD RECERTIFICATION: ICD-10-PCS | Mod: ,,, | Performed by: FAMILY MEDICINE

## 2020-01-23 PROCEDURE — G0179 MD RECERTIFICATION HHA PT: HCPCS | Mod: ,,, | Performed by: FAMILY MEDICINE

## 2020-01-28 ENCOUNTER — PATIENT MESSAGE (OUTPATIENT)
Dept: FAMILY MEDICINE | Facility: CLINIC | Age: 85
End: 2020-01-28

## 2020-01-28 NOTE — TELEPHONE ENCOUNTER
Spoke with pt granddaughter informed her office has no paperwork she states she will have [facility] fax over paperwork fax number provided she verbalized that she understood

## 2020-02-06 ENCOUNTER — EXTERNAL HOME HEALTH (OUTPATIENT)
Dept: HOME HEALTH SERVICES | Facility: HOSPITAL | Age: 85
End: 2020-02-06
Payer: MEDICARE

## 2020-02-06 DIAGNOSIS — I10 ESSENTIAL HYPERTENSION: ICD-10-CM

## 2020-02-07 ENCOUNTER — TELEPHONE (OUTPATIENT)
Dept: UROLOGY | Facility: CLINIC | Age: 85
End: 2020-02-07

## 2020-02-07 NOTE — PRE ADMISSION SCREENING
Pre admit phone call completed.    Instructions given to patient about NPO status as follows:     The evening before surgery do not eat anything after 9 p.m. ( this includes hard candy, chewing gum and mints).  You may only have GATORADE, POWERADE AND WATER from 9 p.m. until you leave your home. DO NOT  DRINK ANY LIQUIDS ON THE WAY TO THE HOSPITAL.      Patient was also instructed on the below information:    Park in the Parking lot behind the hospital or in the [employer] [facility] across the street from the parking lot.  Parking is complimentary.  If you will be discharged the same day as your procedure, please arrange for a responsible adult to drive you home or  to accompany you if traveling by taxi.  YOU WILL NOT BE PERMITTED TO DRIVE OR TO LEAVE THE HOSPITAL ALONE AFTER SURGERY.  It is strongly recommended that you arrange for someone to remain with you for the first 24 hrs following your surgery.    Patient verbalized understanding of above instructions.

## 2020-02-07 NOTE — TELEPHONE ENCOUNTER
Called g daughter catrachita left detail message to see if the surgery can be changed to 02/10/20 at 5:00am

## 2020-02-07 NOTE — TELEPHONE ENCOUNTER
----- Message from Dominique Staples sent at 2/7/2020  9:48 AM CST -----  Contact: Ara Silverman (Grandchild)   Type:  Patient Returning Call    Who Called: STEPHAN GREGG [[id number]]    Who Left Message for Patient: Natalia ROWLAND    Does the patient know what this is regarding?: unknown    Can the clinic reply in [facility]: No    Best Call Back Number: [phone]    Additional Information: N/A

## 2020-02-10 ENCOUNTER — ANESTHESIA EVENT (OUTPATIENT)
Dept: SURGERY | Facility: OTHER | Age: 85
End: 2020-02-10
Payer: MEDICARE

## 2020-02-10 ENCOUNTER — ANESTHESIA (OUTPATIENT)
Dept: SURGERY | Facility: OTHER | Age: 85
End: 2020-02-10
Payer: MEDICARE

## 2020-02-10 ENCOUNTER — HOSPITAL ENCOUNTER (OUTPATIENT)
Facility: OTHER | Age: 85
Discharge: HOME | End: 2020-02-10
Attending: UROLOGY | Admitting: UROLOGY
Payer: MEDICARE

## 2020-02-10 VITALS
DIASTOLIC BLOOD PRESSURE: 66 MMHG | SYSTOLIC BLOOD PRESSURE: 164 MMHG | WEIGHT: 150 LBS | TEMPERATURE: 96 F | HEIGHT: 63 IN | RESPIRATION RATE: 16 BRPM | HEART RATE: 98 BPM | BODY MASS INDEX: 26.58 KG/M2 | OXYGEN SATURATION: 96 %

## 2020-02-10 DIAGNOSIS — C68.9 UROTHELIAL CANCER: ICD-10-CM

## 2020-02-10 DIAGNOSIS — C66.1 URETERAL CANCER, RIGHT: ICD-10-CM

## 2020-02-10 DIAGNOSIS — R30.0 DYSURIA: ICD-10-CM

## 2020-02-10 DIAGNOSIS — N28.89 URETERAL MASS: Primary | ICD-10-CM

## 2020-02-10 PROCEDURE — 74420 PR  X-RAY RETROGRADE PYELOGRAM: ICD-10-PCS | Mod: 26,,, | Performed by: UROLOGY

## 2020-02-10 PROCEDURE — 71000015 HC POSTOP RECOV 1ST HR: Performed by: UROLOGY

## 2020-02-10 PROCEDURE — 88300 SURGICAL PATH GROSS: CPT | Performed by: PATHOLOGY

## 2020-02-10 PROCEDURE — 36000709 HC OR TIME LEV III EA ADD 15 MIN: Performed by: UROLOGY

## 2020-02-10 PROCEDURE — 27201423 OPTIME MED/SURG SUP & DEVICES STERILE SUPPLY: Performed by: UROLOGY

## 2020-02-10 PROCEDURE — 52332 PR CYSTOSCOPY,INSERT URETERAL STENT: ICD-10-PCS | Mod: 59,RT,, | Performed by: UROLOGY

## 2020-02-10 PROCEDURE — 63600175 PHARM REV CODE 636 W HCPCS: Performed by: ANESTHESIOLOGY

## 2020-02-10 PROCEDURE — C2617 STENT, NON-COR, TEM W/O DEL: HCPCS | Performed by: UROLOGY

## 2020-02-10 PROCEDURE — 71000033 HC RECOVERY, INTIAL HOUR: Performed by: UROLOGY

## 2020-02-10 PROCEDURE — 36000708 HC OR TIME LEV III 1ST 15 MIN: Performed by: UROLOGY

## 2020-02-10 PROCEDURE — 52234 PR CYSTOURETHROSCOPY,FULGUR 0.5-2 CM LESN: ICD-10-PCS | Mod: 51,RT,, | Performed by: UROLOGY

## 2020-02-10 PROCEDURE — 37000009 HC ANESTHESIA EA ADD 15 MINS: Performed by: UROLOGY

## 2020-02-10 PROCEDURE — 25000003 PHARM REV CODE 250: Performed by: NURSE ANESTHETIST, CERTIFIED REGISTERED

## 2020-02-10 PROCEDURE — 88300 SURGICAL PATH GROSS: CPT | Mod: 26,,, | Performed by: PATHOLOGY

## 2020-02-10 PROCEDURE — 52351 CYSTOURETERO & OR PYELOSCOPE: CPT | Mod: RT,,, | Performed by: UROLOGY

## 2020-02-10 PROCEDURE — C1758 CATHETER, URETERAL: HCPCS | Performed by: UROLOGY

## 2020-02-10 PROCEDURE — C1894 INTRO/SHEATH, NON-LASER: HCPCS | Performed by: UROLOGY

## 2020-02-10 PROCEDURE — 63600175 PHARM REV CODE 636 W HCPCS: Performed by: NURSE PRACTITIONER

## 2020-02-10 PROCEDURE — 52234 CYSTOSCOPY AND TREATMENT: CPT | Mod: 51,RT,, | Performed by: UROLOGY

## 2020-02-10 PROCEDURE — 88300 PR  SURG PATH,GROSS,LEVEL I: ICD-10-PCS | Mod: 26,,, | Performed by: PATHOLOGY

## 2020-02-10 PROCEDURE — 37000008 HC ANESTHESIA 1ST 15 MINUTES: Performed by: UROLOGY

## 2020-02-10 PROCEDURE — 63600175 PHARM REV CODE 636 W HCPCS: Performed by: NURSE ANESTHETIST, CERTIFIED REGISTERED

## 2020-02-10 PROCEDURE — 74420 UROGRAPHY RTRGR +-KUB: CPT | Mod: 26,,, | Performed by: UROLOGY

## 2020-02-10 PROCEDURE — 71000016 HC POSTOP RECOV ADDL HR: Performed by: UROLOGY

## 2020-02-10 PROCEDURE — 52332 CYSTOSCOPY AND TREATMENT: CPT | Mod: 59,RT,, | Performed by: UROLOGY

## 2020-02-10 PROCEDURE — 25500020 PHARM REV CODE 255: Performed by: UROLOGY

## 2020-02-10 PROCEDURE — 52351 PR CYSTO/URETERO/PYELOSCOPY, DX: ICD-10-PCS | Mod: RT,,, | Performed by: UROLOGY

## 2020-02-10 PROCEDURE — C1769 GUIDE WIRE: HCPCS | Performed by: UROLOGY

## 2020-02-10 DEVICE — STENT URETERAL UNIV 7FR 22CM: Type: IMPLANTABLE DEVICE | Site: URETER | Status: FUNCTIONAL

## 2020-02-10 RX ORDER — ONDANSETRON 2 MG/ML
INJECTION INTRAMUSCULAR; INTRAVENOUS
Status: DISCONTINUED | OUTPATIENT
Start: 2020-02-10 | End: 2020-02-10

## 2020-02-10 RX ORDER — ONDANSETRON 2 MG/ML
4 INJECTION INTRAMUSCULAR; INTRAVENOUS DAILY PRN
Status: DISCONTINUED | OUTPATIENT
Start: 2020-02-10 | End: 2020-02-10 | Stop reason: HOSPADM

## 2020-02-10 RX ORDER — ONDANSETRON 4 MG/1
4 TABLET, ORALLY DISINTEGRATING ORAL EVERY 6 HOURS PRN
Qty: 12 TABLET | Refills: 1 | Status: ON HOLD | OUTPATIENT
Start: 2020-02-10 | End: 2021-01-01 | Stop reason: CLARIF

## 2020-02-10 RX ORDER — HYDRALAZINE HYDROCHLORIDE 20 MG/ML
INJECTION INTRAMUSCULAR; INTRAVENOUS
Status: DISCONTINUED | OUTPATIENT
Start: 2020-02-10 | End: 2020-02-10

## 2020-02-10 RX ORDER — MEPERIDINE HYDROCHLORIDE 25 MG/ML
12.5 INJECTION INTRAMUSCULAR; INTRAVENOUS; SUBCUTANEOUS ONCE AS NEEDED
Status: COMPLETED | OUTPATIENT
Start: 2020-02-10 | End: 2020-02-10

## 2020-02-10 RX ORDER — POTASSIUM CITRATE 10 MEQ/1
10 TABLET, EXTENDED RELEASE ORAL 2 TIMES DAILY WITH MEALS
Qty: 60 TABLET | Refills: 11 | Status: SHIPPED | OUTPATIENT
Start: 2020-02-10 | End: 2020-01-01

## 2020-02-10 RX ORDER — PROPOFOL 10 MG/ML
VIAL (ML) INTRAVENOUS
Status: DISCONTINUED | OUTPATIENT
Start: 2020-02-10 | End: 2020-02-10

## 2020-02-10 RX ORDER — FENTANYL CITRATE 50 UG/ML
INJECTION, SOLUTION INTRAMUSCULAR; INTRAVENOUS
Status: DISCONTINUED | OUTPATIENT
Start: 2020-02-10 | End: 2020-02-10

## 2020-02-10 RX ORDER — HYDROMORPHONE HYDROCHLORIDE 2 MG/ML
0.4 INJECTION, SOLUTION INTRAMUSCULAR; INTRAVENOUS; SUBCUTANEOUS EVERY 5 MIN PRN
Status: DISCONTINUED | OUTPATIENT
Start: 2020-02-10 | End: 2020-02-10 | Stop reason: HOSPADM

## 2020-02-10 RX ORDER — IBUPROFEN 600 MG/1
600 TABLET ORAL EVERY 6 HOURS PRN
Status: DISCONTINUED | OUTPATIENT
Start: 2020-02-10 | End: 2020-02-10 | Stop reason: HOSPADM

## 2020-02-10 RX ORDER — LIDOCAINE HYDROCHLORIDE 20 MG/ML
INJECTION INTRAVENOUS
Status: DISCONTINUED | OUTPATIENT
Start: 2020-02-10 | End: 2020-02-10

## 2020-02-10 RX ORDER — PHENYLEPHRINE HYDROCHLORIDE 10 MG/ML
INJECTION INTRAVENOUS
Status: DISCONTINUED | OUTPATIENT
Start: 2020-02-10 | End: 2020-02-10

## 2020-02-10 RX ORDER — PHENAZOPYRIDINE HYDROCHLORIDE 100 MG/1
100 TABLET, FILM COATED ORAL 3 TIMES DAILY PRN
Qty: 30 TABLET | Refills: 0 | Status: SHIPPED | OUTPATIENT
Start: 2020-02-10 | End: 2020-01-01 | Stop reason: SDUPTHER

## 2020-02-10 RX ORDER — OXYCODONE HYDROCHLORIDE 5 MG/1
5 TABLET ORAL
Status: DISCONTINUED | OUTPATIENT
Start: 2020-02-10 | End: 2020-02-10 | Stop reason: HOSPADM

## 2020-02-10 RX ORDER — SODIUM CHLORIDE 9 MG/ML
INJECTION, SOLUTION INTRAVENOUS CONTINUOUS
Status: DISCONTINUED | OUTPATIENT
Start: 2020-02-10 | End: 2020-02-10 | Stop reason: HOSPADM

## 2020-02-10 RX ORDER — SODIUM CHLORIDE 0.9 % (FLUSH) 0.9 %
3 SYRINGE (ML) INJECTION
Status: DISCONTINUED | OUTPATIENT
Start: 2020-02-10 | End: 2020-02-10 | Stop reason: HOSPADM

## 2020-02-10 RX ORDER — GLYCOPYRROLATE 0.2 MG/ML
INJECTION INTRAMUSCULAR; INTRAVENOUS
Status: DISCONTINUED | OUTPATIENT
Start: 2020-02-10 | End: 2020-02-10

## 2020-02-10 RX ORDER — CEFAZOLIN SODIUM 1 G/3ML
2 INJECTION, POWDER, FOR SOLUTION INTRAMUSCULAR; INTRAVENOUS
Status: DISCONTINUED | OUTPATIENT
Start: 2020-02-10 | End: 2020-02-10 | Stop reason: HOSPADM

## 2020-02-10 RX ORDER — AMLODIPINE BESYLATE 2.5 MG/1
TABLET ORAL
Qty: 90 TABLET | Refills: 3 | Status: SHIPPED | OUTPATIENT
Start: 2020-02-10 | End: 2021-01-01

## 2020-02-10 RX ADMIN — CEFAZOLIN 2 G: 330 INJECTION, POWDER, FOR SOLUTION INTRAMUSCULAR; INTRAVENOUS at 07:02

## 2020-02-10 RX ADMIN — ONDANSETRON 4 MG: 2 INJECTION INTRAMUSCULAR; INTRAVENOUS at 07:02

## 2020-02-10 RX ADMIN — FENTANYL CITRATE 50 MCG: 50 INJECTION, SOLUTION INTRAMUSCULAR; INTRAVENOUS at 07:02

## 2020-02-10 RX ADMIN — GLYCOPYRROLATE 0.2 MG: 0.2 INJECTION, SOLUTION INTRAMUSCULAR; INTRAVENOUS at 07:02

## 2020-02-10 RX ADMIN — PHENYLEPHRINE HYDROCHLORIDE 50 MCG: 10 INJECTION INTRAVENOUS at 07:02

## 2020-02-10 RX ADMIN — LIDOCAINE HYDROCHLORIDE 75 MG: 20 INJECTION, SOLUTION INTRAVENOUS at 07:02

## 2020-02-10 RX ADMIN — PHENYLEPHRINE HYDROCHLORIDE 100 MCG: 10 INJECTION INTRAVENOUS at 08:02

## 2020-02-10 RX ADMIN — PROPOFOL 130 MG: 10 INJECTION, EMULSION INTRAVENOUS at 07:02

## 2020-02-10 RX ADMIN — SODIUM CHLORIDE, SODIUM LACTATE, POTASSIUM CHLORIDE, AND CALCIUM CHLORIDE: 600; 310; 30; 20 INJECTION, SOLUTION INTRAVENOUS at 06:02

## 2020-02-10 RX ADMIN — MEPERIDINE HYDROCHLORIDE 12.5 MG: 25 INJECTION INTRAMUSCULAR; INTRAVENOUS; SUBCUTANEOUS at 09:02

## 2020-02-10 RX ADMIN — HYDRALAZINE HYDROCHLORIDE 10 MG: 20 INJECTION INTRAMUSCULAR; INTRAVENOUS at 08:02

## 2020-02-10 RX ADMIN — PHENYLEPHRINE HYDROCHLORIDE 100 MCG: 10 INJECTION INTRAVENOUS at 07:02

## 2020-02-10 NOTE — ANESTHESIA PREPROCEDURE EVALUATION
02/10/2020  Destinee Luo is a 88 y.o., female.    Pre-op Assessment    I have reviewed the Patient Summary Reports.     I have reviewed the Nursing Notes.   I have reviewed the Medications.     Review of Systems  Anesthesia Hx:  No problems with previous Anesthesia  History of prior surgery of interest to airway management or planning: Previous anesthesia: General 5/19 ureteroscopy with general anesthesia.  Procedure performed at an [facility]. Airway issues documented on chart review include mask, easy, laryngeal mask airway used  Denies Family Hx of Anesthesia complications.   Denies Personal Hx of Anesthesia complications.   Social:  Non-Smoker    Hematology/Oncology:  Hematology Normal      Current/Recent Cancer. Oncology Comments: Bladder,mult prev GA for bladder proceedures    EENT/Dental:EENT/Dental Normal   Cardiovascular:   Exercise tolerance: good Hypertension, well controlled Aortic valve insuff   Pulmonary:  Pulmonary Normal    Renal/:   Chronic Renal Disease, CRI    Musculoskeletal:  Musculoskeletal Normal    Neurological:   TIA, Denies CVA. Questionable TIA in remote past    vertigo   Endocrine:  Endocrine Normal    Dermatological:  Skin Normal    Psych:  Psychiatric Normal           Physical Exam  General:  Well nourished    Airway/Jaw/Neck:  Airway Findings: Mouth Opening: Normal Tongue: Normal  General Airway Assessment: Adult  Mallampati: II     Eyes/Ears/Nose:  Eyes/Ears/Nose Findings:    Dental:  Dental Findings: Upper front caps        Mental Status:  Mental Status Findings:  Cooperative, Alert and Oriented         Anesthesia Plan  Type of Anesthesia, risks & benefits discussed:  Anesthesia Type:  general  Patient's Preference:   Intra-op Monitoring Plan: standard ASA monitors  Intra-op Monitoring Plan Comments:   Post Op Pain Control Plan: multimodal analgesia  Post Op Pain  Control Plan Comments:   Induction:   IV  Beta Blocker:         Informed Consent: Patient understands risks and agrees with Anesthesia plan.  Questions answered. Anesthesia consent signed with patient.  ASA Score: 3     Day of Surgery Review of History & Physical:    H&P update referred to the surgeon.     Anesthesia Plan Notes: Mult prev cysto and stents            Ready For Surgery From Anesthesia Perspective.

## 2020-02-10 NOTE — OP NOTE
[facility]  Operative Note    Date: 02/10/2020    Pre-Op Diagnosis: Right upper tract urothelial cancer    Patient Active Problem List    Diagnosis Date Noted    Dysuria 02/10/2020    Urothelial cancer 08/22/2019    Anxiety 06/19/2019    Ureteral cancer, right 01/30/2019    Stage 3 chronic kidney disease 01/06/2018    Essential hypertension 01/04/2018    Dyslipidemia 01/04/2018    History of transient ischemic attack (TIA) 01/04/2018    Ureteral mass 11/15/2017    Carotid artery disease 05/21/2013    Echocardiogram abnormal 05/21/2013    Vaginal bleeding 05/21/2013    History of aortic valve insufficiency 12/03/2012    Right facial numbness 12/03/2012       Post-Op Diagnosis: same, plus bladder tumor    Procedure(s) Performed:   1. Cystoscopy with 30 and 70 degree lens  2. Right ureteral stent exchange  3. Right ureteroscopy   4. Right retrograde pyelogram  6. Laser ablation and fulguration of bladder tumors  7. Fluoro < 1 h    Specimen(s):   Right ureteral stent    Staff Surgeon: Kiet Arellano MD    Assistant Surgeon: Carlos Ding MD    Anesthesia: General endotracheal anesthesia    Indications: Destinee Luo is a 88 y.o. female s/p distal right ureterectomy and psoas hitch for TCC in 2018. 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 She has been managed conservatively. Every three months, she undergoes ureteroscopy with laser tumor ablation and an indwelling ureteral stent exchange.     Findings:   - Initial cystoscopy done with 30 and 70 degree lens showed 3 papillary tumors, approximately 1.5 cm each, on the right lateral wall  - stent seen emanating from the site of ureteral reimplant at the right dome of the bladder  - there was resistance upon removal of the stent, requiring manipulation of the stent and pushing of stones with the rigid ureteroscope in order to free it from the ureter  - 12/14 35cm access sheath was unable to be  inserted  - right retrograde pyelogram performed which showed no obvious filling defects but there was some narrowing at the UPJ  - tumors in bladder were ablated with laser, then fulgurated with Bugbee electrocautery     1 baskets were used throughout the case.      Estimated Blood Loss: min    Drains: 6 Fr x 22 cm JJ ureteral stent without strings    Procedure in detail:  After informed consent was obtained, the patient was brought the the cystoscopy suite and placed in the supine position.  SCDs were applied and working.  Anesthesia was administered.  The patient was then placed in the dorsal lithotomy position and prepped and draped in the usual sterile fashion.      A rigid cystoscope in a 22 Fr sheath was introduced into the patient's urethra.  This passed easily.  The entire urethra was visualized which showed no strictures or masses.  Formal cystoscopy with 30 and 70 degree lenses was performed which showed 3 papillary tumors on the right lateral wall. The general shape of the bladder was abnormal but explained by prior surgery as well as a cystocele.    The left ureteral orifice was visualized in normal anatomic position. The right ureteral orifice was at the right bladder dome with the stent seen emanating from it. The stent was grasped with stent graspers and brought to the meatus. Due to stent encrustation we were not able to feed the guide wire through the stent. We were also not able to insert the wire alongside using a cystoscope. We switched to 8 Fr rigid ureteroscopy and inserted the scope alongside the stent. A motion wire was advanced through the scope. Its placement in the right renal pelvis was confirmed with fluoroscopy. Manipulation of the encrusted ureter with ureteroscope allowed for it to be freed and pulled out.  A guidewire was inserted through the ureteroscope and the ureteroscope was removed.     We then attempted to pass a flexible ureteroscope over the guide wire; the scope was unable  to advance past the distal ureter. A12/14 Fr 35cm access sheath was similarly unable to be advanced past the distal ureter The decision was made to not perform pyeloscopy. A 5 Fr open-ended catheter was inserted over the guide wire and the guide wire was removed. A retrograde pyelogram was performed that showed no extravasation or fillings defects. There was a slight narrowing the the ureteropelvic junction. Pyelovenous backflow was noted.      A 6 Fr x 22 cm JJ ureteral stent without strings was passed over the wire and up into the renal pelvis using fluoro and direct vision. The wire was removed under continuous fluoro.  Good coils were seen in the kidney and the bladder using fluoro, and in the bladder under direct vision.     A 200 micron laser fiber than then introduced through the cystoscope and used to ablate the tumors in the bladder. As progress was very slow here, the decision was made to switch to Bugbee electrocautery. The tumors were then fulgurated. Inflow was turned off and hemostasis was confirmed. The cystoscope was then removed.     The patient tolerated the procedure well and was transferred to the recovery room in stable condition.      Disposition:  The patient will follow up with Judy Burroughs NP in 1 month. She will follow up with Dr. Arellano in 6 weeks for repeat right ureteroscopy, possible ablation of ureteral tumor, possible TURBT.    Carlos Ding MD

## 2020-02-10 NOTE — PROGRESS NOTES
Refill Authorization Note     is requesting a refill authorization.    Brief assessment and rationale for refill: APPROVE: prr          Medication Therapy Plan: Elevated BP at Urology procedure and OV; approve 12 more    Medication reconciliation completed: No                         Comments:   Requested Prescriptions   Pending Prescriptions Disp Refills    amLODIPine (NORVASC) 2.5 MG tablet [Pharmacy Med Name: AMLODIPINE BESYLATE 2.5 MG TABLET] 90 tablet 3     Sig: TAKE 1 TABLET BY MOUTH EVERY DAY       Cardiovascular:  Calcium Channel Blockers Failed - 2/6/2020  9:14 AM        Failed - Last BP in normal range within 360 days.     BP Readings from Last 3 Encounters:   02/10/20 (!) 164/66   01/20/20 (!) 169/80   12/16/19 136/80              Passed - Patient is at least 18 years old        Passed - Office visit in past 12 months or future 90 days.     Recent Outpatient Visits            3 weeks ago Dysuria    [facility] [address] Judy Burroughs NP    1 month ago Essential hypertension    [facility] NATIVIDAD Hinojosa MD    3 months ago Urothelial cancer    [facility] [address] Kiet Arellano MD    4 months ago Endometrial cancer    [facility] - GYN Oncology Carlos Mann MD    6 months ago Urothelial cancer    [facility] [address] Kiet Arellano MD          Future Appointments              In 2 months MD Herm[facility] - GYN Oncology, [facility]    In 4 months NATIVIDAD Hinojosa MD [facility]                 Appointments  past 12m or future 3m with PCP    Date Provider   Last Visit   12/16/2019 NATIVIDAD Hionjosa MD   Next Visit   6/19/2020 NATIVIDAD Hinojosa MD        Note composed:11:13 AM 02/10/2020

## 2020-02-10 NOTE — DISCHARGE SUMMARY
[facility]  Discharge Note  Short Stay    Admit Date: 2/10/2020    Discharge Date and Time: 02/10/2020 8:20 AM      Attending Physician: Kiet Arellano MD     Discharge Provider: Carlos Ding    Diagnoses:  Active Hospital Problems    Diagnosis  POA    *Dysuria [R30.0]  Yes      Resolved Hospital Problems   No resolved problems to display.       Discharged Condition: good    Hospital Course: Patient was admitted for cystoscopy, right ureteroscopy, right RPG and ureteral stent exchange, and fulguration of bladder tumor and tolerated the procedure well with no complications. The patient was discharged home in good condition on the same day.  She will follow up with Judy Burroughs NP in clinic in 1 month, and will follow up with  for right ureteroscopy with possible ablation of tumor in 6 weeks.    Final Diagnoses: Same as principal problem.    Disposition: Home or Self Care    Follow up/Patient Instructions:    Medications:  Reconciled Home Medications:   Current Discharge Medication List      START taking these medications    Details   phenazopyridine (PYRIDIUM) 100 MG tablet Take 1 tablet (100 mg total) by mouth 3 (three) times daily as needed for Pain.  Qty: 30 tablet, Refills: 0         CONTINUE these medications which have CHANGED    Details   potassium citrate (UROCIT-K) 10 mEq (1,080 mg) TbSR Take 1 tablet (10 mEq total) by mouth 2 (two) times daily with meals.  Qty: 60 tablet, Refills: 11         CONTINUE these medications which have NOT CHANGED    Details   acetaminophen (TYLENOL) 325 MG tablet Take 1 tablet (325 mg total) by mouth every 6 (six) hours as needed for Pain.  Refills: 0      ALPRAZolam (XANAX) 0.25 MG tablet Take 1 tablet (0.25 mg total) by mouth 2 (two) times daily as needed for Anxiety. For anxiety  Qty: 45 tablet, Refills: 0    Associated Diagnoses: Anxiety      amLODIPine (NORVASC) 2.5 MG tablet Take 1 tablet (2.5 mg total) by mouth once daily.  Qty: 90 tablet,  Refills: 0    Associated Diagnoses: Essential hypertension      cranberry 400 mg Cap Take by mouth once daily.       docusate sodium (COLACE) 100 MG capsule Take 1 capsule (100 mg total) by mouth 2 (two) times daily.  Refills: 0      fenofibrate (TRICOR) 145 MG tablet Take 1 tablet (145 mg total) by mouth once daily.  Qty: 90 tablet, Refills: 0    Comments: This prescription was filled on 5/20/2019. Any refills authorized will be placed on file.      Lactobacillus acidophilus Cap Take by mouth.      loratadine (CLARITIN) 10 mg tablet Take 10 mg by mouth as needed for Allergies.      meclizine (ANTIVERT) 12.5 mg tablet Take 1 tablet (12.5 mg total) by mouth 3 (three) times daily as needed.  Qty: 60 tablet, Refills: 1    Associated Diagnoses: Vertigo      nitrofurantoin, macrocrystal-monohydrate, (MACROBID) 100 MG capsule Take 100 mg by mouth nightly.  Refills: 6      pantoprazole (PROTONIX) 40 MG tablet Take 1 tablet (40 mg total) by mouth once daily.  Qty: 90 tablet, Refills: 2    Associated Diagnoses: Gastroesophageal reflux disease without esophagitis      vitamin D 1000 units Tab Take 1,000 Units by mouth once daily.      multivitamin capsule Take 1 capsule by mouth.      traMADol (ULTRAM) 50 mg tablet Take 1 tablet (50 mg total) by mouth every 6 (six) hours as needed for Pain.  Qty: 5 tablet, Refills: 0    Comments: Quantity prescribed more than 7 day supply? No           Discharge Procedure Orders   Diet general     Call MD for:  extreme fatigue     Call MD for:  persistent dizziness or light-headedness     Call MD for:  hives     Call MD for:  redness, tenderness, or signs of infection (pain, swelling, redness, odor or green/yellow discharge around incision site)     Call MD for:  difficulty breathing, headache or visual disturbances     Call MD for:  severe uncontrolled pain     Call MD for:  persistent nausea and vomiting     Call MD for:  temperature >100.4     Follow-up Information     Judy Burroughs NP  In 1 month.    Specialty:  Urology  Contact information:  [address] [facility] [id number] [phone]

## 2020-02-10 NOTE — INTERVAL H&P NOTE
The patient has been examined and the H&P has been reviewed:    I concur with the findings and no changes have occurred since H&P was written.    Anesthesia/Surgery risks, benefits and alternative options discussed and understood by patient/family.          Active Hospital Problems    Diagnosis  POA    Dysuria [R30.0]  Yes      Resolved Hospital Problems   No resolved problems to display.

## 2020-02-10 NOTE — ANESTHESIA POSTPROCEDURE EVALUATION
Anesthesia Post Evaluation    Patient: Destinee CARDONA Major    Procedure(s) Performed: Procedure(s) (LRB):  CYSTOURETEROSCOPY, WITH RETROGRADE PYELOGRAM AND URETERAL STENT EXCHANGE AND CALCULUS REMOVAL (Right)  BIOPSY, URETER (Right)  ABLATION, NEOPLASM, BLADDER, USING LASER    Final Anesthesia Type: general    Patient location during evaluation: PACU  Patient participation: Yes- Able to Participate  Level of consciousness: awake and alert  Post-procedure vital signs: reviewed and stable  Pain management: adequate  Airway patency: patent    PONV status at discharge: No PONV  Anesthetic complications: no      Cardiovascular status: blood pressure returned to baseline  Respiratory status: unassisted, spontaneous ventilation and room air  Hydration status: euvolemic  Follow-up not needed.          Vitals Value Taken Time   /66 2/10/2020  9:55 AM   Temp 35.3 °C (95.5 °F) 2/10/2020  9:25 AM   Pulse 98 2/10/2020  9:55 AM   Resp 16 2/10/2020  9:55 AM   SpO2 96 % 2/10/2020  9:55 AM         Event Time     Out of Recovery 09:20:09          Pain/Anabella Score: Anabella Score: 10 (2/10/2020 10:25 AM)

## 2020-02-10 NOTE — DISCHARGE INSTRUCTIONS
Anesthesia: After Your Surgery  Youve just had surgery. During surgery, you received medication called anesthesia to keep you comfortable and pain-free. After surgery, you may experience some pain or nausea. This is common. Here are some tips for feeling better and recovering after surgery.    Going home  Your doctor or nurse will show you how to take care of yourself when you go home. He or she will also answer your questions. Have an adult family member or friend drive you home. For the first 24 hours after your surgery:  · Do not drive or use heavy equipment.  · Do not make important decisions or sign legal documents.  · Avoid alcohol.  · Have someone stay with you, if needed. He or she can watch for problems and help keep you safe.  · Take your time getting up from a seated or lying position. You may experience dizziness for 24 hours  Be sure to keep all follow-up appointments with your doctor. And rest after your procedure for as long as your doctor tells you to.    Coping with pain  If you have pain after surgery, pain medication will help you feel better. Take it as directed, before pain becomes severe. Also, ask your doctor or pharmacist about other ways to control pain, such as with heat, ice, and relaxation. And follow any other instructions your surgeon or nurse gives you.    URINARY RETENTION  Should you experience a decrease in your urine output or are unable to urinate following surgery, this can be due to the medications given during surgery.  We recommend you going to the nearest Emergency Department.    Tips for taking pain medication  To get the best relief possible, remember these points:  · Pain medications can upset your stomach. Taking them with a little food may help.  · Most pain relievers taken by mouth need at least 20 to 30 minutes to take effect.  · Taking medication on a schedule can help you remember to take it. Try to time your medication so that you can take it before beginning an  activity, such as dressing, walking, or sitting down for dinner.  · Constipation is a common side effect of pain medications. Contact your doctor before taking any medications like laxatives or stool softeners to help relieve constipation. Also ask about any dietary restrictions, because drinking lots of fluids and eating foods like fruits and vegetables that are high in fiber can also help. Remember, dont take laxatives unless your surgeon has prescribed them.  · Mixing alcohol and pain medication can cause dizziness and slow your breathing. It can even be fatal. Dont drink alcohol while taking pain medication.  · Pain medication can slow your reflexes. Dont drive or operate machinery while taking pain medication.  If your health care provider tells you to take acetaminophen to help relieve your pain, ask him or her how much you are supposed to take each day. (Acetaminophen is the generic name for Tylenol and other brand-name pain relievers.) Acetaminophen or other pain relievers may interact with your prescription medicines or other over-the-counter (OTC) drugs. Some prescription medications contain acetaminophen along with other active ingredients. Using both prescription and OTC acetaminophen for pain can cause you to overdose. The FDA recommends that you read the labels on your OTC medications carefully. This will help you to clearly understand the list of active ingredients, dosing instructions, and any warnings. It may also help you avoid taking too much acetaminophen. If you have questions or don't understand the information, ask your pharmacist or health care provider to explain it to you before you take the OTC medication.    Managing nausea  Some people have an upset stomach after surgery. This is often due to anesthesia, pain, pain medications, or the stress of surgery. The following tips will help you manage nausea and get good nutrition as you recover. If you were on a special diet before surgery,  ask your doctor if you should follow it during recovery. These tips may help:  · Dont push yourself to eat. Your body will tell you when to eat and how much.  · Start off with clear liquids and soup. They are easier to digest.  · Progress to semi-solid foods (mashed potatoes, applesauce, and gelatin) as you feel ready.  · Slowly move to solid foods. Dont eat fatty, rich, or spicy foods at first.  · Dont force yourself to have three large meals a day. Instead, eat smaller amounts more often.  · Take pain medications with a small amount of solid food, such as crackers or toast to avoid nausea.      Call your surgeon if    · You feel too sleepy, dizzy, or groggy (medication may be too strong).  · You have side effects like nausea, vomiting, or skin changes (rash, itching, or hives).   © 0913-5059 [employer]. [address] [place] [address]. All rights reserved. This information is not intended as a substitute for professional medical care. Always follow your healthcare professional's instructions.                  Ureteral Stents  A ureteral stent is a soft plastic tube with holes in it. Its temporarily inserted into a ureter to help drain urine into the bladder. One end goes in the kidney. The other end goes in the bladder. A coil on each end holds the stent in place. The stent cant be seen from outside the body. It shouldnt interfere with your normal routine. Your stent will be put in by a doctor trained in treating the urinary tract (a urologist) or another specialist. The procedure is done in a hospital or surgery center. Youll likely go home the same day.    While you have a stent  · Some discomfort is normal. Certain movements may trigger pain or a feeling that you need to urinate. You may also feel mild soreness or pressure before or during urination. These symptoms will go away a few days after the stent is removed.  · Medicine to control pain or bladder spasms or to prevent  infection may be prescribed. Take this as directed.  · Drink plenty of fluids to help flush out your urinary tract.  · Your urine may be slightly pink or red. This is due to bleeding caused by minor irritation from the stent. This may happen on and off while you have the stent.  · As with any synthetic device placed in the body, there is a risk of infection. The stent may have to be removed if this happens.   How long will you need a stent?  The stent is often taken out after the blockage in the ureter is treated or the ureter has healed. This may take 1 week to 2 weeks, or longer. If a stent is needed for a long time, it may need to be changed every few months.  When to call your healthcare provider  Contact your healthcare provider right away if:  · Your urine contains blood clots or you see a large amount of blood-tinged urine  · You have symptoms similar to those you had before the stent was placed  · You constantly leak urine  · You have a fever over 100.4°F (38°C), chills, nausea, or vomiting  · Your pain is not relieved with medicine  · The end of the stent comes out of the urethra   Date Last Reviewed: 1/1/2017  © 8404-1806 [employer]. [address] [place] [address]. All rights reserved. This information is not intended as a substitute for professional medical care. Always follow your healthcare professional's instructions.        Cystoscopy  After the procedure  If you had a sedative, general anesthesia, or spinal anesthesia, you must have someone drive you home. Once youre home:  · Drink plenty of fluids.  · You may have burning or light bleeding when you urinate--this is normal.  · Medicines may be prescribed to ease any discomfort or prevent infection. Take these as directed.  · Call your doctor if you have heavy bleeding or blood clots, burning that lasts more than a day, a fever over 100°F  (38° C), or trouble urinating.  Date Last Reviewed: 1/1/2017  © 9416-8684 [employer]. [address] [place] [address]. All rights reserved. This information is not intended as a substitute for professional medical care. Always follow your healthcare professional's instructions.    PLEASE FOLLOW ANY ADDITIONAL INSTRUCTIONS GIVEN TO YOU BY DR ALLISON!

## 2020-02-10 NOTE — TRANSFER OF CARE
"Anesthesia Transfer of Care Note    Patient: Destinee CARDONA Major    Procedure(s) Performed: Procedure(s) (LRB):  CYSTOURETEROSCOPY, WITH RETROGRADE PYELOGRAM AND URETERAL STENT EXCHANGE AND CALCULUS REMOVAL (Right)  BIOPSY, URETER (Right)  ABLATION, NEOPLASM, BLADDER, USING LASER    Patient location: PACU    Anesthesia Type: general    Transport from OR: Transported from OR on 2-3 L/min O2 by NC with adequate spontaneous ventilation    Post pain: adequate analgesia    Post assessment: no apparent anesthetic complications    Post vital signs: stable    Level of consciousness: awake and alert    Nausea/Vomiting: no nausea/vomiting    Complications: none    Transfer of care protocol was followed      Last vitals:   Visit Vitals  BP (!) 175/74   Pulse 89   Temp 36.5 °C (97.7 °F)   Resp 18   Ht 5' 3" (1.6 m)   Wt 68 kg (150 lb)   SpO2 99%   Breastfeeding? No   BMI 26.57 kg/m²     "

## 2020-02-27 ENCOUNTER — PATIENT MESSAGE (OUTPATIENT)
Dept: UROLOGY | Facility: CLINIC | Age: 85
End: 2020-02-27

## 2020-03-03 ENCOUNTER — TELEPHONE (OUTPATIENT)
Dept: UROLOGY | Facility: CLINIC | Age: 85
End: 2020-03-03

## 2020-03-03 DIAGNOSIS — C68.9 UROTHELIAL CANCER: Primary | ICD-10-CM

## 2020-03-03 RX ORDER — LIDOCAINE HYDROCHLORIDE 20 MG/ML
JELLY TOPICAL ONCE
Status: CANCELLED | OUTPATIENT
Start: 2020-03-03 | End: 2020-03-03

## 2020-03-03 NOTE — PROGRESS NOTES
I placed orders for right ureteroscopy and possible TURBT on March 25th.  She should keep her preop appointment with nurse practitioner to check a urine culture in do consents in history and physical

## 2020-03-03 NOTE — TELEPHONE ENCOUNTER
----- Message from Francisca Robertson MA sent at 3/3/2020  8:04 AM CST -----      ----- Message -----  From: Francisca Robertson MA  Sent: 2/27/2020  12:55 PM CST  To: STU Mello Sean M., MD 55 minutes ago (11:55 AM)       I need to schedule my grandmothers 4 week follow up visit with Judy before her next procedure.  Please Let me know what date and time during  the week of March 9-13 would work.  She has a 6 week procedure and if we could go ahead and get that scheduled it would be most helpful.  The week of March 23rd-27th.  You can reach me at [phone] (Ara)

## 2020-03-03 NOTE — TELEPHONE ENCOUNTER
Forwarded the message to the doctor with the new surgery date 03/25/20 and also spoke to the patient grand daughter.

## 2020-03-06 ENCOUNTER — TELEPHONE (OUTPATIENT)
Dept: UROLOGY | Facility: CLINIC | Age: 85
End: 2020-03-06

## 2020-03-10 ENCOUNTER — OFFICE VISIT (OUTPATIENT)
Dept: UROLOGY | Facility: CLINIC | Age: 85
End: 2020-03-10
Payer: MEDICARE

## 2020-03-10 VITALS
BODY MASS INDEX: 26.57 KG/M2 | WEIGHT: 149.94 LBS | HEIGHT: 63 IN | DIASTOLIC BLOOD PRESSURE: 69 MMHG | SYSTOLIC BLOOD PRESSURE: 156 MMHG | HEART RATE: 81 BPM

## 2020-03-10 DIAGNOSIS — R82.90 ABNORMAL URINALYSIS: Primary | ICD-10-CM

## 2020-03-10 DIAGNOSIS — C66.1 URETERAL CANCER, RIGHT: ICD-10-CM

## 2020-03-10 LAB
FINAL PATHOLOGIC DIAGNOSIS: NORMAL
GROSS: NORMAL

## 2020-03-10 PROCEDURE — 99214 OFFICE O/P EST MOD 30 MIN: CPT | Mod: S$GLB,,, | Performed by: NURSE PRACTITIONER

## 2020-03-10 PROCEDURE — 87086 URINE CULTURE/COLONY COUNT: CPT

## 2020-03-10 PROCEDURE — 99214 PR OFFICE/OUTPT VISIT, EST, LEVL IV, 30-39 MIN: ICD-10-PCS | Mod: S$GLB,,, | Performed by: NURSE PRACTITIONER

## 2020-03-10 NOTE — PROGRESS NOTES
Subjective:      Destinee Luo is a 88 y.o. female who returns today regarding her urothelial carcinoma.    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 She has been managed conservatively. Every three months, she undergoes ureteroscopy with laser tumor ablation and ureteral stent exchange.       Most recently she is s/p cysto with right ureteroscopy, right RPG, ureteral stent exchange and fulguration of bladder tumor on 2/10/20 with Dr. Arellano. Her stent was severely encrusted with stones despite K citrate. She returns today to discuss and schedule right ureteroscopy with possible ablation of tumor and possible TURBT tentatively scheduled for 3/24.    Doing well.  Occasional hematuria. She denies dysuria and fever/chills.    The following portions of the patient's history were reviewed and updated as appropriate: allergies, current medications, past family history, past medical history, past social history, past surgical history and problem list.    Review of Systems  Constitutional: no fever or chills  ENT: no nasal congestion or sore throat  Respiratory: no cough or shortness of breath  Cardiovascular: no chest pain or palpitations  Gastrointestinal: no nausea or vomiting, tolerating diet  Genitourinary: as per HPI  Hematologic/Lymphatic: no easy bruising or lymphadenopathy  Musculoskeletal: no arthralgias or myalgias  Neurological: no seizures or tremors  Behavioral/Psych: no auditory or visual hallucinations     Objective:   Vital Signs:  Vitals:    03/10/20 1008   BP: (!) 156/69   Pulse: 81       Physical Exam   General: alert and oriented, no acute distress  Head: normocephalic, atraumatic  Neck: supple, no lymphadenopathy, normal ROM, no masses  Respiratory: Symmetric expansion, non-labored breathing  Cardiovascular: regular rate and rhythm, nomal pulses, no peripheral  edema  Abdomen: soft, non tender, non distended, no palpable masses, no hernias, no hepatomegaly or splenomegaly  Pelvic:deferred  Lymphatic: no inguinal nodes  Skin: normal coloration and turgor, no rashes, no suspicious skin lesions noted  Neuro: alert and oriented x3, no gross deficits  Psych: normal judgment and insight, normal mood/affect and non-anxious  No CVA tenderness    Lab Review   Urinalysis demonstrates positive for nitrites, leukocytes (++), red blood cells (250 malka/ml), protein (+)  Lab Results   Component Value Date    WBC 4.65 05/14/2019    HGB 12.0 05/14/2019    HCT 37.2 05/14/2019    MCV 85 05/14/2019     05/14/2019     Lab Results   Component Value Date    CREATININE 1.3 11/26/2019    BUN 23 11/26/2019       Imaging   None    Assessment:   Abnormal urinalysis  Ureteral cancer, right     Plan:   Destinee was seen today for post-op evaluation.    Diagnoses and all orders for this visit:    Abnormal urinalysis  -     Urine culture    Ureteral cancer, right    Plan:  --Urine culture, will notify if antibiotics are indicated  --Will proceed with cysto with right ureteroscopy, right RPG, ureteral stent exchange, fulguration of bladder tumor/TURBT on 3/24 with Dr. Arellano. Discussed the risks and benefits of the procedure. Answered all questions. Consent signed.

## 2020-03-11 LAB — BACTERIA UR CULT: NO GROWTH

## 2020-03-19 ENCOUNTER — PATIENT MESSAGE (OUTPATIENT)
Dept: SURGERY | Facility: OTHER | Age: 85
End: 2020-03-19

## 2020-03-19 DIAGNOSIS — R82.71 BACTERIURIA: ICD-10-CM

## 2020-03-19 DIAGNOSIS — C66.1 URETERAL CANCER, RIGHT: Primary | ICD-10-CM

## 2020-03-19 RX ORDER — CIPROFLOXACIN 500 MG/1
250 TABLET ORAL EVERY 12 HOURS
Qty: 14 TABLET | Refills: 1 | Status: SHIPPED | OUTPATIENT
Start: 2020-03-19 | End: 2020-03-26

## 2020-03-19 NOTE — TELEPHONE ENCOUNTER
Please note this encounter occured during the COVID19 pandemic with limit resouces available.    Spoke with the patient's daughter.  She would like to postpone her surgery which is scheduled for next week.  She has had multiple local recurrences of her urothelial cancer, but has not progressed over the last 2 years.  Given the current pandemic, I think the risk of bring her into the hospital far outweighs the small oncologic risk of delaying her procedure.    I placed a standing order for urinalysis and urine culture and sent Cipro to a pharmacy in [place] she should develop UTI symptoms.    Her daughter will contact next month to reschedule her surgery

## 2020-03-23 PROCEDURE — G0179 MD RECERTIFICATION HHA PT: HCPCS | Mod: ,,, | Performed by: FAMILY MEDICINE

## 2020-03-23 PROCEDURE — G0179 PR HOME HEALTH MD RECERTIFICATION: ICD-10-PCS | Mod: ,,, | Performed by: FAMILY MEDICINE

## 2020-04-01 ENCOUNTER — EXTERNAL HOME HEALTH (OUTPATIENT)
Dept: HOME HEALTH SERVICES | Facility: HOSPITAL | Age: 85
End: 2020-04-01
Payer: MEDICARE

## 2020-04-08 ENCOUNTER — PATIENT MESSAGE (OUTPATIENT)
Dept: GYNECOLOGIC ONCOLOGY | Facility: CLINIC | Age: 85
End: 2020-04-08

## 2020-04-16 ENCOUNTER — TELEPHONE (OUTPATIENT)
Dept: FAMILY MEDICINE | Facility: CLINIC | Age: 85
End: 2020-04-16

## 2020-04-16 NOTE — TELEPHONE ENCOUNTER
----- Message from Coco Dietrich sent at 4/16/2020 12:25 PM CDT -----  Type: Needs Medical Advice  Who Called:  Cecilia BARROSO nurse  Best Call Back Number: [phone]  Additional Information: would like for orders to be faxed to her office at [phone].

## 2020-05-14 NOTE — PROGRESS NOTES
Home health called  She has flank pain and  UA is +  No fever    I sent Cipro 250mg BID to her pharmacy  Home health will do a urine cs and send us the results

## 2020-05-15 NOTE — TELEPHONE ENCOUNTER
----- Message from Kiet Arellano MD sent at 5/14/2020  4:43 PM CDT -----  Contact: Kathy with [facility]  [place]    Please tell her that's fine    Thanks    Sc      ----- Message -----  From: Natalia Busby MA  Sent: 5/14/2020   3:57 PM CDT  To: Kiet Arellano MD        ----- Message -----  From: Charbel Ang  Sent: 5/14/2020   3:11 PM CDT  To: Adan Santa Staff    Name of Who is Calling: Kathy with [facility]    What is the request in detail: states the patient is asking can her Rx dosage be changed to taking the whole tablet instead of half tablet. Please contact to further discuss and advise      Can the clinic reply by [facility]: no    What Number to Call Back if not in [facility]: [phone]

## 2020-05-19 NOTE — TELEPHONE ENCOUNTER
PATIENT GRAND DAUGHTER HAS A QUESTION ON WHEN THE DOCTOR WANTS TO DO THE SURGERY.PLEASE CONTACT THE GRAND DAUGHTER.

## 2020-05-19 NOTE — TELEPHONE ENCOUNTER
Spoke w/Ara to instruct the patient to d/c her antibiotics per Dr. Arellano. Pt's caregiver expressed understanding.

## 2020-05-19 NOTE — TELEPHONE ENCOUNTER
SPOKE TO THE PATIENT GRAND DAUGHTER AND APPOINTMENT SCHEDULE WITH [facility] ON 06/20 , PRE-OP WILL BE PHONE INTERVIEW, COVID TEST SCHEDULE ON 06/16/20

## 2020-05-22 NOTE — TELEPHONE ENCOUNTER
Spoke with Cecilia at [facility]. She stated patient does not seem like herself.  LOC is the same she is just not as energetic, weaker, stated she thinks patient needs PT again. Referral pended for PT.

## 2020-05-22 NOTE — TELEPHONE ENCOUNTER
----- Message from Veronique Alexander sent at 5/22/2020 11:58 AM CDT -----  Contact: Candis/ Home health  Type: Needs Medical Advice  Who Called:  Candis  Best Call Back Number: [phone]  Additional Information: Candis reports patient was seen in home yesterday .  Candis expresses concern about patient not dieter  to put finger on what's wrong just something is off patient not her usual self, she is weaker having trouble walking .  No change in level of consciousness, vitals  Within normal limits,  Just less energetic. Candis recommends a visit to doctor's office and would like for nurse to call the patient to set up apt. Please call to advise.  Thanks!

## 2020-05-22 NOTE — TELEPHONE ENCOUNTER
Spoke with daughter, Ara about getting aptient in to see Dr Hinojosa sooner than July appointment. She stated her mom does not appear as if she needs to be seen at this point. Is requesting PT. Order pended

## 2020-05-26 NOTE — TELEPHONE ENCOUNTER
----- Message from Adali Etienne RN sent at 5/26/2020  8:55 AM CDT -----  Contact: Home Health  This patient phone call was accidentally sent to the wrong Dr. Hinojosa.  This is Dr. Crystal Hinojosa's patient, not Nancy Hinojosa.  Please call patient    Thanks   ----- Message -----  From: Suze Gillespie  Sent: 5/26/2020   8:39 AM CDT  To: Ashish [facility] Staff    Type: Needs Medical Advice  Who Called: [facility] home health Nurse  Best Call Back Number:   Additional Information: Per nurse requesting a call back to clarify PT and home health orders-please advise-thank you

## 2020-05-26 NOTE — TELEPHONE ENCOUNTER
----- Message from Suze Gillespie sent at 5/26/2020  8:39 AM CDT -----  Contact: Home Health  Type: Needs Medical Advice  Who Called: [facility] home health Nurse  Best Call Back Number:   Additional Information: Per nurse requesting a call back to clarify PT and home health orders-please advise-thank you

## 2020-06-09 NOTE — PROGRESS NOTES
Chart reviewed.   Immunizations: Triggered Imm Registry     Orders placed: n/a  Upcoming appts to satisfy KERWIN topics: n/a

## 2020-06-10 NOTE — H&P (VIEW-ONLY)
Subjective:      Destinee Luo is a 88 y.o. female who returns today regarding her urothelial carcinoma.     The patient is s/p distal right ureterectomy and psoas hitch by Dr. Arellano for TCC in 2018. 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 She has been managed conservatively. Every three months, she undergoes ureteroscopy with laser tumor ablation and ureteral stent exchange.       Most recently she is s/p cysto with right ureteroscopy, right RPG, ureteral stent exchange and fulguration of bladder tumor on 2/10/20 with Dr. Arellano. Her stent was severely encrusted with stones despite K citrate. She was scheduled for right ureteroscopy with possible ablation of tumor and possible TURBT in March; however this was delayed due to the COVID pandemic.    She returns today to reschedule this procedure. She self started cipro 250 mg BID for UTI symptoms on Saturday. Feels much better. Denies fever/chills.      The following portions of the patient's history were reviewed and updated as appropriate: allergies, current medications, past family history, past medical history, past social history, past surgical history and problem list.    Review of Systems  Constitutional: no fever or chills  ENT: no nasal congestion or sore throat  Respiratory: no cough or shortness of breath  Cardiovascular: no chest pain or palpitations  Gastrointestinal: no nausea or vomiting, tolerating diet  Genitourinary: as per HPI  Hematologic/Lymphatic: no easy bruising or lymphadenopathy  Musculoskeletal: no arthralgias or myalgias  Neurological: no seizures or tremors  Behavioral/Psych: no auditory or visual hallucinations     Objective:   Vital Signs:   Vitals:    06/10/20 1043   BP: (!) 182/72   Pulse: 77       Physical Exam   General: alert and oriented, no acute distress  Head: normocephalic, atraumatic  Neck: supple, no lymphadenopathy, normal ROM, no masses  Respiratory: Symmetric expansion,  non-labored breathing  Cardiovascular: regular rate and rhythm, nomal pulses, no peripheral edema  Abdomen: soft, non tender, non distended, no palpable masses, no hernias, no hepatomegaly or splenomegaly  Pelvic: deferred  Lymphatic: no inguinal nodes  Skin: normal coloration and turgor, no rashes, no suspicious skin lesions noted  Neuro: alert and oriented x3, no gross deficits  Psych: normal judgment and insight, normal mood/affect and non-anxious  No CVA tenderness    Lab Review   Urinalysis demonstrates positive for leukocytes, red blood cells, protein  Lab Results   Component Value Date    WBC 4.65 05/14/2019    HGB 12.0 05/14/2019    HCT 37.2 05/14/2019    MCV 85 05/14/2019     05/14/2019     Lab Results   Component Value Date    CREATININE 1.3 11/26/2019    BUN 23 11/26/2019       Imaging   None    Assessment:   Dysuria  Urothelial cancer    Plan:   Destinee was seen today for follow-up.    Diagnoses and all orders for this visit:    Dysuria  -     POCT URINE DIPSTICK WITHOUT MICROSCOPE  -     Urine culture  -     ciprofloxacin HCl (CIPRO) 250 MG tablet; Take 1 tablet (250 mg total) by mouth 2 (two) times daily. for 2 days    Urothelial cancer    Plan:  --Repeat urine culture today  --Continue cipro for now  --Will proceed with right ureteroscopy, right RPG, ureteral stent exchange, fulguration of bladder tumor/TURBT on 6/18 with Dr. Arellano. Discussed the risks and benefits of the procedure. Answered all questions. Consent signed   --Pre op COVID testing 48 hours before surgery

## 2020-06-17 NOTE — PRE ADMISSION SCREENING
Pre admit phone call completed.    Instructions given to patient about NPO status as follows:     The evening before surgery do not eat anything after 9 p.m. ( this includes hard candy, chewing gum and mints).  You may only have GATORADE, POWERADE AND WATER from 9 p.m. until you leave your home. DO NOT  DRINK ANY LIQUIDS ON THE WAY TO THE HOSPITAL.      Patient was also instructed on the below information:    Only one adult visitor allowed in hospital    Park in the Parking lot behind the hospital or in the [facility] across the street from the parking lot.  Parking is complimentary.  If you will be discharged the same day as your procedure, please arrange for a responsible adult to drive you home or  to accompany you if traveling by taxi.  YOU WILL NOT BE PERMITTED TO DRIVE OR TO LEAVE THE HOSPITAL ALONE AFTER SURGERY.  It is strongly recommended that you arrange for someone to remain with you for the first 24 hrs following your surgery.    Patient verbalized understanding of above instructions.

## 2020-06-17 NOTE — PROGRESS NOTES
Dr Arellano forwarded these results to the patient via [facility].  He will discuss the results with the patient in person at the next appointment.  The patient was instructed to make an appointment if she does not already have one scheduled.

## 2020-06-18 NOTE — ANESTHESIA POSTPROCEDURE EVALUATION
Anesthesia Post Evaluation    Patient: Destinee CARDONA Major    Procedure(s) Performed: Procedure(s) (LRB):  CYSTOURETEROSCOPY, WITH RETROGRADE PYELOGRAM AND URETERAL STENT INSERTION (Right)  TURBT (TRANSURETHRAL RESECTION OF BLADDER TUMOR)  ATTEMPTED URETEROSCOPY    Final Anesthesia Type: general    Patient location during evaluation: PACU  Patient participation: Yes- Able to Participate  Level of consciousness: awake and alert and oriented  Post-procedure vital signs: reviewed and stable  Pain management: adequate  Airway patency: patent    PONV status at discharge: No PONV  Anesthetic complications: no      Cardiovascular status: blood pressure returned to baseline and hemodynamically stable  Respiratory status: unassisted, spontaneous ventilation and room air  Hydration status: euvolemic  Follow-up not needed.          Vitals Value Taken Time   /73 06/18/20 1653   Temp 36.7 °C (98 °F) 06/18/20 1653   Pulse 96 06/18/20 1653   Resp 18 06/18/20 1653   SpO2 97 % 06/18/20 1653         Event Time   Out of Recovery 15:32:04         Pain/Anabella Score: Pain Rating Post Med Admin: 0 (6/18/2020  3:08 PM)  Anabella Score: 10 (6/18/2020  3:08 PM)

## 2020-06-18 NOTE — OP NOTE
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  TURBT of medium (3-4cm) sized bladder tumor  2.  Right ureteral stent exchange  3.  Right retrograde pyelogram  4.  Right ureteroscopy (attempted)     Specimen(s):   Bladder tumor    Staff Surgeon: Kiet Arellano MD    Assistant Surgeon: Elvia العراقي MD    Anesthesia: General endotracheal anesthesia    Indications: Destinee Luo is a 88 y.o. 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 She has recurrent disease but has deferred nephroureterectomy. She is managed with ureteroscopy with tumor ablation every 3 months.       Findings:   1.  [place] shaped bladder with right UO at the dome   2.  Attempts were made to enter the right ureter alongside the stent and over the wire, however resistance was met, decision was made not to perform ureteroscopy  3.  Right retrograde pyelogram showed one large renal pelvis, possible blunted calyces, possible pyelovenous backflow, no filling defects or extravasation   4.  Multiple tumors at the base of the bladder, right lateral wall and left lateral wall. The largest of these measured 3.5cm at the right lateral wall/base of the bladder. All resected with the base fulgurated    Estimated Blood Loss: min    Drains:   24 Fr 3-way mitchell catheter  6 x 22 cm right JJ ureteral stent without strings     Procedure in detail:  After the risks, benefits and possible complications of the procedure were explained, consents were obtained. The patient was taken to the operating room and placed under anesthesia. Pre-operative antibiotics were administered 30 minutes prior to expected start time. The patient was placed  in the dorsal lithotomy position and prepped and draped in the normal and sterile fashion. Time out was performed.      A rigid cystoscope in a 22 Fr sheath was introduced into the bladder per urethra. This passed easily.  The entire urethra was visualized which showed no masses or strictures. The bladder was oblong in shape and the right UO entered at the dome. The left UO appeared to be closer  to the bladder neck on the left. It was seen with the 70 degree lens. Cystoscopy was performed which revealed multiple tumors at the base of the bladder, right lateral wall and left lateral wall. The largest of these measured 3.5cm at the right lateral wall/base of the bladder.  The 30 degree lens was exchanged for the 70 degree lens to evaluate the bladder neck. The left UO appeared closer to the bladder neck on the left and was observed with the 70 degree lens. There were no concerning lesions surrounding the bladder neck.     The bladder was drained and the cystoscope was removed. The flexible ureteroscope was then inserted and an attempt was made at entering the right UO alongside the wire. This was unable to be done. The flexible ureteroscope was exchanged for a rigid cystoscope and a second wire was able to be inserted into the right UO after inserting the wire through an open ended 5 Fr ureteral catheter. The guide wire placement in the renal pelvis was confirmed using fluoroscopy.     The cystoscope was removed and the flexible ureteroscope was inserted over the guidewire into the bladder under fluoroscopy. Upon passing through the UO, resistance was met and the scope began to become redundant in the bladder. Even with more back-tension on the wire, the flexible ureteroscope would not progress. Decision was made to abort this portion of the procedure. The motion wire was left in place and the ureteroscope was removed.     A right retrograde pyelogram was performed by introducing the 5 Fr open ended ureteral catheter  over the motion wire. The motion wire was removed. We were careful not to pull the catheter distally for fear of losing access. Right retrograde pyelogram showed one large renal pelvis, possible blunted calyces, possible pyelovenous backflow, no filling defects or extravasation. The ureteral catheter was then re-exchanged for the wire and the wire was secured to the drape.     The resectoscope was then assembled with the visual obturator. This was placed into the bladder via the urethra and the visual obturator was exchanged for the resecting mechanism.  The tumors were then resected, superficially until the base was identified.  Specimens were then removed and passed off the field for pathologic analysis.      The bladder was drained and hemostasis was achieved.  The resectoscope was removed.      A 6 x 22 cm JJ ureteral stent was placed over the indwelling motion wire by first backloading the wire via the pusher through the scope. Under direct vision, we were able to push the wire through the scope and up into the renal pelvis. A good proximal coil was seen with fluoroscopy. A good distal coil was seen under direct vision.     A 24 Fr mitchell 3-way catheter was placed with 10 cc in the balloon.  The bladder was then irrigated.    The patient tolerated the procedure well and was transferred to recovery in stable condition.    Disposition:  The patient will be kept overnight for close monitoring. She will be kept on CBI.   Elvia العراقي MD

## 2020-06-18 NOTE — TRANSFER OF CARE
"Anesthesia Transfer of Care Note    Patient: Destinee CARDONA Major    Procedure(s) Performed: Procedure(s) (LRB):  CYSTOURETEROSCOPY, WITH RETROGRADE PYELOGRAM AND URETERAL STENT INSERTION (Right)  TURBT (TRANSURETHRAL RESECTION OF BLADDER TUMOR)  ATTEMPTED URETEROSCOPY    Patient location: PACU    Anesthesia Type: general    Transport from OR: Transported from OR on 2-3 L/min O2 by NC with adequate spontaneous ventilation    Post pain: adequate analgesia    Post assessment: no apparent anesthetic complications    Post vital signs: stable    Level of consciousness: awake    Nausea/Vomiting: no nausea/vomiting    Complications: none    Transfer of care protocol was followed      Last vitals:   Visit Vitals  BP (!) 181/77 (BP Location: Left arm, Patient Position: Lying)   Pulse 87   Temp 36.8 °C (98.3 °F) (Oral)   Resp 16   Ht 5' 3" (1.6 m)   Wt 65.8 kg (145 lb)   SpO2 99%   Breastfeeding No   BMI 25.69 kg/m²     "

## 2020-06-18 NOTE — ANESTHESIA PREPROCEDURE EVALUATION
06/18/2020  Destinee Luo is a 88 y.o., female.    Pre-op Assessment    I have reviewed the Patient Summary Reports.     I have reviewed the Nursing Notes. I have reviewed the NPO Status.   I have reviewed the Medications.     Review of Systems  Anesthesia Hx:  No problems with previous Anesthesia Hx of Anesthetic complications PONV History of prior surgery of interest to airway management or planning: Previous anesthesia: General 5/19 ureteroscopy with general anesthesia.  Procedure performed at an [facility]. Airway issues documented on chart review include mask, easy, laryngeal mask airway used  Denies Family Hx of Anesthesia complications.   Denies Personal Hx of Anesthesia complications.   Social:  Non-Smoker    Hematology/Oncology:  Hematology Normal      Current/Recent Cancer. Oncology Comments: Bladder,mult prev GA for bladder proceedures    EENT/Dental:EENT/Dental Normal   Cardiovascular:   Exercise tolerance: good Hypertension, well controlled Aortic valve insuff   Pulmonary:  Pulmonary Normal    Renal/:   Chronic Renal Disease, CRI    Musculoskeletal:  Musculoskeletal Normal    Neurological:   TIA, CVA Questionable TIA in remote past    vertigo   Endocrine:  Endocrine Normal    Dermatological:  Skin Normal    Psych:  Psychiatric Normal           Physical Exam  General:  Well nourished    Airway/Jaw/Neck:  Airway Findings: Mouth Opening: Normal Tongue: Normal  General Airway Assessment: Adult  Mallampati: II     Eyes/Ears/Nose:  Eyes/Ears/Nose Findings:    Dental:  Dental Findings: Upper front caps        Mental Status:  Mental Status Findings:  Cooperative, Alert and Oriented         Anesthesia Plan  Type of Anesthesia, risks & benefits discussed:  Anesthesia Type:  general  Patient's Preference:   Intra-op Monitoring Plan: standard ASA monitors  Intra-op Monitoring Plan Comments:   Post Op  Pain Control Plan: multimodal analgesia and per primary service following discharge from PACU  Post Op Pain Control Plan Comments:   Induction:   IV  Beta Blocker:         Informed Consent: Patient understands risks and agrees with Anesthesia plan.  Questions answered. Anesthesia consent signed with patient.  ASA Score: 3     Day of Surgery Review of History & Physical:    H&P update referred to the surgeon.     Anesthesia Plan Notes: Mult prev cysto and stents.            Ready For Surgery From Anesthesia Perspective.

## 2020-06-18 NOTE — OR NURSING
Continue to wait to give report. Granddaughter notified of delay.   Pt denies c/o pain to abdomen.

## 2020-06-18 NOTE — INTERVAL H&P NOTE
The patient has been examined and the H&P has been reviewed:    I concur with the findings and no changes have occurred since H&P was written.     Urine cs neg    Anesthesia/Surgery risks, benefits and alternative options discussed and understood by patient/family.          Active Hospital Problems    Diagnosis  POA    Dysuria [R30.0]  Yes      Resolved Hospital Problems   No resolved problems to display.

## 2020-06-18 NOTE — NURSING
Patient arrived to the unit per facility transport and PACU nurse via stretcher.  Patient assisted from stretcher to bed without difficulty.  Patient AAOx4, no apparent SOB or distress noted. Respirations even and unlabored on ra.  Dia cath in place draining blood tinged urine to  bag,  bag placed below patients bladder on frame of bed. IV in place to left wrist, no c/o of pain or discomfort to site. Patient's grand-daughter at bedside.  Patient oriented to the room, call bell and facility policies.  Call bell placed within patient's reach, bed placed in lowest possible locked position, side rails up x 2.  Bed alarms in use and audible. Will continue to monitor.

## 2020-06-18 NOTE — PROGRESS NOTES
post op    Ab soft  Bladder not distedned  Urine clear yellow on slow CBI    Sp TURBT    Cont CBI tonight  Mitomycin tomorrow if urine is clear  Hopefully dc home tomorrow after

## 2020-06-19 NOTE — HPI
This is an 88 YOM s/p distal right ureterectomy and psoas hitch by Dr. Arellano for TCC in 2018. 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 She has been managed with laser tumor ablation and ureteral stent exchange every 3 months.       Most recently she is s/p cysto with right ureteroscopy, right RPG, ureteral stent exchange and fulguration of bladder tumor on 2/10/20 with Dr. Arellano. She was scheduled for right ureteroscopy with possible ablation of tumor and possible TURBT in March; however this was delayed due to the COVID pandemic. She presents today for this procedure.

## 2020-06-19 NOTE — PROGRESS NOTES
[facility]  Urology  Progress Note    Patient Name: Destinee Luo  MRN: [id number]  Admission Date: 6/18/2020  Hospital Length of Stay: 0 days  Code Status: Full Code   Attending Provider: Kiet Arellano MD   Primary Care Physician: ZEKE Hinojosa MD    Subjective:     HPI:  This is an 88 YOM s/p distal right ureterectomy and psoas hitch by Dr. Arellano for TCC in 2018. 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 She has been managed with laser tumor ablation and ureteral stent exchange every 3 months.       Most recently she is s/p cysto with right ureteroscopy, right RPG, ureteral stent exchange and fulguration of bladder tumor on 2/10/20 with Dr. Arellano. She was scheduled for right ureteroscopy with possible ablation of tumor and possible TURBT in March; however this was delayed due to the COVID pandemic. She presents today for this procedure.        Interval History:   Kept overnight on CBI   CBI was off this morning and there was dark maroon colored urine in the mitchell tubing, thick but no clots present   Irrigated to clear and restarted CBI     Review of Systems  Objective:     Temp:  [97.5 °F (36.4 °C)-98.3 °F (36.8 °C)] 97.9 °F (36.6 °C)  Pulse:  [] 88  Resp:  [16-18] 16  SpO2:  [96 %-100 %] 99 %  BP: (136-195)/(66-91) 189/79     Body mass index is 25.69 kg/m².           Drains     Drain                 Ureteral Drain/Stent 06/18/20 1302 Right ureter 8 Fr. less than 1 day         Urethral Catheter 06/18/20 1259 Triple-lumen 24 Fr. less than 1 day                Physical Exam   Constitutional: She is oriented to person, place, and time. She appears well-developed. No distress.   HENT:   Head: Normocephalic and atraumatic.   Eyes: No scleral icterus.   Neck: No tracheal deviation present.   Cardiovascular: Normal rate.    Pulmonary/Chest: Effort normal. No respiratory distress.   Abdominal: Soft. She exhibits no distension. There is no abdominal  tenderness.   Genitourinary:    Genitourinary Comments: Dia catheter in place draining dark red urine      Musculoskeletal: Normal range of motion.   Neurological: She is alert and oriented to person, place, and time.   Skin: Skin is warm and dry.     Psychiatric: Her behavior is normal.       Significant Labs:    BMP:  Recent Labs   Lab 06/18/20  0904      K 4.2      CO2 22*   BUN 19   CREATININE 1.3   CALCIUM 9.0       CBC:   Recent Labs   Lab 06/18/20  0904   WBC 6.88   HGB 10.0*   HCT 32.9*          All pertinent labs results from the past 24 hours have been reviewed.    Significant Imaging:  All pertinent imaging results/findings from the past 24 hours have been reviewed.                  Assessment/Plan:     Ureteral cancer, right  -- will keep another night on CBI, titrate to clear light pink  -- labs in the morning  -- regular diet   -- SCDs while in bed   -- ambulate daily  -- oxybutynin and tylenol PRN        VTE Risk Mitigation (From admission, onward)         Ordered     IP VTE HIGH RISK PATIENT  Once      06/18/20 1608     Place sequential compression device  Until discontinued      06/18/20 1608     Place KIANNA hose  Until discontinued      06/18/20 1608                Elvia العراقي MD  Urology  [facility]

## 2020-06-19 NOTE — ASSESSMENT & PLAN NOTE
-- will keep another night on CBI, titrate to clear light pink  -- labs in the morning  -- regular diet   -- SCDs while in bed   -- ambulate daily  -- oxybutynin and tylenol PRN

## 2020-06-19 NOTE — SUBJECTIVE & OBJECTIVE
Interval History:   Kept overnight on CBI   CBI was off this morning and there was dark maroon colored urine in the mitchell tubing, thick but no clots present   Irrigated to clear and restarted CBI     Review of Systems  Objective:     Temp:  [97.5 °F (36.4 °C)-98.3 °F (36.8 °C)] 97.9 °F (36.6 °C)  Pulse:  [] 88  Resp:  [16-18] 16  SpO2:  [96 %-100 %] 99 %  BP: (136-195)/(66-91) 189/79     Body mass index is 25.69 kg/m².           Drains     Drain                 Ureteral Drain/Stent 06/18/20 1302 Right ureter 8 Fr. less than 1 day         Urethral Catheter 06/18/20 1259 Triple-lumen 24 Fr. less than 1 day                Physical Exam   Constitutional: She is oriented to person, place, and time. She appears well-developed. No distress.   HENT:   Head: Normocephalic and atraumatic.   Eyes: No scleral icterus.   Neck: No tracheal deviation present.   Cardiovascular: Normal rate.    Pulmonary/Chest: Effort normal. No respiratory distress.   Abdominal: Soft. She exhibits no distension. There is no abdominal tenderness.   Genitourinary:    Genitourinary Comments: Mitchell catheter in place draining dark red urine      Musculoskeletal: Normal range of motion.   Neurological: She is alert and oriented to person, place, and time.   Skin: Skin is warm and dry.     Psychiatric: Her behavior is normal.       Significant Labs:    BMP:  Recent Labs   Lab 06/18/20  0904      K 4.2      CO2 22*   BUN 19   CREATININE 1.3   CALCIUM 9.0       CBC:   Recent Labs   Lab 06/18/20  0904   WBC 6.88   HGB 10.0*   HCT 32.9*          All pertinent labs results from the past 24 hours have been reviewed.    Significant Imaging:  All pertinent imaging results/findings from the past 24 hours have been reviewed.

## 2020-06-19 NOTE — PLAN OF CARE
Plan of care implemented.  Bladder irrigation in progress.  No acute distress noted.  Patient denies c/o pain and discomfort.   Safety measures implemented.

## 2020-06-19 NOTE — NURSING
Ambulated in room with rolling walker.  Tolerated well.  Required assistance x 1.  No acute distress noted.

## 2020-06-20 NOTE — PLAN OF CARE
Fall precaution maintained thru out shift; pt denies any pain or discomfort; no sign of  distress noted; pt tolerated irrigation well; skin dry and intact; side rails up x2; bed lock and at lowest position; call light with in reached;  Will continue to monitor.  paola Parkinson

## 2020-06-20 NOTE — DISCHARGE SUMMARY
[facility]  Urology  Discharge Summary      Patient Name: Destinee Luo  MRN: [id number]  Admission Date: 6/18/2020  Hospital Length of Stay: 0 days  Discharge Date and Time:  06/20/2020 11:31 AM  Attending Physician: Kiet Arellano MD   Discharging Provider: Kiet Arellano MD  Primary Care Physician: ZEKE Hinojosa MD    HPI:   This is an 88 YOM s/p distal right ureterectomy and psoas hitch by Dr. Arellano for TCC in 2018. 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 She has been managed with laser tumor ablation and ureteral stent exchange every 3 months.       Most recently she is s/p cysto with right ureteroscopy, right RPG, ureteral stent exchange and fulguration of bladder tumor on 2/10/20 with Dr. Arellano. She was scheduled for right ureteroscopy with possible ablation of tumor and possible TURBT in March; however this was delayed due to the COVID pandemic. She presents today for this procedure.        Procedure(s) (LRB):  CYSTOURETEROSCOPY, WITH RETROGRADE PYELOGRAM AND URETERAL STENT INSERTION (Right)  TURBT (TRANSURETHRAL RESECTION OF BLADDER TUMOR)  ATTEMPTED URETEROSCOPY     Indwelling Lines/Drains at time of discharge:   Lines/Drains/Airways     Drain                 Ureteral Drain/Stent 06/18/20 1302 Right ureter 8 Fr. 1 day         Urethral Catheter 06/18/20 1259 Triple-lumen 24 Fr. 1 day                Hospital Course (synopsis of major diagnoses, care, treatment, and services provided during the course of the hospital stay):       pod2  CBI off  Urine is dark pink but no clots; this is the usual color of her urine  Mitchell removed  Voided clear pink urine immediately after removal of mitchell      Ab soft  Bladder not distended    Consults:     Significant Diagnostic Studies: -    Pending Diagnostic Studies:     Procedure Component Value Units Date/Time    Specimen to Pathology, Surgery Urology [[id number]] Collected: 06/18/20 1310    Order Status:  Sent Lab Status: In process Updated: 06/18/20 1434          Final Active Diagnoses:    Diagnosis Date Noted POA    PRINCIPAL PROBLEM:  Ureteral cancer, right [C66.1] 01/30/2019 Yes    Dysuria [R30.0] 02/10/2020 Yes      Problems Resolved During this Admission:         Discharged Condition: good    Disposition:     Follow Up:  Follow-up Information     Judy Burroughs NP In 2 months.    Specialty: Urology  Contact information:  [address] [facility] [address]  [phone]             ZEKE Hinojosa MD.    Specialty: Family Medicine  Contact information:  [address] [place] [address]  [phone]                 Patient Instructions:   No discharge procedures on file.  Medications:  Reconciled Home Medications:      Medication List      CONTINUE taking these medications    acetaminophen 325 MG tablet  Commonly known as: TYLENOL  Take 1 tablet (325 mg total) by mouth every 6 (six) hours as needed for Pain.     ALPRAZolam 0.25 MG tablet  Commonly known as: XANAX  Take 1 tablet (0.25 mg total) by mouth 2 (two) times daily as needed for Anxiety. For anxiety     amLODIPine 2.5 MG tablet  Commonly known as: NORVASC  TAKE 1 TABLET BY MOUTH EVERY DAY     cranberry 400 mg Cap  Take by mouth once daily.     docusate sodium 100 MG capsule  Commonly known as: COLACE  Take 1 capsule (100 mg total) by mouth 2 (two) times daily.     fenofibrate 145 MG tablet  Commonly known as: TRICOR  Take 1 tablet (145 mg total) by mouth once daily.     Lactobacillus acidophilus Cap  Take by mouth.     loratadine 10 mg tablet  Commonly known as: CLARITIN  Take 10 mg by mouth as needed for Allergies.     meclizine 12.5 mg tablet  Commonly known as: ANTIVERT  Take 1 tablet (12.5 mg total) by mouth 3 (three) times daily as needed.     multivitamin capsule  Take 1 capsule by mouth.     ondansetron 4 MG Tbdl  Commonly known as: ZOFRAN-ODT  Take 1 tablet (4 mg total) by mouth every 6 (six) hours as needed (nausea).     pantoprazole  40 MG tablet  Commonly known as: PROTONIX  Take 1 tablet (40 mg total) by mouth once daily.     potassium citrate 10 mEq (1,080 mg) Tbsr  Commonly known as: UROCIT-K  Take 1 tablet (10 mEq total) by mouth 2 (two) times daily with meals.     traMADoL 50 mg tablet  Commonly known as: ULTRAM  Take 1 tablet (50 mg total) by mouth every 6 (six) hours as needed for Pain.     vitamin D 1000 units Tab  Commonly known as: VITAMIN D3  Take 1,000 Units by mouth once daily.        STOP taking these medications    ciprofloxacin HCl 500 MG tablet  Commonly known as: CIPRO          Follow up with Judy Burroughs NP 2 months  Schedule right ureteroscopy and possible TURBT in 3 months    Time spent on the discharge of patient: 45 minutes    Kiet Arellano MD  Urology  [facility]

## 2020-06-20 NOTE — PLAN OF CARE
Final Note  No initial assessment captured.  Patient is cleared for discharged from case management stand point.

## 2020-06-23 NOTE — PROGRESS NOTES
60 Day Recert Order # [id number]  New Cert Period: 05/22 to 07/20/2020 with [facility] - Dr. Viki Hinojosa

## 2020-06-24 NOTE — TELEPHONE ENCOUNTER
I spoke to [facility].  They will fax most recent order for Dr. Arellano to sign.   Quality 111:Pneumonia Vaccination Status For Older Adults: Pneumococcal Vaccination Previously Received Detail Level: Simple Quality 130: Documentation Of Current Medications In The Medical Record: Current Medications Documented

## 2020-07-10 NOTE — PROGRESS NOTES
"Subjective:       Patient ID: Destinee Luo is a 88 y.o. female.    Chief Complaint: Follow-up    Pt is known to me.  Pt is here for followup of chronic medical issues.  The pt has periodic cystoscopy to remove bladder tumors (cancer).    The pt has been anxious--she is taking Xanax every night for the last 2 weeks and she is no longer having panic attacks.  She is anemic--she has had some bleeding after bladder procedures.  She is feeling cold all the time and she gets tired.  Her weight is down 6 pounds from her visit with me in 12//2019.  She is now drinking protein shakes and is trying to eat more.    Review of Systems   Constitutional: Positive for activity change and unexpected weight change.   HENT: Positive for trouble swallowing. Negative for hearing loss and rhinorrhea.    Eyes: Negative for discharge and visual disturbance.   Respiratory: Negative for chest tightness and wheezing.    Cardiovascular: Negative for chest pain and palpitations.   Gastrointestinal: Negative for blood in stool, constipation, diarrhea and vomiting.   Endocrine: Positive for polyuria. Negative for polydipsia.   Genitourinary: Positive for hematuria. Negative for difficulty urinating, dysuria and menstrual problem.   Musculoskeletal: Negative for arthralgias, joint swelling and neck pain.   Neurological: Positive for weakness. Negative for headaches.   Psychiatric/Behavioral: Positive for confusion. Negative for dysphoric mood.       Objective:       Vitals:    07/10/20 1115   BP: (!) 140/70   BP Location: Left arm   Pulse: 89   Resp: 18   Temp: 99.9 °F (37.7 °C)   TempSrc: Tympanic   Weight: 65.3 kg (143 lb 15.4 oz)   Height: 5' 3" (1.6 m)     Physical Exam  Constitutional:       Appearance: She is well-developed.   HENT:      Head: Normocephalic.   Eyes:      Conjunctiva/sclera: Conjunctivae normal.      Pupils: Pupils are equal, round, and reactive to light.   Neck:      Musculoskeletal: Normal range of motion and neck supple. "      Thyroid: No thyromegaly.   Cardiovascular:      Rate and Rhythm: Normal rate and regular rhythm.      Heart sounds: Normal heart sounds.   Pulmonary:      Effort: Pulmonary effort is normal.      Breath sounds: Normal breath sounds.   Abdominal:      General: Bowel sounds are normal.      Palpations: Abdomen is soft.      Tenderness: There is no abdominal tenderness.   Musculoskeletal: Normal range of motion.         General: No tenderness or deformity.      Comments: Uses a walker and needs help to get onto exam table   Lymphadenopathy:      Cervical: No cervical adenopathy.   Skin:     General: Skin is warm and dry.   Neurological:      Mental Status: She is alert and oriented to person, place, and time.      Cranial Nerves: No cranial nerve deficit.      Motor: No abnormal muscle tone.      Coordination: Coordination normal.      Deep Tendon Reflexes: Reflexes normal.   Psychiatric:         Behavior: Behavior normal.         Assessment:       1. Anemia, unspecified type    2. Anxiety    3. Essential hypertension    4. Dyslipidemia    5. Bilateral carotid artery disease, unspecified type    6. Urothelial cancer        Plan:       Destinee was seen today for follow-up.    Diagnoses and all orders for this visit:    Anemia, unspecified type  -     CBC auto differential; Future  -     Iron and TIBC; Future  -     Ferritin; Future    Anxiety  -     ALPRAZolam (XANAX) 0.25 MG tablet; Take 1 tablet (0.25 mg total) by mouth 2 (two) times daily as needed for Anxiety. For anxiety    Essential hypertension    Dyslipidemia    Bilateral carotid artery disease, unspecified type    Urothelial cancer      During this visit, I reviewed the pt's history, medications, allergies, and problem list.

## 2020-07-14 NOTE — TELEPHONE ENCOUNTER
----- Message from Riza Sawyer sent at 7/14/2020  9:08 AM CDT -----  Type: Needs Medical Advice  Who Called:  [facility]    Best Call Back Number: [phone]  Additional Information: Caller states that she would like na callback regarding needing orders for blood work for the patient

## 2020-07-15 NOTE — PROGRESS NOTES
Subjective:      Patient ID: Destinee Luo is a 88 y.o. female.    Chief Complaint: Endometrial Cancer (6mth f/u)    Treatment History  Stage IBG3 (clinical) endometrial cancer  RALH/BSO in combo with a right ureteral resection and ureteroneocystostomy by Dr. Arellano on 1/4/18  Completed VB to 21 Gy on 6/7/18    Follow-up  Pertinent negatives include no abdominal pain, arthralgias, chest pain, chills, coughing, fatigue, fever, nausea, numbness, rash, sore throat, vomiting or weakness.     Here today for continued surveillance.  Had repeat TURBT in June.  Has doen well since..  Denies VB, F/C, N/V.   Review of Systems   Constitutional: Negative for activity change, appetite change, chills, fatigue and fever.   HENT: Negative for hearing loss, mouth sores, nosebleeds, sore throat and tinnitus.    Eyes: Negative for visual disturbance.   Respiratory: Negative for cough, chest tightness, shortness of breath and wheezing.    Cardiovascular: Negative for chest pain and leg swelling.   Gastrointestinal: Negative for abdominal distention, abdominal pain, blood in stool, constipation, diarrhea, nausea and vomiting.   Genitourinary: Negative for dysuria, flank pain, frequency, hematuria, pelvic pain, vaginal bleeding, vaginal discharge and vaginal pain.   Musculoskeletal: Negative for arthralgias and back pain.   Skin: Negative for rash.   Neurological: Negative for dizziness, seizures, syncope, weakness and numbness.   Hematological: Does not bruise/bleed easily.   Psychiatric/Behavioral: Negative for confusion and sleep disturbance. The patient is not nervous/anxious.        Objective:   Physical Exam:   Constitutional: She appears well-developed and well-nourished. No distress.    HENT:   Head: Normocephalic and atraumatic.    Eyes: No scleral icterus.    Neck: Normal range of motion. Neck supple.    Cardiovascular: Normal rate and intact distal pulses.  Exam reveals no cyanosis and no edema.     Pulmonary/Chest: Effort  normal. No respiratory distress. She exhibits no tenderness.        Abdominal: Soft. She exhibits no distension (healed incisions), no fluid wave and no mass. There is no abdominal tenderness. There is no rebound and no guarding. No hernia.     Genitourinary:    Vagina and rectum normal.      Pelvic exam was performed with patient supine.   There is no rash, tenderness or lesion on the right labia. There is no rash, tenderness or lesion on the left labia. Uterus is absent. Right adnexum displays no mass, no tenderness and no fullness. Left adnexum displays no mass, no tenderness and no fullness. No bleeding (cuff without defect or lesion) or unspecified prolapse of vaginal walls in the vagina. Vaginal cuff normal.Labial bartholins normal.Cervix exhibits absence. negative for vaginal discharge             Lymphadenopathy:     She has no cervical adenopathy. No inguinal adenopathy noted on the right or left side.     Skin: No cyanosis.        Assessment:     1. Ureteral cancer, right    2. Essential hypertension        Plan:       Overall continues to do well.  Has some prolapse. IRMA on exam today.  Will have her f/u at 6 month intervals.

## 2020-07-24 NOTE — TELEPHONE ENCOUNTER
Let pt know that I had not received the lab reports until her granddaughter sent them.  Her blood count is stable but her iron levels are low.  She should take a low dose iron supplement with food 3-4 days a week.  Taking it daily may cause significant constipation.  Please check on her PT referral.

## 2020-08-17 NOTE — TELEPHONE ENCOUNTER
Refill Routing Note   Medication(s) are not appropriate for processing by [facility]:       - Unclear Indication for Medication     Medication-related problems identified: No indication for a medication  Medication Therapy Plan: GERD or other approved indication for Protonix not active on problem list; Not commented on recently; Defer to you  Medication reconciliation completed: No      Automatic Epic Generated Protocol Data:        Requested Prescriptions   Pending Prescriptions Disp Refills    pantoprazole (PROTONIX) 40 MG tablet 90 tablet 3     Sig: Take 1 tablet (40 mg total) by mouth once daily.       Gastroenterology: Proton Pump Inhibitors 2 Failed - 8/17/2020  1:45 PM        Failed - An appropriate indication is on the problem list        Passed - Patient is at least 18 years old        Passed - Osteoporosis is not on problem list        Passed - Office visit in past 6 months or future 90 days.     Recent Outpatient Visits            1 month ago Ureteral cancer, right    [facility] Carlos Mann MD    1 month ago Anemia, unspecified type    [facility] NATIVIDAD Hinojosa MD    2 months ago Dysuria    [facility] [address] Judy Burroughs NP    5 months ago Abnormal urinalysis    [facility] [address] Judy Burroughs NP    7 months ago Dysuria    [facility] [address] Judy Burroughs NP          Future Appointments              In 1 week Judy Burroughs NP [facility] [address]    In 5 months NATIVIDAD Hinojosa MD [facility] [place]    In 5 months MD [facility] - GYN Oncology, [facility]                      Appointments  past 12m or future 3m with PCP    Date Provider   Last Visit   7/10/2020 NATIVIDAD Hinojosa MD   Next Visit   7/31/2020 NATIVIDAD Hinojosa MD   ED visits in past 90 days: [unfilled]     Note composed:3:20 PM 08/17/2020

## 2020-08-17 NOTE — TELEPHONE ENCOUNTER
Care Due:                  Date            Visit Type   Department     Provider  --------------------------------------------------------------------------------                                MYCHART                              FOLLOWUP/OF  [facility]  Last Visit: 07-      FICE VISIT   MEDICINE       NATIVIDAD BENJAMIN                              ESTABLISHED   [facility]  Next Visit: 01-      PATIENT      MEDICINE       NATIVIDAD BENJAMIN                                                            Last  Test          Frequency    Reason                     Performed    Due Date  --------------------------------------------------------------------------------    ALT.........  12 months..  fenofibrate..............  12- 12-    AST.........  12 months..  fenofibrate..............  12- 12-    Powered by [employer]. Reference number: [id number]. 8/17/2020 1:46:16 PM CDT

## 2020-08-27 NOTE — PROGRESS NOTES
Subjective:      Destinee Luo is a 88 y.o. female who returns today regarding her urothelial cancer. She presents today once again with her granddaughter.     The patient is s/p distal right ureterectomy and psoas hitch by Dr. Arellano for TCC in 2018. 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 She has been managed conservatively. Every three months, she undergoes ureteroscopy with laser tumor ablation and ureteral stent exchange.        Most recently she is s/p cysto with attempted right ureteroscopy, right RPG, ureteral stent exchange and TURBT on 6/18/20 with Dr. Arellano.     She returns today to schedule her next procedure. She reports occasional bladder discomfort, dysuria and gross hematuria. Denies fever/chills and flank pain.    The following portions of the patient's history were reviewed and updated as appropriate: allergies, current medications, past family history, past medical history, past social history, past surgical history and problem list.    Review of Systems  Constitutional: no fever or chills  ENT: no nasal congestion or sore throat  Respiratory: no cough or shortness of breath  Cardiovascular: no chest pain or palpitations  Gastrointestinal: no nausea or vomiting, tolerating diet  Genitourinary: as per HPI  Hematologic/Lymphatic: no easy bruising or lymphadenopathy  Musculoskeletal: no arthralgias or myalgias  Neurological: no seizures or tremors  Behavioral/Psych: no auditory or visual hallucinations     Objective:   Vital Signs:   Vitals:    08/28/20 0929   BP: (!) 152/70   Pulse: 78       Physical Exam   General: alert and oriented, no acute distress  Head: normocephalic, atraumatic  Neck: supple, no lymphadenopathy, normal ROM, no masses  Respiratory: Symmetric expansion, non-labored breathing  Cardiovascular: regular rate and rhythm, nomal pulses, no peripheral edema  Abdomen: soft, non tender, non distended, no palpable masses, no hernias, no  hepatomegaly or splenomegaly  Pelvic: deferred  Lymphatic: no inguinal nodes  Skin: normal coloration and turgor, no rashes, no suspicious skin lesions noted  Neuro: alert and oriented x3, no gross deficits  Psych: normal judgment and insight, normal mood/affect and non-anxious  No CVA tenderness    Lab Review   Urinalysis demonstrates positive for leukocytes, red blood cells, protein  Lab Results   Component Value Date    WBC 8.43 06/20/2020    HGB 8.7 (L) 06/20/2020    HCT 28.6 (L) 06/20/2020    MCV 73 (L) 06/20/2020     06/20/2020     Lab Results   Component Value Date    CREATININE 1.1 06/20/2020    BUN 17 06/20/2020       Imaging   None    Assessment:   Urothelial carcinoma  Dysuria    Plan:   Destinee was seen today for consent.    Diagnoses and all orders for this visit:    Urothelial carcinoma  -     POCT URINE DIPSTICK WITHOUT MICROSCOPE  -     Urine culture    Dysuria  -     POCT URINE DIPSTICK WITHOUT MICROSCOPE  -     Urine culture    Plan:  --Urine culture, will notify if antibiotics are needed pre operatively  --Will proceed right ureteroscopy, right RPG, ureteral stent exchange, possible TURBT on 9/24 with Dr. Arellano. Discussed the risks and benefits of the procedure. Answered all questions. Consent signed   --Pre op COVID testing 48 hours before surgery

## 2020-09-11 NOTE — TELEPHONE ENCOUNTER
Per daughter, last two cultures have been contaminated. Discussed w/NP. Verbal order given to Kathy at [facility] for catheterized urine culture.    [facility]: [phone]    ----- Message from Judy Burroughs NP sent at 9/11/2020  9:52 AM CDT -----  Please notify the patient that her urine culture appears contaminated. She will need to repeat this prior to surgery. Standing order placed. Thanks!

## 2020-09-22 NOTE — OR NURSING
Pre admit phone call completed.    Instructions given to patient about NPO status as follows:     The evening before surgery do not eat anything after 9 p.m. ( this includes hard candy, chewing gum and mints).  You may only have GATORADE, POWERADE AND WATER from 9 p.m. until you leave your home. DO NOT  DRINK ANY LIQUIDS ON THE WAY TO THE HOSPITAL.      Patient was also instructed on the below information:    Park in the Parking lot behind the hospital or in the [employer] [facility] across the street from the parking lot.  Parking is complimentary.  If you will be discharged the same day as your procedure, please arrange for a responsible adult to drive you home or  to accompany you if traveling by taxi.  YOU WILL NOT BE PERMITTED TO DRIVE OR TO LEAVE THE HOSPITAL ALONE AFTER SURGERY.  It is strongly recommended that you arrange for someone to remain with you for the first 24 hrs following your surgery.    Patient verbalized understanding of above instructions.

## 2020-09-23 NOTE — TELEPHONE ENCOUNTER
Spoke with Thu at [facility] to states the patient can take her iron medication with water. Thu verbally understood    Francisca PERAZA

## 2020-09-24 PROBLEM — C68.9 UROTHELIAL CARCINOMA: Status: ACTIVE | Noted: 2020-01-01

## 2020-09-24 PROBLEM — C68.9 UROTHELIAL CARCINOMA: Status: RESOLVED | Noted: 2020-01-01 | Resolved: 2020-01-01

## 2020-09-24 NOTE — OR NURSING
Pt and granddaughter requesting PO Zofran prior to procedure.  Granddaugher states this is the only way pt remains free of nausea post procedure.  Called Dr. Metcalf and received telephone order for PO Zofran.

## 2020-09-24 NOTE — TRANSFER OF CARE
"Anesthesia Transfer of Care Note    Patient: Destinee CARDONA Major    Procedure(s) Performed: Procedure(s) (LRB):  CYSTOURETEROSCOPY, WITH RETROGRADE PYELOGRAM AND URETERAL STENT INSERTION (Right)  TURBT (TRANSURETHRAL RESECTION OF BLADDER TUMOR) (N/A)    Patient location: PACU    Anesthesia Type: general    Transport from OR: Transported from OR on 2-3 L/min O2 by NC with adequate spontaneous ventilation    Post pain: adequate analgesia    Post assessment: no apparent anesthetic complications    Post vital signs: stable    Level of consciousness: awake    Complications: none    Transfer of care protocol was followed      Last vitals:   Visit Vitals  BP (!) 179/80 (BP Location: Right arm, Patient Position: Lying)   Pulse 87   Temp 36.6 °C (97.9 °F)   Resp 16   Ht 5' 3" (1.6 m)   Wt 63.5 kg (140 lb)   SpO2 99%   Breastfeeding No   BMI 24.80 kg/m²     "

## 2020-09-24 NOTE — ANESTHESIA POSTPROCEDURE EVALUATION
Anesthesia Post Evaluation    Patient: Destinee CARDONA Major    Procedure(s) Performed: Procedure(s) (LRB):  CYSTOURETEROSCOPY, WITH RETROGRADE PYELOGRAM AND URETERAL STENT INSERTION (Right)  TURBT (TRANSURETHRAL RESECTION OF BLADDER TUMOR) (N/A)    Final Anesthesia Type: general    Patient location during evaluation: PACU  Patient participation: Yes- Able to Participate  Level of consciousness: awake and alert  Post-procedure vital signs: reviewed and stable  Pain management: adequate  Airway patency: patent    PONV status at discharge: No PONV  Anesthetic complications: no      Cardiovascular status: blood pressure returned to baseline  Respiratory status: unassisted, spontaneous ventilation and room air  Hydration status: euvolemic  Follow-up not needed.          Vitals Value Taken Time   /73 09/24/20 1337   Temp 36.5 °C (97.7 °F) 09/24/20 1250   Pulse 86 09/24/20 1338   Resp 17 09/24/20 1305   SpO2 95 % 09/24/20 1338   Vitals shown include unvalidated device data.      No case tracking events are documented in the log.      Pain/Anabella Score: Anabella Score: 9 (9/24/2020  1:35 PM)

## 2020-09-24 NOTE — ANESTHESIA PREPROCEDURE EVALUATION
09/24/2020  Destinee Luo is a 88 y.o., female.    Pre-op Assessment    I have reviewed the Patient Summary Reports.     I have reviewed the Nursing Notes. I have reviewed the NPO Status.   I have reviewed the Medications.     Review of Systems  Anesthesia Hx:  No problems with previous Anesthesia Hx of Anesthetic complications PONV History of prior surgery of interest to airway management or planning: Previous anesthesia: General 5/19 ureteroscopy with general anesthesia.  Procedure performed at an [facility]. Airway issues documented on chart review include mask, easy, laryngeal mask airway used  Denies Family Hx of Anesthesia complications.   Denies Personal Hx of Anesthesia complications.   Social:  Non-Smoker    Hematology/Oncology:         -- Anemia: Hematology Comments: HCT 32.6 Current/Recent Cancer. Oncology Comments: Bladder,mult prev GA for bladder proceedures     EENT/Dental:EENT/Dental Normal   Cardiovascular:   Exercise tolerance: good Hypertension, well controlled hyperlipidemia Aortic valve insuff   Pulmonary:  Pulmonary Normal    Renal/:   Chronic Renal Disease, CRI    Hepatic/GI:  Hepatic/GI Normal    Musculoskeletal:  Musculoskeletal Normal    Neurological:   TIA, CVA Questionable TIA in remote past    vertigo   Endocrine:  Endocrine Normal    Dermatological:  Skin Normal    Psych:   depression          Physical Exam  General:  Well nourished    Airway/Jaw/Neck:  Airway Findings: Mouth Opening: Normal Tongue: Normal  General Airway Assessment: Adult  Mallampati: II     Eyes/Ears/Nose:  Eyes/Ears/Nose Findings:    Dental:  Dental Findings: Upper front caps   Chest/Lungs:  Chest/Lungs Clear    Heart/Vascular:  Heart Findings: Normal       Mental Status:  Mental Status Findings:  Cooperative, Alert and Oriented         Anesthesia Plan  Type of Anesthesia, risks & benefits  discussed:  Anesthesia Type:  general  Patient's Preference:   Intra-op Monitoring Plan: standard ASA monitors  Intra-op Monitoring Plan Comments:   Post Op Pain Control Plan: multimodal analgesia and per primary service following discharge from PACU  Post Op Pain Control Plan Comments:   Induction:   IV  Beta Blocker:         Informed Consent: Patient understands risks and agrees with Anesthesia plan.  Questions answered. Anesthesia consent signed with patient.  ASA Score: 3     Day of Surgery Review of History & Physical:    H&P update referred to the surgeon.     Anesthesia Plan Notes: Mult prev cysto and stents.            Ready For Surgery From Anesthesia Perspective.

## 2020-09-24 NOTE — ANESTHESIA PROCEDURE NOTES
Intubation  Performed by: Charmaine Bruno CRNA  Authorized by: Lasha Estrella MD     Intubation:     Induction:  Intravenous    Intubated:  Postinduction    Mask Ventilation:  Easy mask    Attempts:  1    Attempted By:  CRNA    Method of Intubation:  Video laryngoscopy    Blade:  Glidescope 3    Laryngeal View Grade: Grade I - full view of chords      Difficult Airway Encountered?: No      Complications:  None    Airway Device:  Oral endotracheal tube    Airway Device Size:  7.0    Style/Cuff Inflation:  Cuffed    Inflation Amount (mL):  3    Tube secured:  20    Secured at:  The lips    Placement Verified By:  Capnometry    Complicating Factors:  Poor neck/head extension    Findings Post-Intubation:  BS equal bilateral

## 2020-09-24 NOTE — DISCHARGE INSTRUCTIONS
Anesthesia: After Your Surgery  Youve just had surgery. During surgery, you received medication called anesthesia to keep you comfortable and pain-free. After surgery, you may experience some pain or nausea. This is common. Here are some tips for feeling better and recovering after surgery.    Going home  Your doctor or nurse will show you how to take care of yourself when you go home. He or she will also answer your questions. Have an adult family member or friend drive you home. For the first 24 hours after your surgery:  · Do not drive or use heavy equipment.  · Do not make important decisions or sign legal documents.  · Avoid alcohol.  · Have someone stay with you, if needed. He or she can watch for problems and help keep you safe.  · Take your time getting up from a seated or lying position. You may experience dizziness for 24 hours  Be sure to keep all follow-up appointments with your doctor. And rest after your procedure for as long as your doctor tells you to.    Coping with pain  If you have pain after surgery, pain medication will help you feel better. Take it as directed, before pain becomes severe. Also, ask your doctor or pharmacist about other ways to control pain, such as with heat, ice, and relaxation. And follow any other instructions your surgeon or nurse gives you.    URINARY RETENTION  Should you experience a decrease in your urine output or are unable to urinate following surgery, this can be due to the medications given during surgery.  We recommend you going to the nearest Emergency Department.    Tips for taking pain medication  To get the best relief possible, remember these points:  · Pain medications can upset your stomach. Taking them with a little food may help.  · Most pain relievers taken by mouth need at least 20 to 30 minutes to take effect.  · Taking medication on a schedule can help you remember to take it. Try to time your medication so that you can take it before beginning an  activity, such as dressing, walking, or sitting down for dinner.  · Constipation is a common side effect of pain medications. Contact your doctor before taking any medications like laxatives or stool softeners to help relieve constipation. Also ask about any dietary restrictions, because drinking lots of fluids and eating foods like fruits and vegetables that are high in fiber can also help. Remember, dont take laxatives unless your surgeon has prescribed them.  · Mixing alcohol and pain medication can cause dizziness and slow your breathing. It can even be fatal. Dont drink alcohol while taking pain medication.  · Pain medication can slow your reflexes. Dont drive or operate machinery while taking pain medication.  If your health care provider tells you to take acetaminophen to help relieve your pain, ask him or her how much you are supposed to take each day. (Acetaminophen is the generic name for Tylenol and other brand-name pain relievers.) Acetaminophen or other pain relievers may interact with your prescription medicines or other over-the-counter (OTC) drugs. Some prescription medications contain acetaminophen along with other active ingredients. Using both prescription and OTC acetaminophen for pain can cause you to overdose. The FDA recommends that you read the labels on your OTC medications carefully. This will help you to clearly understand the list of active ingredients, dosing instructions, and any warnings. It may also help you avoid taking too much acetaminophen. If you have questions or don't understand the information, ask your pharmacist or health care provider to explain it to you before you take the OTC medication.    Managing nausea  Some people have an upset stomach after surgery. This is often due to anesthesia, pain, pain medications, or the stress of surgery. The following tips will help you manage nausea and get good nutrition as you recover. If you were on a special diet before surgery,  ask your doctor if you should follow it during recovery. These tips may help:  · Dont push yourself to eat. Your body will tell you when to eat and how much.  · Start off with clear liquids and soup. They are easier to digest.  · Progress to semi-solid foods (mashed potatoes, applesauce, and gelatin) as you feel ready.  · Slowly move to solid foods. Dont eat fatty, rich, or spicy foods at first.  · Dont force yourself to have three large meals a day. Instead, eat smaller amounts more often.  · Take pain medications with a small amount of solid food, such as crackers or toast to avoid nausea.      Call your surgeon if    · You feel too sleepy, dizzy, or groggy (medication may be too strong).  · You have side effects like nausea, vomiting, or skin changes (rash, itching, or hives).   © 9688-5817 [employer]. [address] [place] [address], [place] [address]. All rights reserved. This information is not intended as a substitute for professional medical care. Always follow your healthcare professional's instructions.

## 2020-09-24 NOTE — OP NOTE
[facility]  Surgery Department  Operative Note    SUMMARY     Date of Procedure: 9/24/2020     Procedure: Procedure(s) (LRB):  CYSTOURETEROSCOPY, WITH RETROGRADE PYELOGRAM AND URETERAL STENT INSERTION (Right)  TURBT (TRANSURETHRAL RESECTION OF BLADDER TUMOR) (N/A)     Surgeon(s) and Role:     * Kiet Arellano MD - Primary        Assistant: none    Pre-Operative Diagnosis: Urothelial carcinoma [C68.9]    Post-Operative Diagnosis: Post-Op Diagnosis Codes:     * Urothelial carcinoma [C68.9]  Bladder stone, calcified ureteral stent    Anesthesia: General    Complications: No    Estimated Blood Loss (EBL): * No values recorded between 9/24/2020 11:35 AM and 9/24/2020 12:54 PM *           Specimens:   Specimen (12h ago, onward)    None                  Condition: Good    Disposition: PACU - hemodynamically stable.    Attestation: I performed the procedure.    History:  88-year-old woman with a long history of urothelial cancer being treated palliatively.  She had a right distal ureterectomy with reimplantation approximately 3 years ago.  She has had a recurrence in the right kidney.  She has declined nephro ureterectomy more aggressive treatment.  She has been treated with sequential ureteroscopy.  Associated with this she has had multiple recurrences in the bladder.  She presents for evaluation and management of the above.  Preop urine culture was negative.  She did have recent infection which has been treated.    Procedure In Detail:  The patient is brought to cysto and undergoes general anesthesia.  Time-out was performed.  Preoperative IV antibiotics administered tense and SCDs were applied.   film was obtained.  No calcifications were visible in the stent.  Cystoscopy was performed with 30 and 70 degree lenses.  There are 9 papillary bladder tumors noted.  The left ureteral orifice is identified.  The right side of the bladder was quite distorted given her previous psoas hitch.  The stent  is seen emanating from the new ureteral orifice at the dome of the bladder.  There is a large calcification on the end of the stent.  The calcification was fragmented with a combination of the mechanical stone  and the holmium laser fiber.  Following this the stent was removed under fluoroscopic guidance without difficulty.  The proximal coil opens and there are no calcifications on the proximal coil.  The cystoscope was removed.  The 24 Indonesian resectoscope was advanced into the bladder.  The 9 visible tumors were resected and the base of resection fulgurated.  There is no evidence of perforation.  In 1 area on the right side of the bladder there is a small amount of superficial perivesical fat visible however.  I chose not to perform ureteroscopy at this time, because I was concerned distending the bladder my create a perforation.  Rather we will re-stage her with a CT scan as an outpatient and consider nephro ureterectomy if she and her family willing to pursue this.  The patient tolerated the procedure well.  A 3 way Dia catheter was placed.  The catheter irrigated clearly a was connected to continuous irrigation.  The patient's abdomen remained soft and distended throughout the case    Addendum.  In aggregate the tumors totaled approximately 5.2cm in diameter    Addendum.  Largest tumor was 2-1/2 cm in diameter

## 2020-09-24 NOTE — PROGRESS NOTES
Gu post op    Doing well  Urine clear without CBI  Af vss  Ab soft and nondistended    Ok to dc home with mitchell  See me tomorrow at 245pm for voiding trial

## 2020-09-24 NOTE — PLAN OF CARE
Destinee Luo has met all discharge criteria from Phase II. Vital Signs are stable, ambulating  without difficulty. Discharge instructions given per Meryl LIM.  [facility] understanding. Discharged from facility via wheelchair in stable condition.

## 2020-09-24 NOTE — INTERVAL H&P NOTE
The patient has been examined and the H&P has been reviewed:    I concur with the findings and no changes have occurred since H&P was written.    Surgery risks, benefits and alternative options discussed and understood by patient/family.    Recent UTIs treated  Follow up urine cs neg      Active Hospital Problems    Diagnosis  POA    Urothelial carcinoma [C68.9]  Yes      Resolved Hospital Problems   No resolved problems to display.

## 2020-09-25 NOTE — PROGRESS NOTES
"Subjective:      Destinee Luo is a 88 y.o. female who returns today regarding her     Voiding trial.    The following portions of the patient's history were reviewed and updated as appropriate: allergies, current medications, past family history, past medical history, past social history, past surgical history and problem list.    Review of Systems  A comprehensive multipoint review of systems was negative except as otherwise stated in the HPI.     Objective:   Vitals: /64 (BP Location: Left arm, Patient Position: Sitting, BP Method: Large (Automatic))   Pulse 84   Ht 5' 3" (1.6 m)   Wt 63.5 kg (140 lb)   BMI 24.80 kg/m²     Physical Exam   General: alert and oriented, no acute distress  Respiratory: Symmetric expansion, non-labored breathing  Cardiovascular: normal to inspection  Abdomen: non distended   Skin: normal coloration and turgor, no rashes, no suspicious skin lesions noted  Neuro: no gross deficits  Psych: normal judgment and insight, normal mood/affect and non-anxious    Physical Exam    Lab Review   Urinalysis demonstrates no specimen    Lab Results   Component Value Date    WBC 6.13 09/24/2020    HGB 9.5 (L) 09/24/2020    HCT 32.6 (L) 09/24/2020    MCV 75 (L) 09/24/2020     (H) 09/24/2020     Lab Results   Component Value Date    CREATININE 1.1 09/24/2020    BUN 19 09/24/2020       Imaging  -  Assessment and Plan:   Urothelial carcinoma      We discussed possible right nephroureteroectomy  Will get CT urogram, CXR and return with family to discuss further    Voiding trial today    "

## 2020-10-02 NOTE — PROGRESS NOTES
Dr Arellano forwarded these results to the patient via [employer]  He will discuss the results with the patient in person at the next appointment.  The patient was instructed to make an appointment if she does not already have one scheduled.

## 2020-10-05 NOTE — PROGRESS NOTES
Dr Arellano forwarded these results to the patient via [email or web address].   CTchest ordered.  Please contact pt's granddaughter and schedule this.  I called Kelly and left a voicemail saying that we would get the CT of the chest.  I also sent the results via [email or web address]

## 2020-10-09 NOTE — TELEPHONE ENCOUNTER
"Patient's granddaughter says they tried to set up an appt w/Dr. Kilpatrick but no referral was in place. They request referral to proceed with care. Thanks!    ----- Message from Cecy Garcia sent at 10/8/2020  4:37 PM CDT -----  Patient Assist    Name of caller:  grand daughter   Contact Preference: [phone]  Does Patient feel the need to see the MD today? No   Physician:   What is the nature of the call?  - missed a call from a nurse regarding referring the pt to hematology.     Additional Notes:   "Thank you for all that you do for our patients'"            "

## 2020-10-10 NOTE — PROGRESS NOTES
Health Maintenance Due   Topic Date Due    TETANUS VACCINE  12/16/1949    Shingles Vaccine (1 of 2) 12/16/1981    Pneumococcal Vaccine (65+ High/Highest Risk) (1 of 2 - PCV13) 12/16/1996    Influenza Vaccine (1) 08/01/2020     Updates were requested from care everywhere.  Chart was reviewed for overdue Proactive [facility] Encounters ([facility]) topics (CRS, Breast Cancer Screening, Eye exam)  Health Maintenance has been updated.  LINKS immunization registry triggered.  Immunizations were reconciled.

## 2020-10-12 NOTE — TELEPHONE ENCOUNTER
No new care gaps identified.  Powered by [employer]. Reference number: [id number]. 10/12/2020 5:13:25 PM   CDT

## 2020-10-13 NOTE — PROGRESS NOTES
"Subjective:      Destinee Luo is a 88 y.o. female who returns today regarding her     New diagnosis of metastatic urothelial cancer.  Here with her family to discuss results of her recent imaging    Has been managed palliatively with serial ureteroscopy and ablation  Initially opted against nephroureterectomy due to her age and comorbidities  We reconsidered nephroureterectomy due to persistent hematuria   However, a preop metastatic eval suggests pulm mets and mediastinal adenopathy    Scheduled to see Oncology, Dr Kilpatrick, on Friday    Chronic hematuria    Walks slowly  Able to  around her house with a walker      The following portions of the patient's history were reviewed and updated as appropriate: allergies, current medications, past family history, past medical history, past social history, past surgical history and problem list.    Review of Systems  Pertinent items are noted in HPI.  A comprehensive multipoint review of systems was negative except as otherwise stated in the HPI.     Objective:   Vitals: BP (!) 149/65 (BP Location: Right arm, Patient Position: Sitting, BP Method: Medium (Automatic))   Pulse 88   Ht 5' 3" (1.6 m)   Wt 63.5 kg (140 lb)   BMI 24.80 kg/m²     Physical Exam   General: alert and oriented, no acute distress; travels in wheelchair  Respiratory: Symmetric expansion, non-labored breathing  Cardiovascular: normal to inspection  Abdomen: non distended   Skin: normal coloration and turgor, no rashes, no suspicious skin lesions noted  Neuro: no gross deficits  Psych: normal judgment and insight, normal mood/affect and non-anxious    Physical Exam    Lab Review   Urinalysis demonstrates   Lab Results   Component Value Date    WBC 6.13 09/24/2020    HGB 9.5 (L) 09/24/2020    HCT 32.6 (L) 09/24/2020    MCV 75 (L) 09/24/2020     (H) 09/24/2020     Lab Results   Component Value Date    CREATININE 1.1 09/24/2020    BUN 19 09/24/2020       Imaging  CT chest  Impression:     1. " Multiple solid pulmonary nodules, the largest of which measures 18 mm within the anterior aspect of the left upper lobe.  Given the patient's history of urothelial malignancy, metastatic disease cannot be excluded.  Consider additional evaluation with a PET-CT examination to assess for FDG hypermetabolism.  Ultimately, percutaneous biopsy may be necessary for a definitive diagnosis.  2. Evidence of prior granulomatous disease in the chest and upper abdomen.  3. Mildly enlarged mediastinal lymph nodes.  4. Prominent coronary artery calcifications and aortic valve calcifications.  This report was flagged in Epic as abnormal.        Electronically signed by: Cameron Bennett MD  Date:                                            10/07/2020  Time:                                           15:24      Impression:     Diffuse involvement of the right renal collecting system by the patient's known urothelial carcinoma.  New moderate hydronephrosis is present.  Irregularity along the right posterior bladder wall raises consideration for bladder involvement.     New 1.5 cm mass within the left renal pelvis suspicious for neoplasm.  Thrombus is an additional consideration.     Hypodense material dependently within the urinary bladder likely representing thrombus.     New 6 mm right lower lobe nodule.  Pulmonary metastatic disease is not excluded.  Complete evaluation of the chest with chest CT is recommended.     Unchanged indeterminate left adrenal nodules, stable since 11/13/2017.     This report was flagged in Epic as abnormal.        Electronically signed by: Conrad Mueller MD  Date:                                            10/02/2020  Time:                                           16:02      Assessment and Plan:   Urothelial cancer; suspect metastatic disease    PET CT ordered  See Heme Onc, Dr Kilpatrick, this Friday    Discussesd Keytruda and other treatments for metastatic urothelial cancer vs palliative treatment  Answered  all questions    RTC after above

## 2020-10-13 NOTE — PROGRESS NOTES
CC:  Lung nodules, noninvasive bladder cancer, history of stage I endometrial cancer    HPI:  Ms Luo is an 89 yo woman who presents today for further evaluation of lung nodules.  She underwent RALH/BSO in combo with a right ureteral resection and ureteroneocystostomy by Deanne Mann and Adan on 1/4/18 for a stage IBG3 (clinical) endometrial cancer, completed VB to 21 Gy on 6/7/18. She has been followed by Dr Arellano for non-invasive papillary urothelial carcinoma, has been managed conservatively. Every three months, she undergoes ureteroscopy with laser tumor ablation and ureteral stent exchange. The majority of the pathology showed non-invasive high-grade papillary urothelial carcinoma with the exception of pathology from 6/18/20 which showed high grade papillary urothelial carcinoma, features are focally suggestive of superficial invasion. She also has path from 2/7/2019 which showed R renal pelvis: high grade papillary urothelial carcinoma  Most recently, she underwent cystoureteroscopy with retrograde pyelogram and ureteral stent insertion (Right) and TURBT on 9/24/2020. There were 9 papillary bladder tumors noted. They were resected and the base of resection fulgurated. In 1 area on the right side of the bladder there is a small amount of superficial perivesical fat visible however.     CT urogram 10/2/2020 showed diffuse involvement of the right renal collecting system by the patient's known urothelial carcinoma.  New moderate hydronephrosis is present.  Irregularity along the right posterior bladder wall raises consideration for bladder involvement.   New 1.5 cm mass within the left renal pelvis suspicious for neoplasm.  Thrombus is an additional consideration.     Hypodense material dependently within the urinary bladder likely representing thrombus.     New 6 mm right lower lobe nodule.  Pulmonary metastatic disease is not excluded.  Complete evaluation of the chest with chest CT is recommended.      Unchanged indeterminate left adrenal nodules, stable since 11/13/2017.    CT chest 10/7/2020: 1. Multiple solid pulmonary nodules, the largest of which measures 18 mm within the anterior aspect of the left upper lobe.  Given the patient's history of urothelial malignancy, metastatic disease cannot be excluded.  Consider additional evaluation with a PET-CT examination to assess for FDG hypermetabolism.  Ultimately, percutaneous biopsy may be necessary for a definitive diagnosis.  2. Evidence of prior granulomatous disease in the chest and upper abdomen.  3. Mildly enlarged mediastinal lymph nodes.  4. Prominent coronary artery calcifications and aortic valve calcifications.    This was followed by a PET scan on 10/15/20:  Redemonstration of irregular urothelial thickening of the right pelvicaliceal system extending to the urinary bladder, consistent with patient known urothelial cancer.  Compromised evaluation of the metabolic activity of the  tract on FDG PET.     Hypermetabolic pulmonary nodules, concerning for metastasis.  Partially calcified and noncalcified mediastinal and hilar lymph nodes, possibly representing metastases or inflammatory nature.  Tissue sampling would be required for definitive diagnosis.     Questionable hypermetabolic focus in the right hepatic lobe which may represent metastasis and hypermetabolic focus at T9 body, concerning for osseous metastasis.  Consider MRI for further evaluation.    She presents today for further management, accompanied by granddaughter, Ara. Has been very weak over the past few weeks. Has been on iron pills since July but is not able to tolerate it every day. Takes iron pills 3 times a week. Low appetite. Lost 30 lbs since Jan. +hematuria, urinary urgency and incontinence    ECOG: 3    Past Medical History:   Diagnosis Date    Anticoagulant long-term use     stopped Plavix and ASA 11/2017    Cancer     uro    High cholesterol     Hyperlipidemia      Hypertension 09/22/2020    denies chest pain    Macular degeneration     PONV (postoperative nausea and vomiting)     S/P complete hysterectomy 01/04/2018    Stroke 2012    TIA, no deficits        Past Surgical History:   Procedure Laterality Date    ABLATION OF NEOPLASM OF BLADDER USING LASER  2/10/2020    Procedure: ABLATION, NEOPLASM, BLADDER, USING LASER;  Surgeon: Kiet Arellano MD;  Location: [facility];  Service: Urology;;    ABLATION OF NEOPLASM OF KIDNEY USING LASER Right 3/6/2019    Procedure: ABLATION, NEOPLASM, KIDNEY, USING LASER;  Surgeon: Kiet Arellano MD;  Location: [facility];  Service: Urology;  Laterality: Right;    BIOPSY OF URETER Right 2/10/2020    Procedure: BIOPSY, URETER;  Surgeon: Kiet Arellano MD;  Location: [facility];  Service: Urology;  Laterality: Right;    COLONOSCOPY  prior to 2008    CYSTOSCOPY W/ URETERAL STENT PLACEMENT Right 2/7/2019    Procedure: CYSTOSCOPY, WITH URETERAL STENT INSERTION, BIOPSY AND ABLATION RIGHT RENAL PELVIC TUMOR;  Surgeon: Kiet Arellano MD;  Location: [facility];  Service: Urology;  Laterality: Right;    CYSTOSCOPY W/ URETERAL STENT PLACEMENT Right 11/14/2019    Procedure: CYSTOSCOPY, WITH URETERAL STENT INSERTION;  Surgeon: Kiet Arellano MD;  Location: [facility];  Service: Urology;  Laterality: Right;    CYSTOSCOPY WITH URETEROSCOPY, RETROGRADE PYELOGRAPHY, AND INSERTION OF STENT  8/22/2019    Procedure: CYSTOSCOPY, WITH RETROGRADE PYELOGRAM AND URETERAL STENT INSERTION;  Surgeon: Kiet Arellano MD;  Location: [facility];  Service: Urology;;    CYSTOURETEROSCOPY WITH RETROGRADE PYELOGRAPHY AND INSERTION OF STENT INTO URETER Right 3/6/2019    Procedure: CYSTOURETEROSCOPY, WITH RETROGRADE PYELOGRAM AND URETERAL STENT INSERTION;  Surgeon: Kiet Arellano MD;  Location: [facility];  Service: Urology;  Laterality: Right;    CYSTOURETEROSCOPY WITH RETROGRADE PYELOGRAPHY AND INSERTION OF STENT INTO URETER Right 2/10/2020    Procedure: CYSTOURETEROSCOPY,  WITH RETROGRADE PYELOGRAM AND URETERAL STENT EXCHANGE AND CALCULUS REMOVAL;  Surgeon: Kiet Arellano MD;  Location: [facility];  Service: Urology;  Laterality: Right;    CYSTOURETEROSCOPY WITH RETROGRADE PYELOGRAPHY AND INSERTION OF STENT INTO URETER Right 6/18/2020    Procedure: CYSTOURETEROSCOPY, WITH RETROGRADE PYELOGRAM AND URETERAL STENT INSERTION;  Surgeon: Kiet Arellano MD;  Location: [facility];  Service: Urology;  Laterality: Right;    EYE SURGERY      HYSTERECTOMY  01/04/2018    LASER LITHOTRIPSY  11/14/2019    Procedure: LITHOTRIPSY, USING LASER;  Surgeon: Kiet Arellano MD;  Location: [facility];  Service: Urology;;    LASER LITHOTRIPSY  9/24/2020    Procedure: LITHOTRIPSY, USING LASER;  Surgeon: Kiet Arellano MD;  Location: [facility];  Service: Urology;;    RETROGRADE PYELOGRAPHY  11/14/2019    Procedure: PYELOGRAM, RETROGRADE;  Surgeon: Kiet Arellano MD;  Location: [facility];  Service: Urology;;    URETERAL REIMPLANTION      URETERAL STENT PLACEMENT      URETEROSCOPY      URETEROSCOPY Bilateral 2/7/2019    Procedure: URETEROSCOPY; bilateral retorgrade pyelogram;  Surgeon: Kiet Arellano MD;  Location: [facility];  Service: Urology;  Laterality: Bilateral;    URETEROSCOPY Right 5/23/2019    Procedure: URETEROSCOPY;  Surgeon: Kiet Arellano MD;  Location: [facility];  Service: Urology;  Laterality: Right;    URETEROSCOPY Right 8/22/2019    Procedure: URETEROSCOPY; laser ablation of tumor;  Surgeon: Kiet Arellano MD;  Location: [facility];  Service: Urology;  Laterality: Right;    URETEROSCOPY Right 11/14/2019    Procedure: URETEROSCOPY;  Surgeon: Kiet Arellano MD;  Location: [facility];  Service: Urology;  Laterality: Right;    URETEROSCOPY  6/18/2020    Procedure: ATTEMPTED URETEROSCOPY;  Surgeon: Kiet Arellano MD;  Location: [facility];  Service: Urology;;       Family History   Problem Relation Age of Onset    Cancer Mother 60        melanoma    Colon cancer Neg Hx     Colon polyps Neg  Hx     Crohn's disease Neg Hx     Ulcerative colitis Neg Hx     Stomach cancer Neg Hx     Esophageal cancer Neg Hx        Social History     Socioeconomic History    Marital status:      Spouse name: Not on file    Number of children: Not on file    Years of education: Not on file    Highest education level: Not on file   Occupational History    Not on file   Social Needs    Financial resource strain: Not very hard    Food insecurity     Worry: Never true     Inability: Never true    Transportation needs     Medical: No     Non-medical: No   Tobacco Use    Smoking status: Never Smoker    Smokeless tobacco: Never Used   Substance and Sexual Activity    Alcohol use: No     Frequency: Never     Drinks per session: Patient refused     Binge frequency: Never    Drug use: No    Sexual activity: Never   Lifestyle    Physical activity     Days per week: 0 days     Minutes per session: 0 min    Stress: To some extent   Relationships    Social connections     Talks on phone: More than three times a week     Gets together: Once a week     Attends Worship service: Not on file     Active member of club or organization: No     Attends meetings of clubs or organizations: Never     Relationship status:    Other Topics Concern    Not on file   Social History Narrative    Not on file       Review of Systems   Constitutional: Positive for activity change, appetite change, fatigue and unexpected weight change. Negative for chills and fever.   HENT: Negative for mouth sores, nosebleeds, tinnitus, trouble swallowing and voice change.    Eyes: Negative for pain, redness and visual disturbance.   Respiratory: Negative for cough, shortness of breath and wheezing.    Cardiovascular: Negative for chest pain, palpitations and leg swelling.   Gastrointestinal: Negative for abdominal distention, abdominal pain, blood in stool, constipation, diarrhea, nausea and vomiting.   Endocrine: Negative for polydipsia,  polyphagia and polyuria.   Genitourinary: Positive for hematuria and urgency. Negative for flank pain.   Musculoskeletal: Negative for arthralgias, back pain, gait problem, joint swelling, myalgias, neck pain and neck stiffness.   Skin: Negative for color change, pallor, rash and wound.   Neurological: Negative for tremors, seizures, syncope, speech difficulty, weakness, light-headedness, numbness and headaches.   Hematological: Negative for adenopathy. Does not bruise/bleed easily.   Psychiatric/Behavioral: Negative for confusion, dysphoric mood and self-injury. The patient is nervous/anxious.    All other systems reviewed and are negative.      Objective:  Physical Exam   Constitutional: She is oriented to person, place, and time. She appears well-nourished.   Frail, in a wheelchair, cannot get on the exam table without assistance   HENT:   Head: Normocephalic and atraumatic.   Mouth/Throat: No oropharyngeal exudate.   Eyes: Pupils are equal, round, and reactive to light. Conjunctivae and EOM are normal. No scleral icterus.   Neck: Normal range of motion. Neck supple.   Cardiovascular: Normal rate, regular rhythm and normal heart sounds. Exam reveals no gallop and no friction rub.   No murmur heard.  Pulmonary/Chest: Effort normal and breath sounds normal.   Abdominal: Soft. Bowel sounds are normal. She exhibits no distension and no mass. There is no abdominal tenderness. There is no rebound.   Musculoskeletal: Normal range of motion.         General: No edema.   Lymphadenopathy:     She has no cervical adenopathy.   Neurological: She is alert and oriented to person, place, and time.   Skin: Skin is warm and dry. No rash noted. No erythema. No pallor.   Psychiatric: Her behavior is normal. Judgment and thought content normal. Her mood appears anxious.       Labs:  Labs reviewed. +microcytic anemia. Ferritin level low in July 2020    Imaging Data:  CT urogram 10/2/2020 showed diffuse involvement of the right renal  collecting system by the patient's known urothelial carcinoma.  New moderate hydronephrosis is present.  Irregularity along the right posterior bladder wall raises consideration for bladder involvement.   New 1.5 cm mass within the left renal pelvis suspicious for neoplasm.  Thrombus is an additional consideration.     Hypodense material dependently within the urinary bladder likely representing thrombus.     New 6 mm right lower lobe nodule.  Pulmonary metastatic disease is not excluded.  Complete evaluation of the chest with chest CT is recommended.     Unchanged indeterminate left adrenal nodules, stable since 11/13/2017.    CT chest 10/7/2020: 1. Multiple solid pulmonary nodules, the largest of which measures 18 mm within the anterior aspect of the left upper lobe.  Given the patient's history of urothelial malignancy, metastatic disease cannot be excluded.  Consider additional evaluation with a PET-CT examination to assess for FDG hypermetabolism.  Ultimately, percutaneous biopsy may be necessary for a definitive diagnosis.  2. Evidence of prior granulomatous disease in the chest and upper abdomen.  3. Mildly enlarged mediastinal lymph nodes.  4. Prominent coronary artery calcifications and aortic valve calcifications.    This was followed by a PET scan on 10/15/20:  Redemonstration of irregular urothelial thickening of the right pelvicaliceal system extending to the urinary bladder, consistent with patient known urothelial cancer.  Compromised evaluation of the metabolic activity of the  tract on FDG PET.     Hypermetabolic pulmonary nodules, concerning for metastasis.  Partially calcified and noncalcified mediastinal and hilar lymph nodes, possibly representing metastases or inflammatory nature.  Tissue sampling would be required for definitive diagnosis.     Questionable hypermetabolic focus in the right hepatic lobe which may represent metastasis and hypermetabolic focus at T9 body, concerning for osseous  metastasis.  Consider MRI for further evaluation.      Assessment and plan:    1. Urothelial carcinoma    2. Endometrial cancer    3. Lung nodule    4. Iron deficiency anemia due to chronic blood loss      1-3  - Ms Babin is a 89 yo woman with recurrent noninvasive urothelial carcinoma of the  tract, history of stage IBG3 endometrial cancer s/p surgery in 2018, who now have pulmonary metastases.   - Long discussion with patient and granddaughter. Reviewed PET scan images together. I recommend IR biopsy of the lung nodule to see if it is from bladder primary versus endometrial primary  - IR biopsy  - return one week after biopsy to discuss results and treatment plan. She is very frail. Not a candidate for chemo. If +urothelial can consider immunotherapy    4.  - 2/2 chronic blood loss from  tract  - check ferritin and iron today  - cannot tolerate po iron  - Discussed side effects of Injectafer which include but are not limited to infusion reaction, shortness of breath, hives, rash, flushing, itching, headaches, skin discoloration at the injection site, nausea, vomiting, low phosphorus level, elevated blood pressure, elevated liver enzymes, low phosphorus level. Patient understands the risks and agrees with proceeding with Injectafer.   - They live in [place]. Will schedule her for the first dose of injectafer when she returns for biopsy, and the second dose when she returns to discuss results    Their multiple questions were answered in the clinic. Encouraged to call should issues arise.   I spent 60 minutes with the patient with at least 50% of the time on face-to-face counseling.

## 2020-10-16 PROBLEM — D50.0 IRON DEFICIENCY ANEMIA DUE TO CHRONIC BLOOD LOSS: Status: ACTIVE | Noted: 2020-01-01

## 2020-10-16 NOTE — LETTER
October 16, 2020      Kiet Arellano MD  [address]  [address]  [place] [address]           [facility] [address]  [address], [address]  [facility] [address]  Phone: [phone]          Patient: Destinee Luo   MR Number: [id number]   YOB: 1931   Date of Visit: 10/16/2020       Dear Dr. Kiet Arellano:    Thank you for referring Destinee Luo to me for evaluation. Attached you will find relevant portions of my assessment and plan of care.    If you have questions, please do not hesitate to call me. I look forward to following Destinee Luo along with you.    Sincerely,    Trish Kilpatrick MD    Enclosure  CC:  No Recipients    If you would like to receive this communication electronically, please contact [email or web address] or [phone] to request more information on [facility] access.    For providers and/or their staff who would like to refer a patient to [facility], please contact us through our one-stop-shop provider referral line, [facility], at [phone].    If you feel you have received this communication in error or would no longer like to receive these types of communications, please e-mail [email or web address]

## 2020-10-16 NOTE — Clinical Note
Get CBC, CMP, ferritin, iron,  today. Schedule IR lung biopsy at [facility]. Verify with PA re injectafer. When she returns for biopsy get first dose of injectafer. See me 1 week after biopsy then get the second dose of injectafer the same day

## 2020-10-20 PROBLEM — C68.9 UROTHELIAL CANCER: Status: RESOLVED | Noted: 2019-08-22 | Resolved: 2020-01-01

## 2020-11-05 NOTE — DISCHARGE INSTRUCTIONS
After the procedure:  · Youll be taken to a room to rest until the anesthesia wears off.  · A chest X-ray may be done. This is to make sure there was no damage to your lungs or the area where the needle was placed.  · When its time for you to go home, have an adult family member or friend ready to drive you.  Recovering at home  You may cough up a small amount of blood shortly after the procedure. Later, you may also have some soreness around the biopsy site. Once at home, follow any instructions youre given. Be sure to:  · Take all medicines as directed.  · Care for the biopsy site as instructed.  · Check for signs of infection at the biopsy site (see below).  · Do not bathe or shower until your provider says it's OK. If you wish, you may wash with a sponge or washcloth.  · Avoid heavy lifting and other strenuous activities as directed.  · If you plan any air travel, ask your provider when you can do so. You may be told not to fly for a few weeks. This is because pressure changes may affect your lungs.  When to call your provider  Call 911 or your local emergency number if you have sudden, severe difficulty breathing. This could be a life-threatening emergency.  Call your healthcare provider for less severe symptoms that are still concerning. If you are unable to speak with your provider, go to the emergency room if you have any of the following symptoms:  · Fever of 100.4°F (38°C) or higher, or as directed by your provider  · Sudden chest pain, shortness of breath, or fainting  · Coughing up increasing amounts of blood  · Signs of infection at the biopsy site, such as increased redness or swelling, warmth, more pain, bleeding, or bad-smelling drainage         Anesthesia: Monitored Anesthesia Care (MAC)    Anesthesia Safety  · Have an adult family member or friend drive you home after the procedure.  · For the first 24 hours after your surgery:  ¨ Do not drive or use heavy equipment.  ¨ Do not make important  decisions or sign documents.  ¨ Avoid alcohol.  ¨ Have someone stay with you, if possible. They can watch for problems and help keep you safe.    Please follow any additional instructions from Dr. Hernandez

## 2020-11-05 NOTE — PROCEDURES
Radiology Post-Procedure Note    Pre Op Diagnosis: L lung nodule biopsy  Post Op Diagnosis: Same    Procedure: lung biopsy    Procedure performed by: Johan Hernandez MD    Written Informed Consent Obtained: Yes  Specimen Removed: YES 4 core specimens  Estimated Blood Loss: Minimal    Findings:   Successful L lung nodule biopsy.    Patient tolerated procedure well.    @SIG@

## 2020-11-05 NOTE — H&P
Consult/H&P Note  Interventional Radiology    Consult Requested By: oncology    Reason for Consult: urothelial carcinoma    SUBJECTIVE:     Chief Complaint: progression    History of Present Illness: 89 yo F with urothelial carcinoma, new lung nodule, concern for metastatic disease.    Past Medical History:   Diagnosis Date    Anticoagulant long-term use     stopped Plavix and ASA 11/2017    Cancer     uro    High cholesterol     Hyperlipidemia     Hypertension 09/22/2020    denies chest pain    Macular degeneration     PONV (postoperative nausea and vomiting)     S/P complete hysterectomy 01/04/2018    Stroke 2012    TIA, no deficits     Past Surgical History:   Procedure Laterality Date    ABLATION OF NEOPLASM OF BLADDER USING LASER  2/10/2020    Procedure: ABLATION, NEOPLASM, BLADDER, USING LASER;  Surgeon: Kiet Arellano MD;  Location: [facility];  Service: Urology;;    ABLATION OF NEOPLASM OF KIDNEY USING LASER Right 3/6/2019    Procedure: ABLATION, NEOPLASM, KIDNEY, USING LASER;  Surgeon: Kiet Arellano MD;  Location: [facility];  Service: Urology;  Laterality: Right;    BIOPSY OF URETER Right 2/10/2020    Procedure: BIOPSY, URETER;  Surgeon: Kiet Arellano MD;  Location: [facility];  Service: Urology;  Laterality: Right;    COLONOSCOPY  prior to 2008    CYSTOSCOPY W/ URETERAL STENT PLACEMENT Right 2/7/2019    Procedure: CYSTOSCOPY, WITH URETERAL STENT INSERTION, BIOPSY AND ABLATION RIGHT RENAL PELVIC TUMOR;  Surgeon: Kiet Arellano MD;  Location: [facility];  Service: Urology;  Laterality: Right;    CYSTOSCOPY W/ URETERAL STENT PLACEMENT Right 11/14/2019    Procedure: CYSTOSCOPY, WITH URETERAL STENT INSERTION;  Surgeon: Kiet Arellano MD;  Location: [facility];  Service: Urology;  Laterality: Right;    CYSTOSCOPY WITH URETEROSCOPY, RETROGRADE PYELOGRAPHY, AND INSERTION OF STENT  8/22/2019    Procedure: CYSTOSCOPY, WITH RETROGRADE PYELOGRAM AND URETERAL STENT INSERTION;  Surgeon: Kiet Arellano,  MD;  Location: [facility];  Service: Urology;;    CYSTOURETEROSCOPY WITH RETROGRADE PYELOGRAPHY AND INSERTION OF STENT INTO URETER Right 3/6/2019    Procedure: CYSTOURETEROSCOPY, WITH RETROGRADE PYELOGRAM AND URETERAL STENT INSERTION;  Surgeon: Kiet Arellano MD;  Location: [facility];  Service: Urology;  Laterality: Right;    CYSTOURETEROSCOPY WITH RETROGRADE PYELOGRAPHY AND INSERTION OF STENT INTO URETER Right 2/10/2020    Procedure: CYSTOURETEROSCOPY, WITH RETROGRADE PYELOGRAM AND URETERAL STENT EXCHANGE AND CALCULUS REMOVAL;  Surgeon: Kiet Arellano MD;  Location: [facility];  Service: Urology;  Laterality: Right;    CYSTOURETEROSCOPY WITH RETROGRADE PYELOGRAPHY AND INSERTION OF STENT INTO URETER Right 6/18/2020    Procedure: CYSTOURETEROSCOPY, WITH RETROGRADE PYELOGRAM AND URETERAL STENT INSERTION;  Surgeon: Kiet Arellano MD;  Location: [facility];  Service: Urology;  Laterality: Right;    EYE SURGERY      HYSTERECTOMY  01/04/2018    LASER LITHOTRIPSY  11/14/2019    Procedure: LITHOTRIPSY, USING LASER;  Surgeon: Kiet Arellano MD;  Location: [facility];  Service: Urology;;    LASER LITHOTRIPSY  9/24/2020    Procedure: LITHOTRIPSY, USING LASER;  Surgeon: Kiet Arellano MD;  Location: [facility];  Service: Urology;;    RETROGRADE PYELOGRAPHY  11/14/2019    Procedure: PYELOGRAM, RETROGRADE;  Surgeon: Kiet Arellano MD;  Location: [facility];  Service: Urology;;    URETERAL REIMPLANTION      URETERAL STENT PLACEMENT      URETEROSCOPY      URETEROSCOPY Bilateral 2/7/2019    Procedure: URETEROSCOPY; bilateral retorgrade pyelogram;  Surgeon: Kiet Arellano MD;  Location: [facility];  Service: Urology;  Laterality: Bilateral;    URETEROSCOPY Right 5/23/2019    Procedure: URETEROSCOPY;  Surgeon: Kiet Arellano MD;  Location: [facility];  Service: Urology;  Laterality: Right;    URETEROSCOPY Right 8/22/2019    Procedure: URETEROSCOPY; laser ablation of tumor;  Surgeon: Kiet Arellano MD;  Location: [facility];   Service: Urology;  Laterality: Right;    URETEROSCOPY Right 11/14/2019    Procedure: URETEROSCOPY;  Surgeon: Kiet Arellano MD;  Location: [facility];  Service: Urology;  Laterality: Right;    URETEROSCOPY  6/18/2020    Procedure: ATTEMPTED URETEROSCOPY;  Surgeon: Kiet Arellano MD;  Location: [facility];  Service: Urology;;     Family History   Problem Relation Age of Onset    Cancer Mother 60        melanoma    Colon cancer Neg Hx     Colon polyps Neg Hx     Crohn's disease Neg Hx     Ulcerative colitis Neg Hx     Stomach cancer Neg Hx     Esophageal cancer Neg Hx      Social History     Tobacco Use    Smoking status: Never Smoker    Smokeless tobacco: Never Used   Substance Use Topics    Alcohol use: No     Frequency: Never     Drinks per session: Patient refused     Binge frequency: Never    Drug use: No       Review of Systems:  Constitutional/General:No fever, chills, change in appetite or weight loss.  Hematological/Immuno: no known coagulopathies  Respiratory: no shortness of breath  Cardiovascular: no chest pain  Gastrointestinal: no abdominal pain  Genito-Urinary: no dysuria  Musculoskeletal: negative  Skin: Negative for rash, itching, pigmentation changes, nail or hair changes.  Neurological: no TIA or stroke symptoms  Psychiatric: normal mood/affect, good insight/judgement      OBJECTIVE:     Vital Signs Range (Last 24H):  Temp:  [97.5 °F (36.4 °C)-98 °F (36.7 °C)]   Pulse:  [88-94]   Resp:  [16]   BP: (138-148)/(63-70)   SpO2:  [98 %-99 %]     Physical Exam:  General- Patient alert and oriented x3 in NAD  ENT- PERRLA,  Neck- No masses  CV- Regular rate and rhythm  Resp-  No increased WOB  GI- Non tender/non-distended  Extrem- No cyanosis, clubbing, edema.   Derm- No rashes, masses, or lesions noted  Neuro-  No focal deficits noted.     Physical Exam  Body mass index is 25.24 kg/m².    Scheduled Meds:   Continuous Infusions:   PRN Meds:    Allergies:   Review of patient's allergies indicates:    Allergen Reactions    Sulfa (sulfonamide antibiotics) Other (See Comments)     Unknown    Codeine Anxiety     Prefers to take Tylenol       Labs:  No results for input(s): INR in the last 168 hours.    Invalid input(s):  PT,  PTT  No results for input(s): WBC, HGB, HCT, MCV, PLT in the last 168 hours. No results for input(s): GLU, NA, K, CL, CO2, BUN, CREATININE, CALCIUM, MG, ALT, AST, ALBUMIN, BILITOT, BILIDIR in the last 168 hours.    Vitals (Most Recent):  Temp: 97.5 °F (36.4 °C) (11/05/20 1028)  Pulse: 88 (11/05/20 1028)  Resp: 16 (11/05/20 1028)  BP: (!) 148/70 (11/05/20 1028)  SpO2: 99 % (11/05/20 1028)    ASA: 3  Mallampati: 2    Consent obtained    ASSESSMENT/PLAN:     L lung nodule biopsy.  Moderate sedation.    Active Hospital Problems    Diagnosis  POA    Lung nodule < 6cm on CT [R91.1]  Yes      Resolved Hospital Problems   No resolved problems to display.           Johan Hernandez MD

## 2020-11-05 NOTE — PLAN OF CARE
During routine rounds, assessed pt for spiritual distress, and assured pt aware  of spiritual care available.  While providing reflective listening and compassionate presence, pt concerns were explored.  Mild distress was reported and presented regarding , and indicated by pt affect, feedback, request for prayer, and request for spiritual care.  Spiritual care also included pt a reading from scripture and playing a spiritual hymn from  phone.   Gratitude for spiritual wellness check was reported by pt.   Pt reported and presented with relational and dominic support.  Follow-up was offered upon request, and, though not indicated at this time, may be offered later at staff's discretion, should pt's conditions change, and/or if pt's length of stay continues significantly.

## 2020-12-01 NOTE — PLAN OF CARE
Injectafer administered, no reaction. Patient tolerated well. Patient accompanied by family member for discharge home. 24 gauge IV removed, catheter intact. No apparent distress noted. Discharge instructions given to patient. Patient understands instructions.

## 2020-12-01 NOTE — PROGRESS NOTES
PROGRESS NOTE    Subjective:       Patient ID: Destinee Luo is a 88 y.o. female.    Chief Complaint: follow up for urothelial carcinoma    Diagnosis:  1. Metastatic urothelial carcinoma, biopsy-proven lung mets on 11/5/2020  2. History of stage I endometrial cancer s/p RALH/BSO on 1/4/2018    Strata for urothelial carcinoma: positive for TSC1 p.Q951 mutation (55%), FGFR3 p.S249C mutation 29% (erdafitinib), CDKN2A deep deletion, KDM6A p.Q958 mutation, MDM2 amplifciation, TERT promoter mutation, ROEL, TMB-low.     Oncologic History:  1. Ms Luo is an 89 yo woman who initially saw me on 10/16/20 for further evaluation of lung nodules.  She underwent RALH/BSO in combo with a right ureteral resection and ureteroneocystostomy by Deanne Mann and Adan on 1/4/18 for a stage IBG3 (clinical) endometrial cancer, completed VB to 21 Gy on 6/7/18. She has been followed by Dr Arellano for non-invasive papillary urothelial carcinoma, has been managed conservatively. Every three months, she undergoes ureteroscopy with laser tumor ablation and ureteral stent exchange. The majority of the pathology showed non-invasive high-grade papillary urothelial carcinoma with the exception of pathology from 6/18/20 which showed high grade papillary urothelial carcinoma, features are focally suggestive of superficial invasion. She also has path from 2/7/2019 which showed R renal pelvis: high grade papillary urothelial carcinoma  Most recently, she underwent cystoureteroscopy with retrograde pyelogram and ureteral stent insertion (Right) and TURBT on 9/24/2020. There were 9 papillary bladder tumors noted. They were resected and the base of resection fulgurated. In 1 area on the right side of the bladder there is a small amount of superficial perivesical fat visible however.      CT urogram 10/2/2020 showed diffuse involvement of the right renal collecting system by the patient's known  urothelial carcinoma.  New moderate hydronephrosis is present.  Irregularity along the right posterior bladder wall raises consideration for bladder involvement.   New 1.5 cm mass within the left renal pelvis suspicious for neoplasm.  Thrombus is an additional consideration.     Hypodense material dependently within the urinary bladder likely representing thrombus.     New 6 mm right lower lobe nodule.  Pulmonary metastatic disease is not excluded.  Complete evaluation of the chest with chest CT is recommended.     Unchanged indeterminate left adrenal nodules, stable since 11/13/2017.     CT chest 10/7/2020: 1. Multiple solid pulmonary nodules, the largest of which measures 18 mm within the anterior aspect of the left upper lobe.  Given the patient's history of urothelial malignancy, metastatic disease cannot be excluded.  Consider additional evaluation with a PET-CT examination to assess for FDG hypermetabolism.  Ultimately, percutaneous biopsy may be necessary for a definitive diagnosis.  2. Evidence of prior granulomatous disease in the chest and upper abdomen.  3. Mildly enlarged mediastinal lymph nodes.  4. Prominent coronary artery calcifications and aortic valve calcifications.     This was followed by a PET scan on 10/15/20:  Redemonstration of irregular urothelial thickening of the right pelvicaliceal system extending to the urinary bladder, consistent with patient known urothelial cancer.  Compromised evaluation of the metabolic activity of the  tract on FDG PET.     Hypermetabolic pulmonary nodules, concerning for metastasis.  Partially calcified and noncalcified mediastinal and hilar lymph nodes, possibly representing metastases or inflammatory nature.  Tissue sampling would be required for definitive diagnosis.     Questionable hypermetabolic focus in the right hepatic lobe which may represent metastasis and hypermetabolic focus at T9 body, concerning for osseous metastasis.  Consider MRI for further  "evaluation.     She presents today for further management, accompanied by granddaughter, Ara. Has been very weak over the past few weeks. Has been on iron pills since July but is not able to tolerate it every day. Takes iron pills 3 times a week. Low appetite. Lost 30 lbs since Jan. +hematuria, urinary urgency and incontinence   ECOG: 3  Patient then received IV injectafer for DANIEL.   2. IR biopsy of lung nodule on 11/5/2020 showed "Metastatic carcinoma, compatible with urothelial primary.  COMMENT: Tumor cells are immunoreactive for CK7, GATA3, and p40 and are negative for synaptophysin. These findings support the above interpretation. This case has also been reviewed by Dr. Chuy Oneal who concurs with the above diagnosis."    Interval History:   Ms Luo returns to discuss biopsy result, accompanied by granddaughter, Ara. Energy level improved after the first dose of injectafer. Still weak.     ECOG: 3    ROS:   A ten-point system review is obtained and negative except for what was stated in the Interval History.     Physical Examination:   Vital signs reviewed.   General: well hydrated, in a wheelchair. Unable to get up from the wheelchair without assistance  HEENT: normocephalic, PERRLA, EOMI, anicteric sclerae, oropharynx clear  Neck: supple, no JVD, thyromegaly, cervical or supraclavicular lymphadenopathy  Lungs: clear breath sounds bilaterally, no wheezing, rales, or rhonchi  Heart: RRR, no M/R/G  Abdomen: soft, no tenderness, non-distended, no hepatosplenomegaly, mass, or hernia. BS present  Extremities: no clubbing, cyanosis, or edema  Skin: no rash, ulcer, or open wounds  Neuro: alert and oriented x 4, no focal neuro deficit  Psych: pleasant and appropriate mood and affect    Objective:     Laboratory Data:  Labs reviewed. Cr 1.1. Hb improved. CrCl 35 mL/min    Imaging Data:  PET scan 10/15/2020:  Impression:     Redemonstration of irregular urothelial thickening of the right pelvicaliceal system " extending to the urinary bladder, consistent with patient known urothelial cancer.  Compromised evaluation of the metabolic activity of the  tract on FDG PET.     Hypermetabolic pulmonary nodules, concerning for metastasis.  Partially calcified and noncalcified mediastinal and hilar lymph nodes, possibly representing metastases or inflammatory nature.  Tissue sampling would be required for definitive diagnosis.     Questionable hypermetabolic focus in the right hepatic lobe which may represent metastasis and hypermetabolic focus at T9 body, concerning for osseous metastasis.  Consider MRI for further evaluation    Assessment and Plan:     1. Ureteral cancer, right    2. Metastasis to liver    3. Metastasis to bone    4. Malignant neoplasm metastatic to lung, unspecified laterality    5. Iron deficiency anemia due to chronic blood loss    6. History of endometrial cancer    7. Aortic atherosclerosis      1-4  - Ms Major is an 89 yo woman with history of endometrial cancer and urothelial cancer. She was found to have hypermetabolic lung nodules, possible liver met and T9 met on PET scan. Biopsy of a lung nodule showed metastatic urothelial carcinoma. Strata: positive for TSC1 p.Q951 mutation (55%), FGFR3 p.S249C mutation 29% (erdafitinib), CDKN2A deep deletion, KDM6A p.Q958 mutation, MDM2 amplifciation, TERT promoter mutation, ROEL, TMB-low.   - performance status 3  - Long discussion with patient and granddaughter. Discussed the diagnosis of stage IV urothelial cancer. She is not a candidate for chemotherapy. Discussed upfront immunotherapy with keytruda 400 mg every 6 weeks. She will need a restaging scan prior to starting treatment as her last scan was almost 2 months ago. recc restaging after 2 cycles of keytruda (after 3 months). If tumor progresses on keytruda next line of treatment could be erdafitinib given FGFR3 mutation.   - The side effects of keytruda were discussed with patient, which include but are not  limited to fatigue, weakness, decreased appetite, nausea, vomiting, diarrhea, skin reactions and rashes which can be severe, flu-like symptoms, fevers, infusion reactions, pain at the site of infusion, inflammation of organs including stomach, bowels, liver, brain, nervous system, lungs, liver, kidney, thyroid, and death.  Handouts provided to patient.  - Patient and granddaughter want to transfer care to [facility]. Scheduled her to see Dr Gibbons later this week. Also messaged Dr Gibbons     5.  - s/p 1 dose of injectafer. Dose #2 today  - Hb improved  - may need to repeat injectafer in a couple of months given persistent intermittent hematuria    6.  - IRMA    7.  - monitor    Their multiple questions were answered in the clinic. Encouraged to call should issues arise.   I spent 40 minutes with the patient with at least 50% of the time on face-to-face counseling.         Electronically signed by Trish Kilpatrick

## 2020-12-02 NOTE — PROGRESS NOTES
Outpatient Care Management   - Low Risk Patient Assessment    Patient: Destinee Luo  MRN:  [id number]  Date of Service:  12/2/2020  Completed by:  Hannah Obrien LCSW  Referral Date: 11/24/2020  Program: OPCM Low Risk    Reason for Visit   Patient presents with    OPCM Chart Review    Social Work Assessment - Low/Mod Risk    Case Closure       Brief Summary: LCSW received referral for above patient from PCP office. LCSW completed SW low risk assessment with Pt's granddaughter, Ara (listed in Pt's contacts). Granddaughter is provides support and helps manages Pt's care. She reports Pt lives alone and reports Pt is independent with ADLs. Pt uses a walker to ambulate. Pt has two daughters who are also involved in Pt's care. Pt rotates spending her evenings at each of her daughter's homes. Pt's family provide transportation for her. Pt's grandchild denies Pt having difficulty affording food, shelter, medications or her medical care. She states Pt has no needs at present. Grandchild advised to reach out to Pt's PCP office if needs arise and she voiced understanding. Case closed.       Patient Summary     OPCM Social Work Assessment (Low/Moderate Risk)    General  Level of Caregiver support: Member independent and does not need caregiver assistance  Have you had to make a decision between paying for any of the following in the last 2 months?: None  Transportation means: Family  Employment status: Retired and not working  Do you have any of the following?: Caregiver make informed decisions on your behalf  Current symptoms: None  Assessments  Was the PHQ Depression Screening completed this visit?: No  Was the HARLEY-7 Screening completed this visit?: No         Complex Care Plan     Care plan was discussed and completed today with input from patient and/or caregiver.    Patient Instructions     No follow-ups on file.    Todays OPCM Self-Management Care Plan was developed with the patients/caregivers  input and was based on identified barriers from todays assessment.  Goals were written today with the patient/caregiver and the patient has agreed to work towards these goals to improve his/her overall well-being. Patient verbalized understanding of the care plan, goals, and all of today's instructions. Encouraged patient/caregiver to communicate with his/her physician and health care team about health conditions and the treatment plan.  Provided my contact information today and encouraged patient/caregiver to call me with any questions as needed.

## 2020-12-04 NOTE — PROGRESS NOTES
CONSULT NOTE    Subjective:       Patient ID: Destinee Luo is a 88 y.o. female.  MRN: [id number]  : 1931    Chief Complaint:   1. Metastatic urothelial carcinoma, biopsy-proven lung mets on 2020  2. History of stage I endometrial cancer s/p RALH/BSO on 2018      History of Present Illness:   Destinee Luo is a 88 y.o. female who is referred for  Metastatic urothelial cancer.  See oncology history for details.  She has been following up with Dr. Arellano for noninvasive papillary urothelial carcinoma that has been managed conservatively since .  However, over time, pathology showed high-grade papillary urothelial carcinoma in the ureter as well as the right renal pelvis.  CT urogram in October showed diffuse involvement of the right renal collecting system, irregularity along the posterior bladder wall oral as well as left renal pelvis mass suspicious for neoplasm.  She also developed a pulmonary nodules that were biopsied.  She saw Dr. Kilpatrick   after her lung biopsy that was consistent with metastatic urothelial carcinoma.  She is transferring care to the [facility] as she lives closer to here.    She presents with her granddaughter today to discuss further recommendations and goals of care.  She reports intermittent hematuria and is status post 2 doses of IV Injectafer with improving fatigue.  She is ambulating with a rolling walker within the house.  She reports anxiety and depression associated with her diagnosis and loss of control.  She was tearful several times throughout our visit.  He denies any pain today.  She denies any shortness of breath, nausea, vomiting, diarrhea or constipation.  She report a fair appetite and denies any changes in her weight or performance status.      Oncology History:  Per Previous notes and updated  1. Ms Luo is an 89 yo woman who presented to Dr. Kilaptrick with lung nodules.  She underwent RALH/BSO in combo with a  right ureteral resection and ureteroneocystostomy by Deanne Mann and Adan on 1/4/18 for a stage IBG3 (clinical) endometrial cancer, completed VB to 21 Gy on 6/7/18. She has been followed by Dr Arellano for non-invasive papillary urothelial carcinoma, has been managed conservatively. Every three months, she undergoes ureteroscopy with laser tumor ablation and ureteral stent exchange. The majority of the pathology showed non-invasive high-grade papillary urothelial carcinoma with the exception of pathology from 6/18/20 which showed high grade papillary urothelial carcinoma, features are focally suggestive of superficial invasion. She also has path from 2/7/2019 which showed R renal pelvis: high grade papillary urothelial carcinoma  Most recently, she underwent cystoureteroscopy with retrograde pyelogram and ureteral stent insertion (Right) and TURBT on 9/24/2020. There were 9 papillary bladder tumors noted. They were resected and the base of resection fulgurated. In 1 area on the right side of the bladder there is a small amount of superficial perivesical fat visible however.      CT urogram 10/2/2020 showed diffuse involvement of the right renal collecting system by the patient's known urothelial carcinoma.  New moderate hydronephrosis is present.  Irregularity along the right posterior bladder wall raises consideration for bladder involvement.   New 1.5 cm mass within the left renal pelvis suspicious for neoplasm.  Thrombus is an additional consideration.     Hypodense material dependently within the urinary bladder likely representing thrombus.     New 6 mm right lower lobe nodule.  Pulmonary metastatic disease is not excluded.  Complete evaluation of the chest with chest CT is recommended.     Unchanged indeterminate left adrenal nodules, stable since 11/13/2017.     CT chest 10/7/2020: 1. Multiple solid pulmonary nodules, the largest of which measures 18 mm within the anterior aspect of the left upper lobe.  " Given the patient's history of urothelial malignancy, metastatic disease cannot be excluded.  Consider additional evaluation with a PET-CT examination to assess for FDG hypermetabolism.  Ultimately, percutaneous biopsy may be necessary for a definitive diagnosis.  2. Evidence of prior granulomatous disease in the chest and upper abdomen.  3. Mildly enlarged mediastinal lymph nodes.  4. Prominent coronary artery calcifications and aortic valve calcifications.     This was followed by a PET scan on 10/15/20:  Redemonstration of irregular urothelial thickening of the right pelvicaliceal system extending to the urinary bladder, consistent with patient known urothelial cancer.  Compromised evaluation of the metabolic activity of the  tract on FDG PET.     Hypermetabolic pulmonary nodules, concerning for metastasis.  Partially calcified and noncalcified mediastinal and hilar lymph nodes, possibly representing metastases or inflammatory nature.  Tissue sampling would be required for definitive diagnosis.     Questionable hypermetabolic focus in the right hepatic lobe which may represent metastasis and hypermetabolic focus at T9 body, concerning for osseous metastasis.  Consider MRI for further evaluation.     She presents today for further management, accompanied by granddaughter, Ara. Has been very weak over the past few weeks. Has been on iron pills since July but is not able to tolerate it every day. Takes iron pills 3 times a week. Low appetite. Lost 30 lbs since Jan. +hematuria, urinary urgency and incontinence   ECOG: 3  Patient then received IV injectafer for DANIEL.   2. IR biopsy of lung nodule on 11/5/2020 showed "Metastatic carcinoma, compatible with urothelial primary.  COMMENT: Tumor cells are immunoreactive for CK7, GATA3, and p40 and are negative for synaptophysin. These findings support the above interpretation. This case has also been reviewed by Dr. Chuy Oneal who concurs with the above " "diagnosis."       History:  Past Medical History:   Diagnosis Date    Anticoagulant long-term use     stopped Plavix and ASA 11/2017    Cancer     uro    High cholesterol     Hyperlipidemia     Hypertension 09/22/2020    denies chest pain    Macular degeneration     PONV (postoperative nausea and vomiting)     S/P complete hysterectomy 01/04/2018    Stroke 2012    TIA, no deficits      Past Surgical History:   Procedure Laterality Date    ABLATION OF NEOPLASM OF BLADDER USING LASER  2/10/2020    Procedure: ABLATION, NEOPLASM, BLADDER, USING LASER;  Surgeon: Kiet Arellano MD;  Location: [facility];  Service: Urology;;    ABLATION OF NEOPLASM OF KIDNEY USING LASER Right 3/6/2019    Procedure: ABLATION, NEOPLASM, KIDNEY, USING LASER;  Surgeon: Kiet Arellano MD;  Location: [facility];  Service: Urology;  Laterality: Right;    BIOPSY OF URETER Right 2/10/2020    Procedure: BIOPSY, URETER;  Surgeon: Kiet Arellano MD;  Location: [facility];  Service: Urology;  Laterality: Right;    COLONOSCOPY  prior to 2008    CYSTOSCOPY W/ URETERAL STENT PLACEMENT Right 2/7/2019    Procedure: CYSTOSCOPY, WITH URETERAL STENT INSERTION, BIOPSY AND ABLATION RIGHT RENAL PELVIC TUMOR;  Surgeon: Kiet Arellano MD;  Location: [facility];  Service: Urology;  Laterality: Right;    CYSTOSCOPY W/ URETERAL STENT PLACEMENT Right 11/14/2019    Procedure: CYSTOSCOPY, WITH URETERAL STENT INSERTION;  Surgeon: Kiet Arellano MD;  Location: [facility];  Service: Urology;  Laterality: Right;    CYSTOSCOPY WITH URETEROSCOPY, RETROGRADE PYELOGRAPHY, AND INSERTION OF STENT  8/22/2019    Procedure: CYSTOSCOPY, WITH RETROGRADE PYELOGRAM AND URETERAL STENT INSERTION;  Surgeon: Kiet Arellano MD;  Location: [facility];  Service: Urology;;    CYSTOURETEROSCOPY WITH RETROGRADE PYELOGRAPHY AND INSERTION OF STENT INTO URETER Right 3/6/2019    Procedure: CYSTOURETEROSCOPY, WITH RETROGRADE PYELOGRAM AND URETERAL STENT INSERTION;  Surgeon: Kiet Arellano, " MD;  Location: [facility];  Service: Urology;  Laterality: Right;    CYSTOURETEROSCOPY WITH RETROGRADE PYELOGRAPHY AND INSERTION OF STENT INTO URETER Right 2/10/2020    Procedure: CYSTOURETEROSCOPY, WITH RETROGRADE PYELOGRAM AND URETERAL STENT EXCHANGE AND CALCULUS REMOVAL;  Surgeon: Kiet Arellano MD;  Location: [facility];  Service: Urology;  Laterality: Right;    CYSTOURETEROSCOPY WITH RETROGRADE PYELOGRAPHY AND INSERTION OF STENT INTO URETER Right 6/18/2020    Procedure: CYSTOURETEROSCOPY, WITH RETROGRADE PYELOGRAM AND URETERAL STENT INSERTION;  Surgeon: Kiet Arellano MD;  Location: [facility];  Service: Urology;  Laterality: Right;    EYE SURGERY      HYSTERECTOMY  01/04/2018    LASER LITHOTRIPSY  11/14/2019    Procedure: LITHOTRIPSY, USING LASER;  Surgeon: Kiet Arellano MD;  Location: [facility];  Service: Urology;;    LASER LITHOTRIPSY  9/24/2020    Procedure: LITHOTRIPSY, USING LASER;  Surgeon: Kiet Arellano MD;  Location: [facility];  Service: Urology;;    RETROGRADE PYELOGRAPHY  11/14/2019    Procedure: PYELOGRAM, RETROGRADE;  Surgeon: Kiet Arellano MD;  Location: [facility];  Service: Urology;;    URETERAL REIMPLANTION      URETERAL STENT PLACEMENT      URETEROSCOPY      URETEROSCOPY Bilateral 2/7/2019    Procedure: URETEROSCOPY; bilateral retorgrade pyelogram;  Surgeon: Kiet Arellano MD;  Location: [facility];  Service: Urology;  Laterality: Bilateral;    URETEROSCOPY Right 5/23/2019    Procedure: URETEROSCOPY;  Surgeon: Kiet Arellano MD;  Location: [facility];  Service: Urology;  Laterality: Right;    URETEROSCOPY Right 8/22/2019    Procedure: URETEROSCOPY; laser ablation of tumor;  Surgeon: Kiet Arellano MD;  Location: [facility];  Service: Urology;  Laterality: Right;    URETEROSCOPY Right 11/14/2019    Procedure: URETEROSCOPY;  Surgeon: Kiet Arellano MD;  Location: [facility];  Service: Urology;  Laterality: Right;    URETEROSCOPY  6/18/2020    Procedure: ATTEMPTED URETEROSCOPY;  Surgeon:  "Kiet Arellano MD;  Location: [facility];  Service: Urology;;     Family History   Problem Relation Age of Onset    Cancer Mother 60        melanoma    Colon cancer Neg Hx     Colon polyps Neg Hx     Crohn's disease Neg Hx     Ulcerative colitis Neg Hx     Stomach cancer Neg Hx     Esophageal cancer Neg Hx       Social History     Tobacco Use    Smoking status: Never Smoker    Smokeless tobacco: Never Used   Substance and Sexual Activity    Alcohol use: No     Frequency: Never     Drinks per session: Patient refused     Binge frequency: Never    Drug use: No    Sexual activity: Never        ROS:   Review of Systems   Constitutional: Positive for malaise/fatigue. Negative for fever and weight loss.   HENT: Negative for congestion, hearing loss, nosebleeds and sore throat.    Eyes: Negative for double vision and photophobia.   Respiratory: Negative for cough, hemoptysis, sputum production, shortness of breath and wheezing.    Cardiovascular: Negative for chest pain, palpitations, orthopnea and leg swelling.   Gastrointestinal: Negative for abdominal pain, blood in stool, constipation, diarrhea, heartburn, nausea and vomiting.   Genitourinary: Positive for hematuria. Negative for dysuria and urgency.   Musculoskeletal: Negative for back pain, joint pain and myalgias.   Skin: Negative for itching and rash.   Neurological: Negative for dizziness, tingling, seizures, weakness and headaches.   Endo/Heme/Allergies: Negative for polydipsia. Does not bruise/bleed easily.   Psychiatric/Behavioral: Positive for depression. Negative for memory loss. The patient is nervous/anxious. The patient does not have insomnia.         Objective:     Vitals:    12/04/20 1301   BP: (!) 144/65   Pulse: 85   Resp: 18   Temp: 98.3 °F (36.8 °C)   TempSrc: Temporal   SpO2: 100%   Weight: 64.2 kg (141 lb 8.6 oz)   Height: 5' 3" (1.6 m)   PainSc: 0-No pain       Physical Examination:   Physical Exam   Constitutional: She is oriented to " person, place, and time. No distress.   Frail woman in a wheelchair   HENT:   Head: Normocephalic.   Mouth/Throat: Oropharynx is clear and moist. No oropharyngeal exudate.   Eyes: Conjunctivae and EOM are normal. No scleral icterus.   Neck: Neck supple. No thyromegaly present.   Cardiovascular: Normal rate, regular rhythm and normal heart sounds.   No murmur heard.  Pulmonary/Chest: Effort normal. No stridor. No respiratory distress. She has no wheezes. She has no rales. She exhibits no tenderness.   Abdominal: Soft. Bowel sounds are normal. She exhibits no distension and no mass. There is no abdominal tenderness. There is no rebound.   Musculoskeletal: Normal range of motion.         General: No tenderness, deformity or edema.   Lymphadenopathy:     She has no cervical adenopathy.   Neurological: She is alert and oriented to person, place, and time. No cranial nerve deficit. Coordination normal.   Skin: Skin is warm and dry. No rash noted. She is not diaphoretic. No erythema.   Psychiatric: Memory, affect and judgment normal.   Tearful, depressed at times   Nursing note and vitals reviewed.       Diagnostic Tests:  Significant Imaging: I have reviewed and interpreted all pertinent imaging results/findings.  CT Chest Without Contrast No results found for this or any previous visit.  CT Chest With Contrast  Results for orders placed during the hospital encounter of 10/07/20   CT Chest With Contrast    Narrative EXAMINATION:  CT CHEST WITH CONTRAST    CLINICAL HISTORY:  urothelial ca and pulm nodule on recent CT urogram; Malignant neoplasm of urinary organ, unspecified    TECHNIQUE:  Following the intravenous administration of 75 cc of Omnipaque 350 contrast material, 1.25 mm low dose contiguous axial images were acquired through the chest.  Subsequently, 2 dimensional coronal and sagittal reconstructed images were generated from the source data.  Axial MIP images of the lungs were also generated.    COMPARISON:  CT  urogram-10/02/2020 and chest x-ray-10/02/2020    FINDINGS:  The soft tissue structures at the base of the neck are unremarkable.  There is atherosclerotic calcification present within the thoracic aorta and aortic annulus.  There are prominent coronary artery calcifications.  There are mitral annulus calcifications.  There are aortic valve calcifications.  No significant volume of pericardial fluid.  No cardiac enlargement.    There is an 11 x 14 mm anterior mediastinal soft tissue density, most likely a mildly prominent pre-vascular lymph node on series 2, image 34.  There is a 10 x 23 mm right paratracheal lymph node on series 2, image 39.  There are multiple calcified nonenlarged mediastinal and left hilar lymph nodes present.    There are a few scattered calcified granulomas observed in the chest, the largest of which measures 8 mm in the subpleural left lower lobe on series 4, image 323.  The most significant abnormality relates to the presence of an approximately 15 x 18 x 14 mm solid irregular noncalcified nodule within the anterior left upper lobe on series 4, image 165 and series 603, image 61.  There is also an 8 mm solid nodule present in the posterolateral subpleural right lower lobe on series 4, image 279, unchanged when compared with the previous CT.    No emphysematous lung architecture.  No bronchiectasis.  No airspace consolidation.  No significant volume of pleural fluid or pneumothorax.    The visualized upper abdominal soft tissues are unremarkable.  There are calcified granulomas present in the spleen.  There is a 12 mm left adrenal nodule present which measured 22 HU on the non-contrast portion of the CT urogram examination and which measures 64 HU on today's contrast enhanced exam (series 2, image 84).  There is a 2nd 12 mm left adrenal nodule on series 2, image 89 which previously measured 22 HU on the non-contrast exam and which measures 66 HU on today's study.  The remaining visualized  abdominal soft tissues are unremarkable.    There is degenerative change of the thoracic spine.  No osseous metastatic disease appreciated on the provided images.  No definite acute rib fractures.      Impression 1. Multiple solid pulmonary nodules, the largest of which measures 18 mm within the anterior aspect of the left upper lobe.  Given the patient's history of urothelial malignancy, metastatic disease cannot be excluded.  Consider additional evaluation with a PET-CT examination to assess for FDG hypermetabolism.  Ultimately, percutaneous biopsy may be necessary for a definitive diagnosis.  2. Evidence of prior granulomatous disease in the chest and upper abdomen.  3. Mildly enlarged mediastinal lymph nodes.  4. Prominent coronary artery calcifications and aortic valve calcifications.  This report was flagged in Epic as abnormal.      Electronically signed by: Cameron Bennett MD  Date:    10/07/2020  Time:    15:24     CT Abdomen/Pelvis Without Contrast No results found for this or any previous visit.   CT Abdomen/Pelvis With Contrast No results found for this or any previous visit.  CT Abdomen/Pelvis With Without Contrast No results found for this or any previous visit.   PET Scan No results found for this or any previous visit.    Laboratory Data:  All pertinent labs have been reviewed.    Labs:   Lab Results   Component Value Date    WBC 5.67 12/01/2020    HGB 10.5 (L) 12/01/2020    HCT 34.7 (L) 12/01/2020    MCV 78 (L) 12/01/2020     12/01/2020       Assessment/Plan:   Ureteral cancer, right  Secondary liver cancer  Malignant neoplasm metastatic to lung, unspecified laterality  Secondary malignant neoplasm of bone    She has a history of endometrial cancer in urothelial cancer diagnosed since 2018.  More recently, she has developed metastatic disease to the lung with possible liver and T9 metastasis on PET scan.  Biopsy of the lung nodule is consistent with metastatic urothelial carcinoma.  Strata for  urothelial carcinoma: positive for TSC1 p.Q951 mutation (55%), FGFR3 p.S249C mutation 29% (erdafitinib), CDKN2A deep deletion, KDM6A p.Q958 mutation, MDM2 amplifciation, TERT promoter mutation, ROEL, TMB-low.     Goals of care and treatment recommendations were discussed at length with the patient and her granddaughter.  All treatment is palliative.  Not undergoing treatment is also an option.  She is not a candidate for cytotoxic chemotherapy.  She may tolerate upfront pembrolizumab 400 mg every 6 weeks.  This has already been discussed by  and additional questions were answered.  We will set her up to undergo repeat CT scans prior to starting treatment to get new baselines.  We will set her up for immune therapy education class prior to initiating treatment.  The patient wants to start treatment after holidays so I will schedule CT scans the last week of December and see her in the 1st week of January initiate treatment as tolerated.    If she tolerates Keytruda poorly or has disease progression, erdafinib can be considered given FGFR3 mutation.    Iron deficiency anemia due to chronic blood loss  Secondary to hematuria.  Status post 2 doses of Injectafer.  Repeat CBC and iron panel prior to her next visit and repeat a course of IV iron as clinically indicated.      Anxiety  She is taking low-dose Xanax twice a day and it appears to be helping.  She has been speaking to her  as well.  She declined any additional help today.    Endometrial cancer    stage IBG3 (clinical) endometrial cancer, status post AMOS/BSO completed VB to 21 Gy on 6/7/18.    ECOG SCORE    3 - Capable of only limited selfcare, confined to bed or chair more than 50% of waking hours           Discussion:   Follow up in about 1 month (around 1/4/2021) for MD, Lab Results, Chemo, Infusion.    Plan was discussed with the patient at length, and she verbalized understanding. Destinee was given an opportunity to ask questions that were  answered to her satisfaction, and she was advised to call in the interval if any problems or questions arise.    Electronically signed by Dolores Gibbons MD

## 2020-12-04 NOTE — LETTER
December 4, 2020      Trish Kilpatrick MD  [address] [facility] [address]           [facility] [address] [address]  Phone: [phone]  Fax: [phone]          Patient: Destinee Luo   MR Number: [id number]   YOB: 1931   Date of Visit: 12/4/2020       Dear Dr. Trish Kilpatrick:    Thank you for referring Destinee Luo to me for evaluation. Attached you will find relevant portions of my assessment and plan of care.    If you have questions, please do not hesitate to call me. I look forward to following Destinee Luo along with you.    Sincerely,    Dolores Gibbons MD    Enclosure  CC:  No Recipients    If you would like to receive this communication electronically, please contact [email or web address] or [phone] to request more information on [employer] Link access.    For providers and/or their staff who would like to refer a patient to [facility], please contact us through our one-stop-shop provider referral line, [facility], at [phone].    If you feel you have received this communication in error or would no longer like to receive these types of communications, please e-mail [email or web address]

## 2020-12-04 NOTE — Clinical Note
Labs, ct scans and keytruda teaching the last week of December ( same day please)   See me for keytruda clearance and infusion first week of January

## 2020-12-09 PROBLEM — C68.9 MALIGNANT NEOPLASM OF URINARY ORGAN, UNSPECIFIED: Status: ACTIVE | Noted: 2020-01-01

## 2020-12-09 PROBLEM — C78.00 MALIGNANT NEOPLASM METASTATIC TO LUNG: Status: ACTIVE | Noted: 2020-01-01

## 2020-12-09 NOTE — PROGRESS NOTES
[3:13 PM] MAE Luo [id number] needs to start on 1/4 - pending insurance .  She will see Dr. Gibbons at 1:30 then start    ?[3:18 PM] Sandy Reina      mrn [id number] @2:30

## 2020-12-09 NOTE — PLAN OF CARE
START OFF PATHWAY REGIMEN - Bladder            Pembrolizumab (Keytruda)           Additional Orders: Serious immune-mediated adverse events can occur with   pembrolizumab. Please monitor your patient and refer to the linked   immune-mediated adverse reaction management materials for more information.    **Always confirm dose/schedule in your pharmacy ordering system**    Patient Characteristics:  Advanced/Metastatic Disease, First Line, No Prior Platinum-Based Therapy, Poor   Renal Function (CrCl < 50 mL/min), Low PD-L1 Expression  Therapeutic Status: Advanced/Metastatic Disease  Line of Therapy: First Line  Prior [place]-Based Therapy<= No  Renal Function: Poor Renal Function (CrCl < 50 mL/min)  PD-L1 Expression Status: Low PD-L1 Expression  Intent of Therapy:  Non-Curative / Palliative Intent, Discussed with Patient

## 2020-12-15 NOTE — PROGRESS NOTES
Refill Routing Note   Medication(s) are not appropriate for processing by [facility] for the following reason(s):     - Outside of protocol  [facility] action(s):  Route        Medication reconciliation completed: No   Automatic Epic Generated Protocol Data:        Requested Prescriptions   Pending Prescriptions Disp Refills    ALPRAZolam (XANAX) 0.25 MG tablet [Pharmacy Med Name: alprazolam 0.25 mg tablet] 45 tablet 2     Sig: TAKE ONE TABLET BY MOUTH TWICE DAILY FOR ANXIETY       There is no refill protocol information for this order           Appointments  past 12m or future 3m with PCP    Date Provider   Last Visit   7/10/2020 NATIVIDAD Hinojosa MD   Next Visit   1/18/2021 NATIVIDAD Hinojosa MD   ED visits in past 90 days: 0        Note composed:11:14 AM 12/15/2020

## 2020-12-15 NOTE — TELEPHONE ENCOUNTER
Spoke w/ Adali @ [facility] who states they will not need any orders from PCP and have already sent off necessary paperwork. Will contact us if they need anything else.

## 2020-12-22 NOTE — PROGRESS NOTES
Oncology Nutrition   [employer]otherapy [employer] Education  Destinee Luo   12/16/1931    Nutrition Education   This is a 89 y.o.female with a medical diagnosis of ureteral cancer. PMHx reviewed. Pt with multiple reports of surgical procedures s/t current diagnosis. Pt is about to start immunotherapy of Keytruda. Pt currently does not identify as being at nutritional risk nor food insecurity at this time.     CBW: 140lbs    Met with pt and pt's daughter for new pt education. Reviewed role of Nutrition during cancer treatment. Educated on food safety and common nutrition impact symptoms associated with chemotherapy treatment. Reinforced the importance of good hydration. Handouts provided.    Answered all nutrition related questions.     Patient provided with dietitian contact number and advised to call with questions or make future appointment if further intervention is needed. RD to follow throughout tx.    MARIA WHITLEY MA, RD, LDN  12/22/2020  12:29 PM

## 2020-12-23 PROBLEM — F32.9 REACTIVE DEPRESSION: Status: ACTIVE | Noted: 2020-01-01

## 2020-12-23 NOTE — PROGRESS NOTES
Subjective:       Patient ID: Destinee Luo is a 89 y.o. female.    Chief Complaint: Immunotherapy education    HPI  This is an 89 year old WF known to Dr. Gibobns for a recent diagnosis of Urothelial cancer with lung metastasis.    She had been following up with Dr. Arellano for noninvasive papillary urothelial carcinoma that has been managed conservatively since 2018.  However, over time, pathology showed high-grade papillary urothelial carcinoma in the ureter as well as the right renal pelvis.  CT urogram in October showed diffuse involvement of the right renal collecting system, irregularity along the posterior bladder wall oral as well as left renal pelvis mass suspicious for neoplasm.  She also developed a pulmonary nodules that were biopsied.  She saw Dr. Kilpatrick after her lung biopsy that was consistent with metastatic urothelial carcinoma.  She has transferred care to the [place] as she lives closer to here.    Oncology History   Malignant neoplasm of urinary organ, unspecified   1/3/2020 -  Chemotherapy    Treatment Summary   Plan Name: OP PEMBROLIZUMAB 400MG Q6W  Treatment Goal: Control  Status: Active  Start Date: 1/3/2020 (Planned)  End Date: 7/31/2020 (Planned)  Provider: Dolores Gibbons MD  Chemotherapy: pembrolizumab (KEYTRUDA) 400 mg in sodium chloride 0.9% 100 mL chemo infusion, 400 mg (original dose ), Intravenous, Clinic/HOD 1 time, 0 of 6 cycles  Dose modification: 400 mg (Cycle 1, Reason: Other (see comments), Comment: give q 42 days )     12/9/2020 Initial Diagnosis    Malignant neoplasm of urinary organ, unspecified     Malignant neoplasm metastatic to lung   1/3/2020 -  Chemotherapy    Treatment Summary   Plan Name: OP PEMBROLIZUMAB 400MG Q6W  Treatment Goal: Control  Status: Active  Start Date: 1/3/2020 (Planned)  End Date: 7/31/2020 (Planned)  Provider: Dolores Gibbons MD  Chemotherapy: pembrolizumab (KEYTRUDA) 400 mg in sodium chloride 0.9% 100 mL chemo infusion, 400 mg (original dose ),  Intravenous, Clinic/HOD 1 time, 0 of 6 cycles  Dose modification: 400 mg (Cycle 1, Reason: Other (see comments), Comment: give q 42 days )     12/9/2020 Initial Diagnosis    Malignant neoplasm metastatic to lung         Review of Systems   Constitutional: Positive for activity change, fatigue and unexpected weight change.   Gastrointestinal: Positive for abdominal pain.   Genitourinary: Positive for hematuria.   Psychiatric/Behavioral: The patient is nervous/anxious.    All other systems reviewed and are negative.        Objective:      Physical Exam  Constitutional:       Comments: Frail, in a wheelchair   HENT:      Head: Normocephalic and atraumatic.   Eyes:      Conjunctiva/sclera: Conjunctivae normal.      Pupils: Pupils are equal, round, and reactive to light.   Cardiovascular:      Rate and Rhythm: Normal rate and regular rhythm.      Pulses: Normal pulses.      Heart sounds: Normal heart sounds.   Pulmonary:      Effort: Pulmonary effort is normal.      Breath sounds: Normal breath sounds.   Abdominal:      General: Bowel sounds are normal.      Palpations: Abdomen is soft.   Skin:     General: Skin is warm and dry.      Capillary Refill: Capillary refill takes less than 2 seconds.   Neurological:      Mental Status: She is alert and oriented to person, place, and time.      Motor: Weakness present.   Psychiatric:      Comments: Tearful, depressed         Assessment:       1. Ureteral cancer, right    2. Malignant neoplasm of urinary organ, unspecified    3. Iron deficiency anemia due to chronic blood loss    4. Reactive depression    5. Anxiety        Plan:       1.  Treatment plan of Keytruda every 6 weeks discussed with patient and daughter.  2.  Handout provided from [employer] on immunotherapy agent.    3.  Discussed the mechanism of action, potential side effects of this treatment as well as ways to manage them at home.   4.  Dietary modifications discussed.  Detail instructions to be provided by  dietician.   5.  Chemotherapy portfolio supplied with contact information.   6.  Discussed follow-up with the physician for toxicity monitoring throughout treatment.    7.  The patient and daughter verbalized understanding. All questions were answered and an informed consent was obtained.   8.  Referral to Oncology psych for evaluation.    Assessment/plan reviewed and approved by Dr. Singleton.

## 2021-01-01 ENCOUNTER — PATIENT MESSAGE (OUTPATIENT)
Dept: HEMATOLOGY/ONCOLOGY | Facility: CLINIC | Age: 86
End: 2021-01-01

## 2021-01-01 ENCOUNTER — OFFICE VISIT (OUTPATIENT)
Dept: GYNECOLOGIC ONCOLOGY | Facility: CLINIC | Age: 86
End: 2021-01-01
Payer: MEDICARE

## 2021-01-01 ENCOUNTER — DOCUMENT SCAN (OUTPATIENT)
Dept: HOME HEALTH SERVICES | Facility: HOSPITAL | Age: 86
End: 2021-01-01
Payer: MEDICARE

## 2021-01-01 ENCOUNTER — EXTERNAL HOME HEALTH (OUTPATIENT)
Dept: HOME HEALTH SERVICES | Facility: HOSPITAL | Age: 86
End: 2021-01-01
Payer: MEDICARE

## 2021-01-01 ENCOUNTER — OFFICE VISIT (OUTPATIENT)
Dept: PSYCHIATRY | Facility: CLINIC | Age: 86
End: 2021-01-01
Payer: MEDICARE

## 2021-01-01 ENCOUNTER — PATIENT MESSAGE (OUTPATIENT)
Dept: UROLOGY | Facility: CLINIC | Age: 86
End: 2021-01-01

## 2021-01-01 ENCOUNTER — OFFICE VISIT (OUTPATIENT)
Dept: FAMILY MEDICINE | Facility: CLINIC | Age: 86
End: 2021-01-01
Payer: MEDICARE

## 2021-01-01 ENCOUNTER — INFUSION (OUTPATIENT)
Dept: INFUSION THERAPY | Facility: HOSPITAL | Age: 86
End: 2021-01-01
Attending: INTERNAL MEDICINE
Payer: MEDICARE

## 2021-01-01 ENCOUNTER — TELEPHONE (OUTPATIENT)
Dept: HEMATOLOGY/ONCOLOGY | Facility: CLINIC | Age: 86
End: 2021-01-01

## 2021-01-01 ENCOUNTER — SPECIALTY PHARMACY (OUTPATIENT)
Dept: PHARMACY | Facility: CLINIC | Age: 86
End: 2021-01-01

## 2021-01-01 ENCOUNTER — OFFICE VISIT (OUTPATIENT)
Dept: UROLOGY | Facility: CLINIC | Age: 86
End: 2021-01-01
Payer: MEDICARE

## 2021-01-01 ENCOUNTER — DOCUMENTATION ONLY (OUTPATIENT)
Dept: INFUSION THERAPY | Facility: HOSPITAL | Age: 86
End: 2021-01-01

## 2021-01-01 ENCOUNTER — LAB VISIT (OUTPATIENT)
Dept: LAB | Facility: HOSPITAL | Age: 86
End: 2021-01-01
Attending: FAMILY MEDICINE
Payer: MEDICARE

## 2021-01-01 ENCOUNTER — HOSPITAL ENCOUNTER (OUTPATIENT)
Dept: RADIOLOGY | Facility: HOSPITAL | Age: 86
Discharge: HOME | End: 2021-03-29
Attending: INTERNAL MEDICINE
Payer: MEDICARE

## 2021-01-01 ENCOUNTER — PATIENT OUTREACH (OUTPATIENT)
Dept: HOME HEALTH SERVICES | Facility: HOSPITAL | Age: 86
End: 2021-01-01

## 2021-01-01 ENCOUNTER — OFFICE VISIT (OUTPATIENT)
Dept: HEMATOLOGY/ONCOLOGY | Facility: CLINIC | Age: 86
End: 2021-01-01
Payer: MEDICARE

## 2021-01-01 ENCOUNTER — PATIENT OUTREACH (OUTPATIENT)
Dept: ADMINISTRATIVE | Facility: OTHER | Age: 86
End: 2021-01-01

## 2021-01-01 VITALS
OXYGEN SATURATION: 99 % | TEMPERATURE: 98 F | HEART RATE: 91 BPM | DIASTOLIC BLOOD PRESSURE: 69 MMHG | SYSTOLIC BLOOD PRESSURE: 157 MMHG | RESPIRATION RATE: 16 BRPM | BODY MASS INDEX: 25.44 KG/M2 | HEIGHT: 62 IN | WEIGHT: 138.25 LBS

## 2021-01-01 VITALS
DIASTOLIC BLOOD PRESSURE: 60 MMHG | WEIGHT: 139.56 LBS | HEART RATE: 89 BPM | BODY MASS INDEX: 25.52 KG/M2 | SYSTOLIC BLOOD PRESSURE: 133 MMHG

## 2021-01-01 VITALS
BODY MASS INDEX: 26.25 KG/M2 | HEART RATE: 80 BPM | HEIGHT: 62 IN | SYSTOLIC BLOOD PRESSURE: 120 MMHG | WEIGHT: 142.63 LBS | DIASTOLIC BLOOD PRESSURE: 52 MMHG

## 2021-01-01 VITALS
WEIGHT: 139.56 LBS | HEIGHT: 63 IN | RESPIRATION RATE: 18 BRPM | BODY MASS INDEX: 24.73 KG/M2 | TEMPERATURE: 98 F | DIASTOLIC BLOOD PRESSURE: 60 MMHG | SYSTOLIC BLOOD PRESSURE: 116 MMHG | HEART RATE: 87 BPM

## 2021-01-01 VITALS
BODY MASS INDEX: 25.44 KG/M2 | HEART RATE: 80 BPM | DIASTOLIC BLOOD PRESSURE: 63 MMHG | TEMPERATURE: 98 F | HEIGHT: 62 IN | SYSTOLIC BLOOD PRESSURE: 138 MMHG | RESPIRATION RATE: 18 BRPM | WEIGHT: 138.25 LBS | OXYGEN SATURATION: 99 %

## 2021-01-01 VITALS
HEIGHT: 63 IN | HEART RATE: 80 BPM | DIASTOLIC BLOOD PRESSURE: 56 MMHG | WEIGHT: 139.56 LBS | TEMPERATURE: 98 F | SYSTOLIC BLOOD PRESSURE: 116 MMHG | RESPIRATION RATE: 18 BRPM | BODY MASS INDEX: 24.73 KG/M2

## 2021-01-01 VITALS
BODY MASS INDEX: 24.73 KG/M2 | SYSTOLIC BLOOD PRESSURE: 116 MMHG | WEIGHT: 139.56 LBS | HEART RATE: 92 BPM | DIASTOLIC BLOOD PRESSURE: 51 MMHG | OXYGEN SATURATION: 99 % | TEMPERATURE: 98 F | HEIGHT: 63 IN | RESPIRATION RATE: 17 BRPM

## 2021-01-01 DIAGNOSIS — D50.0 IRON DEFICIENCY ANEMIA DUE TO CHRONIC BLOOD LOSS: ICD-10-CM

## 2021-01-01 DIAGNOSIS — C68.9 MALIGNANT NEOPLASM OF URINARY ORGAN, UNSPECIFIED: ICD-10-CM

## 2021-01-01 DIAGNOSIS — N18.30 STAGE 3 CHRONIC KIDNEY DISEASE, UNSPECIFIED WHETHER STAGE 3A OR 3B CKD: ICD-10-CM

## 2021-01-01 DIAGNOSIS — K21.9 GASTROESOPHAGEAL REFLUX DISEASE WITHOUT ESOPHAGITIS: ICD-10-CM

## 2021-01-01 DIAGNOSIS — F43.23 ADJUSTMENT DISORDER WITH MIXED ANXIETY AND DEPRESSED MOOD: ICD-10-CM

## 2021-01-01 DIAGNOSIS — H35.3222 EXUDATIVE AGE-RELATED MACULAR DEGENERATION, LEFT EYE, WITH INACTIVE CHOROIDAL NEOVASCULARIZATION: ICD-10-CM

## 2021-01-01 DIAGNOSIS — C78.00 MALIGNANT NEOPLASM METASTATIC TO LUNG, UNSPECIFIED LATERALITY: ICD-10-CM

## 2021-01-01 DIAGNOSIS — F43.23 ADJUSTMENT DISORDER WITH MIXED ANXIETY AND DEPRESSED MOOD: Primary | ICD-10-CM

## 2021-01-01 DIAGNOSIS — R93.89 ABNORMAL FINDINGS ON DIAGNOSTIC IMAGING OF OTHER SPECIFIED BODY STRUCTURES: ICD-10-CM

## 2021-01-01 DIAGNOSIS — C66.1 URETERAL CANCER, RIGHT: Primary | ICD-10-CM

## 2021-01-01 DIAGNOSIS — I10 ESSENTIAL HYPERTENSION: ICD-10-CM

## 2021-01-01 DIAGNOSIS — C66.1 URETERAL CANCER, RIGHT: ICD-10-CM

## 2021-01-01 DIAGNOSIS — R31.0 GROSS HEMATURIA: ICD-10-CM

## 2021-01-01 DIAGNOSIS — F32.9 REACTIVE DEPRESSION: ICD-10-CM

## 2021-01-01 DIAGNOSIS — R60.0 LOWER EXTREMITY EDEMA: ICD-10-CM

## 2021-01-01 DIAGNOSIS — C54.1 ENDOMETRIAL ADENOCARCINOMA: ICD-10-CM

## 2021-01-01 DIAGNOSIS — C78.7 SECONDARY LIVER CANCER: ICD-10-CM

## 2021-01-01 DIAGNOSIS — C68.9 MALIGNANT NEOPLASM OF URINARY ORGAN, UNSPECIFIED: Primary | ICD-10-CM

## 2021-01-01 DIAGNOSIS — R04.2 COUGH WITH HEMOPTYSIS: ICD-10-CM

## 2021-01-01 DIAGNOSIS — F41.9 ANXIETY: ICD-10-CM

## 2021-01-01 DIAGNOSIS — C78.00 MALIGNANT NEOPLASM METASTATIC TO LUNG, UNSPECIFIED LATERALITY: Primary | ICD-10-CM

## 2021-01-01 DIAGNOSIS — F32.9 REACTIVE DEPRESSION: Primary | ICD-10-CM

## 2021-01-01 DIAGNOSIS — F33.0 MILD EPISODE OF RECURRENT MAJOR DEPRESSIVE DISORDER: ICD-10-CM

## 2021-01-01 DIAGNOSIS — C54.1 ENDOMETRIAL ADENOCARCINOMA: Primary | ICD-10-CM

## 2021-01-01 DIAGNOSIS — C68.9 UROTHELIAL CANCER: Primary | ICD-10-CM

## 2021-01-01 DIAGNOSIS — I70.0 AORTIC ATHEROSCLEROSIS: ICD-10-CM

## 2021-01-01 DIAGNOSIS — C79.51 SECONDARY MALIGNANT NEOPLASM OF BONE: ICD-10-CM

## 2021-01-01 DIAGNOSIS — R93.89 ABNORMAL FINDINGS ON DIAGNOSTIC IMAGING OF OTHER SPECIFIED BODY STRUCTURES: Primary | ICD-10-CM

## 2021-01-01 DIAGNOSIS — F41.9 ANXIETY: Primary | ICD-10-CM

## 2021-01-01 DIAGNOSIS — E44.1 MILD PROTEIN-CALORIE MALNUTRITION: ICD-10-CM

## 2021-01-01 LAB
BASOPHILS # BLD AUTO: 0.05 K/UL (ref 0–0.2)
BASOPHILS NFR BLD: 0.7 % (ref 0–1.9)
DIFFERENTIAL METHOD: ABNORMAL
EOSINOPHIL # BLD AUTO: 0.2 K/UL (ref 0–0.5)
EOSINOPHIL NFR BLD: 3.3 % (ref 0–8)
ERYTHROCYTE [DISTWIDTH] IN BLOOD BY AUTOMATED COUNT: 21.7 % (ref 11.5–14.5)
HCT VFR BLD AUTO: 37.8 % (ref 37–48.5)
HGB BLD-MCNC: 11.5 G/DL (ref 12–16)
IMM GRANULOCYTES # BLD AUTO: 0.04 K/UL (ref 0–0.04)
IMM GRANULOCYTES NFR BLD AUTO: 0.6 % (ref 0–0.5)
LYMPHOCYTES # BLD AUTO: 1.1 K/UL (ref 1–4.8)
LYMPHOCYTES NFR BLD: 16.9 % (ref 18–48)
MCH RBC QN AUTO: 26.5 PG (ref 27–31)
MCHC RBC AUTO-ENTMCNC: 30.4 G/DL (ref 32–36)
MCV RBC AUTO: 87 FL (ref 82–98)
MONOCYTES # BLD AUTO: 1.1 K/UL (ref 0.3–1)
MONOCYTES NFR BLD: 16.2 % (ref 4–15)
NEUTROPHILS # BLD AUTO: 4.2 K/UL (ref 1.8–7.7)
NEUTROPHILS NFR BLD: 62.3 % (ref 38–73)
NRBC BLD-RTO: 0 /100 WBC
PLATELET # BLD AUTO: 320 K/UL (ref 150–350)
PMV BLD AUTO: 10.2 FL (ref 9.2–12.9)
RBC # BLD AUTO: 4.34 M/UL (ref 4–5.4)
WBC # BLD AUTO: 6.67 K/UL (ref 3.9–12.7)

## 2021-01-01 PROCEDURE — 99214 OFFICE O/P EST MOD 30 MIN: CPT | Mod: 95,,, | Performed by: UROLOGY

## 2021-01-01 PROCEDURE — 99215 OFFICE O/P EST HI 40 MIN: CPT | Mod: S$PBB,,, | Performed by: INTERNAL MEDICINE

## 2021-01-01 PROCEDURE — 90791 PR PSYCHIATRIC DIAGNOSTIC EVALUATION: ICD-10-PCS | Mod: S$PBB,,, | Performed by: PSYCHOLOGIST

## 2021-01-01 PROCEDURE — 99215 PR OFFICE/OUTPT VISIT, EST, LEVL V, 40-54 MIN: ICD-10-PCS | Mod: S$PBB,,, | Performed by: INTERNAL MEDICINE

## 2021-01-01 PROCEDURE — 99999 PR PBB SHADOW E&M-EST. PATIENT-LVL IV: CPT | Mod: PBBFAC,,, | Performed by: INTERNAL MEDICINE

## 2021-01-01 PROCEDURE — 99999 PR PBB SHADOW E&M-EST. PATIENT-LVL IV: CPT | Mod: PBBFAC,,, | Performed by: FAMILY MEDICINE

## 2021-01-01 PROCEDURE — 71260 CT THORAX DX C+: CPT | Mod: TC,PO

## 2021-01-01 PROCEDURE — G0179 MD RECERTIFICATION HHA PT: HCPCS | Mod: ,,, | Performed by: FAMILY MEDICINE

## 2021-01-01 PROCEDURE — 99214 OFFICE O/P EST MOD 30 MIN: CPT | Mod: S$PBB,,, | Performed by: OBSTETRICS & GYNECOLOGY

## 2021-01-01 PROCEDURE — 63600175 PHARM REV CODE 636 W HCPCS: Mod: JG,PN | Performed by: INTERNAL MEDICINE

## 2021-01-01 PROCEDURE — 36415 COLL VENOUS BLD VENIPUNCTURE: CPT | Mod: PO

## 2021-01-01 PROCEDURE — 99999 PR PBB SHADOW E&M-EST. PATIENT-LVL IV: ICD-10-PCS | Mod: PBBFAC,,, | Performed by: INTERNAL MEDICINE

## 2021-01-01 PROCEDURE — 96413 CHEMO IV INFUSION 1 HR: CPT | Mod: PN

## 2021-01-01 PROCEDURE — 90837 PSYTX W PT 60 MINUTES: CPT | Mod: S$PBB,,, | Performed by: PSYCHOLOGIST

## 2021-01-01 PROCEDURE — 99214 PR OFFICE/OUTPT VISIT, EST, LEVL IV, 30-39 MIN: ICD-10-PCS | Mod: S$PBB,,, | Performed by: FAMILY MEDICINE

## 2021-01-01 PROCEDURE — 90837 PR PSYCHOTHERAPY W/PATIENT, 60 MIN: ICD-10-PCS | Mod: S$PBB,,, | Performed by: PSYCHOLOGIST

## 2021-01-01 PROCEDURE — 71260 CT CHEST ABDOMEN PELVIS WITH CONTRAST (XPD): ICD-10-PCS | Mod: 26,,, | Performed by: RADIOLOGY

## 2021-01-01 PROCEDURE — 99442 PR PHYSICIAN TELEPHONE EVALUATION 11-20 MIN: ICD-10-PCS | Mod: 95,,, | Performed by: UROLOGY

## 2021-01-01 PROCEDURE — 74177 CT CHEST ABDOMEN PELVIS WITH CONTRAST (XPD): ICD-10-PCS | Mod: 26,,, | Performed by: RADIOLOGY

## 2021-01-01 PROCEDURE — 99214 OFFICE O/P EST MOD 30 MIN: CPT | Mod: PBBFAC,PO | Performed by: FAMILY MEDICINE

## 2021-01-01 PROCEDURE — 99214 PR OFFICE/OUTPT VISIT, EST, LEVL IV, 30-39 MIN: ICD-10-PCS | Mod: S$PBB,,, | Performed by: OBSTETRICS & GYNECOLOGY

## 2021-01-01 PROCEDURE — 85025 COMPLETE CBC W/AUTO DIFF WBC: CPT

## 2021-01-01 PROCEDURE — G0179 PR HOME HEALTH MD RECERTIFICATION: ICD-10-PCS | Mod: ,,, | Performed by: FAMILY MEDICINE

## 2021-01-01 PROCEDURE — 99213 OFFICE O/P EST LOW 20 MIN: CPT | Mod: PBBFAC | Performed by: OBSTETRICS & GYNECOLOGY

## 2021-01-01 PROCEDURE — 99999 PR PBB SHADOW E&M-EST. PATIENT-LVL I: CPT | Mod: PBBFAC,,, | Performed by: PSYCHOLOGIST

## 2021-01-01 PROCEDURE — 99999 PR PBB SHADOW E&M-EST. PATIENT-LVL III: ICD-10-PCS | Mod: PBBFAC,,, | Performed by: OBSTETRICS & GYNECOLOGY

## 2021-01-01 PROCEDURE — 99442 PR PHYSICIAN TELEPHONE EVALUATION 11-20 MIN: CPT | Mod: 95,,, | Performed by: UROLOGY

## 2021-01-01 PROCEDURE — 99211 OFF/OP EST MAY X REQ PHY/QHP: CPT | Mod: PBBFAC,PO | Performed by: PSYCHOLOGIST

## 2021-01-01 PROCEDURE — 99214 OFFICE O/P EST MOD 30 MIN: CPT | Mod: PBBFAC,PN | Performed by: INTERNAL MEDICINE

## 2021-01-01 PROCEDURE — 99999 PR PBB SHADOW E&M-EST. PATIENT-LVL III: CPT | Mod: PBBFAC,,, | Performed by: OBSTETRICS & GYNECOLOGY

## 2021-01-01 PROCEDURE — 99214 OFFICE O/P EST MOD 30 MIN: CPT | Mod: PBBFAC,PO | Performed by: INTERNAL MEDICINE

## 2021-01-01 PROCEDURE — 74177 CT ABD & PELVIS W/CONTRAST: CPT | Mod: TC,PO

## 2021-01-01 PROCEDURE — 25000003 PHARM REV CODE 250: Mod: PN | Performed by: INTERNAL MEDICINE

## 2021-01-01 PROCEDURE — 74177 CT ABD & PELVIS W/CONTRAST: CPT | Mod: 26,,, | Performed by: RADIOLOGY

## 2021-01-01 PROCEDURE — 99999 PR PBB SHADOW E&M-EST. PATIENT-LVL IV: ICD-10-PCS | Mod: PBBFAC,,, | Performed by: FAMILY MEDICINE

## 2021-01-01 PROCEDURE — A9698 NON-RAD CONTRAST MATERIALNOC: HCPCS | Mod: PO | Performed by: INTERNAL MEDICINE

## 2021-01-01 PROCEDURE — 99214 PR OFFICE/OUTPT VISIT, EST, LEVL IV, 30-39 MIN: ICD-10-PCS | Mod: 95,,, | Performed by: UROLOGY

## 2021-01-01 PROCEDURE — 90791 PSYCH DIAGNOSTIC EVALUATION: CPT | Mod: S$PBB,,, | Performed by: PSYCHOLOGIST

## 2021-01-01 PROCEDURE — 99214 OFFICE O/P EST MOD 30 MIN: CPT | Mod: S$PBB,,, | Performed by: FAMILY MEDICINE

## 2021-01-01 PROCEDURE — 71260 CT THORAX DX C+: CPT | Mod: 26,,, | Performed by: RADIOLOGY

## 2021-01-01 PROCEDURE — 99999 PR PBB SHADOW E&M-EST. PATIENT-LVL I: ICD-10-PCS | Mod: PBBFAC,,, | Performed by: PSYCHOLOGIST

## 2021-01-01 PROCEDURE — 25500020 PHARM REV CODE 255: Mod: PO | Performed by: INTERNAL MEDICINE

## 2021-01-01 PROCEDURE — 90837 PSYTX W PT 60 MINUTES: CPT | Mod: PBBFAC,PO | Performed by: PSYCHOLOGIST

## 2021-01-01 PROCEDURE — A4216 STERILE WATER/SALINE, 10 ML: HCPCS | Mod: PN | Performed by: INTERNAL MEDICINE

## 2021-01-01 RX ORDER — EPINEPHRINE 0.3 MG/.3ML
0.3 INJECTION SUBCUTANEOUS ONCE AS NEEDED
Status: CANCELLED | OUTPATIENT
Start: 2021-01-01

## 2021-01-01 RX ORDER — SODIUM CHLORIDE 0.9 % (FLUSH) 0.9 %
10 SYRINGE (ML) INJECTION
Status: CANCELLED | OUTPATIENT
Start: 2021-01-01

## 2021-01-01 RX ORDER — HEPARIN 100 UNIT/ML
5 SYRINGE INTRAVENOUS
Status: CANCELLED | OUTPATIENT
Start: 2021-01-01

## 2021-01-01 RX ORDER — HEPARIN 100 UNIT/ML
500 SYRINGE INTRAVENOUS
Status: CANCELLED | OUTPATIENT
Start: 2021-01-01

## 2021-01-01 RX ORDER — SODIUM CHLORIDE 0.9 % (FLUSH) 0.9 %
10 SYRINGE (ML) INJECTION
Status: DISCONTINUED | OUTPATIENT
Start: 2021-01-01 | End: 2021-01-01 | Stop reason: HOSPADM

## 2021-01-01 RX ORDER — AMLODIPINE BESYLATE 2.5 MG/1
TABLET ORAL
Qty: 90 TABLET | Refills: 3 | Status: SHIPPED | OUTPATIENT
Start: 2021-01-01

## 2021-01-01 RX ORDER — SODIUM CHLORIDE 9 MG/ML
INJECTION, SOLUTION INTRAVENOUS CONTINUOUS
Status: CANCELLED | OUTPATIENT
Start: 2021-01-01

## 2021-01-01 RX ORDER — DIPHENHYDRAMINE HYDROCHLORIDE 50 MG/ML
50 INJECTION INTRAMUSCULAR; INTRAVENOUS ONCE AS NEEDED
Status: CANCELLED | OUTPATIENT
Start: 2021-01-01

## 2021-01-01 RX ORDER — METHYLPREDNISOLONE SOD SUCC 125 MG
125 VIAL (EA) INJECTION ONCE AS NEEDED
Status: CANCELLED | OUTPATIENT
Start: 2021-01-01

## 2021-01-01 RX ORDER — PANTOPRAZOLE SODIUM 40 MG/1
TABLET, DELAYED RELEASE ORAL
Qty: 90 TABLET | Refills: 3 | Status: SHIPPED | OUTPATIENT
Start: 2021-01-01

## 2021-01-01 RX ORDER — VENLAFAXINE HYDROCHLORIDE 37.5 MG/1
37.5 CAPSULE, EXTENDED RELEASE ORAL DAILY
Qty: 30 CAPSULE | Refills: 2 | Status: SHIPPED | OUTPATIENT
Start: 2021-01-01 | End: 2021-01-01 | Stop reason: SINTOL

## 2021-01-01 RX ORDER — INFLUENZA A VIRUS A/MICHIGAN/45/2015 X-275 (H1N1) ANTIGEN (FORMALDEHYDE INACTIVATED), INFLUENZA A VIRUS A/SINGAPORE/INFIMH-16-0019/2016 IVR-186 (H3N2) ANTIGEN (FORMALDEHYDE INACTIVATED), INFLUENZA B VIRUS B/PHUKET/3073/2013 ANTIGEN (FORMALDEHYDE INACTIVATED), AND INFLUENZA B VIRUS B/MARYLAND/15/2016 BX-69A ANTIGEN (FORMALDEHYDE INACTIVATED) 60; 60; 60; 60 UG/.7ML; UG/.7ML; UG/.7ML; UG/.7ML
INJECTION, SUSPENSION INTRAMUSCULAR
Status: ON HOLD | COMMUNITY
Start: 2020-01-01 | End: 2021-01-01 | Stop reason: CLARIF

## 2021-01-01 RX ORDER — ALPRAZOLAM 0.25 MG/1
0.25 TABLET ORAL 2 TIMES DAILY PRN
Qty: 60 TABLET | Refills: 1 | Status: SHIPPED | OUTPATIENT
Start: 2021-01-01 | End: 2021-01-01

## 2021-01-01 RX ADMIN — SODIUM CHLORIDE 400 MG: 9 INJECTION, SOLUTION INTRAVENOUS at 12:02

## 2021-01-01 RX ADMIN — Medication 10 ML: at 04:01

## 2021-01-01 RX ADMIN — IOHEXOL 75 ML: 350 INJECTION, SOLUTION INTRAVENOUS at 10:03

## 2021-01-01 RX ADMIN — SODIUM CHLORIDE: 0.9 INJECTION, SOLUTION INTRAVENOUS at 02:01

## 2021-01-01 RX ADMIN — SODIUM CHLORIDE 400 MG: 9 INJECTION, SOLUTION INTRAVENOUS at 03:01

## 2021-01-01 RX ADMIN — IOHEXOL 1000 ML: 9 SOLUTION ORAL at 10:03

## 2021-01-01 RX ADMIN — SODIUM CHLORIDE: 0.9 INJECTION, SOLUTION INTRAVENOUS at 12:02

## 2021-01-04 PROBLEM — C78.7 SECONDARY LIVER CANCER: Status: ACTIVE | Noted: 2021-01-01

## 2021-01-04 PROBLEM — C54.1 ENDOMETRIAL ADENOCARCINOMA: Status: ACTIVE | Noted: 2021-01-01

## 2021-01-04 PROBLEM — C79.51 SECONDARY MALIGNANT NEOPLASM OF BONE: Status: ACTIVE | Noted: 2021-01-01

## 2021-01-19 PROBLEM — H35.3222 EXUDATIVE AGE-RELATED MACULAR DEGENERATION, LEFT EYE, WITH INACTIVE CHOROIDAL NEOVASCULARIZATION: Status: ACTIVE | Noted: 2021-01-01

## 2021-01-19 PROBLEM — F33.0 MILD EPISODE OF RECURRENT MAJOR DEPRESSIVE DISORDER: Status: ACTIVE | Noted: 2021-01-01

## 2021-02-01 PROBLEM — F43.22 ADJUSTMENT DISORDER WITH ANXIETY: Status: ACTIVE | Noted: 2021-01-01

## 2021-02-01 PROBLEM — F43.23 ADJUSTMENT DISORDER WITH MIXED ANXIETY AND DEPRESSED MOOD: Status: ACTIVE | Noted: 2021-01-01

## 2021-02-22 PROBLEM — R60.0 LOWER EXTREMITY EDEMA: Status: ACTIVE | Noted: 2021-01-01

## 2021-02-22 PROBLEM — C79.51 SECONDARY MALIGNANT NEOPLASM OF BONE: Status: RESOLVED | Noted: 2021-01-01 | Resolved: 2021-01-01

## 2021-04-12 PROBLEM — R04.2 COUGH WITH HEMOPTYSIS: Status: ACTIVE | Noted: 2021-01-01

## 2021-04-12 PROBLEM — R31.0 GROSS HEMATURIA: Status: ACTIVE | Noted: 2021-01-01

## 2021-04-19 NOTE — PLAN OF CARE
Patient prefers to have Ara present for discharge.   <-------- Click here to INCLUDE CoVID-19 Discharge Instructions

## 2021-04-24 PROBLEM — D64.9 SYMPTOMATIC ANEMIA: Status: ACTIVE | Noted: 2021-01-01

## 2021-04-25 PROBLEM — Z71.89 ACP (ADVANCE CARE PLANNING): Status: ACTIVE | Noted: 2021-01-01

## 2021-04-25 PROBLEM — M25.552 BILATERAL HIP PAIN: Status: ACTIVE | Noted: 2021-01-01

## 2021-04-25 PROBLEM — D50.0 CHRONIC HEMORRHAGIC ANEMIA: Status: ACTIVE | Noted: 2021-01-01

## 2021-04-25 PROBLEM — M25.551 BILATERAL HIP PAIN: Status: ACTIVE | Noted: 2021-01-01

## 2021-05-06 ENCOUNTER — DOCUMENT SCAN (OUTPATIENT)
Dept: HOME HEALTH SERVICES | Facility: HOSPITAL | Age: 86
End: 2021-05-06
Payer: MEDICARE

## 2021-05-07 ENCOUNTER — PATIENT MESSAGE (OUTPATIENT)
Dept: GYNECOLOGIC ONCOLOGY | Facility: CLINIC | Age: 86
End: 2021-05-07

## 2023-08-25 NOTE — DISCHARGE SUMMARY
Radiology Discharge Summary      Hospital Course: No complications    Admit Date: 11/5/2020  Discharge Date: 11/05/2020     Instructions Given to Patient: Yes  Diet: Resume prior diet  Activity: activity as tolerated and no driving for today    Description of Condition on Discharge: Stable  Vital Signs (Most Recent): Temp: 97.5 °F (36.4 °C) (11/05/20 1028)  Pulse: 84 (11/05/20 1225)  Resp: 14 (11/05/20 1225)  BP: (!) 152/66 (11/05/20 1225)  SpO2: 100 % (11/05/20 1225)    Discharge Disposition: Home    Discharge Diagnosis: lung nodule     Follow-up: per oncology    @SIG@  
obese

## 2023-11-20 NOTE — ASSESSMENT & PLAN NOTE
Patient is calling back to speak with MA regarding medication dosage. Patient is asking to please call in the afternoon.    Patient reports an event with numbness which took place about 5 years ago.  Denies any neurologic symptoms since this event.  She was on aspirin therapy for many years and then start on dual antiplatelet therapy as a preventive measure.  I would recommend holding aspirin and clopidogrel until post-operative bleeding risk low.  Unclear if she needs dual antiplatelet therapy if she never failed treatment with aspirin.  Recommend patient discuss switching back to monotherapy with Dr. Cassidy unless she has a stronger indication to also treat with clopidogrel.

## 2024-05-02 NOTE — PROGRESS NOTES
Pharmacy Treatment Plan Review    Patient  Destinee Luo, 89 y.o.  12/16/1931    Indication: NSCLC     History:  1. Metastatic urothelial carcinoma, biopsy-proven lung mets on 11/5/2020  2. History of stage I endometrial cancer s/p RALH/BSO on 1/4/2018    Labs:  CBC  Lab Results   Component Value Date    WBC 5.97 12/28/2020    HGB 12.1 12/28/2020    HCT 38.8 12/28/2020    MCV 83 12/28/2020     12/28/2020       BMP  Lab Results   Component Value Date     (L) 12/01/2020    K 3.8 12/01/2020     12/01/2020    CO2 23 12/01/2020    BUN 27 (H) 12/01/2020    CREATININE 1.0 12/28/2020    CALCIUM 8.4 (L) 12/01/2020    ANIONGAP 8 12/01/2020    ESTGFRAFRICA 58 (A) 12/28/2020    EGFRNONAA 50 (A) 12/28/2020       LFTs  Lab Results   Component Value Date    ALT 14 12/01/2020    AST 14 12/01/2020    ALKPHOS 70 12/01/2020    BILITOT 0.2 12/01/2020       The patient is scheduled to start the following infusion:   OP PEMBROLIZUMAB 400MG Q6W    42-day cycle for 6 cycles of:    Chemotherapy:   pembrolizumab (KEYTRUDA) 400 mg in sodium chloride 0.9% 100 mL chemo infusion, Intravenous, on day 1    The treatment plan per [facility]     Neri Bashir  PharmD   PAST MEDICAL HISTORY:  No pertinent past medical history

## 2025-04-15 NOTE — PATIENT INSTRUCTIONS
Hernia (Adult)    A hernia can happen when there is a weakness or defect in the wall of the abdomen or groin. Intestines or nearby tissues may move from their usual location and push through the weakness in the wall. This can cause a hernia (bulge) you may see or feel.  Causes and Risk Factors   A hernia may be present at birth. Or it may be caused by the wear and tear of daily living. Certain factors can make a hernia more likely. These can include:  · Heavy lifting  · Straining, whether from lifting, movement, or constipation  · Chronic cough  · Injury to the abdominal wall  · Excess weight  · Pregnancy  · Prior surgery  · Older age  · Family history of hernia  Symptoms  Symptoms of a hernia may come on suddenly. Or they may appear slowly over time. Some common symptoms include:  · Bulge in the groin area, around the navel, or in the scrotum (the bulge may get bigger when you stand and go away when you lie down)  · Pain or pressure around the bulge  · Pain during activities such as lifting, coughing, or sneezing  · A feeling of weakness or pressure in the groin  · Pain or swelling in the scrotum  Types of hernias  There are different types of hernia. The type you have depends on its location:  · Inguinal: This type is in the groin or scrotum. It is more common in men.  · Femoral: This type is in the groin, upper thigh (where the leg bends), or labia. It is more common in women.  · Ventral: This type is in the abdominal wall.  · Umbilical: This type occurs around the navel (belly button).  · Incisional: This type occurs at the site of a previous surgery.  The condition of the hernia can help determine how urgently it needs to be treated.  · Reducible: It goes back in by itself, or it can be pushed back in.  · Irreducible: It cant be pushed back in.  · Incarcerated/Strangulated: The intestine is trapped (incarcerated). If this happens, you wont be able to push the bulge back in. If the incarcerated hernia isnt  Cardiology Infectious Disease Infectious Disease Infectious Disease Internal Medicine Internal Medicine Neurology Pulmonology Cardiology Infectious Disease Infectious Disease Infectious Disease Internal Medicine Internal Medicine Pulmonology treated, it may become strangulated. This means the area loses blood supply and the tissue may die. This requires emergency surgery! Treatment is needed right away!  In most cases, a hernia will not heal on its own. Surgery is usually needed to repair the defect in the abdominal wall or groin. Youll be told more about surgery, if needed.  If your symptoms are not severe, treatment may sometimes be delayed. In such cases, regular follow-up visits with the provider will be needed. Youll be asked to keep track of your symptoms and to watch for signs of more serious problems. You may also be given guidelines similar to the home care instructions below.  Home Care  To help keep a hernia from getting worse, you may be advised to:  · Avoid heavy lifting and straining as directed.  · Take steps to prevent constipation, such as eating more fiber and drinking more water. This may help reduce straining that can occur when having a bowel movement. Reducing straining may help keep your symptoms from getting worse.  · Maintain a healthy weight or lose excess weight. This can help reduce strain on abdominal muscles and tissues.  · Stop smoking. This can help prevent coughing that may also strain abdominal muscles and tissues.  Follow-up care  Follow up with your healthcare provider, or as directed. If imaging tests were done, they will be reviewed a doctor. You will be told the results and any new findings that may affect your care.  When to seek medical advice  Call your healthcare provider right away if any of these occur:  · Hernia hardens, swells, or grows larger  · Hernia can no longer be pushed back in  · Pain moves to the lower right abdomen (just below the waistline), or spreads to the back  Call 911  Call 911 right away if any of these occur:  · Nausea and vomiting  · Severe pain, redness, or tenderness in the area near the hernia  · Pain worsens quickly and doesnt get better  · Inability to have a bowel movement or  Cardiology Cardiology Cardiology Infectious Disease Infectious Disease Internal Medicine Internal Medicine Internal Medicine Orthopedics pass gas  · Fever of 100.4°F (38°C) or higher  · Trouble breathing  · Fainting  · Rapid heart rate  · Vomiting blood  · Large amounts of blood in stool  Date Last Reviewed: 6/9/2015  © 5513-9723 [employer]. [address] [place] [address], [place] [address]. All rights reserved. This information is not intended as a substitute for professional medical care. Always follow your healthcare professional's instructions.         Cardiology Cardiology Cardiology Internal Medicine Internal Medicine Orthopedics Pulmonology Pulmonology Internal Medicine Pulmonology Pulmonology Internal Medicine Pulmonology Internal Medicine Internal Medicine Pain Medicine Pain Medicine Internal Medicine Internal Medicine

## (undated) DEVICE — BAG URINARY DRN 2000ML

## (undated) DEVICE — GLOVE BIOGEL SKINSENSE PI 7.0

## (undated) DEVICE — Device

## (undated) DEVICE — CONTAINER SPECIMEN STRL 4OZ

## (undated) DEVICE — GUIDE WIRE MOTION .035 X 150CM

## (undated) DEVICE — GAUZE SPONGE 4X4 12PLY

## (undated) DEVICE — FORCEP BIOPSY BACKLOADING

## (undated) DEVICE — SOL 9P NACL IRR PIC IL

## (undated) DEVICE — WIRE GUIDE 0.038OLD

## (undated) DEVICE — GUIDEWIRE NITINOL HYBRID 150CM

## (undated) DEVICE — SET IRR URLGY 2LINE UNIV SPIKE

## (undated) DEVICE — DRESSING TELFA N ADH 3X8

## (undated) DEVICE — DRESSING TEGADERM 2X2 3/4

## (undated) DEVICE — FIBER 272 MICRON HOLMIUM

## (undated) DEVICE — CATH URETERAL DUAL LUMEN 10FR

## (undated) DEVICE — BRUSH SCRUB SURGICALW/BETADINE

## (undated) DEVICE — SOL PVP-I SCRUB 7.5% 4OZ

## (undated) DEVICE — DEV-O-LOOPS MAXI RED

## (undated) DEVICE — DEVICE ANC SW STAT FOLEY 6-24

## (undated) DEVICE — CATH CONE TIP

## (undated) DEVICE — SUT STRATAFIX SPIRAL 20CM

## (undated) DEVICE — SUT POLY CV-6 30 TT-13

## (undated) DEVICE — CATH URTRL OPEN END STR TIP 5F

## (undated) DEVICE — TRAY DRY SKIN SCRUB PREP

## (undated) DEVICE — CATH URETERAL POLY CONE TI

## (undated) DEVICE — SOL BETADINE 5%

## (undated) DEVICE — SUT MONOCRYL 2-0 UR6 UNDYED

## (undated) DEVICE — SCRUB 10% POVIDONE IODINE 4OZ

## (undated) DEVICE — SOL IRR NACL .9% 3000ML

## (undated) DEVICE — UNDERGLOVES BIOGEL PI SZ 7 LF

## (undated) DEVICE — EXTRACTOR TIPLESS 2.4FRX1115CM

## (undated) DEVICE — EXTRACTOR STONE 4WR NIT 2.2F 1

## (undated) DEVICE — SEE MEDLINE ITEM 152622

## (undated) DEVICE — ELECTRODE RESECTSCP HI 24FR

## (undated) DEVICE — DRAPE ARM DAVINCI XI

## (undated) DEVICE — IRRIGATOR ENDOSCOPY DISP.

## (undated) DEVICE — SHEATH FLEXOR URET 10.7FRX35CM

## (undated) DEVICE — SET BASIN 48X48IN 6000ML RING

## (undated) DEVICE — SEAL UNIVERSAL 5MM-8MM XI

## (undated) DEVICE — ASSEMBLY HARMONIC ACE 8MM

## (undated) DEVICE — DRAIN CHANNEL ROUND 19FR

## (undated) DEVICE — SOL CLEARIFY VISUALIZATION LAP

## (undated) DEVICE — SOL NS 1000CC

## (undated) DEVICE — HEMOSTAT SURGICEL 4X8IN

## (undated) DEVICE — SHEATH URTR ACC FLX 12FRX35CM

## (undated) DEVICE — EVACUATOR WOUND BULB 100CC

## (undated) DEVICE — SUT ETHILON 2-0 FS 18IN BLK

## (undated) DEVICE — PORT ACCESS 8MM W/120MM LOW

## (undated) DEVICE — JELLY KY LUBRICATING 5G PACKET

## (undated) DEVICE — SUT ABS CLIP LAPRA-TY CTD

## (undated) DEVICE — SYR 10CC LUER LOCK

## (undated) DEVICE — SUT VICRYL 3-0 27 SH

## (undated) DEVICE — SEE MEDLINE ITEM 152487

## (undated) DEVICE — SPONGE GELFOAM 12-7MM

## (undated) DEVICE — TROCAR ENDOPATH XCEL 12X100MM

## (undated) DEVICE — SUT CTD VICRYL 0 UND BR SUT

## (undated) DEVICE — LOOP VESSEL BLUE MAXI

## (undated) DEVICE — NDL SAFETY 21G X 1 1/2 ECLPSE

## (undated) DEVICE — PORT ACCESS 5MM W/120MM

## (undated) DEVICE — DRAPE STERI LONG

## (undated) DEVICE — KIT WING PAD POSITIONING

## (undated) DEVICE — DRESSING TELFA PAD N ADH 2X3

## (undated) DEVICE — DRESSING TRANS 2X2 TEGADERM

## (undated) DEVICE — TAPE MEDIPORE 3 X 10YD

## (undated) DEVICE — SEE MEDLINE ITEM 152186

## (undated) DEVICE — SPONGE DERMACEA GAUZE 4X4

## (undated) DEVICE — EVACUATOR BLADDER UROVAC ADPT

## (undated) DEVICE — STAPLER SKIN ROTATING HEAD

## (undated) DEVICE — SUT VICRYL+ 2-0 UR6 27 VIOL

## (undated) DEVICE — CAP IV DUAL FUNCTION

## (undated) DEVICE — SPONGE COTTON TRAY 4X4IN

## (undated) DEVICE — STAPLER SKIN PROXIMATE WIDE

## (undated) DEVICE — CATH URETHRAL RED RUBBER 12FR

## (undated) DEVICE — POSITIONER HEAD ADULT

## (undated) DEVICE — CATH 5FR OPEN END URETERAL

## (undated) DEVICE — SUT EASE CROSSBOW CLSR SYS

## (undated) DEVICE — COVER TIP CURVED SCISSORS XI

## (undated) DEVICE — BAG TISSUE RETRIEVAL 225ML

## (undated) DEVICE — GLOVE BIOGEL SKINSENSE PI 6.0

## (undated) DEVICE — LOOP VESSEL YELLOW MAXI

## (undated) DEVICE — NDL SAFETY 22G X 1.5 ECLIPSE

## (undated) DEVICE — SLEEVE SCD EXPRESS CALF MEDIUM

## (undated) DEVICE — DRAPE COLUMN DAVINCI XI

## (undated) DEVICE — SYR 50ML CATH TIP

## (undated) DEVICE — CLIP HEMO-LOK MLX LARGE LF

## (undated) DEVICE — SUT SILK 3-0 BLK BR SH 30IN

## (undated) DEVICE — GLOVE PROTEXIS NEOPRENE 7.5

## (undated) DEVICE — SUT VICRYL PLUS ANTIBACT

## (undated) DEVICE — OBTURATOR BLADELESS 8MM XI

## (undated) DEVICE — ELECTRODE REM PLYHSV RETURN 9

## (undated) DEVICE — KIT SURGIFLO HEMOSTATIC MATRIX

## (undated) DEVICE — SOL ELECTROLUBE ANTI-STIC

## (undated) DEVICE — SEE MEDLINE ITEM 156923

## (undated) DEVICE — APPLIER LAPRATY SUT CLIP 33CM

## (undated) DEVICE — GLOVE BIOGEL SKINSENSE PI 6.5

## (undated) DEVICE — INSERT CUSHIONPRONE VIEW LARGE

## (undated) DEVICE — MANIPULATOR VCARE PLUS 34MM

## (undated) DEVICE — BAG TISSUE RETRIEVAL 5MM

## (undated) DEVICE — SET TRI-LUMEN FILTERED TUBE

## (undated) DEVICE — SOL WATER STRL IRR 1000ML

## (undated) DEVICE — SUT CTD VICRYL 2.0

## (undated) DEVICE — SUT PDS II 1 CT VIL MONO 36

## (undated) DEVICE — NDL INSUF ULTRA VERESS 120MM

## (undated) DEVICE — UNDERGLOVE BIOGEL PI SZ 6.5 LF

## (undated) DEVICE — SYR 30CC LUER LOCK

## (undated) DEVICE — BRUSH 3FR CYTOLOGY

## (undated) DEVICE — SOL IRR WATER STRL 3000 ML

## (undated) DEVICE — CATH POLLACK OPEN-END FLEXI-TI

## (undated) DEVICE — SUT 0 54IN COATED VICRYL U